# Patient Record
Sex: FEMALE | Race: WHITE | NOT HISPANIC OR LATINO | Employment: UNEMPLOYED | URBAN - METROPOLITAN AREA
[De-identification: names, ages, dates, MRNs, and addresses within clinical notes are randomized per-mention and may not be internally consistent; named-entity substitution may affect disease eponyms.]

---

## 2017-01-03 ENCOUNTER — OFFICE VISIT (OUTPATIENT)
Dept: FAMILY MEDICINE CLINIC | Facility: CLINIC | Age: 62
End: 2017-01-03

## 2017-01-03 VITALS
RESPIRATION RATE: 16 BRPM | HEIGHT: 61 IN | BODY MASS INDEX: 25.11 KG/M2 | TEMPERATURE: 98.7 F | WEIGHT: 133 LBS | SYSTOLIC BLOOD PRESSURE: 118 MMHG | HEART RATE: 85 BPM | DIASTOLIC BLOOD PRESSURE: 70 MMHG

## 2017-01-03 DIAGNOSIS — I10 ESSENTIAL HYPERTENSION: Primary | ICD-10-CM

## 2017-01-03 DIAGNOSIS — E78.49 OTHER HYPERLIPIDEMIA: ICD-10-CM

## 2017-01-03 DIAGNOSIS — F41.9 ANXIETY: ICD-10-CM

## 2017-01-03 DIAGNOSIS — Z12.11 SCREEN FOR COLON CANCER: ICD-10-CM

## 2017-01-03 DIAGNOSIS — K21.9 GASTROESOPHAGEAL REFLUX DISEASE, ESOPHAGITIS PRESENCE NOT SPECIFIED: ICD-10-CM

## 2017-01-03 PROBLEM — D70.9 NEUTROPENIA: Status: ACTIVE | Noted: 2017-01-03

## 2017-01-03 PROCEDURE — 99214 OFFICE O/P EST MOD 30 MIN: CPT | Performed by: FAMILY MEDICINE

## 2017-01-03 RX ORDER — METOPROLOL SUCCINATE 50 MG/1
50 TABLET, EXTENDED RELEASE ORAL DAILY
Qty: 90 TABLET | Refills: 1 | Status: SHIPPED | OUTPATIENT
Start: 2017-01-03 | End: 2017-06-28 | Stop reason: SDUPTHER

## 2017-01-03 RX ORDER — DULOXETIN HYDROCHLORIDE 60 MG/1
60 CAPSULE, DELAYED RELEASE ORAL NIGHTLY
Qty: 90 CAPSULE | Refills: 1 | Status: SHIPPED | OUTPATIENT
Start: 2017-01-03 | End: 2017-06-28 | Stop reason: SDUPTHER

## 2017-01-03 RX ORDER — ATORVASTATIN CALCIUM 10 MG/1
10 TABLET, FILM COATED ORAL NIGHTLY
Qty: 90 TABLET | Refills: 1 | Status: SHIPPED | OUTPATIENT
Start: 2017-01-03 | End: 2017-06-28 | Stop reason: SDUPTHER

## 2017-01-03 RX ORDER — OMEPRAZOLE 20 MG/1
20 CAPSULE, DELAYED RELEASE ORAL NIGHTLY
Qty: 90 CAPSULE | Refills: 1 | Status: SHIPPED | OUTPATIENT
Start: 2017-01-03 | End: 2017-06-28 | Stop reason: SDUPTHER

## 2017-01-03 RX ORDER — MULTIVIT,IRON,MINERALS/LUTEIN
1 TABLET ORAL DAILY
Qty: 30 TABLET | Refills: 5
Start: 2017-01-03 | End: 2021-08-26

## 2017-01-03 NOTE — PROGRESS NOTES
"Chief Complaint   Patient presents with   • Hyperlipidemia   • Hypertension       Subjective   This patient presents the office to review labs and refill medicines.  Blood pressure is controlled on current therapy.  GERD symptoms are stable with omeprazole.  Lipids are stable on current dose of atorvastatin.  Anxiety control with duloxetine.  Labs are normal except for continued neutropenia.  She will add Centrum Silver to her current regimen.  Review of Systems   Constitutional: Negative for fatigue.   Cardiovascular: Negative for chest pain.       Objective   Visit Vitals   • /70   • Pulse 85   • Temp 98.7 °F (37.1 °C) (Oral)   • Resp 16   • Ht 61\" (154.9 cm)   • Wt 133 lb (60.3 kg)   • BMI 25.13 kg/m2     Body mass index is 25.13 kg/(m^2).  Physical Exam   Constitutional: She is cooperative. No distress.   Eyes: Conjunctivae and lids are normal.   Neck: Carotid bruit is not present. No tracheal deviation present.   Cardiovascular: Normal rate, regular rhythm and normal heart sounds.    No murmur heard.  Pulmonary/Chest: Effort normal and breath sounds normal.   Neurological: She is alert. She is not disoriented.   Skin: Skin is warm and dry.   Psychiatric: She has a normal mood and affect. Her speech is normal and behavior is normal.   Vitals reviewed.      Assessment/Plan     Problem List Items Addressed This Visit        Cardiovascular and Mediastinum    BP (high blood pressure) - Primary    Relevant Medications    metoprolol succinate XL (TOPROL-XL) 50 MG 24 hr tablet    Other Relevant Orders    Comprehensive metabolic panel    CBC and Differential    TSH       Digestive    Gastroesophageal reflux disease    Relevant Medications    omeprazole (priLOSEC) 20 MG capsule    Other Relevant Orders    Comprehensive metabolic panel    CBC and Differential       Other    Hyperlipidemia    Relevant Medications    atorvastatin (LIPITOR) 10 MG tablet    Other Relevant Orders    Lipid Panel With LDL/HDL Ratio    " Anxiety    Relevant Medications    DULoxetine (CYMBALTA) 60 MG capsule    Other Relevant Orders    Comprehensive metabolic panel    CBC and Differential    TSH          Outpatient Encounter Prescriptions as of 1/3/2017   Medication Sig Dispense Refill   • atorvastatin (LIPITOR) 10 MG tablet Take 1 tablet by mouth Every Night for 180 days. 90 tablet 1   • DULoxetine (CYMBALTA) 60 MG capsule Take 1 capsule by mouth Every Night for 180 days. 90 capsule 1   • metoprolol succinate XL (TOPROL-XL) 50 MG 24 hr tablet Take 1 tablet by mouth Daily for 180 days. 90 tablet 1   • Multiple Vitamins-Minerals (CENTRUM SILVER ULTRA WOMENS) tablet Take 1 tablet by mouth Daily for 180 days. 30 tablet 5   • omeprazole (priLOSEC) 20 MG capsule Take 1 capsule by mouth Every Night for 180 days. 90 capsule 1   • [DISCONTINUED] atorvastatin (LIPITOR) 10 MG tablet Take 1 tablet by mouth every night for 180 days. 90 tablet 1   • [DISCONTINUED] DULoxetine (CYMBALTA) 60 MG capsule Take 1 capsule by mouth every night for 180 days. 90 capsule 1   • [DISCONTINUED] metoprolol succinate XL (TOPROL-XL) 50 MG 24 hr tablet Take 1 tablet by mouth daily for 180 days. 90 tablet 1   • [DISCONTINUED] omeprazole (PriLOSEC) 20 MG capsule Take 1 capsule by mouth every night for 180 days. 90 capsule 1     No facility-administered encounter medications on file as of 1/3/2017.        Orders Placed This Encounter   Procedures   • Comprehensive metabolic panel     Standing Status:   Future     Standing Expiration Date:   9/30/2017   • Lipid Panel With LDL/HDL Ratio     Standing Status:   Future     Standing Expiration Date:   9/30/2017   • TSH     Standing Status:   Future     Standing Expiration Date:   9/30/2017       Continue with current treatment plan.         F/U in 6 months

## 2017-01-03 NOTE — MR AVS SNAPSHOT
Kayla Adkins   1/3/2017 1:30 PM   Office Visit    Provider:  Emmett Rivas MD   Department:  [facility]   Dept Phone:  [phone]                Your Full Care Plan              Today's Medication Changes          These changes are accurate as of: 1/3/17  4:05 PM.  If you have any questions, ask your nurse or doctor.               New Medication(s)Ordered:     CENTRUM SILVER ULTRA WOMENS tablet   Take 1 tablet by mouth Daily for 180 days.   Started by:  Emmett Rivas MD            Where to Get Your Medications      These medications were sent to [facility] - [place], [place] - [address] - [phone] [facility] [phone] FX  [address], [facility] [address]     Phone:  [phone]     atorvastatin 10 MG tablet    DULoxetine 60 MG capsule    metoprolol succinate XL 50 MG 24 hr tablet    omeprazole 20 MG capsule         Information about where to get these medications is not yet available     ! Ask your nurse or doctor about these medications     CENTRUM SILVER ULTRA WOMENS tablet                  Your Updated Medication List          This list is accurate as of: 1/3/17  4:05 PM.  Always use your most recent med list.                atorvastatin 10 MG tablet   Commonly known as:  LIPITOR   Take 1 tablet by mouth Every Night for 180 days.       CENTRUM SILVER ULTRA WOMENS tablet   Take 1 tablet by mouth Daily for 180 days.       DULoxetine 60 MG capsule   Commonly known as:  CYMBALTA   Take 1 capsule by mouth Every Night for 180 days.       metoprolol succinate XL 50 MG 24 hr tablet   Commonly known as:  TOPROL-XL   Take 1 tablet by mouth Daily for 180 days.       omeprazole 20 MG capsule   Commonly known as:  priLOSEC   Take 1 capsule by mouth Every Night for 180 days.               We Performed the Following     Ambulatory Referral to Gastroenterology       You Were Diagnosed With        Codes Comments    Essential  "hypertension    -  Primary ICD-10-CM: I10  ICD-9-CM: 401.9     Gastroesophageal reflux disease, esophagitis presence not specified     ICD-10-CM: K21.9  ICD-9-CM: 530.81     Anxiety     ICD-10-CM: F41.9  ICD-9-CM: 300.00     Other hyperlipidemia     ICD-10-CM: E78.4  ICD-9-CM: 272.4     Screen for colon cancer     ICD-10-CM: Z12.11  ICD-9-CM: V76.51       Instructions     None    Patient Instructions History      MyChart Signup     Our records indicate that you have an active [employer] account.    You can view your After Visit Summary by going to [employer] and logging in with your [employer] username and password.  If you don't have a [employer] username and password but a parent or guardian has access to your record, the parent or guardian should login with their own [employer] username and password and access your record to view the After Visit Summary.    If you have questions, you can email [email or web address] or call [phone] to talk to our [employer] staff.  Remember, [employer] is NOT to be used for urgent needs.  For medical emergencies, dial 911.               Other Info from Your Visit           Allergies     Penicillins        Reason for Visit     Hyperlipidemia     Hypertension           Vital Signs     Blood Pressure Pulse Temperature Respirations Height Weight    118/70 85 98.7 °F (37.1 °C) (Oral) 16 61\" (154.9 cm) 133 lb (60.3 kg)    Body Mass Index Smoking Status                25.13 kg/m2 Never Smoker          Problems and Diagnoses Noted     Anxiety problem    High blood pressure    Acid reflux disease    High cholesterol or triglycerides    Disease of white blood cells    Screen for colon cancer          "

## 2017-02-23 VITALS
SYSTOLIC BLOOD PRESSURE: 97 MMHG | OXYGEN SATURATION: 98 % | HEART RATE: 82 BPM | HEIGHT: 62 IN | HEART RATE: 80 BPM | SYSTOLIC BLOOD PRESSURE: 98 MMHG | WEIGHT: 126 LBS | RESPIRATION RATE: 21 BRPM | SYSTOLIC BLOOD PRESSURE: 92 MMHG | DIASTOLIC BLOOD PRESSURE: 78 MMHG | DIASTOLIC BLOOD PRESSURE: 54 MMHG | HEART RATE: 84 BPM | RESPIRATION RATE: 29 BRPM | HEART RATE: 96 BPM | DIASTOLIC BLOOD PRESSURE: 59 MMHG | RESPIRATION RATE: 26 BRPM | DIASTOLIC BLOOD PRESSURE: 58 MMHG | DIASTOLIC BLOOD PRESSURE: 77 MMHG | HEART RATE: 76 BPM | SYSTOLIC BLOOD PRESSURE: 101 MMHG | SYSTOLIC BLOOD PRESSURE: 96 MMHG | DIASTOLIC BLOOD PRESSURE: 56 MMHG | SYSTOLIC BLOOD PRESSURE: 94 MMHG | DIASTOLIC BLOOD PRESSURE: 46 MMHG | HEART RATE: 86 BPM | RESPIRATION RATE: 16 BRPM | HEART RATE: 73 BPM | TEMPERATURE: 99.1 F | TEMPERATURE: 97.5 F | HEART RATE: 91 BPM | SYSTOLIC BLOOD PRESSURE: 128 MMHG | SYSTOLIC BLOOD PRESSURE: 125 MMHG | RESPIRATION RATE: 22 BRPM | OXYGEN SATURATION: 99 %

## 2017-02-23 PROBLEM — Z12.11 SCREENING FOR COLONIC NEOPLASIA: Status: ACTIVE | Noted: 2017-02-24

## 2017-02-24 ENCOUNTER — AMBULATORY SURGICAL CENTER (AMBULATORY)
Dept: URBAN - METROPOLITAN AREA SURGERY 17 | Facility: SURGERY | Age: 62
End: 2017-02-24
Payer: COMMERCIAL

## 2017-02-24 ENCOUNTER — OFFICE (AMBULATORY)
Dept: URBAN - METROPOLITAN AREA CLINIC 64 | Facility: CLINIC | Age: 62
End: 2017-02-24

## 2017-02-24 DIAGNOSIS — Z12.11 ENCOUNTER FOR SCREENING FOR MALIGNANT NEOPLASM OF COLON: ICD-10-CM

## 2017-02-24 DIAGNOSIS — Z83.71 FAMILY HISTORY OF COLONIC POLYPS: ICD-10-CM

## 2017-02-24 DIAGNOSIS — K52.832 LYMPHOCYTIC COLITIS: ICD-10-CM

## 2017-02-24 DIAGNOSIS — K59.1 FUNCTIONAL DIARRHEA: ICD-10-CM

## 2017-02-24 DIAGNOSIS — K52.89 OTHER SPECIFIED NONINFECTIVE GASTROENTERITIS AND COLITIS: ICD-10-CM

## 2017-02-24 LAB
GI HISTOLOGY: A. UNSPECIFIED: (no result)
GI HISTOLOGY: B. UNSPECIFIED: (no result)
GI HISTOLOGY: PDF REPORT: (no result)

## 2017-02-24 PROCEDURE — 88305 TISSUE EXAM BY PATHOLOGIST: CPT | Performed by: INTERNAL MEDICINE

## 2017-02-24 PROCEDURE — 45380 COLONOSCOPY AND BIOPSY: CPT | Performed by: INTERNAL MEDICINE

## 2017-02-24 RX ORDER — HYOSCYAMINE SULFATE 0.12 MG/1
TABLET ORAL
Qty: 60 | Refills: 10 | Status: ACTIVE
Start: 2017-02-24

## 2017-02-24 RX ADMIN — PROPOFOL 50 MG: 10 INJECTION, EMULSION INTRAVENOUS at 14:05

## 2017-02-24 RX ADMIN — PROPOFOL 50 MG: 10 INJECTION, EMULSION INTRAVENOUS at 14:06

## 2017-02-24 RX ADMIN — PROPOFOL 50 MG: 10 INJECTION, EMULSION INTRAVENOUS at 14:03

## 2017-02-24 RX ADMIN — PROPOFOL 100 MG: 10 INJECTION, EMULSION INTRAVENOUS at 14:03

## 2017-02-24 RX ADMIN — PROPOFOL 50 MG: 10 INJECTION, EMULSION INTRAVENOUS at 14:16

## 2017-04-26 ENCOUNTER — APPOINTMENT (OUTPATIENT)
Dept: WOMENS IMAGING | Facility: HOSPITAL | Age: 62
End: 2017-04-26

## 2017-04-26 PROCEDURE — 77067 SCR MAMMO BI INCL CAD: CPT | Performed by: RADIOLOGY

## 2017-06-02 ENCOUNTER — RESULTS ENCOUNTER (OUTPATIENT)
Dept: FAMILY MEDICINE CLINIC | Facility: CLINIC | Age: 62
End: 2017-06-02

## 2017-06-02 DIAGNOSIS — K21.9 GASTROESOPHAGEAL REFLUX DISEASE, ESOPHAGITIS PRESENCE NOT SPECIFIED: ICD-10-CM

## 2017-06-02 DIAGNOSIS — F41.9 ANXIETY: ICD-10-CM

## 2017-06-02 DIAGNOSIS — I10 ESSENTIAL HYPERTENSION: ICD-10-CM

## 2017-06-02 DIAGNOSIS — E78.49 OTHER HYPERLIPIDEMIA: ICD-10-CM

## 2017-06-17 DIAGNOSIS — E78.49 OTHER HYPERLIPIDEMIA: ICD-10-CM

## 2017-06-17 DIAGNOSIS — K21.9 GASTROESOPHAGEAL REFLUX DISEASE, ESOPHAGITIS PRESENCE NOT SPECIFIED: ICD-10-CM

## 2017-06-17 DIAGNOSIS — F41.9 ANXIETY: ICD-10-CM

## 2017-06-17 DIAGNOSIS — I10 ESSENTIAL HYPERTENSION: ICD-10-CM

## 2017-06-19 DIAGNOSIS — K21.9 GASTROESOPHAGEAL REFLUX DISEASE, ESOPHAGITIS PRESENCE NOT SPECIFIED: ICD-10-CM

## 2017-06-19 RX ORDER — DULOXETIN HYDROCHLORIDE 60 MG/1
CAPSULE, DELAYED RELEASE ORAL
Qty: 90 CAPSULE | Refills: 1 | OUTPATIENT
Start: 2017-06-19

## 2017-06-19 RX ORDER — ATORVASTATIN CALCIUM 10 MG/1
TABLET, FILM COATED ORAL
Qty: 90 TABLET | Refills: 1 | OUTPATIENT
Start: 2017-06-19

## 2017-06-19 RX ORDER — METOPROLOL SUCCINATE 50 MG/1
TABLET, EXTENDED RELEASE ORAL
Qty: 90 TABLET | Refills: 1 | OUTPATIENT
Start: 2017-06-19

## 2017-06-19 RX ORDER — OMEPRAZOLE 20 MG/1
CAPSULE, DELAYED RELEASE ORAL
Qty: 90 CAPSULE | Refills: 1 | OUTPATIENT
Start: 2017-06-19

## 2017-06-21 LAB
ALBUMIN SERPL-MCNC: 4.6 G/DL (ref 3.5–5.2)
ALBUMIN/GLOB SERPL: 2.1 G/DL
ALP SERPL-CCNC: 89 U/L (ref 39–117)
ALT SERPL-CCNC: 15 U/L (ref 1–33)
AST SERPL-CCNC: 13 U/L (ref 1–32)
BASOPHILS # BLD AUTO: 0.01 10*3/MM3 (ref 0–0.2)
BASOPHILS NFR BLD AUTO: 0.3 % (ref 0–1.5)
BILIRUB SERPL-MCNC: 0.2 MG/DL (ref 0.1–1.2)
BUN SERPL-MCNC: 9 MG/DL (ref 8–23)
BUN/CREAT SERPL: 10.8 (ref 7–25)
CALCIUM SERPL-MCNC: 9.6 MG/DL (ref 8.6–10.5)
CHLORIDE SERPL-SCNC: 102 MMOL/L (ref 98–107)
CHOLEST SERPL-MCNC: 186 MG/DL (ref 0–200)
CO2 SERPL-SCNC: 27.2 MMOL/L (ref 22–29)
CREAT SERPL-MCNC: 0.83 MG/DL (ref 0.57–1)
EOSINOPHIL # BLD AUTO: 0.06 10*3/MM3 (ref 0–0.7)
EOSINOPHIL NFR BLD AUTO: 1.7 % (ref 0.3–6.2)
ERYTHROCYTE [DISTWIDTH] IN BLOOD BY AUTOMATED COUNT: 12.9 % (ref 11.7–13)
GLOBULIN SER CALC-MCNC: 2.2 GM/DL
GLUCOSE SERPL-MCNC: 102 MG/DL (ref 65–99)
HCT VFR BLD AUTO: 42.9 % (ref 35.6–45.5)
HDLC SERPL-MCNC: 65 MG/DL (ref 40–60)
HGB BLD-MCNC: 13.9 G/DL (ref 11.9–15.5)
IMM GRANULOCYTES # BLD: 0 10*3/MM3 (ref 0–0.03)
IMM GRANULOCYTES NFR BLD: 0 % (ref 0–0.5)
LDLC SERPL CALC-MCNC: 89 MG/DL (ref 0–100)
LDLC/HDLC SERPL: 1.37 {RATIO}
LYMPHOCYTES # BLD AUTO: 1.35 10*3/MM3 (ref 0.9–4.8)
LYMPHOCYTES NFR BLD AUTO: 37.4 % (ref 19.6–45.3)
MCH RBC QN AUTO: 31.2 PG (ref 26.9–32)
MCHC RBC AUTO-ENTMCNC: 32.4 G/DL (ref 32.4–36.3)
MCV RBC AUTO: 96.4 FL (ref 80.5–98.2)
MONOCYTES # BLD AUTO: 0.37 10*3/MM3 (ref 0.2–1.2)
MONOCYTES NFR BLD AUTO: 10.2 % (ref 5–12)
NEUTROPHILS # BLD AUTO: 1.82 10*3/MM3 (ref 1.9–8.1)
NEUTROPHILS NFR BLD AUTO: 50.4 % (ref 42.7–76)
PLATELET # BLD AUTO: 279 10*3/MM3 (ref 140–500)
POTASSIUM SERPL-SCNC: 4.8 MMOL/L (ref 3.5–5.2)
PROT SERPL-MCNC: 6.8 G/DL (ref 6–8.5)
RBC # BLD AUTO: 4.45 10*6/MM3 (ref 3.9–5.2)
SODIUM SERPL-SCNC: 141 MMOL/L (ref 136–145)
TRIGL SERPL-MCNC: 159 MG/DL (ref 0–150)
TSH SERPL DL<=0.005 MIU/L-ACNC: 1.13 MIU/ML (ref 0.27–4.2)
VLDLC SERPL CALC-MCNC: 31.8 MG/DL (ref 5–40)
WBC # BLD AUTO: 3.61 10*3/MM3 (ref 4.5–10.7)

## 2017-06-28 ENCOUNTER — OFFICE VISIT (OUTPATIENT)
Dept: FAMILY MEDICINE CLINIC | Facility: CLINIC | Age: 62
End: 2017-06-28

## 2017-06-28 VITALS
TEMPERATURE: 99.1 F | WEIGHT: 135 LBS | DIASTOLIC BLOOD PRESSURE: 73 MMHG | BODY MASS INDEX: 25.49 KG/M2 | SYSTOLIC BLOOD PRESSURE: 115 MMHG | HEART RATE: 82 BPM | RESPIRATION RATE: 16 BRPM | HEIGHT: 61 IN

## 2017-06-28 DIAGNOSIS — I10 ESSENTIAL HYPERTENSION: Primary | ICD-10-CM

## 2017-06-28 DIAGNOSIS — Z11.59 NEED FOR HEPATITIS C SCREENING TEST: ICD-10-CM

## 2017-06-28 DIAGNOSIS — F41.9 ANXIETY: ICD-10-CM

## 2017-06-28 DIAGNOSIS — K21.9 GASTROESOPHAGEAL REFLUX DISEASE, ESOPHAGITIS PRESENCE NOT SPECIFIED: ICD-10-CM

## 2017-06-28 DIAGNOSIS — K21.9 GASTROESOPHAGEAL REFLUX DISEASE WITHOUT ESOPHAGITIS: ICD-10-CM

## 2017-06-28 DIAGNOSIS — E78.49 OTHER HYPERLIPIDEMIA: ICD-10-CM

## 2017-06-28 PROBLEM — K52.832 LYMPHOCYTIC COLITIS: Status: ACTIVE | Noted: 2017-06-28

## 2017-06-28 PROBLEM — K52.832 LYMPHOCYTIC COLITIS: Status: ACTIVE | Noted: 2017-01-03

## 2017-06-28 PROCEDURE — 99214 OFFICE O/P EST MOD 30 MIN: CPT | Performed by: FAMILY MEDICINE

## 2017-06-28 RX ORDER — METOPROLOL SUCCINATE 50 MG/1
50 TABLET, EXTENDED RELEASE ORAL DAILY
Qty: 90 TABLET | Refills: 1 | Status: SHIPPED | OUTPATIENT
Start: 2017-06-28 | End: 2018-01-03 | Stop reason: SDUPTHER

## 2017-06-28 RX ORDER — HYOSCYAMINE SULFATE 0.125 MG
0.12 TABLET ORAL EVERY 4 HOURS PRN
COMMUNITY
End: 2021-08-26

## 2017-06-28 RX ORDER — DULOXETIN HYDROCHLORIDE 60 MG/1
60 CAPSULE, DELAYED RELEASE ORAL NIGHTLY
Qty: 90 CAPSULE | Refills: 1 | Status: SHIPPED | OUTPATIENT
Start: 2017-06-28 | End: 2018-01-03 | Stop reason: SDUPTHER

## 2017-06-28 RX ORDER — OMEPRAZOLE 20 MG/1
20 CAPSULE, DELAYED RELEASE ORAL NIGHTLY
Qty: 90 CAPSULE | Refills: 1 | Status: SHIPPED | OUTPATIENT
Start: 2017-06-28 | End: 2018-01-03

## 2017-06-28 RX ORDER — ATORVASTATIN CALCIUM 10 MG/1
10 TABLET, FILM COATED ORAL NIGHTLY
Qty: 90 TABLET | Refills: 1 | Status: SHIPPED | OUTPATIENT
Start: 2017-06-28 | End: 2018-01-03 | Stop reason: SDUPTHER

## 2017-06-28 NOTE — PROGRESS NOTES
"Chief Complaint   Patient presents with   • Hyperlipidemia   • Hypertension       Subjective   This patient presents the office to review labs and refill medicines.  Blood pressure is controlled on current therapy.  GERD is controlled with omeprazole.  Lipids are controlled with current atorvastatin dose.  Her anxiety is fairly well controlled with duloxetine.  Lately, she has had some feelings that the other shoe is going to drop in the near future.  For now, we will not change therapy.  Labs have been reviewed and are seen below.  Neutropenia is improved.  She continues to have mild elevation of fasting blood sugar.  Brief discussion about dietary interventions has been undertaken.    Since the last visit she had colonoscopy and was diagnosed with lymphocytic colitis of the colon.  She uses hyoscyamine on an as-needed basis for that condition.  I have reviewed and updated her medications, medical history and problem list during today's office visit.        Social History   Substance Use Topics   • Smoking status: Never Smoker   • Smokeless tobacco: Never Used   • Alcohol use No       Review of Systems   Constitutional: Negative for fatigue.   Cardiovascular: Negative for chest pain.       Objective   /73  Pulse 82  Temp 99.1 °F (37.3 °C) (Oral)   Resp 16  Ht 61\" (154.9 cm)  Wt 135 lb (61.2 kg)  BMI 25.51 kg/m2  Body mass index is 25.51 kg/(m^2).  Physical Exam   Constitutional: She is cooperative. No distress.   Eyes: Conjunctivae and lids are normal.   Neck: Carotid bruit is not present. No tracheal deviation present.   Cardiovascular: Normal rate, regular rhythm and normal heart sounds.    No murmur heard.  Pulmonary/Chest: Effort normal and breath sounds normal.   Neurological: She is alert. She is not disoriented.   Skin: Skin is warm and dry.   Psychiatric: She has a normal mood and affect. Her speech is normal and behavior is normal.   Vitals reviewed.      Data Reviewed:    CMP:  Lab Results "   Component Value Date     (H) 06/21/2017    BUN 9 06/21/2017    CREATININE 0.83 06/21/2017    EGFRIFNONA 70 06/21/2017    EGFRIFAFRI 85 06/21/2017     06/21/2017    K 4.8 06/21/2017     06/21/2017    CALCIUM 9.6 06/21/2017    PROTENTOTREF 6.8 06/21/2017    ALBUMIN 4.60 06/21/2017    LABGLOBREF 2.2 06/21/2017    BILITOT 0.2 06/21/2017    ALKPHOS 89 06/21/2017    AST 13 06/21/2017    ALT 15 06/21/2017     CBC w/ diff:   Lab Results   Component Value Date    WBC 3.61 (L) 06/21/2017    RBC 4.45 06/21/2017    HGB 13.9 06/21/2017    HCT 42.9 06/21/2017    MCV 96.4 06/21/2017    MCH 31.2 06/21/2017    MCHC 32.4 06/21/2017    RDW 12.9 06/21/2017     06/21/2017    NEUTRORELPCT 50.4 06/21/2017    LYMPHORELPCT 37.4 06/21/2017    MONORELPCT 10.2 06/21/2017    EOSRELPCT 1.7 06/21/2017    BASORELPCT 0.3 06/21/2017     LIPID PANEL:  Lab Results   Component Value Date    CHLPL 186 06/21/2017    TRIG 159 (H) 06/21/2017    HDL 65 (H) 06/21/2017    VLDL 31.8 06/21/2017    LDL 89 06/21/2017    LDLHDL 1.37 06/21/2017     TSH:  Lab Results   Component Value Date    TSH 1.130 06/21/2017       Assessment/Plan     Problem List Items Addressed This Visit        Cardiovascular and Mediastinum    BP (high blood pressure) - Primary    Relevant Medications    metoprolol succinate XL (TOPROL-XL) 50 MG 24 hr tablet    Other Relevant Orders    Comprehensive metabolic panel    CBC and Differential    TSH    Hyperlipidemia    Relevant Medications    atorvastatin (LIPITOR) 10 MG tablet    Other Relevant Orders    Lipid Panel With LDL/HDL Ratio       Digestive    Gastroesophageal reflux disease    Relevant Medications    hyoscyamine (ANASPAZ,LEVSIN) 0.125 MG tablet    omeprazole (priLOSEC) 20 MG capsule       Other    Anxiety    Relevant Medications    DULoxetine (CYMBALTA) 60 MG capsule      Other Visit Diagnoses     Need for hepatitis C screening test        Relevant Orders    Hepatitis C Antibody          Outpatient  Encounter Prescriptions as of 6/28/2017   Medication Sig Dispense Refill   • atorvastatin (LIPITOR) 10 MG tablet Take 1 tablet by mouth Every Night for 180 days. 90 tablet 1   • DULoxetine (CYMBALTA) 60 MG capsule Take 1 capsule by mouth Every Night for 180 days. 90 capsule 1   • hyoscyamine (ANASPAZ,LEVSIN) 0.125 MG tablet Take 0.125 mg by mouth Every 4 (Four) Hours As Needed for Cramping.     • metoprolol succinate XL (TOPROL-XL) 50 MG 24 hr tablet Take 1 tablet by mouth Daily for 180 days. 90 tablet 1   • Multiple Vitamins-Minerals (CENTRUM SILVER ULTRA WOMENS) tablet Take 1 tablet by mouth Daily for 180 days. 30 tablet 5   • omeprazole (priLOSEC) 20 MG capsule Take 1 capsule by mouth Every Night for 180 days. 90 capsule 1   • [DISCONTINUED] atorvastatin (LIPITOR) 10 MG tablet Take 1 tablet by mouth Every Night for 180 days. 90 tablet 1   • [DISCONTINUED] DULoxetine (CYMBALTA) 60 MG capsule Take 1 capsule by mouth Every Night for 180 days. 90 capsule 1   • [DISCONTINUED] metoprolol succinate XL (TOPROL-XL) 50 MG 24 hr tablet Take 1 tablet by mouth Daily for 180 days. 90 tablet 1   • [DISCONTINUED] omeprazole (priLOSEC) 20 MG capsule Take 1 capsule by mouth Every Night for 180 days. 90 capsule 1     No facility-administered encounter medications on file as of 6/28/2017.        Orders Placed This Encounter   Procedures   • Comprehensive metabolic panel     Standing Status:   Future     Standing Expiration Date:   3/25/2018   • Lipid Panel With LDL/HDL Ratio     Standing Status:   Future     Standing Expiration Date:   3/25/2018   • TSH     Standing Status:   Future     Standing Expiration Date:   3/25/2018   • Hepatitis C Antibody     Standing Status:   Future     Standing Expiration Date:   3/25/2018   • CBC and Differential     Standing Status:   Future     Standing Expiration Date:   3/25/2018     Order Specific Question:   Manual Differential     Answer:   No       Continue with current treatment plan.          F/U in 6 months

## 2017-11-25 ENCOUNTER — RESULTS ENCOUNTER (OUTPATIENT)
Dept: FAMILY MEDICINE CLINIC | Facility: CLINIC | Age: 62
End: 2017-11-25

## 2017-11-25 DIAGNOSIS — I10 ESSENTIAL HYPERTENSION: ICD-10-CM

## 2017-11-25 DIAGNOSIS — Z11.59 NEED FOR HEPATITIS C SCREENING TEST: ICD-10-CM

## 2017-11-25 DIAGNOSIS — E78.49 OTHER HYPERLIPIDEMIA: ICD-10-CM

## 2017-12-05 DIAGNOSIS — K21.9 GASTROESOPHAGEAL REFLUX DISEASE, ESOPHAGITIS PRESENCE NOT SPECIFIED: ICD-10-CM

## 2017-12-05 DIAGNOSIS — E78.49 OTHER HYPERLIPIDEMIA: ICD-10-CM

## 2017-12-05 DIAGNOSIS — I10 ESSENTIAL HYPERTENSION: ICD-10-CM

## 2017-12-05 DIAGNOSIS — F41.9 ANXIETY: ICD-10-CM

## 2017-12-05 RX ORDER — OMEPRAZOLE 20 MG/1
CAPSULE, DELAYED RELEASE ORAL
Qty: 90 CAPSULE | Refills: 1 | OUTPATIENT
Start: 2017-12-05

## 2017-12-05 RX ORDER — ATORVASTATIN CALCIUM 10 MG/1
TABLET, FILM COATED ORAL
Qty: 90 TABLET | Refills: 1 | OUTPATIENT
Start: 2017-12-05

## 2017-12-05 RX ORDER — DULOXETIN HYDROCHLORIDE 60 MG/1
CAPSULE, DELAYED RELEASE ORAL
Qty: 90 CAPSULE | Refills: 1 | OUTPATIENT
Start: 2017-12-05

## 2017-12-05 RX ORDER — METOPROLOL SUCCINATE 50 MG/1
TABLET, EXTENDED RELEASE ORAL
Qty: 90 TABLET | Refills: 1 | OUTPATIENT
Start: 2017-12-05

## 2017-12-28 LAB
ALBUMIN SERPL-MCNC: 4.2 G/DL (ref 3.5–5.2)
ALBUMIN/GLOB SERPL: 1.8 G/DL
ALP SERPL-CCNC: 82 U/L (ref 39–117)
ALT SERPL-CCNC: 21 U/L (ref 1–33)
AST SERPL-CCNC: 14 U/L (ref 1–32)
BASOPHILS # BLD AUTO: 0.01 10*3/MM3 (ref 0–0.2)
BASOPHILS NFR BLD AUTO: 0.3 % (ref 0–1.5)
BILIRUB SERPL-MCNC: 0.3 MG/DL (ref 0.1–1.2)
BUN SERPL-MCNC: 13 MG/DL (ref 8–23)
BUN/CREAT SERPL: 18.1 (ref 7–25)
CALCIUM SERPL-MCNC: 9.6 MG/DL (ref 8.6–10.5)
CHLORIDE SERPL-SCNC: 104 MMOL/L (ref 98–107)
CHOLEST SERPL-MCNC: 201 MG/DL (ref 0–200)
CO2 SERPL-SCNC: 28.4 MMOL/L (ref 22–29)
CREAT SERPL-MCNC: 0.72 MG/DL (ref 0.57–1)
EOSINOPHIL # BLD AUTO: 0.06 10*3/MM3 (ref 0–0.7)
EOSINOPHIL NFR BLD AUTO: 2 % (ref 0.3–6.2)
ERYTHROCYTE [DISTWIDTH] IN BLOOD BY AUTOMATED COUNT: 13 % (ref 11.7–13)
GLOBULIN SER CALC-MCNC: 2.4 GM/DL
GLUCOSE SERPL-MCNC: 104 MG/DL (ref 65–99)
HCT VFR BLD AUTO: 41.1 % (ref 35.6–45.5)
HCV AB S/CO SERPL IA: <0.1 S/CO RATIO (ref 0–0.9)
HDLC SERPL-MCNC: 69 MG/DL (ref 40–60)
HGB BLD-MCNC: 13.5 G/DL (ref 11.9–15.5)
IMM GRANULOCYTES # BLD: 0 10*3/MM3 (ref 0–0.03)
IMM GRANULOCYTES NFR BLD: 0 % (ref 0–0.5)
LDLC SERPL CALC-MCNC: 101 MG/DL (ref 0–100)
LDLC/HDLC SERPL: 1.47 {RATIO}
LYMPHOCYTES # BLD AUTO: 1.18 10*3/MM3 (ref 0.9–4.8)
LYMPHOCYTES NFR BLD AUTO: 40 % (ref 19.6–45.3)
MCH RBC QN AUTO: 31.1 PG (ref 26.9–32)
MCHC RBC AUTO-ENTMCNC: 32.8 G/DL (ref 32.4–36.3)
MCV RBC AUTO: 94.7 FL (ref 80.5–98.2)
MONOCYTES # BLD AUTO: 0.31 10*3/MM3 (ref 0.2–1.2)
MONOCYTES NFR BLD AUTO: 10.5 % (ref 5–12)
NEUTROPHILS # BLD AUTO: 1.39 10*3/MM3 (ref 1.9–8.1)
NEUTROPHILS NFR BLD AUTO: 47.2 % (ref 42.7–76)
PLATELET # BLD AUTO: 288 10*3/MM3 (ref 140–500)
POTASSIUM SERPL-SCNC: 4.9 MMOL/L (ref 3.5–5.2)
PROT SERPL-MCNC: 6.6 G/DL (ref 6–8.5)
RBC # BLD AUTO: 4.34 10*6/MM3 (ref 3.9–5.2)
SODIUM SERPL-SCNC: 143 MMOL/L (ref 136–145)
TRIGL SERPL-MCNC: 154 MG/DL (ref 0–150)
TSH SERPL DL<=0.005 MIU/L-ACNC: 0.9 MIU/ML (ref 0.27–4.2)
VLDLC SERPL CALC-MCNC: 30.8 MG/DL (ref 5–40)
WBC # BLD AUTO: 2.95 10*3/MM3 (ref 4.5–10.7)

## 2018-01-03 ENCOUNTER — OFFICE VISIT (OUTPATIENT)
Dept: FAMILY MEDICINE CLINIC | Facility: CLINIC | Age: 63
End: 2018-01-03

## 2018-01-03 VITALS
SYSTOLIC BLOOD PRESSURE: 135 MMHG | WEIGHT: 166 LBS | TEMPERATURE: 98.4 F | BODY MASS INDEX: 31.34 KG/M2 | DIASTOLIC BLOOD PRESSURE: 86 MMHG | HEIGHT: 61 IN | RESPIRATION RATE: 16 BRPM | HEART RATE: 115 BPM

## 2018-01-03 DIAGNOSIS — K21.9 GASTROESOPHAGEAL REFLUX DISEASE, ESOPHAGITIS PRESENCE NOT SPECIFIED: ICD-10-CM

## 2018-01-03 DIAGNOSIS — F40.243 FEAR OF FLYING: ICD-10-CM

## 2018-01-03 DIAGNOSIS — F41.9 ANXIETY: ICD-10-CM

## 2018-01-03 DIAGNOSIS — E78.49 OTHER HYPERLIPIDEMIA: ICD-10-CM

## 2018-01-03 DIAGNOSIS — M77.9 TENDONITIS: ICD-10-CM

## 2018-01-03 DIAGNOSIS — I10 ESSENTIAL HYPERTENSION: Primary | ICD-10-CM

## 2018-01-03 PROCEDURE — 99214 OFFICE O/P EST MOD 30 MIN: CPT | Performed by: FAMILY MEDICINE

## 2018-01-03 RX ORDER — METOPROLOL SUCCINATE 50 MG/1
50 TABLET, EXTENDED RELEASE ORAL DAILY
Qty: 90 TABLET | Refills: 1 | Status: SHIPPED | OUTPATIENT
Start: 2018-01-03 | End: 2018-01-03 | Stop reason: SDUPTHER

## 2018-01-03 RX ORDER — ATORVASTATIN CALCIUM 10 MG/1
10 TABLET, FILM COATED ORAL NIGHTLY
Qty: 90 TABLET | Refills: 1 | Status: SHIPPED | OUTPATIENT
Start: 2018-01-03 | End: 2018-01-03 | Stop reason: SDUPTHER

## 2018-01-03 RX ORDER — OMEPRAZOLE 20 MG/1
20 CAPSULE, DELAYED RELEASE ORAL NIGHTLY
Qty: 90 CAPSULE | Refills: 2 | Status: SHIPPED | OUTPATIENT
Start: 2018-01-03 | End: 2018-01-10 | Stop reason: SDUPTHER

## 2018-01-03 RX ORDER — DULOXETIN HYDROCHLORIDE 60 MG/1
60 CAPSULE, DELAYED RELEASE ORAL NIGHTLY
Qty: 90 CAPSULE | Refills: 1 | Status: SHIPPED | OUTPATIENT
Start: 2018-01-03 | End: 2018-01-03 | Stop reason: SDUPTHER

## 2018-01-03 RX ORDER — DULOXETIN HYDROCHLORIDE 60 MG/1
60 CAPSULE, DELAYED RELEASE ORAL NIGHTLY
Qty: 90 CAPSULE | Refills: 2 | Status: SHIPPED | OUTPATIENT
Start: 2018-01-03 | End: 2018-01-10 | Stop reason: SDUPTHER

## 2018-01-03 RX ORDER — OMEPRAZOLE 20 MG/1
20 CAPSULE, DELAYED RELEASE ORAL NIGHTLY
Qty: 90 CAPSULE | Refills: 1 | Status: SHIPPED | OUTPATIENT
Start: 2018-01-03 | End: 2018-01-03 | Stop reason: SDUPTHER

## 2018-01-03 RX ORDER — ALPRAZOLAM 0.5 MG/1
0.5 TABLET ORAL 4 TIMES DAILY PRN
Qty: 4 TABLET | Refills: 0 | Status: SHIPPED | OUTPATIENT
Start: 2018-01-03 | End: 2018-01-04

## 2018-01-03 RX ORDER — ATORVASTATIN CALCIUM 10 MG/1
10 TABLET, FILM COATED ORAL NIGHTLY
Qty: 90 TABLET | Refills: 2 | Status: SHIPPED | OUTPATIENT
Start: 2018-01-03 | End: 2018-01-10 | Stop reason: SDUPTHER

## 2018-01-03 RX ORDER — METOPROLOL SUCCINATE 50 MG/1
50 TABLET, EXTENDED RELEASE ORAL DAILY
Qty: 90 TABLET | Refills: 2 | Status: SHIPPED | OUTPATIENT
Start: 2018-01-03 | End: 2018-01-10 | Stop reason: SDUPTHER

## 2018-01-10 ENCOUNTER — TELEPHONE (OUTPATIENT)
Dept: FAMILY MEDICINE CLINIC | Facility: CLINIC | Age: 63
End: 2018-01-10

## 2018-01-10 DIAGNOSIS — E78.49 OTHER HYPERLIPIDEMIA: ICD-10-CM

## 2018-01-10 DIAGNOSIS — F41.9 ANXIETY: ICD-10-CM

## 2018-01-10 DIAGNOSIS — I10 ESSENTIAL HYPERTENSION: ICD-10-CM

## 2018-01-10 DIAGNOSIS — K21.9 GASTROESOPHAGEAL REFLUX DISEASE, ESOPHAGITIS PRESENCE NOT SPECIFIED: ICD-10-CM

## 2018-01-10 RX ORDER — ATORVASTATIN CALCIUM 10 MG/1
10 TABLET, FILM COATED ORAL NIGHTLY
Qty: 14 TABLET | Refills: 0 | Status: SHIPPED | OUTPATIENT
Start: 2018-01-10 | End: 2018-09-13 | Stop reason: SDUPTHER

## 2018-01-10 RX ORDER — DULOXETIN HYDROCHLORIDE 60 MG/1
60 CAPSULE, DELAYED RELEASE ORAL NIGHTLY
Qty: 14 CAPSULE | Refills: 0 | Status: SHIPPED | OUTPATIENT
Start: 2018-01-10 | End: 2018-09-13 | Stop reason: SDUPTHER

## 2018-01-10 RX ORDER — OMEPRAZOLE 20 MG/1
20 CAPSULE, DELAYED RELEASE ORAL NIGHTLY
Qty: 14 CAPSULE | Refills: 0 | Status: SHIPPED | OUTPATIENT
Start: 2018-01-10 | End: 2018-09-13 | Stop reason: SDUPTHER

## 2018-01-10 RX ORDER — METOPROLOL SUCCINATE 50 MG/1
50 TABLET, EXTENDED RELEASE ORAL DAILY
Qty: 14 TABLET | Refills: 0 | Status: SHIPPED | OUTPATIENT
Start: 2018-01-10 | End: 2018-09-13 | Stop reason: SDUPTHER

## 2018-04-30 ENCOUNTER — APPOINTMENT (OUTPATIENT)
Dept: WOMENS IMAGING | Facility: HOSPITAL | Age: 63
End: 2018-04-30

## 2018-04-30 PROCEDURE — 77063 BREAST TOMOSYNTHESIS BI: CPT | Performed by: RADIOLOGY

## 2018-04-30 PROCEDURE — 77067 SCR MAMMO BI INCL CAD: CPT | Performed by: RADIOLOGY

## 2018-06-02 ENCOUNTER — RESULTS ENCOUNTER (OUTPATIENT)
Dept: FAMILY MEDICINE CLINIC | Facility: CLINIC | Age: 63
End: 2018-06-02

## 2018-06-02 DIAGNOSIS — E78.49 OTHER HYPERLIPIDEMIA: ICD-10-CM

## 2018-06-02 DIAGNOSIS — I10 ESSENTIAL HYPERTENSION: ICD-10-CM

## 2018-09-10 LAB
ALBUMIN SERPL-MCNC: 4.3 G/DL (ref 3.5–5.2)
ALBUMIN/GLOB SERPL: 1.6 G/DL
ALP SERPL-CCNC: 108 U/L (ref 39–117)
ALT SERPL-CCNC: 28 U/L (ref 1–33)
AST SERPL-CCNC: 16 U/L (ref 1–32)
BASOPHILS # BLD AUTO: 0.01 10*3/MM3 (ref 0–0.2)
BASOPHILS NFR BLD AUTO: 0.3 % (ref 0–1.5)
BILIRUB SERPL-MCNC: 0.2 MG/DL (ref 0.1–1.2)
BUN SERPL-MCNC: 16 MG/DL (ref 8–23)
BUN/CREAT SERPL: 23.2 (ref 7–25)
CALCIUM SERPL-MCNC: 9.8 MG/DL (ref 8.6–10.5)
CHLORIDE SERPL-SCNC: 103 MMOL/L (ref 98–107)
CHOLEST SERPL-MCNC: 205 MG/DL (ref 0–200)
CO2 SERPL-SCNC: 25.9 MMOL/L (ref 22–29)
CREAT SERPL-MCNC: 0.69 MG/DL (ref 0.57–1)
EOSINOPHIL # BLD AUTO: 0.08 10*3/MM3 (ref 0–0.7)
EOSINOPHIL NFR BLD AUTO: 2.2 % (ref 0.3–6.2)
ERYTHROCYTE [DISTWIDTH] IN BLOOD BY AUTOMATED COUNT: 13.4 % (ref 11.7–13)
GLOBULIN SER CALC-MCNC: 2.7 GM/DL
GLUCOSE SERPL-MCNC: 106 MG/DL (ref 65–99)
HCT VFR BLD AUTO: 42.7 % (ref 35.6–45.5)
HDLC SERPL-MCNC: 81 MG/DL (ref 40–60)
HGB BLD-MCNC: 13.9 G/DL (ref 11.9–15.5)
IMM GRANULOCYTES # BLD: 0 10*3/MM3 (ref 0–0.03)
IMM GRANULOCYTES NFR BLD: 0 % (ref 0–0.5)
LDLC SERPL CALC-MCNC: 94 MG/DL (ref 0–100)
LDLC/HDLC SERPL: 1.17 {RATIO}
LYMPHOCYTES # BLD AUTO: 1.21 10*3/MM3 (ref 0.9–4.8)
LYMPHOCYTES NFR BLD AUTO: 34 % (ref 19.6–45.3)
MCH RBC QN AUTO: 29.6 PG (ref 26.9–32)
MCHC RBC AUTO-ENTMCNC: 32.6 G/DL (ref 32.4–36.3)
MCV RBC AUTO: 91 FL (ref 80.5–98.2)
MONOCYTES # BLD AUTO: 0.44 10*3/MM3 (ref 0.2–1.2)
MONOCYTES NFR BLD AUTO: 12.4 % (ref 5–12)
NEUTROPHILS # BLD AUTO: 1.82 10*3/MM3 (ref 1.9–8.1)
NEUTROPHILS NFR BLD AUTO: 51.1 % (ref 42.7–76)
PLATELET # BLD AUTO: 315 10*3/MM3 (ref 140–500)
POTASSIUM SERPL-SCNC: 4.6 MMOL/L (ref 3.5–5.2)
PROT SERPL-MCNC: 7 G/DL (ref 6–8.5)
RBC # BLD AUTO: 4.69 10*6/MM3 (ref 3.9–5.2)
SODIUM SERPL-SCNC: 143 MMOL/L (ref 136–145)
TRIGL SERPL-MCNC: 148 MG/DL (ref 0–150)
TSH SERPL DL<=0.005 MIU/L-ACNC: 0.74 MIU/ML (ref 0.27–4.2)
VLDLC SERPL CALC-MCNC: 29.6 MG/DL (ref 5–40)
WBC # BLD AUTO: 3.56 10*3/MM3 (ref 4.5–10.7)

## 2018-09-13 ENCOUNTER — OFFICE VISIT (OUTPATIENT)
Dept: FAMILY MEDICINE CLINIC | Facility: CLINIC | Age: 63
End: 2018-09-13

## 2018-09-13 VITALS
WEIGHT: 162 LBS | RESPIRATION RATE: 16 BRPM | HEIGHT: 61 IN | BODY MASS INDEX: 30.58 KG/M2 | TEMPERATURE: 97.3 F | HEART RATE: 105 BPM | DIASTOLIC BLOOD PRESSURE: 87 MMHG | SYSTOLIC BLOOD PRESSURE: 142 MMHG

## 2018-09-13 DIAGNOSIS — I10 ESSENTIAL HYPERTENSION: Primary | ICD-10-CM

## 2018-09-13 DIAGNOSIS — K21.9 GASTROESOPHAGEAL REFLUX DISEASE WITHOUT ESOPHAGITIS: ICD-10-CM

## 2018-09-13 DIAGNOSIS — F41.9 ANXIETY: ICD-10-CM

## 2018-09-13 DIAGNOSIS — E78.49 OTHER HYPERLIPIDEMIA: ICD-10-CM

## 2018-09-13 PROBLEM — M77.9 TENDONITIS: Status: RESOLVED | Noted: 2018-01-03 | Resolved: 2018-09-13

## 2018-09-13 PROCEDURE — 99214 OFFICE O/P EST MOD 30 MIN: CPT | Performed by: FAMILY MEDICINE

## 2018-09-13 RX ORDER — ATORVASTATIN CALCIUM 10 MG/1
10 TABLET, FILM COATED ORAL NIGHTLY
Qty: 90 TABLET | Refills: 1 | Status: SHIPPED | OUTPATIENT
Start: 2018-09-13 | End: 2019-03-04 | Stop reason: SDUPTHER

## 2018-09-13 RX ORDER — OMEPRAZOLE 20 MG/1
20 CAPSULE, DELAYED RELEASE ORAL NIGHTLY
Qty: 90 CAPSULE | Refills: 1 | Status: SHIPPED | OUTPATIENT
Start: 2018-09-13 | End: 2019-03-04 | Stop reason: SDUPTHER

## 2018-09-13 RX ORDER — DULOXETIN HYDROCHLORIDE 60 MG/1
60 CAPSULE, DELAYED RELEASE ORAL NIGHTLY
Qty: 90 CAPSULE | Refills: 1 | Status: SHIPPED | OUTPATIENT
Start: 2018-09-13 | End: 2019-03-04 | Stop reason: SDUPTHER

## 2018-09-13 RX ORDER — METOPROLOL SUCCINATE 50 MG/1
50 TABLET, EXTENDED RELEASE ORAL DAILY
Qty: 90 TABLET | Refills: 1 | Status: SHIPPED | OUTPATIENT
Start: 2018-09-13 | End: 2019-03-04 | Stop reason: SDUPTHER

## 2018-09-13 NOTE — PROGRESS NOTES
"Chief Complaint   Patient presents with   • Hypertension   • Hyperlipidemia       Subjective     Kayla Adkins presents to the office today to refill her medications and review recent labs. No medication side effects are reported.  Her blood pressure is mildly elevated on the systolic side today.  Overall she feels well.  GERD symptoms are stable.  Anxiety is stable. Lipids are stable with current therapy.    I have reviewed and updated her medications, medical history and problem list during today's office visit.      Patient Care Team:  Emmett Rivas MD as PCP - General (Family Medicine)  Jody Erickson MD as Consulting Physician (Obstetrics and Gynecology)  Jovan Stinson MD as Consulting Physician (Gastroenterology)    Social History   Substance Use Topics   • Smoking status: Never Smoker   • Smokeless tobacco: Never Used   • Alcohol use No       Review of Systems   Constitutional: Negative for fatigue.   Cardiovascular: Negative for chest pain.       Objective     /87   Pulse 105   Temp 97.3 °F (36.3 °C) (Oral)   Resp 16   Ht 154.9 cm (61\")   Wt 73.5 kg (162 lb)   BMI 30.61 kg/m²     Body mass index is 30.61 kg/m².    Physical Exam   Constitutional: She is oriented to person, place, and time. She appears well-developed. No distress.   Eyes: Conjunctivae and lids are normal.   Neck: Carotid bruit is not present.   Cardiovascular: Normal rate, regular rhythm and normal heart sounds.    Pulmonary/Chest: Effort normal and breath sounds normal.   Neurological: She is alert and oriented to person, place, and time.   Skin: Skin is warm and dry.   Psychiatric: She has a normal mood and affect. Her behavior is normal.   Vitals reviewed.      Data Reviewed:         CMP:  Lab Results   Component Value Date     (H) 09/10/2018    BUN 16 09/10/2018    CREATININE 0.69 09/10/2018    EGFRIFNONA 86 09/10/2018    EGFRIFAFRI 104 09/10/2018     09/10/2018    K 4.6 09/10/2018     09/10/2018    " CALCIUM 9.8 09/10/2018    PROTENTOTREF 7.0 09/10/2018    ALBUMIN 4.30 09/10/2018    LABGLOBREF 2.7 09/10/2018    BILITOT 0.2 09/10/2018    ALKPHOS 108 09/10/2018    AST 16 09/10/2018    ALT 28 09/10/2018     CBC w/ diff:   Lab Results   Component Value Date    WBC 2.95 (L) 12/27/2017    RBC 4.69 09/10/2018    HGB 13.9 09/10/2018    HCT 42.7 09/10/2018    MCV 91.0 09/10/2018    MCH 29.6 09/10/2018    MCHC 32.6 09/10/2018    RDW 13.4 (H) 09/10/2018     09/10/2018    NEUTRORELPCT 51.1 09/10/2018    LYMPHORELPCT 34.0 09/10/2018    MONORELPCT 12.4 (H) 09/10/2018    EOSRELPCT 2.2 09/10/2018    BASORELPCT 0.3 09/10/2018     LIPID PANEL:  Lab Results   Component Value Date    CHLPL 205 (H) 09/10/2018    TRIG 148 09/10/2018    HDL 81 (H) 09/10/2018    VLDL 29.6 09/10/2018    LDL 94 09/10/2018    LDLHDL 1.17 09/10/2018     TSH:  Lab Results   Component Value Date    TSH 0.737 09/10/2018       Assessment/Plan     Problem List Items Addressed This Visit     Essential hypertension - Primary     Hypertension is worsening.  Continue current treatment regimen.  Dietary sodium restriction.  Weight loss.  Regular aerobic exercise.  Blood pressure will be reassessed at the next regular appointment.  BP log bid for 2 weeks with [employer] message and results         Relevant Medications    metoprolol succinate XL (TOPROL-XL) 50 MG 24 hr tablet    Other Relevant Orders    Comprehensive metabolic panel    CBC and Differential    TSH    Other hyperlipidemia     Lipid abnormalities are unchanged.  Pharmacotherapy as ordered.  Lipids will be reassessed in 6 months.         Relevant Medications    atorvastatin (LIPITOR) 10 MG tablet    Other Relevant Orders    Lipid Panel With LDL/HDL Ratio    Anxiety     The current medical regimen is effective;  continue present plan and medications.           Relevant Medications    DULoxetine (CYMBALTA) 60 MG capsule    Gastroesophageal reflux disease without esophagitis     stable         Relevant  Medications    omeprazole (priLOSEC) 20 MG capsule          Orders Placed This Encounter   Procedures   • Comprehensive metabolic panel     Standing Status:   Future     Standing Expiration Date:   6/10/2019   • Lipid Panel With LDL/HDL Ratio     Standing Status:   Future     Standing Expiration Date:   6/10/2019   • TSH     Standing Status:   Future     Standing Expiration Date:   6/10/2019   • CBC and Differential     Standing Status:   Future     Standing Expiration Date:   6/10/2019     Order Specific Question:   Manual Differential     Answer:   No         Current Outpatient Prescriptions:   •  atorvastatin (LIPITOR) 10 MG tablet, Take 1 tablet by mouth Every Night for 180 days., Disp: 90 tablet, Rfl: 1  •  DULoxetine (CYMBALTA) 60 MG capsule, Take 1 capsule by mouth Every Night for 180 days., Disp: 90 capsule, Rfl: 1  •  hyoscyamine (ANASPAZ,LEVSIN) 0.125 MG tablet, Take 0.125 mg by mouth Every 4 (Four) Hours As Needed for Cramping., Disp: , Rfl:   •  metoprolol succinate XL (TOPROL-XL) 50 MG 24 hr tablet, Take 1 tablet by mouth Daily for 180 days., Disp: 90 tablet, Rfl: 1  •  omeprazole (priLOSEC) 20 MG capsule, Take 1 capsule by mouth Every Night for 180 days., Disp: 90 capsule, Rfl: 1  •  Multiple Vitamins-Minerals (CENTRUM SILVER ULTRA WOMENS) tablet, Take 1 tablet by mouth Daily for 180 days., Disp: 30 tablet, Rfl: 5    Return in about 6 months (around 3/13/2019) for Recheck.

## 2018-09-13 NOTE — PATIENT INSTRUCTIONS
"Check on insurance coverage and cost for Shingrix (newest shingles vaccine) and get the immunization at your local pharmacy. It is more effective than the old Zostavax vaccine and is recommended even if you have had the Zostavax vaccine in the past. For more information, please look at the website below:    https://www.cdc.gov/vaccines/vpd/shingles/public/shingrix/index.html    Annual flu shot has been recommended to patient. Optimal timing of this vaccination is in mid October of each year.       DASH Eating Plan  DASH stands for \"Dietary Approaches to Stop Hypertension.\" The DASH eating plan is a healthy eating plan that has been shown to reduce high blood pressure (hypertension). It may also reduce your risk for type 2 diabetes, heart disease, and stroke. The DASH eating plan may also help with weight loss.  What are tips for following this plan?  General guidelines  · Avoid eating more than 2,300 mg (milligrams) of salt (sodium) a day. If you have hypertension, you may need to reduce your sodium intake to 1,500 mg a day.  · Limit alcohol intake to no more than 1 drink a day for nonpregnant women and 2 drinks a day for men. One drink equals 12 oz of beer, 5 oz of wine, or 1½ oz of hard liquor.  · Work with your health care provider to maintain a healthy body weight or to lose weight. Ask what an ideal weight is for you.  · Get at least 30 minutes of exercise that causes your heart to beat faster (aerobic exercise) most days of the week. Activities may include walking, swimming, or biking.  · Work with your health care provider or diet and nutrition specialist (dietitian) to adjust your eating plan to your individual calorie needs.  Reading food labels  · Check food labels for the amount of sodium per serving. Choose foods with less than 5 percent of the Daily Value of sodium. Generally, foods with less than 300 mg of sodium per serving fit into this eating plan.  · To find whole grains, look for the word \"whole\" " "as the first word in the ingredient list.  Shopping  · Buy products labeled as \"low-sodium\" or \"no salt added.\"  · Buy fresh foods. Avoid canned foods and premade or frozen meals.  Cooking  · Avoid adding salt when cooking. Use salt-free seasonings or herbs instead of table salt or sea salt. Check with your health care provider or pharmacist before using salt substitutes.  · Do not [place] foods. Cook foods using healthy methods such as baking, boiling, grilling, and broiling instead.  · Cook with heart-healthy oils, such as olive, canola, soybean, or sunflower oil.  Meal planning    · Eat a balanced diet that includes:  ? 5 or more servings of fruits and vegetables each day. At each meal, try to fill half of your plate with fruits and vegetables.  ? Up to 6-8 servings of whole grains each day.  ? Less than 6 oz of lean meat, poultry, or fish each day. A 3-oz serving of meat is about the same size as a deck of cards. One egg equals 1 oz.  ? 2 servings of low-fat dairy each day.  ? A serving of nuts, seeds, or beans 5 times each week.  ? Heart-healthy fats. Healthy fats called Omega-3 fatty acids are found in foods such as flaxseeds and coldwater fish, like sardines, salmon, and mackerel.  · Limit how much you eat of the following:  ? Canned or prepackaged foods.  ? Food that is high in trans fat, such as fried foods.  ? Food that is high in saturated fat, such as fatty meat.  ? Sweets, desserts, sugary drinks, and other foods with added sugar.  ? Full-fat dairy products.  · Do not salt foods before eating.  · Try to eat at least 2 vegetarian meals each week.  · Eat more home-cooked food and less restaurant, buffet, and fast food.  · When eating at a restaurant, ask that your food be prepared with less salt or no salt, if possible.  What foods are recommended?  The items listed may not be a complete list. Talk with your dietitian about what dietary choices are best for you.  Grains  Whole-grain or whole-wheat bread. " Whole-grain or whole-wheat pasta. Brown rice. Oatmeal. Quinoa. Bulgur. Whole-grain and low-sodium cereals. Allegra bread. Low-fat, low-sodium crackers. Whole-wheat flour tortillas.  Vegetables  Fresh or frozen vegetables (raw, steamed, roasted, or grilled). Low-sodium or reduced-sodium tomato and vegetable juice. Low-sodium or reduced-sodium tomato sauce and tomato paste. Low-sodium or reduced-sodium canned vegetables.  Fruits  All fresh, dried, or frozen fruit. Canned fruit in natural juice (without added sugar).  Meat and other protein foods  Skinless chicken or turkey. Ground chicken or turkey. Pork with fat trimmed off. Fish and seafood. Egg whites. Dried beans, peas, or lentils. Unsalted nuts, nut butters, and seeds. Unsalted canned beans. Lean cuts of beef with fat trimmed off. Low-sodium, lean deli meat.  Dairy  Low-fat (1%) or fat-free (skim) milk. Fat-free, low-fat, or reduced-fat cheeses. Nonfat, low-sodium ricotta or cottage cheese. Low-fat or nonfat yogurt. Low-fat, low-sodium cheese.  Fats and oils  Soft margarine without trans fats. Vegetable oil. Low-fat, reduced-fat, or light mayonnaise and salad dressings (reduced-sodium). Canola, safflower, olive, soybean, and sunflower oils. Avocado.  Seasoning and other foods  Herbs. Spices. Seasoning mixes without salt. Unsalted popcorn and pretzels. Fat-free sweets.  What foods are not recommended?  The items listed may not be a complete list. Talk with your dietitian about what dietary choices are best for you.  Grains  Baked goods made with fat, such as croissants, muffins, or some breads. Dry pasta or rice meal packs.  Vegetables  Creamed or fried vegetables. Vegetables in a cheese sauce. Regular canned vegetables (not low-sodium or reduced-sodium). Regular canned tomato sauce and paste (not low-sodium or reduced-sodium). Regular tomato and vegetable juice (not low-sodium or reduced-sodium). Pickles. Olives.  Fruits  Canned fruit in a light or heavy syrup.  Fried fruit. Fruit in cream or butter sauce.  Meat and other protein foods  Fatty cuts of meat. Ribs. Fried meat. Beltran. Sausage. Bologna and other processed lunch meats. Salami. Fatback. Hotdogs. Bratwurst. Salted nuts and seeds. Canned beans with added salt. Canned or smoked fish. Whole eggs or egg yolks. Chicken or turkey with skin.  Dairy  Whole or 2% milk, cream, and half-and-half. Whole or full-fat cream cheese. Whole-fat or sweetened yogurt. Full-fat cheese. Nondairy creamers. Whipped toppings. Processed cheese and cheese spreads.  Fats and oils  Butter. Stick margarine. Lard. Shortening. Ghee. Beltran fat. Tropical oils, such as coconut, palm kernel, or palm oil.  Seasoning and other foods  Salted popcorn and pretzels. Onion salt, garlic salt, seasoned salt, table salt, and sea salt. Worcestershire sauce. Tartar sauce. Barbecue sauce. Teriyaki sauce. Soy sauce, including reduced-sodium. Steak sauce. Canned and packaged gravies. Fish sauce. Oyster sauce. Cocktail sauce. Horseradish that you find on the shelf. Ketchup. Mustard. Meat flavorings and tenderizers. Bouillon cubes. Hot sauce and Tabasco sauce. Premade or packaged marinades. Premade or packaged taco seasonings. Relishes. Regular salad dressings.  Where to find more information:  · National Heart, Lung, and Blood [employer]: www.nhlbi.nih.gov  · American Heart Association: www.heart.org  Summary  · The DASH eating plan is a healthy eating plan that has been shown to reduce high blood pressure (hypertension). It may also reduce your risk for type 2 diabetes, heart disease, and stroke.  · With the DASH eating plan, you should limit salt (sodium) intake to 2,300 mg a day. If you have hypertension, you may need to reduce your sodium intake to 1,500 mg a day.  · When on the DASH eating plan, aim to eat more fresh fruits and vegetables, whole grains, lean proteins, low-fat dairy, and heart-healthy fats.  · Work with your health care provider or diet and  nutrition specialist (dietitian) to adjust your eating plan to your individual calorie needs.  This information is not intended to replace advice given to you by your health care provider. Make sure you discuss any questions you have with your health care provider.  Document Released: 12/06/2012 Document Revised: 12/11/2017 Document Reviewed: 12/11/2017  [employer] Interactive Patient Education © 2018 [employer]

## 2018-09-13 NOTE — ASSESSMENT & PLAN NOTE
Hypertension is worsening.  Continue current treatment regimen.  Dietary sodium restriction.  Weight loss.  Regular aerobic exercise.  Blood pressure will be reassessed at the next regular appointment.  BP log bid for 2 weeks with [facility] [place] message and results

## 2018-09-20 ENCOUNTER — OFFICE VISIT (OUTPATIENT)
Dept: FAMILY MEDICINE CLINIC | Facility: CLINIC | Age: 63
End: 2018-09-20

## 2018-09-20 VITALS
TEMPERATURE: 97.5 F | SYSTOLIC BLOOD PRESSURE: 143 MMHG | OXYGEN SATURATION: 99 % | HEART RATE: 106 BPM | HEIGHT: 61 IN | RESPIRATION RATE: 20 BRPM | WEIGHT: 164 LBS | BODY MASS INDEX: 30.96 KG/M2 | DIASTOLIC BLOOD PRESSURE: 96 MMHG

## 2018-09-20 DIAGNOSIS — K52.9 GASTROENTERITIS: Primary | ICD-10-CM

## 2018-09-20 PROCEDURE — 99213 OFFICE O/P EST LOW 20 MIN: CPT | Performed by: FAMILY MEDICINE

## 2018-09-20 RX ORDER — DIPHENOXYLATE HYDROCHLORIDE AND ATROPINE SULFATE 2.5; .025 MG/1; MG/1
1 TABLET ORAL 3 TIMES DAILY PRN
Qty: 21 TABLET | Refills: 0 | Status: SHIPPED | OUTPATIENT
Start: 2018-09-20 | End: 2018-10-03

## 2018-09-20 RX ORDER — ONDANSETRON 4 MG/1
4 TABLET, ORALLY DISINTEGRATING ORAL EVERY 8 HOURS PRN
Qty: 18 TABLET | Refills: 1 | Status: SHIPPED | OUTPATIENT
Start: 2018-09-20 | End: 2018-10-03

## 2018-09-20 NOTE — PROGRESS NOTES
"Subjective   Kayla McleodLucille is a 63 y.o. female.     CC: Nausea/Vomiting    History of Present Illness     Pt comes in today reporting a two day h/o N/V; also reports diarrhea.  starting with the similar today. Pt reports multiple emesis and diarrhea. Still making good UOP.      The following portions of the patient's history were reviewed and updated as appropriate: allergies, current medications, past family history, past medical history, past social history, past surgical history and problem list.    Review of Systems   Constitutional: Negative for activity change, chills, fatigue and fever.   Respiratory: Negative for cough and chest tightness.    Cardiovascular: Negative for chest pain and palpitations.   Gastrointestinal: Positive for diarrhea, nausea and vomiting. Negative for abdominal pain and blood in stool.   Endocrine: Negative for cold intolerance.   Psychiatric/Behavioral: Negative for behavioral problems and dysphoric mood.     /96   Pulse 106   Temp 97.5 °F (36.4 °C) (Oral)   Resp 20   Ht 154.9 cm (61\")   Wt 74.4 kg (164 lb)   SpO2 99%   BMI 30.99 kg/m²     Objective   Physical Exam   Constitutional: She appears well-developed and well-nourished. She has a sickly appearance.   Neck: Neck supple. No thyromegaly present.   Cardiovascular: Normal rate and regular rhythm.    No murmur heard.  Pulmonary/Chest: Effort normal and breath sounds normal.   Abdominal: Bowel sounds are normal. She exhibits no mass. There is no tenderness. There is no rebound and no guarding. No hernia.   Neurological: She is alert.   Psychiatric: She has a normal mood and affect. Her behavior is normal.   Nursing note and vitals reviewed.  Jeni present for exam.    Assessment/Plan   Kayla was seen today for vomiting, diarrhea and headache.    Diagnoses and all orders for this visit:    Gastroenteritis  -     ondansetron ODT (ZOFRAN ODT) 4 MG disintegrating tablet; Take 1 tablet by mouth Every 8 (Eight) " Hours As Needed for Nausea or Vomiting.  -     diphenoxylate-atropine (LOMOTIL) 2.5-0.025 MG per tablet; Take 1 tablet by mouth 3 (Three) Times a Day As Needed for Diarrhea.    Rehydration techniques discussed. ED if worsening.

## 2018-10-03 ENCOUNTER — OFFICE VISIT (OUTPATIENT)
Dept: FAMILY MEDICINE CLINIC | Facility: CLINIC | Age: 63
End: 2018-10-03

## 2018-10-03 VITALS
BODY MASS INDEX: 30.02 KG/M2 | TEMPERATURE: 98.2 F | HEART RATE: 84 BPM | SYSTOLIC BLOOD PRESSURE: 120 MMHG | DIASTOLIC BLOOD PRESSURE: 78 MMHG | HEIGHT: 61 IN | WEIGHT: 159 LBS | RESPIRATION RATE: 16 BRPM

## 2018-10-03 DIAGNOSIS — I10 ESSENTIAL HYPERTENSION: Primary | ICD-10-CM

## 2018-10-03 DIAGNOSIS — K52.9 GASTROENTERITIS: ICD-10-CM

## 2018-10-03 PROCEDURE — 99213 OFFICE O/P EST LOW 20 MIN: CPT | Performed by: FAMILY MEDICINE

## 2018-10-03 NOTE — PROGRESS NOTES
"Chief Complaint   Patient presents with   • Hypertension   • Hyperlipidemia       Subjective   She returns the office to review her recent blood pressures.  Home blood pressure readings have trended better since she stopped caffeine.  She is also on current exercise weight loss program.  No medication changes needed.    She recently got over a case of gastroenteritis.  She had hepatitis A vaccine back in May.  Titers will be checked.  I have reviewed and updated her medications, medical history and problem list during today's office visit.     Patient Care Team:  Emmett Rivas MD as PCP - General (Family Medicine)  Jody Erickson MD as Consulting Physician (Obstetrics and Gynecology)  Jovan Stinson MD as Consulting Physician (Gastroenterology)    Social History   Substance Use Topics   • Smoking status: Never Smoker   • Smokeless tobacco: Never Used   • Alcohol use No       Review of Systems   Gastrointestinal: Negative for diarrhea, nausea and vomiting.       Objective     /78   Pulse 84   Temp 98.2 °F (36.8 °C) (Oral)   Resp 16   Ht 154.9 cm (61\")   Wt 72.1 kg (159 lb)   BMI 30.04 kg/m²     Body mass index is 30.04 kg/m².    Physical Exam   Constitutional: She is oriented to person, place, and time. No distress.   Cardiovascular: Normal rate and normal heart sounds.    Pulmonary/Chest: Effort normal and breath sounds normal.   Neurological: She is alert and oriented to person, place, and time.   Skin: She is not diaphoretic.   Psychiatric: She has a normal mood and affect. Her speech is normal. She is attentive.   Vitals reviewed.       Data Reviewed:             Assessment/Plan     Problem List Items Addressed This Visit     Essential hypertension - Primary     Hypertension is improving with treatment.  Continue current treatment regimen.  Continue current medications.  Blood pressure will be reassessed at the next regular appointment.           Other Visit Diagnoses     Gastroenteritis        " Relevant Orders    Hepatitis A Antibody, Total    Hepatitis A Antibody, IgM          Orders Placed This Encounter   Procedures   • Hepatitis A Antibody, Total   • Hepatitis A Antibody, IgM         Current Outpatient Prescriptions:   •  atorvastatin (LIPITOR) 10 MG tablet, Take 1 tablet by mouth Every Night for 180 days., Disp: 90 tablet, Rfl: 1  •  DULoxetine (CYMBALTA) 60 MG capsule, Take 1 capsule by mouth Every Night for 180 days., Disp: 90 capsule, Rfl: 1  •  hyoscyamine (ANASPAZ,LEVSIN) 0.125 MG tablet, Take 0.125 mg by mouth Every 4 (Four) Hours As Needed for Cramping., Disp: , Rfl:   •  metoprolol succinate XL (TOPROL-XL) 50 MG 24 hr tablet, Take 1 tablet by mouth Daily for 180 days., Disp: 90 tablet, Rfl: 1  •  omeprazole (priLOSEC) 20 MG capsule, Take 1 capsule by mouth Every Night for 180 days., Disp: 90 capsule, Rfl: 1  •  Multiple Vitamins-Minerals (CENTRUM SILVER ULTRA WOMENS) tablet, Take 1 tablet by mouth Daily for 180 days., Disp: 30 tablet, Rfl: 5    Return in about 5 months (around 3/3/2019) for Recheck.

## 2018-10-04 LAB
HAV AB SER QL IA: POSITIVE
HAV IGM SERPL QL IA: NEGATIVE

## 2018-10-04 NOTE — PROGRESS NOTES
Please accept these satisfactory lab results. As expected it shows a positive antibody to hepatitis A, which I think represents you getting the vaccine in May. Glad you are feeling better.  Please keep regular follow up appointment as scheduled.                                                    Dr. Alston

## 2019-02-10 ENCOUNTER — RESULTS ENCOUNTER (OUTPATIENT)
Dept: FAMILY MEDICINE CLINIC | Facility: CLINIC | Age: 64
End: 2019-02-10

## 2019-02-10 DIAGNOSIS — I10 ESSENTIAL HYPERTENSION: ICD-10-CM

## 2019-02-10 DIAGNOSIS — E78.49 OTHER HYPERLIPIDEMIA: ICD-10-CM

## 2019-02-27 LAB
ALBUMIN SERPL-MCNC: 4.4 G/DL (ref 3.5–5.2)
ALBUMIN/GLOB SERPL: 2 G/DL
ALP SERPL-CCNC: 108 U/L (ref 39–117)
ALT SERPL-CCNC: 15 U/L (ref 1–33)
AST SERPL-CCNC: 12 U/L (ref 1–32)
BASOPHILS # BLD AUTO: 0.01 10*3/MM3 (ref 0–0.2)
BASOPHILS NFR BLD AUTO: 0.3 % (ref 0–1.5)
BILIRUB SERPL-MCNC: 0.2 MG/DL (ref 0.1–1.2)
BUN SERPL-MCNC: 10 MG/DL (ref 8–23)
BUN/CREAT SERPL: 13.7 (ref 7–25)
CALCIUM SERPL-MCNC: 10.3 MG/DL (ref 8.6–10.5)
CHLORIDE SERPL-SCNC: 102 MMOL/L (ref 98–107)
CHOLEST SERPL-MCNC: 161 MG/DL (ref 0–200)
CO2 SERPL-SCNC: 29 MMOL/L (ref 22–29)
CREAT SERPL-MCNC: 0.73 MG/DL (ref 0.57–1)
EOSINOPHIL # BLD AUTO: 0.04 10*3/MM3 (ref 0–0.4)
EOSINOPHIL NFR BLD AUTO: 1.1 % (ref 0.3–6.2)
ERYTHROCYTE [DISTWIDTH] IN BLOOD BY AUTOMATED COUNT: 12.5 % (ref 12.3–15.4)
GLOBULIN SER CALC-MCNC: 2.2 GM/DL
GLUCOSE SERPL-MCNC: 107 MG/DL (ref 65–99)
HCT VFR BLD AUTO: 43.6 % (ref 34–46.6)
HDLC SERPL-MCNC: 52 MG/DL (ref 40–60)
HGB BLD-MCNC: 13.7 G/DL (ref 12–15.9)
IMM GRANULOCYTES # BLD AUTO: 0.01 10*3/MM3 (ref 0–0.05)
IMM GRANULOCYTES NFR BLD AUTO: 0.3 % (ref 0–0.5)
LDLC SERPL CALC-MCNC: 75 MG/DL (ref 0–100)
LDLC/HDLC SERPL: 1.45 {RATIO}
LYMPHOCYTES # BLD AUTO: 1.11 10*3/MM3 (ref 0.7–3.1)
LYMPHOCYTES NFR BLD AUTO: 30.1 % (ref 19.6–45.3)
MCH RBC QN AUTO: 29.1 PG (ref 26.6–33)
MCHC RBC AUTO-ENTMCNC: 31.4 G/DL (ref 31.5–35.7)
MCV RBC AUTO: 92.6 FL (ref 79–97)
MONOCYTES # BLD AUTO: 0.37 10*3/MM3 (ref 0.1–0.9)
MONOCYTES NFR BLD AUTO: 10 % (ref 5–12)
NEUTROPHILS # BLD AUTO: 2.15 10*3/MM3 (ref 1.4–7)
NEUTROPHILS NFR BLD AUTO: 58.2 % (ref 42.7–76)
NRBC BLD AUTO-RTO: 0 /100 WBC (ref 0–0)
PLATELET # BLD AUTO: 288 10*3/MM3 (ref 140–450)
POTASSIUM SERPL-SCNC: 5.1 MMOL/L (ref 3.5–5.2)
PROT SERPL-MCNC: 6.6 G/DL (ref 6–8.5)
RBC # BLD AUTO: 4.71 10*6/MM3 (ref 3.77–5.28)
SODIUM SERPL-SCNC: 138 MMOL/L (ref 136–145)
TRIGL SERPL-MCNC: 168 MG/DL (ref 0–150)
TSH SERPL DL<=0.005 MIU/L-ACNC: 0.82 MIU/ML (ref 0.27–4.2)
VLDLC SERPL CALC-MCNC: 33.6 MG/DL (ref 5–40)
WBC # BLD AUTO: 3.69 10*3/MM3 (ref 3.4–10.8)

## 2019-03-04 ENCOUNTER — OFFICE VISIT (OUTPATIENT)
Dept: FAMILY MEDICINE CLINIC | Facility: CLINIC | Age: 64
End: 2019-03-04

## 2019-03-04 VITALS
SYSTOLIC BLOOD PRESSURE: 119 MMHG | HEIGHT: 61 IN | BODY MASS INDEX: 30.96 KG/M2 | HEART RATE: 98 BPM | DIASTOLIC BLOOD PRESSURE: 75 MMHG | TEMPERATURE: 97.7 F | RESPIRATION RATE: 16 BRPM | WEIGHT: 164 LBS

## 2019-03-04 DIAGNOSIS — E78.49 OTHER HYPERLIPIDEMIA: ICD-10-CM

## 2019-03-04 DIAGNOSIS — K21.9 GASTROESOPHAGEAL REFLUX DISEASE WITHOUT ESOPHAGITIS: ICD-10-CM

## 2019-03-04 DIAGNOSIS — F41.9 ANXIETY: ICD-10-CM

## 2019-03-04 DIAGNOSIS — R73.03 PREDIABETES: ICD-10-CM

## 2019-03-04 DIAGNOSIS — I10 ESSENTIAL HYPERTENSION: Primary | ICD-10-CM

## 2019-03-04 PROBLEM — D70.9 NEUTROPENIA: Status: RESOLVED | Noted: 2017-01-03 | Resolved: 2019-03-04

## 2019-03-04 PROCEDURE — 99214 OFFICE O/P EST MOD 30 MIN: CPT | Performed by: FAMILY MEDICINE

## 2019-03-04 RX ORDER — ATORVASTATIN CALCIUM 10 MG/1
10 TABLET, FILM COATED ORAL NIGHTLY
Qty: 90 TABLET | Refills: 3 | Status: SHIPPED | OUTPATIENT
Start: 2019-03-04 | End: 2019-11-14 | Stop reason: SDUPTHER

## 2019-03-04 RX ORDER — DULOXETIN HYDROCHLORIDE 60 MG/1
60 CAPSULE, DELAYED RELEASE ORAL NIGHTLY
Qty: 90 CAPSULE | Refills: 3 | Status: SHIPPED | OUTPATIENT
Start: 2019-03-04 | End: 2019-11-14 | Stop reason: SDUPTHER

## 2019-03-04 RX ORDER — OMEPRAZOLE 20 MG/1
20 CAPSULE, DELAYED RELEASE ORAL NIGHTLY
Qty: 90 CAPSULE | Refills: 3 | OUTPATIENT
Start: 2019-03-04 | End: 2019-06-24

## 2019-03-04 RX ORDER — METOPROLOL SUCCINATE 50 MG/1
50 TABLET, EXTENDED RELEASE ORAL DAILY
Qty: 90 TABLET | Refills: 3 | Status: SHIPPED | OUTPATIENT
Start: 2019-03-04 | End: 2019-11-14 | Stop reason: SDUPTHER

## 2019-03-04 NOTE — ASSESSMENT & PLAN NOTE
Continue omeprazole on as needed basis.  We talked about not eating after about 7 PM at night if possible and also weight control to help with condition.

## 2019-03-04 NOTE — ASSESSMENT & PLAN NOTE
Lipid abnormalities are improving with treatment.  Pharmacotherapy as ordered.  Lipids will be reassessed in 1 year.

## 2019-03-04 NOTE — PROGRESS NOTES
"Chief Complaint   Patient presents with   • Hypertension       Subjective     Kayla McleodLucille presents to the office today to refill her medications and review recent labs. No medication side effects are reported.  Blood pressure is well controlled.  Lipids have shown interval improvement.  Anxiety is stable.  GERD symptoms are stable.  She may try weaning down on frequency of dose of omeprazole.  Previously low white blood cell count is now within normal limits.  Overall, she feels well.    I have reviewed and updated her medications, medical history and problem list during today's office visit.      Patient Care Team:  Emmett Rivas MD as PCP - General (Family Medicine)  Jody Erickson MD as Consulting Physician (Obstetrics and Gynecology)  Jovan Stinson MD as Consulting Physician (Gastroenterology)    Social History     Tobacco Use   • Smoking status: Never Smoker   • Smokeless tobacco: Never Used   Substance Use Topics   • Alcohol use: No       Review of Systems   Constitutional: Negative for fatigue.   Cardiovascular: Negative for chest pain.       Objective     /75   Pulse 98   Temp 97.7 °F (36.5 °C) (Oral)   Resp 16   Ht 154.9 cm (61\")   Wt 74.4 kg (164 lb)   BMI 30.99 kg/m²     Body mass index is 30.99 kg/m².    Physical Exam   Constitutional: She is oriented to person, place, and time. Vital signs are normal. She appears well-developed. No distress.   HENT:   Head: Normocephalic and atraumatic.   Right Ear: Hearing and tympanic membrane normal.   Left Ear: Hearing and tympanic membrane normal.   Nose: Nose normal.   Mouth/Throat: Uvula is midline, oropharynx is clear and moist and mucous membranes are normal.   Eyes: Conjunctivae, EOM and lids are normal. Pupils are equal, round, and reactive to light.   Neck: Trachea normal and phonation normal. No JVD present. Carotid bruit is not present. No thyroid mass and no thyromegaly present.   Cardiovascular: Normal rate, regular rhythm and normal " heart sounds.   Pulmonary/Chest: Effort normal and breath sounds normal.   Abdominal: Soft. Normal appearance and bowel sounds are normal. There is no hepatosplenomegaly. There is no tenderness.   Musculoskeletal: Normal range of motion.        Lumbar back: She exhibits no deformity.   Lymphadenopathy:     She has no cervical adenopathy.        Right: No supraclavicular adenopathy present.        Left: No supraclavicular adenopathy present.   Neurological: She is alert and oriented to person, place, and time. She has normal strength. No cranial nerve deficit.   Reflex Scores:       Patellar reflexes are 2+ on the right side and 2+ on the left side.  Skin: Skin is warm and dry. No rash noted.   Psychiatric: She has a normal mood and affect. Her speech is normal and behavior is normal. Judgment and thought content normal. Cognition and memory are normal. She is attentive.       Data Reviewed:             Lab Results   Component Value Date     (H) 02/27/2019    BUN 10 02/27/2019    CREATININE 0.73 02/27/2019    EGFRIFNONA 81 02/27/2019    EGFRIFAFRI 98 02/27/2019     02/27/2019    K 5.1 02/27/2019     02/27/2019    CO2 29.0 02/27/2019    CALCIUM 10.3 02/27/2019    ALBUMIN 4.40 02/27/2019    LABGLOBREF 2.2 02/27/2019    BILITOT 0.2 02/27/2019    ALKPHOS 108 02/27/2019    AST 12 02/27/2019    ALT 15 02/27/2019    CHLPL 161 02/27/2019    TRIG 168 (H) 02/27/2019    HDL 52 02/27/2019    VLDL 33.6 02/27/2019    LDL 75 02/27/2019    LDLHDL 1.45 02/27/2019    WBC 3.69 02/27/2019    RBC 4.71 02/27/2019    HCT 43.6 02/27/2019    MCV 92.6 02/27/2019    MCH 29.1 02/27/2019    MCHC 31.4 (L) 02/27/2019    RDW 12.5 02/27/2019    TSH 0.818 02/27/2019          Assessment/Plan     Problem List Items Addressed This Visit     Essential hypertension - Primary     Hypertension is improving with treatment.  Continue current treatment regimen.  Blood pressure will be reassessed at the next regular appointment.          Relevant Medications    metoprolol succinate XL (TOPROL-XL) 50 MG 24 hr tablet    Other Relevant Orders    Comprehensive Metabolic Panel    CBC & Differential    Other hyperlipidemia     Lipid abnormalities are improving with treatment.  Pharmacotherapy as ordered.  Lipids will be reassessed in 1 year.         Relevant Medications    atorvastatin (LIPITOR) 10 MG tablet    Other Relevant Orders    Lipid Panel With / Chol / HDL Ratio    CK    Anxiety     The current medical regimen is effective;  continue present plan and medications.           Relevant Medications    DULoxetine (CYMBALTA) 60 MG capsule    Gastroesophageal reflux disease without esophagitis     Continue omeprazole on as needed basis.  We talked about not eating after about 7 PM at night if possible and also weight control to help with condition.         Relevant Medications    omeprazole (priLOSEC) 20 MG capsule    Prediabetes     Stable.  Will see if Romelia Luis diet helps               Orders Placed This Encounter   Procedures   • Comprehensive Metabolic Panel     Standing Status:   Future     Standing Expiration Date:   3/4/2020   • Lipid Panel With / Chol / HDL Ratio     Standing Status:   Future     Standing Expiration Date:   3/4/2020   • CK     Standing Status:   Future     Standing Expiration Date:   3/4/2020   • CBC & Differential     Standing Status:   Future     Standing Expiration Date:   3/4/2020     Order Specific Question:   Manual Differential     Answer:   No         Current Outpatient Medications:   •  atorvastatin (LIPITOR) 10 MG tablet, Take 1 tablet by mouth Every Night., Disp: 90 tablet, Rfl: 3  •  DULoxetine (CYMBALTA) 60 MG capsule, Take 1 capsule by mouth Every Night., Disp: 90 capsule, Rfl: 3  •  hyoscyamine (ANASPAZ,LEVSIN) 0.125 MG tablet, Take 0.125 mg by mouth Every 4 (Four) Hours As Needed for Cramping., Disp: , Rfl:   •  metoprolol succinate XL (TOPROL-XL) 50 MG 24 hr tablet, Take 1 tablet by mouth Daily., Disp: 90  tablet, Rfl: 3  •  omeprazole (priLOSEC) 20 MG capsule, Take 1 capsule by mouth Every Night., Disp: 90 capsule, Rfl: 3  •  Multiple Vitamins-Minerals (CENTRUM SILVER ULTRA WOMENS) tablet, Take 1 tablet by mouth Daily for 180 days., Disp: 30 tablet, Rfl: 5    Return in about 1 year (around 3/4/2020) for Recheck.

## 2019-03-05 ENCOUNTER — TRANSCRIBE ORDERS (OUTPATIENT)
Dept: ADMINISTRATIVE | Facility: HOSPITAL | Age: 64
End: 2019-03-05

## 2019-03-05 DIAGNOSIS — Z13.9 SCREENING PROCEDURE: Primary | ICD-10-CM

## 2019-03-15 ENCOUNTER — HOSPITAL ENCOUNTER (OUTPATIENT)
Dept: CARDIOLOGY | Facility: HOSPITAL | Age: 64
Discharge: HOME | End: 2019-03-15
Admitting: FAMILY MEDICINE

## 2019-03-15 VITALS
BODY MASS INDEX: 29.63 KG/M2 | HEIGHT: 62 IN | SYSTOLIC BLOOD PRESSURE: 120 MMHG | DIASTOLIC BLOOD PRESSURE: 68 MMHG | HEART RATE: 86 BPM | WEIGHT: 161 LBS

## 2019-03-15 DIAGNOSIS — Z13.9 SCREENING PROCEDURE: ICD-10-CM

## 2019-03-15 LAB
BH CV VAS BP LEFT ARM: NORMAL MMHG
BH CV VAS BP RIGHT ARM: NORMAL MMHG
BH CV XLRA MEAS - PAD LEFT ABI DP: 1.25
BH CV XLRA MEAS - PAD LEFT ABI PT: 1.25
BH CV XLRA MEAS - PAD LEFT ARM: 118 MMHG
BH CV XLRA MEAS - PAD LEFT LEG DP: 150 MMHG
BH CV XLRA MEAS - PAD LEFT LEG PT: 150 MMHG
BH CV XLRA MEAS - PAD RIGHT ABI DP: 1.23
BH CV XLRA MEAS - PAD RIGHT ABI PT: 1.21
BH CV XLRA MEAS - PAD RIGHT ARM: 120 MMHG
BH CV XLRA MEAS - PAD RIGHT LEG DP: 148 MMHG
BH CV XLRA MEAS - PAD RIGHT LEG PT: 146 MMHG
BH CV XLRA MEAS LEFT ICA/CCA RATIO: 1.66
BH CV XLRA MEAS LEFT MID CCA PSV: NORMAL CM/SEC
BH CV XLRA MEAS LEFT MID ICA PSV: NORMAL CM/SEC
BH CV XLRA MEAS LEFT PROX ECA PSV: NORMAL CM/SEC
BH CV XLRA MEAS RIGHT ICA/CCA RATIO: 0.98
BH CV XLRA MEAS RIGHT MID CCA PSV: NORMAL CM/SEC
BH CV XLRA MEAS RIGHT MID ICA PSV: NORMAL CM/SEC
BH CV XLRA MEAS RIGHT PROX ECA PSV: NORMAL CM/SEC

## 2019-03-15 PROCEDURE — 93799 UNLISTED CV SVC/PROCEDURE: CPT

## 2019-03-20 ENCOUNTER — CLINICAL SUPPORT (OUTPATIENT)
Dept: FAMILY MEDICINE CLINIC | Facility: CLINIC | Age: 64
End: 2019-03-20

## 2019-03-20 DIAGNOSIS — Z23 IMMUNIZATION DUE: Primary | ICD-10-CM

## 2019-03-20 PROCEDURE — 90471 IMMUNIZATION ADMIN: CPT | Performed by: FAMILY MEDICINE

## 2019-03-20 PROCEDURE — 90750 HZV VACC RECOMBINANT IM: CPT | Performed by: FAMILY MEDICINE

## 2019-05-03 ENCOUNTER — APPOINTMENT (OUTPATIENT)
Dept: WOMENS IMAGING | Facility: HOSPITAL | Age: 64
End: 2019-05-03

## 2019-05-03 PROCEDURE — 77063 BREAST TOMOSYNTHESIS BI: CPT | Performed by: RADIOLOGY

## 2019-05-03 PROCEDURE — 77067 SCR MAMMO BI INCL CAD: CPT | Performed by: RADIOLOGY

## 2019-06-24 ENCOUNTER — APPOINTMENT (OUTPATIENT)
Dept: GENERAL RADIOLOGY | Facility: HOSPITAL | Age: 64
End: 2019-06-24

## 2019-06-24 PROCEDURE — 73560 X-RAY EXAM OF KNEE 1 OR 2: CPT | Performed by: EMERGENCY MEDICINE

## 2019-07-01 ENCOUNTER — OFFICE VISIT (OUTPATIENT)
Dept: FAMILY MEDICINE CLINIC | Facility: CLINIC | Age: 64
End: 2019-07-01

## 2019-07-01 VITALS
DIASTOLIC BLOOD PRESSURE: 73 MMHG | BODY MASS INDEX: 29.83 KG/M2 | WEIGHT: 158 LBS | RESPIRATION RATE: 16 BRPM | HEIGHT: 61 IN | HEART RATE: 101 BPM | SYSTOLIC BLOOD PRESSURE: 127 MMHG | OXYGEN SATURATION: 99 %

## 2019-07-01 DIAGNOSIS — S89.90XA KNEE INJURY, INITIAL ENCOUNTER: Primary | ICD-10-CM

## 2019-07-01 PROCEDURE — 99213 OFFICE O/P EST LOW 20 MIN: CPT | Performed by: FAMILY MEDICINE

## 2019-07-01 NOTE — ASSESSMENT & PLAN NOTE
New problem.  Improving with therapy.  MRI needed due to ongoing crepitus and abnormal gait.  Continue meloxicam.  Discussion using crutches or avoiding stairs if possible.

## 2019-07-01 NOTE — PROGRESS NOTES
"Rooming Tab(CC,VS,Pt Hx,Fall Screen)  Chief Complaint   Patient presents with   • Knee Pain      lt knee       Subjective   Patient returns the office to check on her left knee.  Injury occurred 1 week ago while traversing stairs.  She heard a loud pop no swelling noted but she was unable to bear weight.Pain intensity is 5/10. Since then she is been using crutches and minimal weightbearing.  Use of meloxicam has seemed to help the discomfort.  She is 75% better since taking medication.  She is still having to use crutches off and on with her ambulation.  Stairs are still a problem.  They have home renovation which causes them the need to use the second floor currently.  Upcoming trip is a concern with walking.  I have reviewed and updated her medications, medical history and problem list during today's office visit.     Patient Care Team:  Emmett Rivas MD as PCP - General (Family Medicine)  Jody Erickson MD as Consulting Physician (Obstetrics and Gynecology)  Jovan Stinson MD as Consulting Physician (Gastroenterology)    Problem List Tab  Medications Tab  Synopsis Tab  Chart Review Tab  Care Everywhere Tab  Immunizations Tab  Patient History Tab    Social History     Tobacco Use   • Smoking status: Never Smoker   • Smokeless tobacco: Never Used   Substance Use Topics   • Alcohol use: No       Review of Systems   Musculoskeletal: Positive for arthralgias.        See HPI       Objective     Rooming Tab(CC,VS,Pt Hx,Fall Screen)  /73   Pulse 101   Resp 16   Ht 156.2 cm (61.5\")   Wt 71.7 kg (158 lb)   SpO2 99%   BMI 29.37 kg/m²     Body mass index is 29.37 kg/m².    Physical Exam   Constitutional: She appears well-developed. No distress.   Musculoskeletal:        Legs:  Some limping with gait noted.        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 Otherwise negative left knee x-ray.  " This report was finalized on 6/24/2019 2:17 PM by Dr. Emory Genao M.D.    Personal review of x-ray films in the office today.  Concur with the cyst of the patella.  Actual joint line looks normal.              Assessment/Plan   Order Review Tab  Health Maintenance Tab  Patient Plan/Order Tab  Diagnoses and all orders for this visit:    1. Knee injury, initial encounter-left (Primary)  Assessment & Plan:  New problem.  Improving with therapy.  MRI needed due to ongoing crepitus and abnormal gait.  Continue meloxicam.  Discussion using crutches or avoiding stairs if possible.    Orders:  -     MRI Knee Left Without Contrast; Future      Wrapup Tab  Return if symptoms worsen or fail to improve.

## 2019-07-10 ENCOUNTER — TELEPHONE (OUTPATIENT)
Dept: ORTHOPEDIC SURGERY | Facility: CLINIC | Age: 64
End: 2019-07-10

## 2019-07-10 ENCOUNTER — TELEPHONE (OUTPATIENT)
Dept: FAMILY MEDICINE CLINIC | Facility: CLINIC | Age: 64
End: 2019-07-10

## 2019-07-10 DIAGNOSIS — S83.242A ACUTE MEDIAL MENISCUS TEAR OF LEFT KNEE, INITIAL ENCOUNTER: Primary | ICD-10-CM

## 2019-07-11 ENCOUNTER — OFFICE VISIT (OUTPATIENT)
Dept: ORTHOPEDIC SURGERY | Facility: CLINIC | Age: 64
End: 2019-07-11

## 2019-07-11 VITALS — HEIGHT: 61 IN | BODY MASS INDEX: 29.91 KG/M2 | TEMPERATURE: 98.5 F | WEIGHT: 158.4 LBS

## 2019-07-11 DIAGNOSIS — M94.262 CHONDROMALACIA OF LEFT KNEE: ICD-10-CM

## 2019-07-11 DIAGNOSIS — S83.242A TEAR OF MEDIAL MENISCUS OF LEFT KNEE, CURRENT, UNSPECIFIED TEAR TYPE, INITIAL ENCOUNTER: ICD-10-CM

## 2019-07-11 DIAGNOSIS — M94.261 CHONDROMALACIA OF RIGHT KNEE: Primary | ICD-10-CM

## 2019-07-11 PROCEDURE — 99244 OFF/OP CNSLTJ NEW/EST MOD 40: CPT | Performed by: ORTHOPAEDIC SURGERY

## 2019-07-11 PROCEDURE — 20610 DRAIN/INJ JOINT/BURSA W/O US: CPT | Performed by: ORTHOPAEDIC SURGERY

## 2019-07-11 RX ADMIN — METHYLPREDNISOLONE ACETATE 80 MG: 80 INJECTION, SUSPENSION INTRA-ARTICULAR; INTRALESIONAL; INTRAMUSCULAR; SOFT TISSUE at 16:31

## 2019-07-11 NOTE — PROGRESS NOTES
New Patient Complaint      Patient: Kayla HESTER [place]  YOB: 1955 63 y.o. female  Medical Record Number: [id number]    Chief Complaints: I hurt my knee    History of Present Illness: Patient injured her left knee approximately 3 weeks ago when she was going down steps and felt a large pop in the medial aspect of the left knee.  She initially did quite a bit of pain and some swelling which has now improved and now has only mild intermittent aching pain with occasional popping in the left knee but no tej locking or catching.      Symptoms are improved with anti-inflammatories and rest.  She states that her knee is a little stiff when she first gets up in the first few steps are bothersome but then feels significantly better on the than occasional ache after prolonged standing    She was seen by her PCP and has an upcoming trip going out of town and was evaluated with an MRI and was found to have a meniscal tear.  She is seen here today at the request of Dr. Emmett Rivas who has requested my opinion regarding etiology and treatment of this condition        HPI    Allergies:   Allergies   Allergen Reactions   • Penicillins        Medications:   Current Outpatient Medications on File Prior to Visit   Medication Sig   • atorvastatin (LIPITOR) 10 MG tablet Take 1 tablet by mouth Every Night.   • DULoxetine (CYMBALTA) 60 MG capsule Take 1 capsule by mouth Every Night.   • hyoscyamine (ANASPAZ,LEVSIN) 0.125 MG tablet Take 0.125 mg by mouth Every 4 (Four) Hours As Needed for Cramping.   • meloxicam (MOBIC) 15 MG tablet Take 1 tablet by mouth Daily.   • metoprolol succinate XL (TOPROL-XL) 50 MG 24 hr tablet Take 1 tablet by mouth Daily.   • Multiple Vitamins-Minerals (CENTRUM SILVER ULTRA WOMENS) tablet Take 1 tablet by mouth Daily for 180 days.     No current facility-administered medications on file prior to visit.        Past Medical History:   Diagnosis Date   • Anemia    • Anxiety    • GERD  "(gastroesophageal reflux disease)    • Hypertension    • Neutropenia (CMS/HCC) 1/3/2017     History reviewed. No pertinent surgical history.  Social History     Occupational History   • Not on file   Tobacco Use   • Smoking status: Never Smoker   • Smokeless tobacco: Never Used   Substance and Sexual Activity   • Alcohol use: No   • Drug use: Not on file   • Sexual activity: Not on file      Social History     Social History Narrative   • Not on file     Family History   Problem Relation Age of Onset   • Hypertension Mother    • Heart disease Father    • Hypertension Sister        Review of Systems: 14 point review of systems performed, positive pertinent findings identified in HPI. All remaining systems negative except knee stiffness, anxiety, headaches    Review of Systems      Physical Exam:   Vitals:    07/11/19 1554   Temp: 98.5 °F (36.9 °C)   Weight: 71.8 kg (158 lb 6.4 oz)   Height: 154.9 cm (61\")     Physical Exam   Constitutional: pleasant, well developed She is with her   Eyes: sclera non icteric  Hearing : adequate for exam  Cardiovascular: palpable pulses in left foot, left calf/ thigh NT without sign of DVT  Respiratoy: breathing unlabored   Neurological: grossly sensate to LT throughout left LE  Psychiatric: oriented with normal mood and affect.   Lymphatic: No palpable popliteal lymphadenopathy left LE  Skin: intact throughout left leg/foot  Musculoskeletal: Left knee shows trace effusion no warmth erythema.  0 to 115 degrees range of motion.  Mild discomfort of the medial joint line but no exacerbation with Primo's.  Stable ligamentous exam.  Mild discomfort with patellofemoral compression  Physical Exam  Ortho Exam    Radiology: 2 views left knee reviewed on the [employer] system from 6/24/2019 joint spaces appear to be well-maintained there is some mild narrowing of the patellofemoral joint space.  No gross malalignment.    MRI report from [facility] dated 7/6/2019 (patient's  is going " to bring the images and for me to evaluate).  Was reviewed and reported increased signal involving the junction between the posterior horn the medial meniscus and posterior root that appeared to be a combination of mucinous degeneration as well as partial radial tear with mild peripheral bowing of the body segment.  Also some fissuring and subchondral edema involving the medial patellar facet and small amount of fissuring the central trochlea.  There is a small knee joint effusion.    Assessment/Plan: 1.  Left knee medial meniscal tear and junction of posterior horn and root with minimal peripheral extrusion  2.  Chondral fissuring consistent with grade II-III chondromalacia of patellofemoral joint    We reviewed treatment options going forward and she would like to avoid surgical treatment if at all possible.  Knee is only mildly bothersome to her at this time and she is leaving in about a week and have to go on a trip for 10 to 12 days.    As she is not having significant mechanical symptoms and is improving from pain standpoint I feel we can hold off on anything from a surgical standpoint unless symptoms worsen.    She was fitted with a knee sleeve today to give her some support we discussed other treatment options and after verbal consent and sterile preparation with discussion of risks which can include infection left knee was injected with steroid lidocaine mixture.    She will avoid kneeling squatting or twisting activities as much as possible and limit steps.    I will see her back in 1 month after she returns for further evaluation of treatment going forward.  If she has persistent complaints of pain or develops increased mechanical symptoms may require arthroscopic debridement    Large Joint Arthrocentesis: L knee  Date/Time: 7/11/2019 4:31 PM  Consent given by: patient  Site marked: site marked  Timeout: Immediately prior to procedure a time out was called to verify the correct patient, procedure,  equipment, support staff and site/side marked as required   Supporting Documentation  Indications: pain   Procedure Details  Location: knee - L knee  Preparation: Patient was prepped and draped in the usual sterile fashion  Needle size: 22 G  Approach: anteromedial  Medications administered: 4 mL lidocaine (cardiac); 80 mg methylPREDNISolone acetate 80 MG/ML  Patient tolerance: patient tolerated the procedure well with no immediate complications

## 2019-07-12 PROBLEM — S83.242A TEAR OF MEDIAL MENISCUS OF LEFT KNEE, CURRENT: Status: ACTIVE | Noted: 2019-07-12

## 2019-07-12 PROBLEM — M94.262 CHONDROMALACIA OF LEFT KNEE: Status: ACTIVE | Noted: 2019-07-12

## 2019-07-12 RX ORDER — METHYLPREDNISOLONE ACETATE 80 MG/ML
80 INJECTION, SUSPENSION INTRA-ARTICULAR; INTRALESIONAL; INTRAMUSCULAR; SOFT TISSUE
Status: COMPLETED | OUTPATIENT
Start: 2019-07-11 | End: 2019-07-11

## 2019-08-12 ENCOUNTER — OFFICE VISIT (OUTPATIENT)
Dept: ORTHOPEDIC SURGERY | Facility: CLINIC | Age: 64
End: 2019-08-12

## 2019-08-12 VITALS — TEMPERATURE: 97.3 F | BODY MASS INDEX: 29.83 KG/M2 | WEIGHT: 158 LBS | HEIGHT: 61 IN

## 2019-08-12 DIAGNOSIS — S83.242A TEAR OF MEDIAL MENISCUS OF LEFT KNEE, CURRENT, UNSPECIFIED TEAR TYPE, INITIAL ENCOUNTER: ICD-10-CM

## 2019-08-12 DIAGNOSIS — M94.262 CHONDROMALACIA OF LEFT KNEE: Primary | ICD-10-CM

## 2019-08-12 PROCEDURE — 99212 OFFICE O/P EST SF 10 MIN: CPT | Performed by: ORTHOPAEDIC SURGERY

## 2019-08-13 NOTE — PROGRESS NOTES
"Knee Exam      Patient: Kayla HESTER [place]    YOB: 1955 63 y.o. female    Chief Complaints: knee feels better    History of Present Illness: Patient was seen on 7/11/2019 after injuring her left knee 3 weeks prior to that when she was going down some steps and felt a large pop in the medial aspect of her knee.    At that time her knee was feeling a little stiff especially the first few steps in the morning.  She had an MRI prior to her visit after evaluation by her PCP which showed a meniscal tear as well as some chondromalacia patellofemoral joint.    At that time her knee was only mildly bothersome and she is not having mechanical symptoms.  Her knee was injected at that time she was fitted with a knee sleeve    She is seen back today stating that her knee feels much better with really no significant complaints of pain locking or catching      HPI    ROS: No knee pain  Past Medical History:   Diagnosis Date   • Anemia    • Anxiety    • GERD (gastroesophageal reflux disease)    • Hypertension    • Neutropenia (CMS/HCC) 1/3/2017       Physical Exam:   Vitals:    08/12/19 1119   Temp: 97.3 °F (36.3 °C)   Weight: 71.7 kg (158 lb)   Height: 154.9 cm (61\")     Well developed with normal mood.  She is seen with her  today.  Left knee shows no warmth erythema and minimal if any effusion.  No focal discomfort of the medial joint line or exacerbation with Primo's or with patellofemoral compression.      Radiology: I again reviewed her MRI of her left knee [facility] on 7/6/2019 her  brought the images in.  It does show signal at the junction between the posterior horn of the medial meniscus and posterior root that appears to be a combination of mucinous degeneration and partial radial tear with some peripheral bowing of the body segment also some fissuring and subchondral edema of the medial patellar facet and central trochlea      Assessment/Plan:  1.  Left knee medial meniscal tear and " junction of posterior horn and root with minimal peripheral extrusion  2.  Chondral fissuring consistent with grade II-III chondromalacia of patellofemoral joint    I reviewed treatment options with she and her  that she is not really having any significant complaints of pain swelling or mechanical symptoms I would not recommend surgical treatment.    She may use her knee sleeve as needed and try to avoid kneeling squatting or twisting activities as much as possible.    If she has any recurrence of symptoms she will let me know and we will consider arthroscopy otherwise I will see her back as needed

## 2019-09-19 ENCOUNTER — CLINICAL SUPPORT (OUTPATIENT)
Dept: FAMILY MEDICINE CLINIC | Facility: CLINIC | Age: 64
End: 2019-09-19

## 2019-09-19 DIAGNOSIS — Z23 IMMUNIZATION DUE: Primary | ICD-10-CM

## 2019-09-19 PROCEDURE — 90471 IMMUNIZATION ADMIN: CPT | Performed by: FAMILY MEDICINE

## 2019-09-19 PROCEDURE — 90750 HZV VACC RECOMBINANT IM: CPT | Performed by: FAMILY MEDICINE

## 2019-10-15 ENCOUNTER — TELEPHONE (OUTPATIENT)
Dept: ORTHOPEDIC SURGERY | Facility: CLINIC | Age: 64
End: 2019-10-15

## 2019-10-15 NOTE — TELEPHONE ENCOUNTER
Thank you. Patient will see [facility] tomorrow and [facility] on 10/28.  very appreciative.

## 2019-10-15 NOTE — TELEPHONE ENCOUNTER
called stating patient's left knee has flared up again and she has been back on crutches x 10 days. Scheduled 1st available f/u appt on 11/6. Can MWM see any sooner?

## 2019-10-15 NOTE — TELEPHONE ENCOUNTER
My schedule is completely full this week and next week.  Please see if she can get in to see Radha this week and I can see her back on 10/28 or 10/31

## 2019-10-16 ENCOUNTER — OFFICE VISIT (OUTPATIENT)
Dept: ORTHOPEDIC SURGERY | Facility: CLINIC | Age: 64
End: 2019-10-16

## 2019-10-16 VITALS — HEIGHT: 62 IN | TEMPERATURE: 98.6 F | WEIGHT: 167.2 LBS | BODY MASS INDEX: 30.77 KG/M2

## 2019-10-16 DIAGNOSIS — M25.562 LEFT KNEE PAIN, UNSPECIFIED CHRONICITY: Primary | ICD-10-CM

## 2019-10-16 DIAGNOSIS — M17.12 PRIMARY OSTEOARTHRITIS OF LEFT KNEE: ICD-10-CM

## 2019-10-16 PROCEDURE — 99213 OFFICE O/P EST LOW 20 MIN: CPT | Performed by: NURSE PRACTITIONER

## 2019-10-16 PROCEDURE — 20610 DRAIN/INJ JOINT/BURSA W/O US: CPT | Performed by: NURSE PRACTITIONER

## 2019-10-16 PROCEDURE — 73562 X-RAY EXAM OF KNEE 3: CPT | Performed by: NURSE PRACTITIONER

## 2019-10-16 RX ORDER — OMEPRAZOLE 20 MG/1
CAPSULE, DELAYED RELEASE ORAL
COMMUNITY
Start: 2019-08-12 | End: 2019-11-14 | Stop reason: SDUPTHER

## 2019-10-16 RX ORDER — MELOXICAM 15 MG/1
TABLET ORAL
Qty: 90 TABLET | Refills: 0 | Status: SHIPPED | OUTPATIENT
Start: 2019-10-16 | End: 2019-10-16

## 2019-10-16 RX ORDER — METHYLPREDNISOLONE ACETATE 80 MG/ML
80 INJECTION, SUSPENSION INTRA-ARTICULAR; INTRALESIONAL; INTRAMUSCULAR; SOFT TISSUE
Status: COMPLETED | OUTPATIENT
Start: 2019-10-16 | End: 2019-10-16

## 2019-10-16 RX ADMIN — METHYLPREDNISOLONE ACETATE 80 MG: 80 INJECTION, SUSPENSION INTRA-ARTICULAR; INTRALESIONAL; INTRAMUSCULAR; SOFT TISSUE at 14:20

## 2019-10-16 NOTE — PATIENT INSTRUCTIONS
"Osteoarthritis    Osteoarthritis is a type of arthritis that affects tissue that covers the ends of bones in joints (cartilage). Cartilage acts as a cushion between the bones and helps them move smoothly. Osteoarthritis results when cartilage in the joints gets worn down. Osteoarthritis is sometimes called \"wear and tear\" arthritis.  Osteoarthritis is the most common form of arthritis. It often occurs in older people. It is a condition that gets worse over time (a progressive condition). Joints that are most often affected by this condition are in:  · Fingers.  · Toes.  · Hips.  · Knees.  · Spine, including neck and lower back.  What are the causes?  This condition is caused by age-related wearing down of cartilage that covers the ends of bones.  What increases the risk?  The following factors may make you more likely to develop this condition:  · Older age.  · Being overweight or obese.  · Overuse of joints, such as in athletes.  · Past injury of a joint.  · Past surgery on a joint.  · Family history of osteoarthritis.  What are the signs or symptoms?  The main symptoms of this condition are pain, swelling, and stiffness in the joint. The joint may lose its shape over time. Small pieces of bone or cartilage may break off and float inside of the joint, which may cause more pain and damage to the joint. Small deposits of bone (osteophytes) may grow on the edges of the joint. Other symptoms may include:  · A grating or scraping feeling inside the joint when you move it.  · Popping or creaking sounds when you move.  Symptoms may affect one or more joints. Osteoarthritis in a major joint, such as your knee or hip, can make it painful to walk or exercise. If you have osteoarthritis in your hands, you might not be able to  items, twist your hand, or control small movements of your hands and fingers (fine motor skills).  How is this diagnosed?  This condition may be diagnosed based on:  · Your medical history.  · A " physical exam.  · Your symptoms.  · X-rays of the affected joint(s).  · Blood tests to rule out other types of arthritis.  How is this treated?  There is no cure for this condition, but treatment can help to control pain and improve joint function. Treatment plans may include:  · A prescribed exercise program that allows for rest and joint relief. You may work with a physical therapist.  · A weight control plan.  · Pain relief techniques, such as:  ? Applying heat and cold to the joint.  ? Electric pulses delivered to nerve endings under the skin (transcutaneous electrical nerve stimulation, or TENS).  ? Massage.  ? Certain nutritional supplements.  · NSAIDs or prescription medicines to help relieve pain.  · Medicine to help relieve pain and inflammation (corticosteroids). This can be given by mouth (orally) or as an injection.  · Assistive devices, such as a brace, wrap, splint, specialized glove, or cane.  · Surgery, such as:  ? An osteotomy. This is done to reposition the bones and relieve pain or to remove loose pieces of bone and cartilage.  ? Joint replacement surgery. You may need this surgery if you have very bad (advanced) osteoarthritis.  Follow these instructions at home:  Activity  · Rest your affected joints as directed by your health care provider.  · Do not drive or use heavy machinery while taking prescription pain medicine.  · Exercise as directed. Your health care provider or physical therapist may recommend specific types of exercise, such as:  ? Strengthening exercises. These are done to strengthen the muscles that support joints that are affected by arthritis. They can be performed with weights or with exercise bands to add resistance.  ? Aerobic activities. These are exercises, such as brisk walking or water aerobics, that get your heart pumping.  ? Range-of-motion activities. These keep your joints easy to move.  ? Balance and agility exercises.  Managing pain, stiffness, and swelling    · If  directed, apply heat to the affected area as often as told by your health care provider. Use the heat source that your health care provider recommends, such as a moist heat pack or a heating pad.  ? If you have a removable assistive device, remove it as told by your health care provider.  ? Place a towel between your skin and the heat source. If your health care provider tells you to keep the assistive device on while you apply heat, place a towel between the assistive device and the heat source.  ? Leave the heat on for 20-30 minutes.  ? Remove the heat if your skin turns bright red. This is especially important if you are unable to feel pain, heat, or cold. You may have a greater risk of getting burned.  · If directed, put ice on the affected joint:  ? If you have a removable assistive device, remove it as told by your health care provider.  ? Put ice in a plastic bag.  ? Place a towel between your skin and the bag. If your health care provider tells you to keep the assistive device on during icing, place a towel between the assistive device and the bag.  ? Leave the ice on for 20 minutes, 2-3 times a day.  General instructions  · Take over-the-counter and prescription medicines only as told by your health care provider.  · Maintain a healthy weight. Follow instructions from your health care provider for weight control. These may include dietary restrictions.  · Do not use any products that contain nicotine or tobacco, such as cigarettes and e-cigarettes. These can delay bone healing. If you need help quitting, ask your health care provider.  · Use assistive devices as directed by your health care provider.  · Keep all follow-up visits as told by your health care provider. This is important.  Where to find more information  · National [place] of Arthritis and Musculoskeletal and Skin Diseases: www.niams.nih.gov  · National [place] on Aging: www.kacy.nih.gov  · American College of Rheumatology:  www.rheumatology.org  Contact a health care provider if:  · Your skin turns red.  · You develop a rash.  · You have pain that gets worse.  · You have a fever along with joint or muscle aches.  Get help right away if:  · You lose a lot of weight.  · You suddenly lose your appetite.  · You have night sweats.  Summary  · Osteoarthritis is a type of arthritis that affects tissue covering the ends of bones in joints (cartilage).  · This condition is caused by age-related wearing down of cartilage that covers the ends of bones.  · The main symptom of this condition is pain, swelling, and stiffness in the joint.  · There is no cure for this condition, but treatment can help to control pain and improve joint function.  This information is not intended to replace advice given to you by your health care provider. Make sure you discuss any questions you have with your health care provider.  Document Released: 12/18/2006 Document Revised: 09/24/2018 Document Reviewed: 08/21/2017  [employer] Patient Education © 2019 [employer]

## 2019-10-16 NOTE — PROGRESS NOTES
Patient Name: Kayla Adkins   YOB: 1955  Referring Primary Care Physician: Emmett Rivas MD  BMI: Body mass index is 30.58 kg/m².    Chief Complaint:    Chief Complaint   Patient presents with   • Left Knee - Establish Care, Pain        HPI: Knee Pain: Patient presents with knee pain involving the  left knee. Onset of the symptoms was several months ago. Inciting event: this is a longstanding problem which has been getting worse. Current symptoms include pain located medial aspect. Pain is aggravated by any weight bearing, going up and down stairs, lateral movements, squatting and standing.  Patient has had prior knee problems. Evaluation to date: plain films: medial joint line oa and MRI: meniscus. Treatment to date: avoidance of offending activity, corticosteroid injection which was effective and rest.  Patient is known to Dr. Curran and is sent here today for evaluation of left knee pain.  She is increased her walking up to a mile and a half and has had more pain.  She experienced this in June had a plain film done at [facility] and her PCP did an MRI both that were reviewed today.  Kayla Adkins is a 64 y.o. female who presents today for evaluation of   Chief Complaint   Patient presents with   • Left Knee - Establish Care, Pain       This problem is  new to this examiner.     Subjective   Medications:   Home Medications:  Current Outpatient Medications on File Prior to Visit   Medication Sig   • atorvastatin (LIPITOR) 10 MG tablet Take 1 tablet by mouth Every Night.   • DULoxetine (CYMBALTA) 60 MG capsule Take 1 capsule by mouth Every Night.   • hyoscyamine (ANASPAZ,LEVSIN) 0.125 MG tablet Take 0.125 mg by mouth Every 4 (Four) Hours As Needed for Cramping.   • meloxicam (MOBIC) 15 MG tablet Take 1 tablet by mouth Daily.   • metoprolol succinate XL (TOPROL-XL) 50 MG 24 hr tablet Take 1 tablet by mouth Daily.   • omeprazole (priLOSEC) 20 MG capsule    • Multiple  Vitamins-Minerals (CENTRUM SILVER ULTRA WOMENS) tablet Take 1 tablet by mouth Daily for 180 days.     No current facility-administered medications on file prior to visit.      Current Medications:  Scheduled Meds:  Continuous Infusions:  No current facility-administered medications for this visit.   PRN Meds:.    I have reviewed the patient's medical history in detail and updated the computerized patient record.  Review and summarization of old records includes:    Past Medical History:   Diagnosis Date   • Anemia    • Anxiety    • GERD (gastroesophageal reflux disease)    • Hypertension    • Neutropenia (CMS/HCC) 1/3/2017      History reviewed. No pertinent surgical history.     Social History     Occupational History   • Not on file   Tobacco Use   • Smoking status: Never Smoker   • Smokeless tobacco: Never Used   Substance and Sexual Activity   • Alcohol use: No   • Drug use: Not on file   • Sexual activity: Not on file      Social History     Social History Narrative   • Not on file        Family History   Problem Relation Age of Onset   • Hypertension Mother    • Heart disease Father    • Hypertension Sister        ROS: 14 point review of systems was performed and all other systems were reviewed and are negative except for documented findings in HPI and today's encounter.     Allergies:   Allergies   Allergen Reactions   • Penicillins      Constitutional:  Denies fever, shaking or chills   Eyes:  Denies change in visual acuity   HENT:  Denies nasal congestion or sore throat   Respiratory:  Denies cough or shortness of breath   Cardiovascular:  Denies chest pain or severe LE edema   GI:  Denies abdominal pain, nausea, vomiting, bloody stools or diarrhea   Musculoskeletal:  Numbness, tingling, pain, or loss of motor function only as noted above in history of present illness.  : Denies painful urination or hematuria  Integument:  Denies rash, lesion or ulceration   Neurologic:  Denies headache or focal  "weakness  Endocrine:  Denies lymphadenopathy  Psych:  Denies confusion or change in mental status   Hem:  Denies active bleeding    OBJECTIVE:  Physical Exam:   Temp 98.6 °F (37 °C) (Temporal)   Ht 157.5 cm (62\")   Wt 75.8 kg (167 lb 3.2 oz)   BMI 30.58 kg/m²     General Appearance:    Alert, cooperative, in no acute distress                  Eyes: conjunctiva clear  ENT: external ears and nose atraumatic  CV: no peripheral edema  Resp: normal respiratory effort  Skin: no rashes or wounds; normal turgor  Psych: mood and affect appropriate  Lymph: no nodes appreciated  Neuro: gross sensation intact  Vascular:  Palpable peripheral pulse in noted extremity  Musculoskeletal Extremities: left knee with medial joint line pain is warm and dry and intact she is neurovascular intact has patellofemoral crepitation skin is warm dry good pulses movement and sensation calf is soft hip is nontender ankles nontender    Radiology: 3 views of left knee were done today that reveal some osteoarthritic changes in the medial compartment and patellofemoral no acute fracture noted no changes from previous films MRI was done it is under media and discusses meniscus tear that is degenerative  LEFT KNEE X-RAY     HISTORY: Medial knee pain for one week after feeling a pop.     TECHNIQUE: Two x-ray images of the left knee are provided.     FINDINGS: There is no joint effusion. The bones and the joint spaces  appear normal with the exception of some small subcortical cysts of the  patella, indicative of chronic chondromalacia at the patellar articular  surface. No soft tissue deformity is present.     IMPRESSION:  Evidence of chronic chondral edge of the patella. Otherwise  negative left knee x-ray.     This report was finalized on 6/24/2019 2:17 PM by Dr. Emory Genao M.D.  Assessment:     ICD-10-CM ICD-9-CM   1. Left knee pain, unspecified chronicity M25.562 719.46   2. Primary osteoarthritis of left knee M17.12 715.16        Large " Joint Arthrocentesis: L knee  Date/Time: 10/16/2019 2:20 PM  Consent given by: patient  Site marked: site marked  Timeout: Immediately prior to procedure a time out was called to verify the correct patient, procedure, equipment, support staff and site/side marked as required   Supporting Documentation  Indications: pain and joint swelling   Procedure Details  Location: knee - L knee  Preparation: Patient was prepped and draped in the usual sterile fashion  Needle size: 22 G (21 G)  Approach: anterolateral  Medications administered: 80 mg methylPREDNISolone acetate 80 MG/ML; 4 mL lidocaine (cardiac)  Patient tolerance: patient tolerated the procedure well with no immediate complications        Cortisone injection was done today per protocol patient can get another one in 3 months prior to her trip to [facility] and discussed mechanism of arthritis we will try the Mobic once a day gave precautions for GI and hypertension and she can call us to follow-up if not better.    Plan: Biomechanics of pertinent body area discussed.  Risks, benefits, alternatives, comparisons, and complications of accepted medicines, injections, recommendations, surgical procedures, and therapies explained and education provided in laymen's terms. Natural history and expected course of this patient's diagnosis discussed along with evaluation of therapies. Questions answered. When appropriate I also discussed proper use of cane, walker, trekking poles.   BMI:  The concept of BMI body mass index and its importance and implications discussed.  BMI suggested to be < 40 or as low as possible. Lifestyle measures for weight loss and how this affects orthopedic condition.  RICE: Rest, ice, compression, and elevation therapy, Cryotherapy/brachy therapy, and or OTC linaments as indicated with instructions.   Discussion of knee braces/elastic support sleeve.      10/16/2019    Much of this encounter note is an electronic transcription/translation of  spoken language to printed text. The electronic translation of spoken language may permit erroneous, or at times, nonsensical words or phrases to be inadvertently transcribed; Although I have reviewed the note for such errors, some may still exist

## 2019-10-28 ENCOUNTER — OFFICE VISIT (OUTPATIENT)
Dept: ORTHOPEDIC SURGERY | Facility: CLINIC | Age: 64
End: 2019-10-28

## 2019-10-28 VITALS — HEIGHT: 62 IN | BODY MASS INDEX: 29.44 KG/M2 | WEIGHT: 160 LBS

## 2019-10-28 DIAGNOSIS — M94.262 CHONDROMALACIA OF LEFT KNEE: ICD-10-CM

## 2019-10-28 DIAGNOSIS — S83.242D TEAR OF MEDIAL MENISCUS OF LEFT KNEE, CURRENT, UNSPECIFIED TEAR TYPE, SUBSEQUENT ENCOUNTER: Primary | ICD-10-CM

## 2019-10-28 PROCEDURE — 99213 OFFICE O/P EST LOW 20 MIN: CPT | Performed by: ORTHOPAEDIC SURGERY

## 2019-10-29 NOTE — PROGRESS NOTES
"Knee Exam      Patient: Kayla HESTER [place]    YOB: 1955 64 y.o. female    Chief Complaints: knee was hurting again    History of Present Illness: Patient was last seen on 8/12/2019 after injuring her left knee 3 weeks prior to our visit on 7/11/2019 when she was going down some steps and felt a large pop in the medial aspect of her knee.    At our last visit we reviewed her MRI which had shown a medial meniscal tear as well as some chondromalacia.  Given her lack of mechanical symptoms at that time we proceeded with nonoperative treatment and had been injected at our visit in July.    She called on 10/15/2019 stating that her knee had flared up again and she saw Radha got another injection.  Her symptoms have improved again with mild intermittent aching pain in the right knee but no tej locking or catching at this point.  HPI    ROS: knee pain  Past Medical History:   Diagnosis Date   • Anemia    • Anxiety    • GERD (gastroesophageal reflux disease)    • Hypertension    • Neutropenia (CMS/HCC) 1/3/2017       Physical Exam:   Vitals:    10/28/19 1429   Weight: 72.6 kg (160 lb)   Height: 157.5 cm (62\")     Well developed with normal mood.  She is with her .  Left knee shows 0 to 115 degrees range of motion minimal discomfort of medial joint line but no exacerbation of pain with Primo's and mild discomfort with patellofemoral compression.      Radiology:I again reviewed her MRI of her left knee [facility] on 7/6/2019 her  brought the images in.  It does show signal at the junction between the posterior horn of the medial meniscus and posterior root that appears to be a combination of mucinous degeneration and partial radial tear with some peripheral bowing of the body segment also some fissuring and subchondral edema of the medial patellar facet and central trochlea      Assessment/Plan:   1.  Left knee medial meniscal tear and junction of posterior horn and root with minimal " peripheral extrusion  2.  Chondral fissuring consistent with grade II-III chondromalacia of patellofemoral joint    We again reviewed treatment options and will hold off on any surgical treatment as her symptoms have improved again however I reviewed in detail with him that I would not recommend any further injections and she has persistent or recurrent complaints of pain that we would be best to proceed with treatment in the form of arthroscopic debridement especially if she develops mechanical symptoms.    She will limit kneeling squatting activities and I will check her back in 3 weeks to assess her progress and determine treatment going forward

## 2019-11-14 ENCOUNTER — TELEPHONE (OUTPATIENT)
Dept: FAMILY MEDICINE CLINIC | Facility: CLINIC | Age: 64
End: 2019-11-14

## 2019-11-14 DIAGNOSIS — I10 ESSENTIAL HYPERTENSION: ICD-10-CM

## 2019-11-14 DIAGNOSIS — F41.9 ANXIETY: ICD-10-CM

## 2019-11-14 DIAGNOSIS — E78.49 OTHER HYPERLIPIDEMIA: ICD-10-CM

## 2019-11-14 DIAGNOSIS — K21.9 GASTROESOPHAGEAL REFLUX DISEASE WITHOUT ESOPHAGITIS: Primary | ICD-10-CM

## 2019-11-14 RX ORDER — OMEPRAZOLE 20 MG/1
20 CAPSULE, DELAYED RELEASE ORAL DAILY
Qty: 90 CAPSULE | Refills: 0 | Status: SHIPPED | OUTPATIENT
Start: 2019-11-14 | End: 2020-03-05 | Stop reason: SDUPTHER

## 2019-11-14 RX ORDER — METOPROLOL SUCCINATE 50 MG/1
50 TABLET, EXTENDED RELEASE ORAL DAILY
Qty: 90 TABLET | Refills: 0 | Status: SHIPPED | OUTPATIENT
Start: 2019-11-14 | End: 2020-03-05 | Stop reason: SDUPTHER

## 2019-11-14 RX ORDER — ATORVASTATIN CALCIUM 10 MG/1
10 TABLET, FILM COATED ORAL NIGHTLY
Qty: 90 TABLET | Refills: 0 | Status: SHIPPED | OUTPATIENT
Start: 2019-11-14 | End: 2020-03-05 | Stop reason: SDUPTHER

## 2019-11-14 RX ORDER — DULOXETIN HYDROCHLORIDE 60 MG/1
60 CAPSULE, DELAYED RELEASE ORAL NIGHTLY
Qty: 90 CAPSULE | Refills: 0 | Status: SHIPPED | OUTPATIENT
Start: 2019-11-14 | End: 2020-03-05 | Stop reason: SDUPTHER

## 2019-11-14 NOTE — TELEPHONE ENCOUNTER
Pt call and says her insurance changed and she will no longer be using [facility]. She states her insurance makes her use [employer].

## 2019-11-16 DIAGNOSIS — R30.0 DYSURIA: Primary | ICD-10-CM

## 2019-11-18 ENCOUNTER — OFFICE VISIT (OUTPATIENT)
Dept: ORTHOPEDIC SURGERY | Facility: CLINIC | Age: 64
End: 2019-11-18

## 2019-11-18 VITALS — HEIGHT: 62 IN | TEMPERATURE: 98.3 F | BODY MASS INDEX: 29.44 KG/M2 | WEIGHT: 160 LBS

## 2019-11-18 DIAGNOSIS — M94.262 CHONDROMALACIA OF LEFT KNEE: ICD-10-CM

## 2019-11-18 DIAGNOSIS — S83.242D TEAR OF MEDIAL MENISCUS OF LEFT KNEE, CURRENT, UNSPECIFIED TEAR TYPE, SUBSEQUENT ENCOUNTER: Primary | ICD-10-CM

## 2019-11-18 PROCEDURE — 99212 OFFICE O/P EST SF 10 MIN: CPT | Performed by: ORTHOPAEDIC SURGERY

## 2019-11-18 NOTE — PROGRESS NOTES
"Knee Exam      Patient: Kayla HESTER [place]    YOB: 1955 64 y.o. female    Chief Complaints: knee feels okay    History of Present Illness:Patient was seen on 8/12/2019 after injuring her left knee 3 weeks prior to our visit on 7/11/2019 when she was going down some steps and felt a large pop in the medial aspect of her knee.     On 8/12/2019  we reviewed her MRI which had shown a medial meniscal tear as well as some chondromalacia.  Given her lack of mechanical symptoms at that time we proceeded with nonoperative treatment and had been injected at our visit in July.     She called on 10/15/2019 stating that she was seen on 10/28/2019 at which time her symptoms had been reported only improved again with mild intermittent aching pain in the right knee but no tej locking or catching.      We discussed treatment options at that time with mutual decision made to proceed to surgical treatment but reviewed with them that if she had recurrent complaints that it may be best to proceed with operative hold on any treatment rather than any recurrent injections.    She is seen back today stating her knee feels fine she has not had any recurrent pain or mechanical symptoms to the knee but has been very careful with it and limiting squatting kneeling or twisting activities.  HPI    ROS: No  knee pain  Past Medical History:   Diagnosis Date   • Anemia    • Anxiety    • GERD (gastroesophageal reflux disease)    • Hypertension    • Neutropenia (CMS/HCC) 1/3/2017       Physical Exam:   Vitals:    11/18/19 1309   Temp: 98.3 °F (36.8 °C)   TempSrc: Temporal   Weight: 72.6 kg (160 lb)   Height: 157.5 cm (62\")     Well developed with normal mood.on exam she is ambling without assistive device.  Left knee shows no effusion warmth erythema and no focal discomfort of the medial joint line but this is the area that bothers her when it does.  She had no pain with Primo's or with patellofemoral compression.  0 to 170 degrees " range of motion today      Radiology: None performed      Assessment/Plan: 1.  Left knee medial meniscal tear and junction of posterior horn and root with minimal peripheral extrusion  2.  Chondral fissuring consistent with grade II-III chondromalacia of patellofemoral joint      Despite her MRI findings clinically she is doing quite well not having any pain or mechanical symptoms I would not recommend surgical treatment nor any recurrent injections  .    She use her knee sleeve and avoid kneeling or squatting twisting activities.  She has a trip coming up in January to [facility] and to check her back in about 2 months to assess progress.  If she has recurrent symptoms then we may need to proceed with operative treatment as I would not recommend any further injection based on her current exams

## 2020-01-02 NOTE — PROGRESS NOTES
"Chief Complaint   Patient presents with   • Hand Pain   • Hypertension   • Heartburn       Subjective   This patient presents the office to refill medicines.  Labs have been reviewed and are seen below.  Her GERD symptoms are stable with current proton pump inhibitor.  Lipids are stable.  There has been mild progression of total cholesterol.  HDL cholesterol is improved.  Blood pressure is stable on current therapy.    She has had 6 week issue with left thumb tendinitis.  It is slowly improving with rest and not lifting as much with her left arm.    She is planning a trip in May and would like to have anti-anxiety medication on hand in case of need due to her fear flying.  I have reviewed and updated her medications, medical history and problem list during today's office visit.        Social History   Substance Use Topics   • Smoking status: Never Smoker   • Smokeless tobacco: Never Used   • Alcohol use No       Review of Systems   Constitutional: Negative for fatigue.   Cardiovascular: Negative for chest pain.       Objective   /86  Pulse 115  Temp 98.4 °F (36.9 °C) (Oral)   Resp 16  Ht 154.9 cm (61\")  Wt 75.3 kg (166 lb)  BMI 31.37 kg/m2  Body mass index is 31.37 kg/(m^2).  Physical Exam   Constitutional: She is cooperative. No distress.   Eyes: Conjunctivae and lids are normal.   Neck: Carotid bruit is not present. No tracheal deviation present.   Cardiovascular: Normal rate, regular rhythm and normal heart sounds.    No murmur heard.  Pulmonary/Chest: Effort normal and breath sounds normal.   Neurological: She is alert. She is not disoriented.   Skin: Skin is warm and dry.   Psychiatric: She has a normal mood and affect. Her speech is normal and behavior is normal.   Vitals reviewed.      Data Reviewed:    CMP:  Lab Results   Component Value Date     (H) 12/27/2017    BUN 13 12/27/2017    CREATININE 0.72 12/27/2017    EGFRIFNONA 82 12/27/2017    EGFRIFAFRI 100 12/27/2017     12/27/2017 " Follow up left shoulder cuff tear arthropathy .  Patient desires injection today of left shoulder.  Risks, benefits, potential complications and alternatives were discussed.   With the patient's consent, sterile prep was performed of left shoulder.  Left shoulder was injected with Depomedrol 80 mg and lidocaine at shoulder subacromial space from the posterolateral approach .  Return to clinic as needed.          K 4.9 12/27/2017     12/27/2017    CALCIUM 9.6 12/27/2017    PROTENTOTREF 6.6 12/27/2017    ALBUMIN 4.20 12/27/2017    LABGLOBREF 2.4 12/27/2017    BILITOT 0.3 12/27/2017    ALKPHOS 82 12/27/2017    AST 14 12/27/2017    ALT 21 12/27/2017     CBC w/ diff:   Lab Results   Component Value Date    WBC 2.95 (L) 12/27/2017    RBC 4.34 12/27/2017    HGB 13.5 12/27/2017    HCT 41.1 12/27/2017    MCV 94.7 12/27/2017    MCH 31.1 12/27/2017    MCHC 32.8 12/27/2017    RDW 13.0 12/27/2017     12/27/2017    NEUTRORELPCT 47.2 12/27/2017    LYMPHORELPCT 40.0 12/27/2017    MONORELPCT 10.5 12/27/2017    EOSRELPCT 2.0 12/27/2017    BASORELPCT 0.3 12/27/2017     LIPID PANEL:  Lab Results   Component Value Date    CHLPL 201 (H) 12/27/2017    TRIG 154 (H) 12/27/2017    HDL 69 (H) 12/27/2017    VLDL 30.8 12/27/2017     (H) 12/27/2017    LDLHDL 1.47 12/27/2017     TSH:  Lab Results   Component Value Date    TSH 0.904 12/27/2017       Assessment/Plan     Problem List Items Addressed This Visit        Cardiovascular and Mediastinum    BP (high blood pressure) - Primary    Relevant Medications    metoprolol succinate XL (TOPROL-XL) 50 MG 24 hr tablet    Other Relevant Orders    Comprehensive metabolic panel    CBC and Differential    TSH    Hyperlipidemia    Relevant Medications    atorvastatin (LIPITOR) 10 MG tablet    Other Relevant Orders    Lipid Panel With LDL/HDL Ratio       Digestive    Gastroesophageal reflux disease    Relevant Medications    omeprazole (priLOSEC) 20 MG capsule       Musculoskeletal and Integument    Tendonitis of left wrist       Other    Anxiety    Relevant Medications    DULoxetine (CYMBALTA) 60 MG capsule    Fear of flying    Relevant Medications    ALPRAZolam (XANAX) 0.5 MG tablet          Outpatient Encounter Prescriptions as of 1/3/2018   Medication Sig Dispense Refill   • hyoscyamine (ANASPAZ,LEVSIN) 0.125 MG tablet Take 0.125 mg by mouth Every 4 (Four) Hours As Needed for Cramping.      • ALPRAZolam (XANAX) 0.5 MG tablet Take 1 tablet by mouth 4 (Four) Times a Day As Needed for Anxiety for up to 1 day. 4 tablet 0   • atorvastatin (LIPITOR) 10 MG tablet Take 1 tablet by mouth Every Night for 180 days. 90 tablet 1   • DULoxetine (CYMBALTA) 60 MG capsule Take 1 capsule by mouth Every Night for 180 days. 90 capsule 1   • metoprolol succinate XL (TOPROL-XL) 50 MG 24 hr tablet Take 1 tablet by mouth Daily for 180 days. 90 tablet 1   • Multiple Vitamins-Minerals (CENTRUM SILVER ULTRA WOMENS) tablet Take 1 tablet by mouth Daily for 180 days. 30 tablet 5   • omeprazole (priLOSEC) 20 MG capsule Take 1 capsule by mouth Every Night for 180 days. 90 capsule 1   • [DISCONTINUED] atorvastatin (LIPITOR) 10 MG tablet Take 1 tablet by mouth Every Night for 180 days. 90 tablet 1   • [DISCONTINUED] DULoxetine (CYMBALTA) 60 MG capsule Take 1 capsule by mouth Every Night for 180 days. 90 capsule 1   • [DISCONTINUED] metoprolol succinate XL (TOPROL-XL) 50 MG 24 hr tablet Take 1 tablet by mouth Daily for 180 days. 90 tablet 1   • [DISCONTINUED] omeprazole (priLOSEC) 20 MG capsule Take 1 capsule by mouth Every Night for 180 days. 90 capsule 1     No facility-administered encounter medications on file as of 1/3/2018.        Orders Placed This Encounter   Procedures   • Comprehensive metabolic panel     Standing Status:   Future     Standing Expiration Date:   9/30/2018   • Lipid Panel With LDL/HDL Ratio     Standing Status:   Future     Standing Expiration Date:   9/30/2018   • TSH     Standing Status:   Future     Standing Expiration Date:   9/30/2018   • CBC and Differential     Standing Status:   Future     Standing Expiration Date:   9/30/2018     Order Specific Question:   Manual Differential     Answer:   No              F/U in 9 months

## 2020-01-20 ENCOUNTER — OFFICE VISIT (OUTPATIENT)
Dept: ORTHOPEDIC SURGERY | Facility: CLINIC | Age: 65
End: 2020-01-20

## 2020-01-20 VITALS — BODY MASS INDEX: 31.73 KG/M2 | HEIGHT: 62 IN | WEIGHT: 172.4 LBS | TEMPERATURE: 97.2 F

## 2020-01-20 DIAGNOSIS — M94.262 CHONDROMALACIA OF LEFT KNEE: Primary | ICD-10-CM

## 2020-01-20 DIAGNOSIS — S83.242D TEAR OF MEDIAL MENISCUS OF LEFT KNEE, CURRENT, UNSPECIFIED TEAR TYPE, SUBSEQUENT ENCOUNTER: ICD-10-CM

## 2020-01-20 PROCEDURE — 99213 OFFICE O/P EST LOW 20 MIN: CPT | Performed by: ORTHOPAEDIC SURGERY

## 2020-01-20 NOTE — PROGRESS NOTES
"Knee Exam      Patient: Kayla HESTER [place]    YOB: 1955 64 y.o. female    Chief Complaints: knee feels okay    History of Present Illness: With some slight achiness but no locking or catching.  Patient has been followed for her left knee (please see note from 11/18/2019 for details.    She was last seen on 11/19/2018 at which time she did not have any significant complaints of pain in the left knee or mechanical symptoms but had been very careful with it and limiting activities.    At that time we decided to hold off on any surgical treatment given her lack of mechanical symptoms and improvement in pain.    She has since gone to [place] and quite a bit of snowmobiling that overall her knee is doing relatively well gets a slight occasional twinge of pain over the anteromedial aspect of the knee    She had previous MRI but in last August which had shown a medial meniscal tear and some chondromalacia.    ROS: knee pain  Past Medical History:   Diagnosis Date   • Anemia    • Anxiety    • GERD (gastroesophageal reflux disease)    • Hypertension    • Neutropenia (CMS/HCC) 1/3/2017       Physical Exam:   Vitals:    01/20/20 1252   Temp: 97.2 °F (36.2 °C)   Weight: 78.2 kg (172 lb 6.4 oz)   Height: 156.2 cm (61.5\")     Well developed with normal mood.  She is with her .  Left knee shows no warmth erythema.  0 to 118 degrees range of motion with mild discomfort over the medial joint line to palpation but no exacerbation of pain with Primo's minimal if any discomfort with patellofemoral compression no gross instability.      Radiology: None performed      Assessment/Plan:   1.  Left knee medial meniscal tear and junction of posterior horn and root with minimal peripheral extrusion  2.  Chondral fissuring consistent with grade II-III chondromalacia of patellofemoral joint    We discussed treatment options including operative and nonoperative and she would like to avoid operative treatment at this " time as well possible.  Reviewed with her that I do not think it is necessary at this time given her lack of mechanical symptoms but is certainly little bit concerned about her slight twinging pain but nothing that is excruciating.    After thorough discussion with she and her  we will hold off on operative treatment she will get back to her more normal activities and see how things do.  If she has recurrence of increased pain or mechanical symptoms let me know we will get a new MRI without toward surgical treatment otherwise I will see her back as needed.  We had a pleasant visit she to me, she has appreciated my care.

## 2020-01-28 ENCOUNTER — RESULTS ENCOUNTER (OUTPATIENT)
Dept: FAMILY MEDICINE CLINIC | Facility: CLINIC | Age: 65
End: 2020-01-28

## 2020-01-28 DIAGNOSIS — I10 ESSENTIAL HYPERTENSION: ICD-10-CM

## 2020-01-28 DIAGNOSIS — E78.49 OTHER HYPERLIPIDEMIA: ICD-10-CM

## 2020-03-03 LAB
ALBUMIN SERPL-MCNC: 4.4 G/DL (ref 3.5–5.2)
ALBUMIN/GLOB SERPL: 2.1 G/DL
ALP SERPL-CCNC: 94 U/L (ref 39–117)
ALT SERPL-CCNC: 10 U/L (ref 1–33)
AST SERPL-CCNC: 10 U/L (ref 1–32)
BASOPHILS # BLD AUTO: 0.01 10*3/MM3 (ref 0–0.2)
BASOPHILS NFR BLD AUTO: 0.3 % (ref 0–1.5)
BILIRUB SERPL-MCNC: 0.3 MG/DL (ref 0.2–1.2)
BUN SERPL-MCNC: 14 MG/DL (ref 8–23)
BUN/CREAT SERPL: 17.1 (ref 7–25)
CALCIUM SERPL-MCNC: 9.2 MG/DL (ref 8.6–10.5)
CHLORIDE SERPL-SCNC: 103 MMOL/L (ref 98–107)
CHOLEST SERPL-MCNC: 149 MG/DL (ref 0–200)
CHOLEST/HDLC SERPL: 2.98 {RATIO}
CK SERPL-CCNC: 57 U/L (ref 20–180)
CO2 SERPL-SCNC: 26.6 MMOL/L (ref 22–29)
CREAT SERPL-MCNC: 0.82 MG/DL (ref 0.57–1)
EOSINOPHIL # BLD AUTO: 0.05 10*3/MM3 (ref 0–0.4)
EOSINOPHIL NFR BLD AUTO: 1.4 % (ref 0.3–6.2)
ERYTHROCYTE [DISTWIDTH] IN BLOOD BY AUTOMATED COUNT: 13.7 % (ref 12.3–15.4)
GLOBULIN SER CALC-MCNC: 2.1 GM/DL
GLUCOSE SERPL-MCNC: 104 MG/DL (ref 65–99)
HCT VFR BLD AUTO: 38.6 % (ref 34–46.6)
HDLC SERPL-MCNC: 50 MG/DL (ref 40–60)
HGB BLD-MCNC: 12.6 G/DL (ref 12–15.9)
IMM GRANULOCYTES # BLD AUTO: 0.01 10*3/MM3 (ref 0–0.05)
IMM GRANULOCYTES NFR BLD AUTO: 0.3 % (ref 0–0.5)
LDLC SERPL CALC-MCNC: 72 MG/DL (ref 0–100)
LYMPHOCYTES # BLD AUTO: 0.93 10*3/MM3 (ref 0.7–3.1)
LYMPHOCYTES NFR BLD AUTO: 26.6 % (ref 19.6–45.3)
MCH RBC QN AUTO: 28.1 PG (ref 26.6–33)
MCHC RBC AUTO-ENTMCNC: 32.6 G/DL (ref 31.5–35.7)
MCV RBC AUTO: 86.2 FL (ref 79–97)
MONOCYTES # BLD AUTO: 0.45 10*3/MM3 (ref 0.1–0.9)
MONOCYTES NFR BLD AUTO: 12.9 % (ref 5–12)
NEUTROPHILS # BLD AUTO: 2.05 10*3/MM3 (ref 1.7–7)
NEUTROPHILS NFR BLD AUTO: 58.5 % (ref 42.7–76)
NRBC BLD AUTO-RTO: 0 /100 WBC (ref 0–0.2)
PLATELET # BLD AUTO: 322 10*3/MM3 (ref 140–450)
POTASSIUM SERPL-SCNC: 4.2 MMOL/L (ref 3.5–5.2)
PROT SERPL-MCNC: 6.5 G/DL (ref 6–8.5)
RBC # BLD AUTO: 4.48 10*6/MM3 (ref 3.77–5.28)
SODIUM SERPL-SCNC: 141 MMOL/L (ref 136–145)
TRIGL SERPL-MCNC: 135 MG/DL (ref 0–150)
VLDLC SERPL CALC-MCNC: 27 MG/DL
WBC # BLD AUTO: 3.5 10*3/MM3 (ref 3.4–10.8)

## 2020-03-05 ENCOUNTER — OFFICE VISIT (OUTPATIENT)
Dept: FAMILY MEDICINE CLINIC | Facility: CLINIC | Age: 65
End: 2020-03-05

## 2020-03-05 VITALS
HEART RATE: 116 BPM | WEIGHT: 162 LBS | BODY MASS INDEX: 29.81 KG/M2 | OXYGEN SATURATION: 96 % | SYSTOLIC BLOOD PRESSURE: 130 MMHG | HEIGHT: 62 IN | RESPIRATION RATE: 16 BRPM | DIASTOLIC BLOOD PRESSURE: 79 MMHG | TEMPERATURE: 98.4 F

## 2020-03-05 DIAGNOSIS — R30.0 DYSURIA: ICD-10-CM

## 2020-03-05 DIAGNOSIS — R73.03 PREDIABETES: ICD-10-CM

## 2020-03-05 DIAGNOSIS — F41.9 ANXIETY: ICD-10-CM

## 2020-03-05 DIAGNOSIS — K21.9 GASTROESOPHAGEAL REFLUX DISEASE WITHOUT ESOPHAGITIS: ICD-10-CM

## 2020-03-05 DIAGNOSIS — E78.49 OTHER HYPERLIPIDEMIA: ICD-10-CM

## 2020-03-05 DIAGNOSIS — R39.9 SYMPTOM INVOLVING BLADDER: ICD-10-CM

## 2020-03-05 DIAGNOSIS — L98.9 SKIN LESION: ICD-10-CM

## 2020-03-05 DIAGNOSIS — I10 ESSENTIAL HYPERTENSION: Primary | ICD-10-CM

## 2020-03-05 PROCEDURE — 99214 OFFICE O/P EST MOD 30 MIN: CPT | Performed by: FAMILY MEDICINE

## 2020-03-05 RX ORDER — DULOXETIN HYDROCHLORIDE 60 MG/1
60 CAPSULE, DELAYED RELEASE ORAL NIGHTLY
Qty: 90 CAPSULE | Refills: 3 | Status: SHIPPED | OUTPATIENT
Start: 2020-03-05 | End: 2021-03-08 | Stop reason: SDUPTHER

## 2020-03-05 RX ORDER — OMEPRAZOLE 20 MG/1
20 CAPSULE, DELAYED RELEASE ORAL DAILY
Qty: 90 CAPSULE | Refills: 3 | Status: SHIPPED | OUTPATIENT
Start: 2020-03-05 | End: 2021-03-08 | Stop reason: SDUPTHER

## 2020-03-05 RX ORDER — METOPROLOL SUCCINATE 50 MG/1
50 TABLET, EXTENDED RELEASE ORAL DAILY
Qty: 90 TABLET | Refills: 3 | Status: SHIPPED | OUTPATIENT
Start: 2020-03-05 | End: 2021-03-08 | Stop reason: SDUPTHER

## 2020-03-05 RX ORDER — ATORVASTATIN CALCIUM 10 MG/1
10 TABLET, FILM COATED ORAL NIGHTLY
Qty: 90 TABLET | Refills: 3 | Status: SHIPPED | OUTPATIENT
Start: 2020-03-05 | End: 2021-03-08 | Stop reason: SDUPTHER

## 2020-03-05 NOTE — PROGRESS NOTES
"   Subjective       Chief Complaint   Patient presents with   • Hypertension     1 yr follow up          HPI:       Kayla Adkins is a 64 y.o. female who presents to the office today for lab review and medicine refill.  Blood pressure is controlled.  Lipids are improved.  Anxiety stable.  GERD symptoms stable.  Prediabetes condition is unchanged.  Patient continues to have some issues with drop bladder sensation.  Her urinary tract infections have resolved.  She never did get the referral process completed to see OB urology.  Call placed today during office to help facilitate that referral process.    I have reviewed and updated her medications, medical history and problem list during today's office visit.     Social History     Tobacco Use   • Smoking status: Never Smoker   • Smokeless tobacco: Never Used   Substance Use Topics   • Alcohol use: No       Review of Systems   Constitutional: Negative for fatigue.   Cardiovascular: Negative for chest pain.   Skin:        Skin lesions anterior chest with some discoloration   Psychiatric/Behavioral: The patient is not nervous/anxious.          PE:   Objective   /79   Pulse 116   Temp 98.4 °F (36.9 °C) (Oral)   Resp 16   Ht 156.2 cm (61.5\")   Wt 73.5 kg (162 lb)   SpO2 96%   BMI 30.11 kg/m²     Body mass index is 30.11 kg/m².    Physical Exam   Constitutional: She is oriented to person, place, and time. She appears well-developed. No distress.   Eyes: Conjunctivae and lids are normal.   Neck: Carotid bruit is not present.   Cardiovascular: Normal rate, regular rhythm and normal heart sounds.   Pulmonary/Chest: Effort normal and breath sounds normal.   Neurological: She is alert and oriented to person, place, and time.   Skin: Skin is warm and dry.   Psychiatric: She has a normal mood and affect. Her behavior is normal.   Vitals reviewed.       Data Reviewed:              Lab Results   Component Value Date     (H) 03/02/2020    BUN 14 03/02/2020    " CREATININE 0.82 03/02/2020    EGFRIFNONA 70 03/02/2020    EGFRIFAFRI 85 03/02/2020     03/02/2020    K 4.2 03/02/2020     03/02/2020    CALCIUM 9.2 03/02/2020    ALBUMIN 4.40 03/02/2020    BILITOT 0.3 03/02/2020    ALKPHOS 94 03/02/2020    AST 10 03/02/2020    ALT 10 03/02/2020    CHLPL 149 03/02/2020    TRIG 135 03/02/2020    HDL 50 03/02/2020    VLDL 27 03/02/2020    LDL 72 03/02/2020    CKTOTAL 57 03/02/2020    WBC 3.50 03/02/2020    RBC 4.48 03/02/2020    HCT 38.6 03/02/2020    MCV 86.2 03/02/2020    MCH 28.1 03/02/2020          A/P:     Assessment/Plan   Diagnoses and all orders for this visit:    1. Essential hypertension (Primary)  Assessment & Plan:  Hypertension is unchanged.  Continue current treatment regimen.  Blood pressure will be reassessed at the next regular appointment.    Orders:  -     metoprolol succinate XL (TOPROL-XL) 50 MG 24 hr tablet; Take 1 tablet by mouth Daily.  Dispense: 90 tablet; Refill: 3  -     Comprehensive Metabolic Panel; Future  -     CBC & Differential; Future  -     TSH; Future    2. Other hyperlipidemia  Assessment & Plan:  Lipid abnormalities are improving with treatment.  Nutritional counseling was provided.  Lipids will be reassessed in 1 year.    Orders:  -     atorvastatin (LIPITOR) 10 MG tablet; Take 1 tablet by mouth Every Night.  Dispense: 90 tablet; Refill: 3  -     Comprehensive Metabolic Panel; Future  -     CBC & Differential; Future  -     Lipid Panel With / Chol / HDL Ratio; Future  -     CK; Future    3. Anxiety  Assessment & Plan:  The current medical regimen is effective;  continue present plan and medications.      Orders:  -     DULoxetine (CYMBALTA) 60 MG capsule; Take 1 capsule by mouth Every Night.  Dispense: 90 capsule; Refill: 3  -     Comprehensive Metabolic Panel; Future  -     CBC & Differential; Future  -     TSH; Future    4. Gastroesophageal reflux disease without esophagitis  Assessment & Plan:  The current medical regimen is  effective;  continue present plan and medications.      Orders:  -     omeprazole (priLOSEC) 20 MG capsule; Take 1 capsule by mouth Daily.  Dispense: 90 capsule; Refill: 3  -     Comprehensive Metabolic Panel; Future  -     CBC & Differential; Future    5. Prediabetes  Assessment & Plan:  stable    Orders:  -     Comprehensive Metabolic Panel; Future    6. Skin lesion  Comments:  Anterior chest most likely seborrheic keratoses.  Some discoloration.  Orders:  -     Ambulatory Referral to Dermatology        Follow up:    Return in about 1 year (around 3/5/2021).

## 2020-07-17 ENCOUNTER — APPOINTMENT (OUTPATIENT)
Dept: WOMENS IMAGING | Facility: HOSPITAL | Age: 65
End: 2020-07-17

## 2020-07-17 PROCEDURE — 77063 BREAST TOMOSYNTHESIS BI: CPT | Performed by: RADIOLOGY

## 2020-07-17 PROCEDURE — 77067 SCR MAMMO BI INCL CAD: CPT | Performed by: RADIOLOGY

## 2020-11-30 DIAGNOSIS — I10 ESSENTIAL HYPERTENSION: ICD-10-CM

## 2020-12-02 RX ORDER — METOPROLOL SUCCINATE 50 MG/1
TABLET, EXTENDED RELEASE ORAL
Qty: 90 TABLET | Refills: 2 | OUTPATIENT
Start: 2020-12-02

## 2021-03-08 ENCOUNTER — OFFICE VISIT (OUTPATIENT)
Dept: FAMILY MEDICINE CLINIC | Facility: CLINIC | Age: 66
End: 2021-03-08

## 2021-03-08 VITALS
BODY MASS INDEX: 30.06 KG/M2 | WEIGHT: 159.2 LBS | HEIGHT: 61 IN | TEMPERATURE: 96.9 F | HEART RATE: 104 BPM | OXYGEN SATURATION: 94 % | DIASTOLIC BLOOD PRESSURE: 68 MMHG | RESPIRATION RATE: 16 BRPM | SYSTOLIC BLOOD PRESSURE: 124 MMHG

## 2021-03-08 DIAGNOSIS — I10 ESSENTIAL HYPERTENSION: Primary | ICD-10-CM

## 2021-03-08 DIAGNOSIS — K21.9 GASTROESOPHAGEAL REFLUX DISEASE WITHOUT ESOPHAGITIS: ICD-10-CM

## 2021-03-08 DIAGNOSIS — D64.9 LOW HEMOGLOBIN: ICD-10-CM

## 2021-03-08 DIAGNOSIS — F41.9 ANXIETY: ICD-10-CM

## 2021-03-08 DIAGNOSIS — D70.8 OTHER NEUTROPENIA (HCC): ICD-10-CM

## 2021-03-08 DIAGNOSIS — E78.49 OTHER HYPERLIPIDEMIA: ICD-10-CM

## 2021-03-08 PROBLEM — R73.03 PREDIABETES: Status: RESOLVED | Noted: 2019-03-04 | Resolved: 2021-03-08

## 2021-03-08 PROBLEM — S89.90XA KNEE INJURY, INITIAL ENCOUNTER: Status: RESOLVED | Noted: 2019-07-01 | Resolved: 2021-03-08

## 2021-03-08 PROCEDURE — 99214 OFFICE O/P EST MOD 30 MIN: CPT | Performed by: FAMILY MEDICINE

## 2021-03-08 RX ORDER — OMEPRAZOLE 20 MG/1
20 CAPSULE, DELAYED RELEASE ORAL DAILY
Qty: 90 CAPSULE | Refills: 3 | Status: SHIPPED | OUTPATIENT
Start: 2021-03-08 | End: 2022-02-28 | Stop reason: SDUPTHER

## 2021-03-08 RX ORDER — DULOXETIN HYDROCHLORIDE 60 MG/1
60 CAPSULE, DELAYED RELEASE ORAL NIGHTLY
Qty: 90 CAPSULE | Refills: 3 | Status: SHIPPED | OUTPATIENT
Start: 2021-03-08 | End: 2022-02-28 | Stop reason: SDUPTHER

## 2021-03-08 RX ORDER — ATORVASTATIN CALCIUM 10 MG/1
10 TABLET, FILM COATED ORAL NIGHTLY
Qty: 90 TABLET | Refills: 3 | Status: SHIPPED | OUTPATIENT
Start: 2021-03-08 | End: 2022-02-28 | Stop reason: SDUPTHER

## 2021-03-08 RX ORDER — METOPROLOL SUCCINATE 50 MG/1
50 TABLET, EXTENDED RELEASE ORAL DAILY
Qty: 90 TABLET | Refills: 3 | Status: SHIPPED | OUTPATIENT
Start: 2021-03-08 | End: 2022-02-28 | Stop reason: SDUPTHER

## 2021-03-08 NOTE — PROGRESS NOTES
"Chief Complaint  Hypertension (one year follow up/ med check)    Subjective          Kayla Adkins presents to [facility] to refill medications and review recent lab results. No medication side effects are reported. Overall the patient is feeling well.  Ongoing issues with the knee.  She will contact orthopedist for next step.  Labs show mild neutropenia and decreased hemoglobin.  Review of Systems   Genitourinary:        Fallen bladder, some urinary difficulty     Objective   Vital Signs:   /68   Pulse 104   Temp 96.9 °F (36.1 °C) (Tympanic)   Resp 16   Ht 154.9 cm (61\")   Wt 72.2 kg (159 lb 3.2 oz)   SpO2 94%   BMI 30.08 kg/m²     Physical Exam  Vitals reviewed.   Constitutional:       General: She is not in acute distress.  Eyes:      General: Lids are normal.      Conjunctiva/sclera: Conjunctivae normal.   Neck:      Vascular: No carotid bruit.      Trachea: No tracheal deviation.   Cardiovascular:      Rate and Rhythm: Normal rate and regular rhythm.      Heart sounds: Normal heart sounds. No murmur.   Pulmonary:      Effort: Pulmonary effort is normal.      Breath sounds: Normal breath sounds.   Skin:     General: Skin is warm and dry.   Neurological:      Mental Status: She is alert. She is not disoriented.   Psychiatric:         Speech: Speech normal.         Behavior: Behavior normal. Behavior is cooperative.          Result Review :       The data below has been reviewed by Emmett Rivas MD on 03/08/2021.  Lab Results - Last 18 Months   Lab Units 03/02/21  0838 03/02/20  0946   GLUCOSE mg/dL 99 104*   BUN mg/dL 16 14   CREATININE mg/dL 0.75 0.82   EGFR IF NONAFRICN AM mL/min/1.73 78 70   EGFR IF AFRICN AM mL/min/1.73 94 85   SODIUM mmol/L 141 141   POTASSIUM mmol/L 4.9 4.2   CHLORIDE mmol/L 104 103   CALCIUM mg/dL 9.4 9.2   ALBUMIN g/dL 4.20 4.40   BILIRUBIN mg/dL <0.2 0.3   ALK PHOS U/L 97 94   AST (SGOT) U/L 9 10   ALT (SGPT) U/L 12 10   CHOLESTEROL mg/dL " 172 149   TRIGLYCERIDES mg/dL 138 135   HDL CHOL mg/dL 53 50   VLDL CHOL mg/dL  --  27   VLDL CHOLESTEROL ANNA mg/dL 24  --    LDL CHOL mg/dL 95 72   CK TOTAL U/L 59 57   WBC 10*3/mm3 2.56* 3.50   RBC 10*6/mm3 4.55 4.48   HEMATOCRIT % 36.9 38.6   MCV fL 81.1 86.2   MCH pg 25.1* 28.1   TSH uIU/mL 0.685  --                        Assessment and Plan    Diagnoses and all orders for this visit:    1. Essential hypertension (Primary)  Assessment & Plan:  Hypertension is unchanged.  Continue current treatment regimen.  Blood pressure will be reassessed at the next regular appointment.    Orders:  -     metoprolol succinate XL (TOPROL-XL) 50 MG 24 hr tablet; Take 1 tablet by mouth Daily.  Dispense: 90 tablet; Refill: 3  -     Comprehensive Metabolic Panel; Future  -     CBC & Differential; Future  -     TSH; Future    2. Gastroesophageal reflux disease without esophagitis  Assessment & Plan:  The current medical regimen is effective;  continue present plan and medications.      Orders:  -     omeprazole (priLOSEC) 20 MG capsule; Take 1 capsule by mouth Daily.  Dispense: 90 capsule; Refill: 3    3. Other hyperlipidemia  Assessment & Plan:  Lipid abnormalities are unchanged.  Pharmacotherapy as ordered.  Lipids will be reassessed in 1 year.    Orders:  -     atorvastatin (LIPITOR) 10 MG tablet; Take 1 tablet by mouth Every Night.  Dispense: 90 tablet; Refill: 3  -     Lipid Panel With / Chol / HDL Ratio; Future  -     CK; Future    4. Anxiety  Assessment & Plan:  The current medical regimen is effective;  continue present plan and medications.      Orders:  -     DULoxetine (CYMBALTA) 60 MG capsule; Take 1 capsule by mouth Every Night.  Dispense: 90 capsule; Refill: 3    5. Other neutropenia (CMS/HCC)  Assessment & Plan:  Asymptomatic.  Repeat CBC    Orders:  -     CBC & Differential; Future  -     CBC & Differential; Future    6. Low hemoglobin  Assessment & Plan:  See discussion regarding neutropenia    Orders:  -     CBC &  Differential; Future      Follow Up   Return in about 1 year (around 3/8/2022) for Welcome to Medicare Wellness and regular visit, 30 min.  Patient was given instructions and counseling regarding her condition or for health maintenance advice. Please see specific information pulled into the AVS if appropriate.

## 2021-03-10 ENCOUNTER — IMMUNIZATION (OUTPATIENT)
Dept: VACCINE CLINIC | Facility: HOSPITAL | Age: 66
End: 2021-03-10

## 2021-03-10 PROCEDURE — 91300 HC SARSCOV02 VAC 30MCG/0.3ML IM: CPT | Performed by: INTERNAL MEDICINE

## 2021-03-10 PROCEDURE — 0001A: CPT | Performed by: INTERNAL MEDICINE

## 2021-03-11 DIAGNOSIS — F41.9 ANXIETY: ICD-10-CM

## 2021-03-11 DIAGNOSIS — E78.49 OTHER HYPERLIPIDEMIA: ICD-10-CM

## 2021-03-11 DIAGNOSIS — K21.9 GASTROESOPHAGEAL REFLUX DISEASE WITHOUT ESOPHAGITIS: ICD-10-CM

## 2021-03-11 DIAGNOSIS — I10 ESSENTIAL HYPERTENSION: ICD-10-CM

## 2021-03-11 RX ORDER — METOPROLOL SUCCINATE 50 MG/1
TABLET, EXTENDED RELEASE ORAL
Qty: 90 TABLET | Refills: 2 | OUTPATIENT
Start: 2021-03-11

## 2021-03-11 RX ORDER — DULOXETIN HYDROCHLORIDE 60 MG/1
CAPSULE, DELAYED RELEASE ORAL
Qty: 30 CAPSULE | Refills: 2 | OUTPATIENT
Start: 2021-03-11

## 2021-03-11 RX ORDER — ATORVASTATIN CALCIUM 10 MG/1
TABLET, FILM COATED ORAL
Qty: 30 TABLET | Refills: 2 | OUTPATIENT
Start: 2021-03-11

## 2021-03-11 RX ORDER — OMEPRAZOLE 20 MG/1
CAPSULE, DELAYED RELEASE ORAL
Qty: 30 CAPSULE | Refills: 2 | OUTPATIENT
Start: 2021-03-11

## 2021-03-31 ENCOUNTER — IMMUNIZATION (OUTPATIENT)
Dept: VACCINE CLINIC | Facility: HOSPITAL | Age: 66
End: 2021-03-31

## 2021-03-31 PROCEDURE — 91300 HC SARSCOV02 VAC 30MCG/0.3ML IM: CPT | Performed by: INTERNAL MEDICINE

## 2021-03-31 PROCEDURE — 0002A: CPT | Performed by: INTERNAL MEDICINE

## 2021-04-19 ENCOUNTER — TELEPHONE (OUTPATIENT)
Dept: ONCOLOGY | Facility: CLINIC | Age: 66
End: 2021-04-19

## 2021-04-19 NOTE — TELEPHONE ENCOUNTER
Returning call to pt. Pt wondering if she needed to fast before her labs on Thursday. Told pt that she did not need to fast. Pt v/u.

## 2021-04-19 NOTE — TELEPHONE ENCOUNTER
Caller: CHARAN BYERS    Relationship: SELF    Best call back number: [phone]    Do you require a callback: YES - PATIENT HAS AN APPT WITH DR ROCHA ON 4/22 AND WANTED TO KNOW IF SHE SHOULD FAST THE DAY BEFORE OR ANY OTHER INSTRUCTIONS.     PLEASE CALL TO DISCUSS

## 2021-04-21 ENCOUNTER — TELEPHONE (OUTPATIENT)
Dept: ONCOLOGY | Facility: CLINIC | Age: 66
End: 2021-04-21

## 2021-04-21 NOTE — TELEPHONE ENCOUNTER
Caller: CHARAN BYERS    Relationship to patient: SELF    Best call back number: [phone]    Additional notes: PATIENT NOTICED THAT SHE IS SCHEDULED FOR LABS ON 4/22 AT [facility] BUT IS TO FOLLOW UP WITH DR ROCHA AT [place]. SHE THOUGHT HER LABS WOULD BE AT [place] AS WELL. PLEASE CALL TO CONFIRM.

## 2021-04-22 ENCOUNTER — CONSULT (OUTPATIENT)
Dept: ONCOLOGY | Facility: CLINIC | Age: 66
End: 2021-04-22

## 2021-04-22 ENCOUNTER — LAB (OUTPATIENT)
Dept: OTHER | Facility: HOSPITAL | Age: 66
End: 2021-04-22

## 2021-04-22 VITALS
TEMPERATURE: 97.3 F | OXYGEN SATURATION: 95 % | RESPIRATION RATE: 16 BRPM | HEIGHT: 63 IN | DIASTOLIC BLOOD PRESSURE: 85 MMHG | BODY MASS INDEX: 28.56 KG/M2 | HEART RATE: 102 BPM | WEIGHT: 161.2 LBS | SYSTOLIC BLOOD PRESSURE: 132 MMHG

## 2021-04-22 DIAGNOSIS — D64.9 LOW HEMOGLOBIN: Primary | ICD-10-CM

## 2021-04-22 DIAGNOSIS — D64.9 LOW HEMOGLOBIN: ICD-10-CM

## 2021-04-22 DIAGNOSIS — D70.8 OTHER NEUTROPENIA (HCC): ICD-10-CM

## 2021-04-22 LAB
BASOPHILS # BLD AUTO: 0.01 10*3/MM3 (ref 0–0.2)
BASOPHILS NFR BLD AUTO: 0.3 % (ref 0–1.5)
CHROMATIN AB SERPL-ACNC: <10 IU/ML (ref 0–14)
DEPRECATED RDW RBC AUTO: 39.5 FL (ref 37–54)
EOSINOPHIL # BLD AUTO: 0.06 10*3/MM3 (ref 0–0.4)
EOSINOPHIL NFR BLD AUTO: 1.7 % (ref 0.3–6.2)
ERYTHROCYTE [DISTWIDTH] IN BLOOD BY AUTOMATED COUNT: 14.5 % (ref 12.3–15.4)
ERYTHROCYTE [SEDIMENTATION RATE] IN BLOOD: 29 MM/HR (ref 0–30)
FERRITIN SERPL-MCNC: 5.7 NG/ML (ref 13–150)
FOLATE SERPL-MCNC: 11.8 NG/ML (ref 4.78–24.2)
HCT VFR BLD AUTO: 33.9 % (ref 34–46.6)
HGB BLD-MCNC: 10.4 G/DL (ref 12–15.9)
HGB RETIC QN AUTO: 22.7 PG (ref 29.8–36.1)
IMM GRANULOCYTES # BLD AUTO: 0.01 10*3/MM3 (ref 0–0.05)
IMM GRANULOCYTES NFR BLD AUTO: 0.3 % (ref 0–0.5)
IMM RETICS NFR: 18.8 % (ref 3–15.8)
IRON 24H UR-MRATE: 16 MCG/DL (ref 37–145)
IRON SATN MFR SERPL: 3 % (ref 20–50)
LDH SERPL-CCNC: 174 U/L (ref 135–214)
LYMPHOCYTES # BLD AUTO: 1.53 10*3/MM3 (ref 0.7–3.1)
LYMPHOCYTES NFR BLD AUTO: 43.1 % (ref 19.6–45.3)
MCH RBC QN AUTO: 23.2 PG (ref 26.6–33)
MCHC RBC AUTO-ENTMCNC: 30.7 G/DL (ref 31.5–35.7)
MCV RBC AUTO: 75.7 FL (ref 79–97)
MONOCYTES # BLD AUTO: 0.5 10*3/MM3 (ref 0.1–0.9)
MONOCYTES NFR BLD AUTO: 14.1 % (ref 5–12)
NEUTROPHILS NFR BLD AUTO: 1.44 10*3/MM3 (ref 1.7–7)
NEUTROPHILS NFR BLD AUTO: 40.5 % (ref 42.7–76)
NRBC BLD AUTO-RTO: 0 /100 WBC (ref 0–0.2)
PLATELET # BLD AUTO: 387 10*3/MM3 (ref 140–450)
PMV BLD AUTO: 9.6 FL (ref 6–12)
RBC # BLD AUTO: 4.48 10*6/MM3 (ref 3.77–5.28)
RETICS # AUTO: 0.05 10*6/MM3 (ref 0.02–0.13)
RETICS/RBC NFR AUTO: 1.07 % (ref 0.7–1.9)
TIBC SERPL-MCNC: 508 MCG/DL (ref 298–536)
TRANSFERRIN SERPL-MCNC: 341 MG/DL (ref 200–360)
VIT B12 BLD-MCNC: 415 PG/ML (ref 211–946)
WBC # BLD AUTO: 3.55 10*3/MM3 (ref 3.4–10.8)

## 2021-04-22 PROCEDURE — 82746 ASSAY OF FOLIC ACID SERUM: CPT | Performed by: INTERNAL MEDICINE

## 2021-04-22 PROCEDURE — 86431 RHEUMATOID FACTOR QUANT: CPT | Performed by: INTERNAL MEDICINE

## 2021-04-22 PROCEDURE — 85046 RETICYTE/HGB CONCENTRATE: CPT | Performed by: INTERNAL MEDICINE

## 2021-04-22 PROCEDURE — 82607 VITAMIN B-12: CPT | Performed by: INTERNAL MEDICINE

## 2021-04-22 PROCEDURE — 84466 ASSAY OF TRANSFERRIN: CPT | Performed by: INTERNAL MEDICINE

## 2021-04-22 PROCEDURE — 82728 ASSAY OF FERRITIN: CPT | Performed by: INTERNAL MEDICINE

## 2021-04-22 PROCEDURE — 85025 COMPLETE CBC W/AUTO DIFF WBC: CPT | Performed by: INTERNAL MEDICINE

## 2021-04-22 PROCEDURE — 99204 OFFICE O/P NEW MOD 45 MIN: CPT | Performed by: INTERNAL MEDICINE

## 2021-04-22 PROCEDURE — 36415 COLL VENOUS BLD VENIPUNCTURE: CPT

## 2021-04-22 PROCEDURE — 86038 ANTINUCLEAR ANTIBODIES: CPT | Performed by: INTERNAL MEDICINE

## 2021-04-22 PROCEDURE — 83540 ASSAY OF IRON: CPT | Performed by: INTERNAL MEDICINE

## 2021-04-22 PROCEDURE — 82525 ASSAY OF COPPER: CPT | Performed by: INTERNAL MEDICINE

## 2021-04-22 PROCEDURE — 83615 LACTATE (LD) (LDH) ENZYME: CPT | Performed by: INTERNAL MEDICINE

## 2021-04-22 PROCEDURE — 85652 RBC SED RATE AUTOMATED: CPT | Performed by: INTERNAL MEDICINE

## 2021-04-22 RX ORDER — FERROUS SULFATE 137(45) MG
1 TABLET, EXTENDED RELEASE ORAL EVERY OTHER DAY
Qty: 30 TABLET | Refills: 3 | Status: SHIPPED | OUTPATIENT
Start: 2021-04-22 | End: 2021-08-26

## 2021-04-22 NOTE — TELEPHONE ENCOUNTER
Reviewed iron levels with pt and informed her of Dr Alvarez's recommendation to being iron every other day.  This was routed to Dr Alvarez for singature.

## 2021-04-22 NOTE — PROGRESS NOTES
Subjective   Kayla Adkins is a 65 y.o. female.  Referred by Dr. Rivas for evaluation of leukopenia    History of Present Illness   Ms. Adkins is a 65-year-old postmenopausal  who has been referred by Dr. Rivas for evaluation of leukopenia, neutropenia.  Patient reports that she has had longstanding leukopenia and her white blood cell count normally ranges in the threes.  On her most recent visit with Dr. Rivas white blood cell count was noted to be 2560 on 3/2/2021 and 2660 on 4/5/2021.  This prompted a referral to hematology.  Patient reports that she had a extensive work-up including a bone marrow biopsy for the same condition about 17 years ago.  She thinks that this was performed at [facility].  According to patient no clear etiology was determined after the work-up.  She was recommended to continue with periodic labs.  No treatment was required.  She denies any frequent infections, recent changes in weight, risk factors for HIV or hepatitis.  Denies any lymphadenopathy.  Denies any new changes in medications.  She has not been on any medications known to affect WBC count.  Denies any other complaints at this time.  She feels well otherwise.  Weight has been fairly stable.  CBC 4/22/2021 shows anemia with a hemoglobin of 10.4, MCV noted to be 75.7, RDW 39.5.  CBC 4/5/2021 also shows anemia with a hemoglobin of 11.1, MCV 76.7.  Hemoglobin was completely normal up until March 2020.  Previously checked hepatitis panel negative.  Patient denies any bright red blood per rectum or dark-colored stools.  She had a colonoscopy in 2017 without any abnormalities.  She is on omeprazole for GERD.  She has been on this for several years.      The following portions of the patient's history were reviewed and updated as appropriate: allergies, current medications, past family history, past medical history, past social history and problem list.    Past Medical History:   Diagnosis Date   • Anemia    •  "Anxiety    • GERD (gastroesophageal reflux disease)    • Hypertension    • Knee injury, initial encounter-left 7/1/2019   • Neutropenia (CMS/HCC) 1/3/2017   • Prediabetes 3/4/2019        History reviewed. No pertinent surgical history.     Family History   Problem Relation Age of Onset   • Hypertension Mother    • Heart disease Father    • Hypertension Sister         Social History     Socioeconomic History   • Marital status:      Spouse name: Not on file   • Number of children: Not on file   • Years of education: Not on file   • Highest education level: Not on file   Tobacco Use   • Smoking status: Never Smoker   • Smokeless tobacco: Never Used   Substance and Sexual Activity   • Alcohol use: No   • Drug use: Defer   • Sexual activity: Defer        OB History    No obstetric history on file.          Allergies   Allergen Reactions   • Penicillins Rash            Review of Systems   Constitutional: Negative.    HENT: Negative.    Eyes: Negative.    Respiratory: Negative.    Cardiovascular: Negative.    Gastrointestinal: Negative.    Endocrine: Negative.    Genitourinary: Negative.    Musculoskeletal: Negative.    Allergic/Immunologic: Negative.    Neurological: Negative.    Hematological: Negative.    Psychiatric/Behavioral: Negative.          Objective   Blood pressure 132/85, pulse 102, temperature 97.3 °F (36.3 °C), temperature source Temporal, resp. rate 16, height 160 cm (62.99\"), weight 73.1 kg (161 lb 3.2 oz), SpO2 95 %.   Physical Exam  Vitals reviewed.   Constitutional:       Appearance: Normal appearance. She is normal weight.   HENT:      Head: Normocephalic and atraumatic.      Right Ear: External ear normal.      Left Ear: External ear normal.      Nose: Nose normal.      Mouth/Throat:      Mouth: Mucous membranes are moist.   Eyes:      Extraocular Movements: Extraocular movements intact.      Pupils: Pupils are equal, round, and reactive to light.   Cardiovascular:      Rate and Rhythm: Normal " rate.   Pulmonary:      Effort: Pulmonary effort is normal.   Abdominal:      General: Abdomen is flat.      Palpations: Abdomen is soft. There is no mass.   Musculoskeletal:         General: Normal range of motion.      Cervical back: Normal range of motion.   Neurological:      General: No focal deficit present.      Mental Status: She is alert and oriented to person, place, and time. Mental status is at baseline.           Lab on 04/22/2021   Component Date Value Ref Range Status   • WBC 04/22/2021 3.55  3.40 - 10.80 10*3/mm3 Final   • RBC 04/22/2021 4.48  3.77 - 5.28 10*6/mm3 Final   • Hemoglobin 04/22/2021 10.4* 12.0 - 15.9 g/dL Final   • Hematocrit 04/22/2021 33.9* 34.0 - 46.6 % Final   • MCV 04/22/2021 75.7* 79.0 - 97.0 fL Final   • MCH 04/22/2021 23.2* 26.6 - 33.0 pg Final   • MCHC 04/22/2021 30.7* 31.5 - 35.7 g/dL Final   • RDW 04/22/2021 14.5  12.3 - 15.4 % Final   • RDW-SD 04/22/2021 39.5  37.0 - 54.0 fl Final   • MPV 04/22/2021 9.6  6.0 - 12.0 fL Final   • Platelets 04/22/2021 387  140 - 450 10*3/mm3 Final   • Neutrophil % 04/22/2021 40.5* 42.7 - 76.0 % Final   • Lymphocyte % 04/22/2021 43.1  19.6 - 45.3 % Final   • Monocyte % 04/22/2021 14.1* 5.0 - 12.0 % Final   • Eosinophil % 04/22/2021 1.7  0.3 - 6.2 % Final   • Basophil % 04/22/2021 0.3  0.0 - 1.5 % Final   • Immature Grans % 04/22/2021 0.3  0.0 - 0.5 % Final   • Neutrophils, Absolute 04/22/2021 1.44* 1.70 - 7.00 10*3/mm3 Final   • Lymphocytes, Absolute 04/22/2021 1.53  0.70 - 3.10 10*3/mm3 Final   • Monocytes, Absolute 04/22/2021 0.50  0.10 - 0.90 10*3/mm3 Final   • Eosinophils, Absolute 04/22/2021 0.06  0.00 - 0.40 10*3/mm3 Final   • Basophils, Absolute 04/22/2021 0.01  0.00 - 0.20 10*3/mm3 Final   • Immature Grans, Absolute 04/22/2021 0.01  0.00 - 0.05 10*3/mm3 Final   • nRBC 04/22/2021 0.0  0.0 - 0.2 /100 WBC Final        No radiology results for the last 30 days.   CBC since 7/6/2016 reviewed and pertinent details summarized in HPI.  CMP  3/2/2021 reviewed and noted to be within normal limits  12/27/2017 hepatitis C antibody negative    Peripheral smear independently reviewed by me.  Noted to have decrease in neutrophils.  Occasional schistocytes noted.  Normal-appearing platelets.  RBC normal.    Assessment/Plan        65-year-old postmenopausal  lady with longstanding leukopenia/neutropenia and anemia    *Leukopenia  · Predominantly neutropenia  · Mild increase in monocyte count  · This is chronic and unchanged  · No increased infections  · Had a previous work-up including a bone marrow biopsy with no clear etiology  · Differential includes nutritional, chronic idiopathic, autoimmune.  · She is currently not on any medications which could account for the leukopenia.  · I will check B12, folic acid, copper levels to rule out any nutritional deficiencies  · Flow cytometry will be obtained to rule out any LGL  · No splenomegaly on exam  · If work-up is negative then she will be seen periodically with CBCs    *Anemia  · Microcytic  · Likely secondary to iron deficiency  · Iron studies have been ordered today    *[id number]-sw-[id number]-date on screening mammograms and colonoscopies    *Follow-up-4 months with CBC, patient will be informed if there is any abnormal values resulted.

## 2021-04-23 LAB — ANA SER QL: NEGATIVE

## 2021-04-25 LAB — COPPER SERPL-MCNC: 134 UG/DL (ref 80–158)

## 2021-05-11 ENCOUNTER — TELEPHONE (OUTPATIENT)
Dept: ONCOLOGY | Facility: CLINIC | Age: 66
End: 2021-05-11

## 2021-05-11 NOTE — TELEPHONE ENCOUNTER
----- Message from Alexa Payne sent at 5/11/2021 12:29 PM EDT -----  Regarding: RE: pre-auth  NO PA NEEDED FOR  FLOW - [id number] AND [id number] - THE [id number] HAS BEEN APPROVED.  THE PATIENT WILL NEED AN APPT TO HAVE THE  FLOW DRAWN.    THANKS,  ----- Message -----  From: Milla Guzman  Sent: 4/22/2021   2:52 PM EDT  To: Anastasiia Silva, Luiza Clemens, #  Subject: RE: pre-auth                                     OK [facility]  ----- Message -----  From: Alexa Payne  Sent: 4/22/2021   2:46 PM EDT  To: Anastasiia Silva, Luiza Clemens, #  Subject: RE: pre-auth                                     NO PA NEEDED FOR [id number] AND [id number] BUT THE [id number] NEEDS A PRECERT AND HAVE TO FAX CLINCALS    ----- Message -----  From: Luiza Clemens  Sent: 4/22/2021  12:12 PM EDT  To: Anastasiia Silva, Luiza Clemens, #  Subject: pre-auth                                         Flow [id number], [id number], [id number]; Dx: D64.9, D70.8; tests done @ [facility]; Dr Alvarez;  [facility] [employer]; collected today @

## 2021-05-12 ENCOUNTER — LAB (OUTPATIENT)
Dept: OTHER | Facility: HOSPITAL | Age: 66
End: 2021-05-12

## 2021-05-12 DIAGNOSIS — D70.8 OTHER NEUTROPENIA (HCC): ICD-10-CM

## 2021-05-12 DIAGNOSIS — D64.9 LOW HEMOGLOBIN: ICD-10-CM

## 2021-05-12 PROCEDURE — 36415 COLL VENOUS BLD VENIPUNCTURE: CPT

## 2021-05-12 PROCEDURE — 88184 FLOWCYTOMETRY/ TC 1 MARKER: CPT

## 2021-05-12 PROCEDURE — 88185 FLOWCYTOMETRY/TC ADD-ON: CPT

## 2021-05-13 LAB — REF LAB TEST METHOD: NORMAL

## 2021-07-26 ENCOUNTER — TELEPHONE (OUTPATIENT)
Dept: FAMILY MEDICINE CLINIC | Facility: CLINIC | Age: 66
End: 2021-07-26

## 2021-07-26 NOTE — TELEPHONE ENCOUNTER
PATIENT WOULD LIKE TO CHECK THE STATUS OF THEIR WHOOPING COUGH VACCINE. PATIENT STATES THEY ARE HAVING A GRANDCHILD AND WILL NEED THIS.    PLEASE ADVISE: [phone]

## 2021-07-27 ENCOUNTER — APPOINTMENT (OUTPATIENT)
Dept: WOMENS IMAGING | Facility: HOSPITAL | Age: 66
End: 2021-07-27

## 2021-07-27 PROCEDURE — 77067 SCR MAMMO BI INCL CAD: CPT | Performed by: RADIOLOGY

## 2021-07-27 PROCEDURE — 77063 BREAST TOMOSYNTHESIS BI: CPT | Performed by: RADIOLOGY

## 2021-08-25 NOTE — PROGRESS NOTES
Subjective   Kayla Adkins is a 65 y.o. female.  Referred by Dr. Rivas for evaluation of leukopenia    History of Present Illness   Ms. Adkins is a 65-year-old postmenopausal  who has been referred by Dr. Rivas for evaluation of leukopenia, neutropenia.  Patient reports that she has had longstanding leukopenia and her white blood cell count normally ranges in the threes.  On her most recent visit with Dr. Rivas white blood cell count was noted to be 2560 on 3/2/2021 and 2660 on 4/5/2021.  This prompted a referral to hematology.  Patient reports that she had a extensive work-up including a bone marrow biopsy for the same condition about 17 years ago.  She thinks that this was performed at [facility].  According to patient no clear etiology was determined after the work-up.  She was recommended to continue with periodic labs.  No treatment was required.    Interval History:  The patient returns today for lab review and follow-up.  She continues on oral iron every other day and is tolerating this well.  She reports feeling well today.  She is eating and drinking adequately.  Her bowels are moving regularly.  She denies fever, chills, or any other signs of infection.  She denies nausea or vomiting.  She has no new complaints today.    The following portions of the patient's history were reviewed and updated as appropriate: allergies, current medications, past family history, past medical history, past social history and problem list.    Past Medical History:   Diagnosis Date   • Anemia    • Anxiety    • GERD (gastroesophageal reflux disease)    • Hypertension    • Knee injury, initial encounter-left 7/1/2019   • Neutropenia (CMS/HCC) 1/3/2017   • Prediabetes 3/4/2019      No past surgical history on file.     Family History   Problem Relation Age of Onset   • Hypertension Mother    • Heart disease Father    • Hypertension Sister       Social History     Socioeconomic History   • Marital status:  "     Spouse name: Not on file   • Number of children: Not on file   • Years of education: Not on file   • Highest education level: Not on file   Tobacco Use   • Smoking status: Never Smoker   • Smokeless tobacco: Never Used   Substance and Sexual Activity   • Alcohol use: No   • Drug use: Defer   • Sexual activity: Defer      OB History    No obstetric history on file.          Allergies   Allergen Reactions   • Penicillins Rash      Review of systems as mentioned in the HPI.    Objective   Blood pressure 130/78, pulse 96, temperature 98.2 °F (36.8 °C), temperature source Temporal, resp. rate 18, height 160 cm (62.99\"), weight 74.1 kg (163 lb 4.8 oz), SpO2 97 %.     Physical Exam  Vitals reviewed.   Constitutional:       Appearance: Normal appearance. She is normal weight.   HENT:      Head: Normocephalic and atraumatic.      Nose: Nose normal.      Mouth/Throat:      Mouth: Mucous membranes are moist.   Eyes:      Extraocular Movements: Extraocular movements intact.      Pupils: Pupils are equal, round, and reactive to light.   Cardiovascular:      Rate and Rhythm: Normal rate.      Heart sounds: Normal heart sounds. No murmur heard.   No gallop.    Pulmonary:      Effort: Pulmonary effort is normal. No respiratory distress.      Breath sounds: Normal breath sounds. No wheezing or rales.   Abdominal:      General: Abdomen is flat. Bowel sounds are normal.      Palpations: Abdomen is soft. There is no mass.   Musculoskeletal:         General: Normal range of motion.      Cervical back: Normal range of motion.   Skin:     Findings: No bruising or rash.   Neurological:      General: No focal deficit present.      Mental Status: She is alert and oriented to person, place, and time. Mental status is at baseline.   Psychiatric:         Behavior: Behavior normal.       Lab on 08/26/2021   Component Date Value Ref Range Status   • WBC 08/26/2021 3.09* 3.40 - 10.80 10*3/mm3 Final   • RBC 08/26/2021 4.52  3.77 - 5.28 " 10*6/mm3 Final   • Hemoglobin 08/26/2021 10.1* 12.0 - 15.9 g/dL Final   • Hematocrit 08/26/2021 33.6* 34.0 - 46.6 % Final   • MCV 08/26/2021 74.3* 79.0 - 97.0 fL Final   • MCH 08/26/2021 22.3* 26.6 - 33.0 pg Final   • MCHC 08/26/2021 30.1* 31.5 - 35.7 g/dL Final   • RDW 08/26/2021 17.9* 12.3 - 15.4 % Final   • RDW-SD 08/26/2021 47.0  37.0 - 54.0 fl Final   • MPV 08/26/2021 9.4  6.0 - 12.0 fL Final   • Platelets 08/26/2021 385  140 - 450 10*3/mm3 Final   • Neutrophil % 08/26/2021 41.8* 42.7 - 76.0 % Final   • Lymphocyte % 08/26/2021 39.5  19.6 - 45.3 % Final   • Monocyte % 08/26/2021 14.9* 5.0 - 12.0 % Final   • Eosinophil % 08/26/2021 3.2  0.3 - 6.2 % Final   • Basophil % 08/26/2021 0.3  0.0 - 1.5 % Final   • Immature Grans % 08/26/2021 0.3  0.0 - 0.5 % Final   • Neutrophils, Absolute 08/26/2021 1.29* 1.70 - 7.00 10*3/mm3 Final   • Lymphocytes, Absolute 08/26/2021 1.22  0.70 - 3.10 10*3/mm3 Final   • Monocytes, Absolute 08/26/2021 0.46  0.10 - 0.90 10*3/mm3 Final   • Eosinophils, Absolute 08/26/2021 0.10  0.00 - 0.40 10*3/mm3 Final   • Basophils, Absolute 08/26/2021 0.01  0.00 - 0.20 10*3/mm3 Final   • Immature Grans, Absolute 08/26/2021 0.01  0.00 - 0.05 10*3/mm3 Final   • nRBC 08/26/2021 0.0  0.0 - 0.2 /100 WBC Final        No radiology results for the last 30 days.   CBC since 7/6/2016 reviewed and pertinent details summarized in HPI.  CMP 3/2/2021 reviewed and noted to be within normal limits  12/27/2017 hepatitis C antibody negative    Peripheral smear independently reviewed by me.  Noted to have decrease in neutrophils.  Occasional schistocytes noted.  Normal-appearing platelets.  RBC normal.    Assessment/Plan      65-year-old postmenopausal  lady with longstanding leukopenia/neutropenia and anemia    *Leukopenia  · Predominantly neutropenia  · Mild increase in monocyte count  · This is chronic and unchanged  · No increased infections  · Had a previous work-up including a bone marrow biopsy with no  clear etiology  · Differential includes nutritional, chronic idiopathic, autoimmune.  · She is currently not on any medications which could account for the leukopenia.  · I will check B12, folic acid, copper levels to rule out any nutritional deficiencies  · Flow cytometry will be obtained to rule out any LGL  · No splenomegaly on exam  · WBC today 3.09, ANC 1.29..  Continue to monitor with every 4-month CBC.    *Anemia  · Microcytic  · Likely secondary to iron deficiency  · 4/22/2021 iron saturation 3%, TIBC today 508, ferritin 5.70.  The patient was initiated on oral iron every other day.  · 8/26/2021, hemoglobin today 10.1.  The patient has been tolerating oral iron every other day.  We will increase oral iron to daily and recheck iron labs in 4 months.    *[id number]-ec-gp-date on screening mammograms and colonoscopies    PLAN:  · Increase oral iron to daily.  · Return in 4 months for CBC, iron profile, ferritin with MD follow-up.    DALE Orozco  08/26/2021

## 2021-08-26 ENCOUNTER — LAB (OUTPATIENT)
Dept: OTHER | Facility: HOSPITAL | Age: 66
End: 2021-08-26

## 2021-08-26 ENCOUNTER — OFFICE VISIT (OUTPATIENT)
Dept: ONCOLOGY | Facility: CLINIC | Age: 66
End: 2021-08-26

## 2021-08-26 VITALS
HEIGHT: 63 IN | WEIGHT: 163.3 LBS | DIASTOLIC BLOOD PRESSURE: 78 MMHG | OXYGEN SATURATION: 97 % | RESPIRATION RATE: 18 BRPM | TEMPERATURE: 98.2 F | SYSTOLIC BLOOD PRESSURE: 130 MMHG | BODY MASS INDEX: 28.93 KG/M2 | HEART RATE: 96 BPM

## 2021-08-26 DIAGNOSIS — D50.9 IRON DEFICIENCY ANEMIA, UNSPECIFIED IRON DEFICIENCY ANEMIA TYPE: ICD-10-CM

## 2021-08-26 DIAGNOSIS — D64.9 LOW HEMOGLOBIN: ICD-10-CM

## 2021-08-26 DIAGNOSIS — D70.8 OTHER NEUTROPENIA (HCC): Primary | ICD-10-CM

## 2021-08-26 LAB
ANISOCYTOSIS BLD QL: NORMAL
BASOPHILS # BLD AUTO: 0.01 10*3/MM3 (ref 0–0.2)
BASOPHILS NFR BLD AUTO: 0.3 % (ref 0–1.5)
DEPRECATED RDW RBC AUTO: 47 FL (ref 37–54)
EOSINOPHIL # BLD AUTO: 0.1 10*3/MM3 (ref 0–0.4)
EOSINOPHIL NFR BLD AUTO: 3.2 % (ref 0.3–6.2)
ERYTHROCYTE [DISTWIDTH] IN BLOOD BY AUTOMATED COUNT: 17.9 % (ref 12.3–15.4)
HCT VFR BLD AUTO: 33.6 % (ref 34–46.6)
HGB BLD-MCNC: 10.1 G/DL (ref 12–15.9)
IMM GRANULOCYTES # BLD AUTO: 0.01 10*3/MM3 (ref 0–0.05)
IMM GRANULOCYTES NFR BLD AUTO: 0.3 % (ref 0–0.5)
LYMPHOCYTES # BLD AUTO: 1.22 10*3/MM3 (ref 0.7–3.1)
LYMPHOCYTES NFR BLD AUTO: 39.5 % (ref 19.6–45.3)
MCH RBC QN AUTO: 22.3 PG (ref 26.6–33)
MCHC RBC AUTO-ENTMCNC: 30.1 G/DL (ref 31.5–35.7)
MCV RBC AUTO: 74.3 FL (ref 79–97)
MICROCYTES BLD QL: NORMAL
MONOCYTES # BLD AUTO: 0.46 10*3/MM3 (ref 0.1–0.9)
MONOCYTES NFR BLD AUTO: 14.9 % (ref 5–12)
NEUTROPHILS NFR BLD AUTO: 1.29 10*3/MM3 (ref 1.7–7)
NEUTROPHILS NFR BLD AUTO: 41.8 % (ref 42.7–76)
NRBC BLD AUTO-RTO: 0 /100 WBC (ref 0–0.2)
PLAT MORPH BLD: NORMAL
PLATELET # BLD AUTO: 385 10*3/MM3 (ref 140–450)
PMV BLD AUTO: 9.4 FL (ref 6–12)
RBC # BLD AUTO: 4.52 10*6/MM3 (ref 3.77–5.28)
WBC # BLD AUTO: 3.09 10*3/MM3 (ref 3.4–10.8)
WBC MORPH BLD: NORMAL

## 2021-08-26 PROCEDURE — 36415 COLL VENOUS BLD VENIPUNCTURE: CPT

## 2021-08-26 PROCEDURE — 85007 BL SMEAR W/DIFF WBC COUNT: CPT | Performed by: INTERNAL MEDICINE

## 2021-08-26 PROCEDURE — 85025 COMPLETE CBC W/AUTO DIFF WBC: CPT | Performed by: INTERNAL MEDICINE

## 2021-08-26 PROCEDURE — 99213 OFFICE O/P EST LOW 20 MIN: CPT | Performed by: NURSE PRACTITIONER

## 2021-08-26 RX ORDER — FERROUS SULFATE 137(45) MG
1 TABLET, EXTENDED RELEASE ORAL DAILY
Qty: 30 TABLET | Refills: 3
Start: 2021-08-26 | End: 2021-10-04 | Stop reason: SDUPTHER

## 2021-10-04 ENCOUNTER — TELEPHONE (OUTPATIENT)
Dept: ONCOLOGY | Facility: CLINIC | Age: 66
End: 2021-10-04

## 2021-10-04 RX ORDER — FERROUS SULFATE 137(45) MG
1 TABLET, EXTENDED RELEASE ORAL DAILY
Qty: 30 TABLET | Refills: 3
Start: 2021-10-04

## 2021-10-04 NOTE — TELEPHONE ENCOUNTER
Caller: Kayla Adkins    Relationship: Self    Medication requested (name and dosage): Ferrous Sulfate ER (Slow Fe) 142 (45 Fe) MG tablet controlled-release    Pharmacy where request should be sent: [facility] [address] AT [facility] & [place] - [phone]  - [phone] FX    Additional details provided by patient: PATIENT HAS 4 PILLS REMAINING.    Best call back number:[phone]    Does the patient have less than a 3 day supply:  [] Yes  [x] No    Herve ANGULO Rep   10/04/21 11:03 EDT

## 2021-10-07 NOTE — TELEPHONE ENCOUNTER
Caller: Kayla Adkins    Relationship: Self      Medication requested (name and dosage):   Ferrous Sulfate ER (Slow Fe) 142 (45 Fe) MG tablet controlled-release [[id number]] (Order [id number])        Pharmacy where request should be sent:   Pharmacy    [facility] [address] [place] [address] - [id number] [facility] AT [facility] [address] & [place] - [phone]  - [phone]    [id number] [place] [id number]   Phone:  [phone]  Fax:  [phone]         Additional details provided by patient: PT STATES THEY WERE TO PHARMACY DID NOT HAVE RX. HUB ATTEMPTED TO CONFIRM WITH PHARMACY. ORIGINAL ORDER ON 10/4 NOT RECEIVED.  NO ANSWER TO CALL MADE TO PHARMACY    Best call back number: [phone]    Does the patient have less than a 3 day supply:  [x] Yes  [] No    Herve Villaseñor Rep   10/07/21 09:30 EDT

## 2021-11-04 ENCOUNTER — OFFICE VISIT (OUTPATIENT)
Dept: FAMILY MEDICINE CLINIC | Facility: CLINIC | Age: 66
End: 2021-11-04

## 2021-11-04 VITALS
HEART RATE: 103 BPM | DIASTOLIC BLOOD PRESSURE: 76 MMHG | WEIGHT: 165 LBS | SYSTOLIC BLOOD PRESSURE: 126 MMHG | TEMPERATURE: 97.5 F | BODY MASS INDEX: 29.23 KG/M2 | OXYGEN SATURATION: 96 % | RESPIRATION RATE: 16 BRPM | HEIGHT: 63 IN

## 2021-11-04 DIAGNOSIS — Z23 IMMUNIZATION DUE: ICD-10-CM

## 2021-11-04 DIAGNOSIS — N30.90 CYSTITIS WITHOUT HEMATURIA: Primary | ICD-10-CM

## 2021-11-04 LAB
BILIRUB BLD-MCNC: NEGATIVE MG/DL
CLARITY, POC: CLEAR
COLOR UR: YELLOW
EXPIRATION DATE: ABNORMAL
GLUCOSE UR STRIP-MCNC: NEGATIVE MG/DL
KETONES UR QL: NEGATIVE
LEUKOCYTE EST, POC: ABNORMAL
Lab: ABNORMAL
NITRITE UR-MCNC: POSITIVE MG/ML
PH UR: 5.5 [PH] (ref 5–8)
PROT UR STRIP-MCNC: NEGATIVE MG/DL
RBC # UR STRIP: NEGATIVE /UL
SP GR UR: 1.02 (ref 1–1.03)
UROBILINOGEN UR QL: NORMAL

## 2021-11-04 PROCEDURE — 90686 IIV4 VACC NO PRSV 0.5 ML IM: CPT | Performed by: NURSE PRACTITIONER

## 2021-11-04 PROCEDURE — 99213 OFFICE O/P EST LOW 20 MIN: CPT | Performed by: NURSE PRACTITIONER

## 2021-11-04 PROCEDURE — G0008 ADMIN INFLUENZA VIRUS VAC: HCPCS | Performed by: NURSE PRACTITIONER

## 2021-11-04 PROCEDURE — 81003 URINALYSIS AUTO W/O SCOPE: CPT | Performed by: NURSE PRACTITIONER

## 2021-11-04 RX ORDER — NITROFURANTOIN 25; 75 MG/1; MG/1
100 CAPSULE ORAL 2 TIMES DAILY
Qty: 10 CAPSULE | Refills: 0 | Status: SHIPPED | OUTPATIENT
Start: 2021-11-04 | End: 2021-11-09

## 2021-11-04 NOTE — PROGRESS NOTES
Immunization  Immunization performed in Left Deltoid by Bibi Jimenez. Patient tolerated the procedure well without complications.  11/04/21   Bibi Jimenez

## 2021-11-04 NOTE — PROGRESS NOTES
Chief Complaint  UTI Symptoms    Subjective          Kayla presents to [facility]     66-year-old female presents to the office today complaining of pain with urination, increased urgency and frequency.  Onset 4 to 5 days ago she does have a prolapsed bladder and was associating the symptoms to that but she took Azo over-the-counter and it did give her some symptom relief.  She stopped taking Azo yesterday and is now having the dysuria again.  She denies any back pain or fever.      She has an active problem list of the following    Patient Active Problem List   Diagnosis   • Essential hypertension   • Other hyperlipidemia   • Anxiety   • Gastroesophageal reflux disease without esophagitis   • Lymphocytic colitis of colon   • Fear of flying   • Chondromalacia of left knee   • Tear of medial meniscus of left knee, current   • Other neutropenia (HCC)   • Low hemoglobin       She has been compliant on the following medications    Current Outpatient Medications on File Prior to Visit   Medication Sig Dispense Refill   • atorvastatin (LIPITOR) 10 MG tablet Take 1 tablet by mouth Every Night. 90 tablet 3   • DULoxetine (CYMBALTA) 60 MG capsule Take 1 capsule by mouth Every Night. 90 capsule 3   • Ferrous Sulfate ER (Slow Fe) 142 (45 Fe) MG tablet controlled-release Take 142 mg by mouth Daily. 30 tablet 3   • metoprolol succinate XL (TOPROL-XL) 50 MG 24 hr tablet Take 1 tablet by mouth Daily. 90 tablet 3   • omeprazole (priLOSEC) 20 MG capsule Take 1 capsule by mouth Daily. 90 capsule 3     No current facility-administered medications on file prior to visit.       She denies any medication side effects    The following portions of the patient's history were reviewed and updated as appropriate: allergies, current medications, past family history, past medical history, past social history, past surgical history, and problem list      Review of Systems   Constitutional: Negative.    HENT:  "Negative.    Respiratory: Negative.    Cardiovascular: Negative.    Gastrointestinal: Negative.    Genitourinary: Positive for dysuria, frequency and urgency. Negative for decreased urine volume, difficulty urinating, dyspareunia, enuresis, flank pain, genital sores, hematuria, menstrual problem, pelvic pain, vaginal bleeding, vaginal discharge and vaginal pain.   Neurological: Negative.    Psychiatric/Behavioral: Negative.         Objective   Vital Signs:   Vitals:    11/04/21 1319   BP: 126/76   Pulse: 103   Resp: 16   Temp: 97.5 °F (36.4 °C)   SpO2: 96%   Weight: 74.8 kg (165 lb)   Height: 160 cm (62.99\")        Physical Exam  Constitutional:       General: She is not in acute distress.     Appearance: Normal appearance. She is normal weight. She is not ill-appearing.   HENT:      Head: Normocephalic.      Nose: Nose normal. No congestion.   Eyes:      Pupils: Pupils are equal, round, and reactive to light.   Cardiovascular:      Rate and Rhythm: Normal rate and regular rhythm.      Pulses: Normal pulses.      Heart sounds: Normal heart sounds. No murmur heard.      Pulmonary:      Effort: Pulmonary effort is normal.      Breath sounds: Normal breath sounds.   Abdominal:      General: Abdomen is flat. There is no distension.      Palpations: Abdomen is soft. There is no mass.      Tenderness: There is no abdominal tenderness. There is no right CVA tenderness, left CVA tenderness, guarding or rebound.      Hernia: No hernia is present.   Musculoskeletal:      Cervical back: Normal range of motion and neck supple.   Skin:     General: Skin is warm and dry.      Capillary Refill: Capillary refill takes less than 2 seconds.      Findings: No rash.   Neurological:      Mental Status: She is alert and oriented to person, place, and time.   Psychiatric:         Mood and Affect: Mood normal.         Behavior: Behavior normal.         Thought Content: Thought content normal.         Judgment: Judgment normal.       Result " Review :     {The following data was reviewed by Radha HUNTER    POC Urinalysis Dipstick, Automated (11/04/2021 13:45)  Urine Culture - Urine, Urine, Clean Catch (11/04/2021 13:45)           Assessment and Plan    Diagnoses and all orders for this visit:    1. Cystitis without hematuria (Primary)  -     nitrofurantoin, macrocrystal-monohydrate, (Macrobid) 100 MG capsule; Take 1 capsule by mouth 2 (Two) Times a Day for 5 days.  Dispense: 10 capsule; Refill: 0  -     Urine Culture - Urine, Urine, Clean Catch  -     POC Urinalysis Dipstick, Automated      Plan  Finish all antibiotics monitor for any side effects  Send urine for culture today  Reviewed proper front to back cleaning technique  Void after intercourse  Return 1 week with any symptoms  Increase fluid intake  May continue Azo as needed          Follow Up   Return if symptoms worsen or fail to improve.  Patient was given instructions and counseling regarding her condition or for health maintenance advice. Please see specific information pulled into the AVS if appropriate.

## 2021-11-04 NOTE — PATIENT INSTRUCTIONS

## 2021-11-10 LAB
BACTERIA UR CULT: ABNORMAL
BACTERIA UR CULT: ABNORMAL
Lab: NORMAL
OTHER ANTIBIOTIC SUSC ISLT: ABNORMAL

## 2021-11-11 ENCOUNTER — TELEPHONE (OUTPATIENT)
Dept: FAMILY MEDICINE CLINIC | Facility: CLINIC | Age: 66
End: 2021-11-11

## 2021-11-11 NOTE — TELEPHONE ENCOUNTER
Caller: Kayla Adkins    Relationship: Self    Best call back number: [phone]    What medication are you requesting: N/A    What are your current symptoms: PATIENT HAS NO SYMPTOMS, PATIENT STATES SHE SAW ON HER MYCHART THAT HER URINE CULTURE CAME BACK POSITIVE AND IS WANTING ASM MEDICATION TO TREAT BACTERIA     Have you had these symptoms before:    [] Yes  [x] No    Have you been treated for these symptoms before:   [] Yes  [x] No    If a prescription is needed, what is your preferred pharmacy and phone number: [facility] [id number] [place] [address] & [place] - [phone] [facility] [phone] FX

## 2021-11-17 ENCOUNTER — TELEPHONE (OUTPATIENT)
Dept: FAMILY MEDICINE CLINIC | Facility: CLINIC | Age: 66
End: 2021-11-17

## 2021-11-17 RX ORDER — SULFAMETHOXAZOLE AND TRIMETHOPRIM 800; 160 MG/1; MG/1
1 TABLET ORAL 2 TIMES DAILY
Qty: 10 TABLET | Refills: 0 | Status: SHIPPED | OUTPATIENT
Start: 2021-11-17 | End: 2021-11-22

## 2021-11-17 NOTE — TELEPHONE ENCOUNTER
Prescription sent for Bactrim.  If symptoms continue or return after this antibiotic need to return for repeat urine culture

## 2021-11-17 NOTE — TELEPHONE ENCOUNTER
PATIENT CALLED AND STATES SHE WAS SEEN ON 11/4/21 FOR UTI. SHE FINISHED THE MEDICATION AND THIS MORNING SHE IS FEELING THE BURNING WHILE URINATING AND PRESSURE AGAIN.    [facility] [id number] [place] [address] AT [facility] & [place] - [phone]  - [phone] FX  [phone]    CALL BACK NUMBER [phone]

## 2021-11-22 ENCOUNTER — OFFICE VISIT (OUTPATIENT)
Dept: FAMILY MEDICINE CLINIC | Facility: CLINIC | Age: 66
End: 2021-11-22

## 2021-11-22 VITALS
HEIGHT: 63 IN | DIASTOLIC BLOOD PRESSURE: 79 MMHG | WEIGHT: 165 LBS | OXYGEN SATURATION: 99 % | BODY MASS INDEX: 29.23 KG/M2 | TEMPERATURE: 97 F | HEART RATE: 119 BPM | SYSTOLIC BLOOD PRESSURE: 150 MMHG | RESPIRATION RATE: 16 BRPM

## 2021-11-22 DIAGNOSIS — R19.7 DIARRHEA, UNSPECIFIED TYPE: ICD-10-CM

## 2021-11-22 DIAGNOSIS — R19.7 DIARRHEA, UNSPECIFIED TYPE: Primary | ICD-10-CM

## 2021-11-22 DIAGNOSIS — K52.832 LYMPHOCYTIC COLITIS: ICD-10-CM

## 2021-11-22 PROCEDURE — 99213 OFFICE O/P EST LOW 20 MIN: CPT | Performed by: NURSE PRACTITIONER

## 2021-11-22 NOTE — PATIENT INSTRUCTIONS
Diarrhea, Adult  Diarrhea is when you pass loose and watery poop (stool) often. Diarrhea can make you feel weak and cause you to lose water in your body (get dehydrated). Losing water in your body can cause you to:  · Feel tired and thirsty.  · Have a dry mouth.  · Go pee (urinate) less often.  Diarrhea often lasts 2-3 days. However, it can last longer if it is a sign of something more serious. It is important to treat your diarrhea as told by your doctor.  Follow these instructions at home:  Eating and drinking         Follow these instructions as told by your doctor:  · Take an ORS (oral rehydration solution). This is a drink that helps you replace fluids and minerals your body lost. It is sold at pharmacies and stores.  · Drink plenty of fluids, such as:  ? Water.  ? Ice chips.  ? Diluted fruit juice.  ? Low-calorie sports drinks.  ? Milk, if you want.  · Avoid drinking fluids that have a lot of sugar or caffeine in them.  · Eat bland, easy-to-digest foods in small amounts as you are able. These foods include:  ? Bananas.  ? Applesauce.  ? Rice.  ? Low-fat (lean) meats.  ? Toast.  ? Crackers.  · Avoid alcohol.  · Avoid spicy or fatty foods.    Medicines  · Take over-the-counter and prescription medicines only as told by your doctor.  · If you were prescribed an antibiotic medicine, take it as told by your doctor. Do not stop using the antibiotic even if you start to feel better.  General instructions    · Wash your hands often using soap and water. If soap and water are not available, use a hand . Others in your home should wash their hands as well. Hands should be washed:  ? After using the toilet or changing a diaper.  ? Before preparing, cooking, or serving food.  ? While caring for a sick person.  ? While visiting someone in a hospital.  · Drink enough fluid to keep your pee (urine) pale yellow.  · Rest at home while you get better.  · Watch your condition for any changes.  · Take a warm bath to help  with any burning or pain from having diarrhea.  · Keep all follow-up visits as told by your doctor. This is important.    Contact a doctor if:  · You have a fever.  · Your diarrhea gets worse.  · You have new symptoms.  · You cannot keep fluids down.  · You feel light-headed or dizzy.  · You have a headache.  · You have muscle cramps.  Get help right away if:  · You have chest pain.  · You feel very weak or you pass out (faint).  · You have bloody or black poop or poop that looks like tar.  · You have very bad pain, cramping, or bloating in your belly (abdomen).  · You have trouble breathing or you are breathing very quickly.  · Your heart is beating very quickly.  · Your skin feels cold and clammy.  · You feel confused.  · You have signs of losing too much water in your body, such as:  ? Dark pee, very little pee, or no pee.  ? Cracked lips.  ? Dry mouth.  ? Sunken eyes.  ? Sleepiness.  ? Weakness.  Summary  · Diarrhea is when you pass loose and watery poop (stool) often.  · Diarrhea can make you feel weak and cause you to lose water in your body (get dehydrated).  · Take an ORS (oral rehydration solution). This is a drink that is sold at pharmacies and stores.  · Eat bland, easy-to-digest foods in small amounts as you are able.  · Contact a doctor if your condition gets worse. Get help right away if you have signs that you have lost too much water in your body.  This information is not intended to replace advice given to you by your health care provider. Make sure you discuss any questions you have with your health care provider.  Document Revised: 05/24/2019 Document Reviewed: 05/24/2019  [employer] Patient Education © 2021 [employer]

## 2021-11-22 NOTE — PROGRESS NOTES
"Chief Complaint  Diarrhea (was on antibiotics for UTI and then once finished said she wasn't better and we gave her another antibiotic. Now she has diarrhea. HX of Colitis and concerned about CDiff)    Aiden Garza presents to [facility]    Review of Systems   Constitutional: Negative for chills, fatigue and fever.   Respiratory: Negative for cough and shortness of breath.    Cardiovascular: Negative.  Negative for chest pain, palpitations and leg swelling.   Gastrointestinal: Positive for diarrhea. Negative for abdominal distention, abdominal pain, anal bleeding, blood in stool, constipation, nausea, rectal pain and vomiting.   Genitourinary: Positive for urgency. Negative for dysuria and frequency.   Skin: Negative.    Neurological: Negative for dizziness and light-headedness.   Psychiatric/Behavioral: Negative.      66-year-old female presents to the office complaining of ongoing diarrhea.  Patient states that she was on antibiotics for UTI a couple of different courses.  She does have a history of colitis and is concerned about C. Difficile.  She has had ongoing issues with diarrhea in the past this is not new.  States it has gotten worse since she has had the antibiotics.  She has not had any diarrhea today but did have 2-3 times the last several days of watery.  No abdominal pain no fever.  Her UTI symptoms have subsided, except for continued urgency related to a \" falling bladder\" she has been referred to GYN urology for that and sees Dr. Gaxiola.    She would like a referral to gastroenterology related to chronic diarrhea  She is due for a repeat colonoscopy on 1/3/2022 where they had found lymphocytic colitis.  She would like to have that scheduled today    She has an active problem list of the following  Patient Active Problem List   Diagnosis   • Essential hypertension   • Other hyperlipidemia   • Anxiety   • Gastroesophageal reflux disease without esophagitis   • " "Lymphocytic colitis of colon   • Fear of flying   • Chondromalacia of left knee   • Tear of medial meniscus of left knee, current   • Other neutropenia (HCC)   • Low hemoglobin       She is been compliant on the following medications    Current Outpatient Medications on File Prior to Visit   Medication Sig Dispense Refill   • atorvastatin (LIPITOR) 10 MG tablet Take 1 tablet by mouth Every Night. 90 tablet 3   • DULoxetine (CYMBALTA) 60 MG capsule Take 1 capsule by mouth Every Night. 90 capsule 3   • Ferrous Sulfate ER (Slow Fe) 142 (45 Fe) MG tablet controlled-release Take 142 mg by mouth Daily. 30 tablet 3   • metoprolol succinate XL (TOPROL-XL) 50 MG 24 hr tablet Take 1 tablet by mouth Daily. 90 tablet 3   • omeprazole (priLOSEC) 20 MG capsule Take 1 capsule by mouth Daily. 90 capsule 3   • sulfamethoxazole-trimethoprim (Bactrim DS) 800-160 MG per tablet Take 1 tablet by mouth 2 (Two) Times a Day for 5 days. 10 tablet 0     No current facility-administered medications on file prior to visit.       She denies any medication side effects    The following portions of the patient's history were reviewed and updated as appropriate: allergies, current medications, past family history, past medical history, past social history, past surgical history, and problem list       Objective   Vital Signs:   Vitals:    11/22/21 1502   BP: 150/79   BP Location: Left arm   Patient Position: Sitting   Cuff Size: Adult   Pulse: 119   Resp: 16   Temp: 97 °F (36.1 °C)   TempSrc: Skin   SpO2: 99%   Weight: 74.8 kg (165 lb)   Height: 160 cm (62.99\")   PainSc: 0-No pain        Physical Exam  Constitutional:       General: She is not in acute distress.     Appearance: Normal appearance. She is normal weight. She is not ill-appearing.   HENT:      Head: Normocephalic.      Nose: Nose normal. No congestion.   Eyes:      Pupils: Pupils are equal, round, and reactive to light.   Cardiovascular:      Rate and Rhythm: Normal rate and regular " rhythm.      Pulses: Normal pulses.      Heart sounds: Normal heart sounds. No murmur heard.      Pulmonary:      Effort: Pulmonary effort is normal.      Breath sounds: Normal breath sounds.   Abdominal:      General: Abdomen is flat. Bowel sounds are normal. There is no distension.      Palpations: Abdomen is soft. There is no mass.      Tenderness: There is no abdominal tenderness. There is no right CVA tenderness, left CVA tenderness, guarding or rebound.      Hernia: No hernia is present.   Musculoskeletal:      Cervical back: Normal range of motion and neck supple.   Skin:     General: Skin is warm and dry.      Capillary Refill: Capillary refill takes less than 2 seconds.      Findings: No rash.   Neurological:      Mental Status: She is alert and oriented to person, place, and time.   Psychiatric:         Mood and Affect: Mood normal.         Behavior: Behavior normal.         Thought Content: Thought content normal.         Judgment: Judgment normal.          Result Review :     {The following data was reviewed by Radha HUNTER      Clostridium Difficile EIA - Stool, Per Rectum (11/22/2021 15:29)  Urine Culture - , (11/05/2021 00:00)  POC Urinalysis Dipstick, Automated (11/04/2021 14:41)  Urine Culture - Urine, Urine, Clean Catch (11/04/2021 13:45)      CBC & Differential (08/26/2021 12:53)    Assessment and Plan    Diagnoses and all orders for this visit:    1. Diarrhea, unspecified type (Primary)  -     Clostridium Difficile EIA - Stool, Per Rectum; Future  -     Cancel: Ambulatory Referral to Gastroenterology  -     Ambulatory Referral to Gastroenterology    2. Lymphocytic colitis of colon  -     Ambulatory Referral For Screening Colonoscopy      Plan  C. difficile ordered today  Ambulatory referral made to gastroenterology  Order placed for colonoscopy repeat screening due 1/3/2022  Continue bland diet-avoid caffeine/spicy foods/dairy  To ER with any acute abdominal pain/fever not relieved by  Tylenol  Declined lab work today does have appointment with hematology next week would like to get lab work completed with them    Follow Up   Return if symptoms worsen or fail to improve.  Patient was given instructions and counseling regarding her condition or for health maintenance advice. Please see specific information pulled into the AVS if appropriate.

## 2021-11-24 LAB
C DIFF TOX A+B STL QL IA: NEGATIVE
Lab: NORMAL

## 2021-12-03 ENCOUNTER — TELEPHONE (OUTPATIENT)
Dept: FAMILY MEDICINE CLINIC | Facility: CLINIC | Age: 66
End: 2021-12-03

## 2021-12-03 NOTE — TELEPHONE ENCOUNTER
Caller: Lucille Kayla KACIE    Relationship: Self    Best call back number: [phone]    What medication are you requesting: SOMETHING FOR DIARRHEA UNTIL SHE CAN GET TO HER GI APPOINTMENT  01/18/22    What are your current symptoms: PATIENT STATES THAT ONCE SHE EATS IT ALMOST IMMEDIATELY THAT SHE NEEDS TO USE THE BATHROOM.      How long have you been experiencing symptoms: HAS GOTTEN WORSE IN THE LAST MONTH.     If a prescription is needed, what is your preferred pharmacy and phone number:    [facility] [id number] [place] [address] & [facility] - [phone]  - [phone] FX  [phone]    Additional notes:  PATIENT HAS BEEN SEEING MASSIEL BARKER ALSO.

## 2021-12-14 NOTE — PROGRESS NOTES
Subjective   Kayla Adkins is a 66 y.o. female.  Referred by Dr. Rivas for evaluation of leukopenia    History of Present Illness   Ms. Adkins is a 65-year-old postmenopausal  who has been referred by Dr. Rivas for evaluation of leukopenia, neutropenia.  Patient reports that she has had longstanding leukopenia and her white blood cell count normally ranges in the threes.  On her most recent visit with Dr. Riavs white blood cell count was noted to be 2560 on 3/2/2021 and 2660 on 4/5/2021.  This prompted a referral to hematology.  Patient reports that she had a extensive work-up including a bone marrow biopsy for the same condition about 17 years ago.  She thinks that this was performed at [facility].  According to patient no clear etiology was determined after the work-up.  She was recommended to continue with periodic labs.  No treatment was required.    Interval History:  Patient returns today for lab review and follow-up.  She continues on oral iron every day and is tolerating this well.  She continues on oral iron daily and is tolerating this well.  She does note that her colitis has worsened over the last 2 months.  She is scheduled to follow-up with her gastroenterologist mid January, with colonoscopy after.  Otherwise, she is feeling well.  She denies fatigue.  She denies signs or symptoms of bleeding.    The following portions of the patient's history were reviewed and updated as appropriate: allergies, current medications, past family history, past medical history, past social history and problem list.    Past Medical History:   Diagnosis Date   • Anemia    • Anxiety    • GERD (gastroesophageal reflux disease)    • Hypertension    • Knee injury, initial encounter-left 7/1/2019   • Neutropenia (HCC) 1/3/2017   • Prediabetes 3/4/2019      No past surgical history on file.     Family History   Problem Relation Age of Onset   • Hypertension Mother    • Heart disease Father    •  "Hypertension Sister       Social History     Socioeconomic History   • Marital status:    Tobacco Use   • Smoking status: Never Smoker   • Smokeless tobacco: Never Used   Vaping Use   • Vaping Use: Never used   Substance and Sexual Activity   • Alcohol use: No   • Drug use: Defer   • Sexual activity: Defer      OB History    No obstetric history on file.          Allergies   Allergen Reactions   • Penicillins Rash      Review of systems as mentioned in the HPI.    Objective   Blood pressure 132/81, pulse 97, temperature 98.2 °F (36.8 °C), temperature source Temporal, resp. rate 16, height 160 cm (62.99\"), weight 74.8 kg (164 lb 12.8 oz), SpO2 99 %.     Physical Exam  Vitals reviewed.   Constitutional:       Appearance: Normal appearance. She is normal weight.   HENT:      Head: Normocephalic and atraumatic.      Nose: Nose normal.      Mouth/Throat:      Mouth: Mucous membranes are moist.   Eyes:      Extraocular Movements: Extraocular movements intact.      Pupils: Pupils are equal, round, and reactive to light.   Cardiovascular:      Rate and Rhythm: Normal rate.      Heart sounds: Normal heart sounds. No murmur heard.  No gallop.    Pulmonary:      Effort: Pulmonary effort is normal. No respiratory distress.      Breath sounds: Normal breath sounds. No wheezing.   Abdominal:      General: Abdomen is flat. Bowel sounds are normal. There is no distension.      Palpations: Abdomen is soft.      Tenderness: There is no abdominal tenderness.   Musculoskeletal:         General: Normal range of motion.      Cervical back: Normal range of motion.      Right lower leg: No edema.      Left lower leg: No edema.   Skin:     Findings: No bruising or rash.   Neurological:      General: No focal deficit present.      Mental Status: She is alert and oriented to person, place, and time. Mental status is at baseline.   Psychiatric:         Behavior: Behavior normal.       Orders Only on 11/22/2021   Component Date Value Ref " Range Status   • C difficile Toxins A+B, EIA 11/23/2021 Negative  Negative Final   • Please note 11/23/2021 Comment   Final    Comment: The date and/or time of collection was not indicated on the  requisition as required by state and federal law.  The date of  receipt of the specimen was used as the collection date if not  supplied.          No radiology results for the last 30 days.   CBC since 7/6/2016 reviewed and pertinent details summarized in HPI.  CMP 3/2/2021 reviewed and noted to be within normal limits  12/27/2017 hepatitis C antibody negative    Peripheral smear independently reviewed by Dr. Alvarez.  Noted to have decrease in neutrophils.  Occasional schistocytes noted.  Normal-appearing platelets.  RBC normal.    Assessment/Plan      65-year-old postmenopausal  lady with longstanding leukopenia/neutropenia and anemia    *Leukopenia  · Predominantly neutropenia  · Mild increase in monocyte count  · This is chronic and unchanged  · No increased infections  · Had a previous work-up including a bone marrow biopsy with no clear etiology  · Differential includes nutritional, chronic idiopathic, autoimmune.  · She is currently not on any medications which could account for the leukopenia.  · We will check B12, folic acid, copper levels to rule out any nutritional deficiencies  · Flow cytometry will be obtained to rule out any LGL  · No splenomegaly on exam  · 12/16/2021, WBC 3.16 today.  Patient denies frequent infections.    *Anemia  · Microcytic  · Likely secondary to iron deficiency  · 4/22/2021 iron saturation 3%, TIBC today 508, ferritin 5.70.  The patient was initiated on oral iron every other day.  · 8/26/2021, hemoglobin today 10.1.  The patient has been tolerating oral iron every other day.  We will increase oral iron to daily and recheck iron labs in 4 months.  · 12/16/2021, patient is taking oral iron every day and tolerating this well.  Hemoglobin today 10.0.  Iron saturation 45, TIBC 413,  ferritin 10.20.    *Qrmevejlx-wj-ns-date on screening mammograms and colonoscopies    PLAN:   · Continue oral iron once daily.  · Return in 3 months for CBC, iron profile, ferritin with MD follow-up.    I spent 35 minutes caring for Kayla on this date of service. This time includes time spent by me in the following activities: preparing for the visit, reviewing tests, obtaining and/or reviewing a separately obtained history, performing a medically appropriate examination and/or evaluation, counseling and educating the patient/family/caregiver, ordering medications, tests, or procedures, documenting information in the medical record and care coordination.     Natalya Thornton, APRN  12/16/2021

## 2021-12-16 ENCOUNTER — LAB (OUTPATIENT)
Dept: OTHER | Facility: HOSPITAL | Age: 66
End: 2021-12-16

## 2021-12-16 ENCOUNTER — OFFICE VISIT (OUTPATIENT)
Dept: ONCOLOGY | Facility: CLINIC | Age: 66
End: 2021-12-16

## 2021-12-16 VITALS
HEIGHT: 63 IN | TEMPERATURE: 98.2 F | DIASTOLIC BLOOD PRESSURE: 81 MMHG | BODY MASS INDEX: 29.2 KG/M2 | RESPIRATION RATE: 16 BRPM | SYSTOLIC BLOOD PRESSURE: 132 MMHG | OXYGEN SATURATION: 99 % | HEART RATE: 97 BPM | WEIGHT: 164.8 LBS

## 2021-12-16 DIAGNOSIS — D64.9 LOW HEMOGLOBIN: ICD-10-CM

## 2021-12-16 DIAGNOSIS — D70.8 OTHER NEUTROPENIA (HCC): Primary | ICD-10-CM

## 2021-12-16 DIAGNOSIS — D50.9 IRON DEFICIENCY ANEMIA, UNSPECIFIED IRON DEFICIENCY ANEMIA TYPE: Primary | ICD-10-CM

## 2021-12-16 LAB
BASOPHILS # BLD AUTO: 0.01 10*3/MM3 (ref 0–0.2)
BASOPHILS NFR BLD AUTO: 0.3 % (ref 0–1.5)
DEPRECATED RDW RBC AUTO: 51.4 FL (ref 37–54)
EOSINOPHIL # BLD AUTO: 0.08 10*3/MM3 (ref 0–0.4)
EOSINOPHIL NFR BLD AUTO: 2.5 % (ref 0.3–6.2)
ERYTHROCYTE [DISTWIDTH] IN BLOOD BY AUTOMATED COUNT: 18.5 % (ref 12.3–15.4)
FERRITIN SERPL-MCNC: 10.2 NG/ML (ref 13–150)
HCT VFR BLD AUTO: 34.4 % (ref 34–46.6)
HGB BLD-MCNC: 10 G/DL (ref 12–15.9)
IMM GRANULOCYTES # BLD AUTO: 0.01 10*3/MM3 (ref 0–0.05)
IMM GRANULOCYTES NFR BLD AUTO: 0.3 % (ref 0–0.5)
IRON 24H UR-MRATE: 186 MCG/DL (ref 37–145)
IRON SATN MFR SERPL: 45 % (ref 20–50)
LYMPHOCYTES # BLD AUTO: 1.1 10*3/MM3 (ref 0.7–3.1)
LYMPHOCYTES NFR BLD AUTO: 34.8 % (ref 19.6–45.3)
MCH RBC QN AUTO: 22.5 PG (ref 26.6–33)
MCHC RBC AUTO-ENTMCNC: 29.1 G/DL (ref 31.5–35.7)
MCV RBC AUTO: 77.5 FL (ref 79–97)
MONOCYTES # BLD AUTO: 0.34 10*3/MM3 (ref 0.1–0.9)
MONOCYTES NFR BLD AUTO: 10.8 % (ref 5–12)
NEUTROPHILS NFR BLD AUTO: 1.62 10*3/MM3 (ref 1.7–7)
NEUTROPHILS NFR BLD AUTO: 51.3 % (ref 42.7–76)
NRBC BLD AUTO-RTO: 0 /100 WBC (ref 0–0.2)
PLATELET # BLD AUTO: 384 10*3/MM3 (ref 140–450)
PMV BLD AUTO: 9.2 FL (ref 6–12)
RBC # BLD AUTO: 4.44 10*6/MM3 (ref 3.77–5.28)
TIBC SERPL-MCNC: 413 MCG/DL (ref 298–536)
TRANSFERRIN SERPL-MCNC: 277 MG/DL (ref 200–360)
WBC NRBC COR # BLD: 3.16 10*3/MM3 (ref 3.4–10.8)

## 2021-12-16 PROCEDURE — 85025 COMPLETE CBC W/AUTO DIFF WBC: CPT | Performed by: NURSE PRACTITIONER

## 2021-12-16 PROCEDURE — 83540 ASSAY OF IRON: CPT | Performed by: NURSE PRACTITIONER

## 2021-12-16 PROCEDURE — 99214 OFFICE O/P EST MOD 30 MIN: CPT | Performed by: NURSE PRACTITIONER

## 2021-12-16 PROCEDURE — 82728 ASSAY OF FERRITIN: CPT | Performed by: NURSE PRACTITIONER

## 2021-12-16 PROCEDURE — 36415 COLL VENOUS BLD VENIPUNCTURE: CPT

## 2021-12-16 PROCEDURE — 84466 ASSAY OF TRANSFERRIN: CPT | Performed by: NURSE PRACTITIONER

## 2022-01-18 ENCOUNTER — TELEPHONE (OUTPATIENT)
Dept: GASTROENTEROLOGY | Facility: CLINIC | Age: 67
End: 2022-01-18

## 2022-01-18 ENCOUNTER — OFFICE VISIT (OUTPATIENT)
Dept: GASTROENTEROLOGY | Facility: CLINIC | Age: 67
End: 2022-01-18

## 2022-01-18 VITALS
WEIGHT: 165.1 LBS | BODY MASS INDEX: 30.38 KG/M2 | HEIGHT: 62 IN | OXYGEN SATURATION: 99 % | DIASTOLIC BLOOD PRESSURE: 76 MMHG | TEMPERATURE: 97.1 F | HEART RATE: 111 BPM | SYSTOLIC BLOOD PRESSURE: 114 MMHG

## 2022-01-18 DIAGNOSIS — R19.7 DIARRHEA, UNSPECIFIED TYPE: Primary | ICD-10-CM

## 2022-01-18 DIAGNOSIS — D50.9 IRON DEFICIENCY ANEMIA, UNSPECIFIED IRON DEFICIENCY ANEMIA TYPE: ICD-10-CM

## 2022-01-18 PROCEDURE — 99204 OFFICE O/P NEW MOD 45 MIN: CPT | Performed by: INTERNAL MEDICINE

## 2022-01-18 RX ORDER — SODIUM CHLORIDE, SODIUM LACTATE, POTASSIUM CHLORIDE, CALCIUM CHLORIDE 600; 310; 30; 20 MG/100ML; MG/100ML; MG/100ML; MG/100ML
30 INJECTION, SOLUTION INTRAVENOUS CONTINUOUS
Status: CANCELLED | OUTPATIENT
Start: 2022-02-22

## 2022-01-18 NOTE — TELEPHONE ENCOUNTER
GILDA patient in office for EGD/CS. Scheduled 02/22/2022 with arrival time 7:30am. Prep packet given to patient in office. Patient advised arrival time may vary. Spoke with Magdalena--Heidi

## 2022-01-18 NOTE — PROGRESS NOTES
Chief Complaint   Patient presents with   • Diarrhea     Subjective   HPI  Kayla Adkins is a 66 y.o. female who presents today for new patient evaluation.  She complains of diarrhea.  Symptoms longstanding dating back years..  She had prior work-up with Dr. Vallejo at [facility] in 2017 she underwent colonoscopy with random biopsies which demonstrated lymphocytic colitis.  Since that time she has been treated with Imodium which is somewhat helpful although symptoms seem to be getting worse over the last few months.  She denies any blood or mucus in her stool.  She has no significant abdominal pain.  She reports that she had a remote EGD and had testing done for celiac disease which was negative.  Also noted to have issues with anemia followed by hematology reports anemia is generally longstanding as well.  She still has intact gallbladdler    Objective   Vitals:    01/18/22 1250   BP: 114/76   Pulse: 111   Temp: 97.1 °F (36.2 °C)   SpO2: 99%     Physical Exam  Vitals reviewed.   Constitutional:       Appearance: She is well-developed.   HENT:      Head: Normocephalic and atraumatic.   Neurological:      Mental Status: She is alert and oriented to person, place, and time.   Psychiatric:         Behavior: Behavior normal.         Thought Content: Thought content normal.         Judgment: Judgment normal.              Assessment/Plan   Assessment:     1. Diarrhea, unspecified type    2. Iron deficiency anemia, unspecified iron deficiency anemia type    3.      Lymphocytic colitis    Plan:   Suspect her symptoms probably related to her underlying lymphocytic colitis but given that her last colonoscopy was nearly 5 years ago I think we should repeat that and perform random biopsies to assess for activity.  Would also recommend an EGD to biopsy for celiac disease given some associated with this and microscopic colitis as well as to rule out any other etiologies for her anemia.  For now we will continue with Imodium if we do  verify that she has active lymphocytic colitis and no other issues at play I would give her a course of Entocort to see if this may provide some improvement in her symptoms or potentially even induce an element of remission.            Irvin Clayton M.D.  [facility]  [address] [place] [address]  Office: [phone]

## 2022-02-03 ENCOUNTER — TELEPHONE (OUTPATIENT)
Dept: FAMILY MEDICINE CLINIC | Facility: CLINIC | Age: 67
End: 2022-02-03

## 2022-02-03 NOTE — TELEPHONE ENCOUNTER
Caller: Kayla Adkins    Relationship: Self    Best call back number: [phone]    Who are you requesting to speak with (clinical staff, provider,  specific staff member): NURSE    What was the call regarding: PATIENT IS NEEDING TO KNOW IF SHE NEEDS TO DO LABS PRIOR TO HER MED CHECK APPT. ON 3/7 PLEASE CALL AND ADVISE.     Do you require a callback: YES

## 2022-02-19 ENCOUNTER — LAB (OUTPATIENT)
Dept: LAB | Facility: HOSPITAL | Age: 67
End: 2022-02-19

## 2022-02-19 DIAGNOSIS — Z00.00 ROUTINE GENERAL MEDICAL EXAMINATION AT A HEALTH CARE FACILITY: Primary | ICD-10-CM

## 2022-02-19 LAB — SARS-COV-2 ORF1AB RESP QL NAA+PROBE: NOT DETECTED

## 2022-02-19 PROCEDURE — C9803 HOPD COVID-19 SPEC COLLECT: HCPCS

## 2022-02-19 PROCEDURE — U0004 COV-19 TEST NON-CDC HGH THRU: HCPCS

## 2022-02-21 ENCOUNTER — TELEPHONE (OUTPATIENT)
Dept: GASTROENTEROLOGY | Facility: CLINIC | Age: 67
End: 2022-02-21

## 2022-02-21 NOTE — TELEPHONE ENCOUNTER
Pt has not stopped taking her iron.  Procedure is in the morning.  What should she do ?  She wants to talk to you. [phone]  
Spoke with pt per dr pope pt not to take iron today or tomorrow still ok to have egd colonoscopy  
wheezes

## 2022-02-22 ENCOUNTER — DOCUMENTATION (OUTPATIENT)
Dept: FAMILY MEDICINE CLINIC | Facility: CLINIC | Age: 67
End: 2022-02-22

## 2022-02-22 ENCOUNTER — ANESTHESIA EVENT (OUTPATIENT)
Dept: GASTROENTEROLOGY | Facility: HOSPITAL | Age: 67
End: 2022-02-22

## 2022-02-22 ENCOUNTER — HOSPITAL ENCOUNTER (OUTPATIENT)
Facility: HOSPITAL | Age: 67
Setting detail: HOSPITAL OUTPATIENT SURGERY
Discharge: HOME | End: 2022-02-22
Attending: INTERNAL MEDICINE | Admitting: INTERNAL MEDICINE

## 2022-02-22 ENCOUNTER — ANESTHESIA (OUTPATIENT)
Dept: GASTROENTEROLOGY | Facility: HOSPITAL | Age: 67
End: 2022-02-22

## 2022-02-22 VITALS
DIASTOLIC BLOOD PRESSURE: 77 MMHG | BODY MASS INDEX: 30.36 KG/M2 | OXYGEN SATURATION: 99 % | HEART RATE: 88 BPM | WEIGHT: 165 LBS | HEIGHT: 62 IN | TEMPERATURE: 98 F | SYSTOLIC BLOOD PRESSURE: 135 MMHG | RESPIRATION RATE: 16 BRPM

## 2022-02-22 DIAGNOSIS — R19.7 DIARRHEA, UNSPECIFIED TYPE: ICD-10-CM

## 2022-02-22 DIAGNOSIS — K63.89 COLONIC MASS: Primary | ICD-10-CM

## 2022-02-22 DIAGNOSIS — D50.9 IRON DEFICIENCY ANEMIA, UNSPECIFIED IRON DEFICIENCY ANEMIA TYPE: ICD-10-CM

## 2022-02-22 PROCEDURE — 45380 COLONOSCOPY AND BIOPSY: CPT | Performed by: INTERNAL MEDICINE

## 2022-02-22 PROCEDURE — 25010000002 PROPOFOL 10 MG/ML EMULSION: Performed by: STUDENT IN AN ORGANIZED HEALTH CARE EDUCATION/TRAINING PROGRAM

## 2022-02-22 PROCEDURE — 43239 EGD BIOPSY SINGLE/MULTIPLE: CPT | Performed by: INTERNAL MEDICINE

## 2022-02-22 PROCEDURE — 25010000002 ONDANSETRON PER 1 MG: Performed by: STUDENT IN AN ORGANIZED HEALTH CARE EDUCATION/TRAINING PROGRAM

## 2022-02-22 PROCEDURE — 88305 TISSUE EXAM BY PATHOLOGIST: CPT | Performed by: INTERNAL MEDICINE

## 2022-02-22 RX ORDER — LIDOCAINE HYDROCHLORIDE 20 MG/ML
INJECTION, SOLUTION INFILTRATION; PERINEURAL AS NEEDED
Status: DISCONTINUED | OUTPATIENT
Start: 2022-02-22 | End: 2022-02-22 | Stop reason: SURG

## 2022-02-22 RX ORDER — ONDANSETRON 2 MG/ML
INJECTION INTRAMUSCULAR; INTRAVENOUS AS NEEDED
Status: DISCONTINUED | OUTPATIENT
Start: 2022-02-22 | End: 2022-02-22 | Stop reason: SURG

## 2022-02-22 RX ORDER — PROPOFOL 10 MG/ML
VIAL (ML) INTRAVENOUS AS NEEDED
Status: DISCONTINUED | OUTPATIENT
Start: 2022-02-22 | End: 2022-02-22 | Stop reason: SURG

## 2022-02-22 RX ORDER — GLYCOPYRROLATE 0.2 MG/ML
INJECTION INTRAMUSCULAR; INTRAVENOUS AS NEEDED
Status: DISCONTINUED | OUTPATIENT
Start: 2022-02-22 | End: 2022-02-22 | Stop reason: SURG

## 2022-02-22 RX ORDER — PROPOFOL 10 MG/ML
VIAL (ML) INTRAVENOUS CONTINUOUS PRN
Status: DISCONTINUED | OUTPATIENT
Start: 2022-02-22 | End: 2022-02-22 | Stop reason: SURG

## 2022-02-22 RX ORDER — PROMETHAZINE HYDROCHLORIDE 25 MG/1
25 TABLET ORAL ONCE AS NEEDED
Status: DISCONTINUED | OUTPATIENT
Start: 2022-02-22 | End: 2022-02-22 | Stop reason: HOSPADM

## 2022-02-22 RX ORDER — SODIUM CHLORIDE 0.9 % (FLUSH) 0.9 %
3 SYRINGE (ML) INJECTION EVERY 12 HOURS SCHEDULED
Status: DISCONTINUED | OUTPATIENT
Start: 2022-02-22 | End: 2022-02-22 | Stop reason: HOSPADM

## 2022-02-22 RX ORDER — SODIUM CHLORIDE, SODIUM LACTATE, POTASSIUM CHLORIDE, CALCIUM CHLORIDE 600; 310; 30; 20 MG/100ML; MG/100ML; MG/100ML; MG/100ML
30 INJECTION, SOLUTION INTRAVENOUS CONTINUOUS
Status: DISCONTINUED | OUTPATIENT
Start: 2022-02-22 | End: 2022-02-22 | Stop reason: HOSPADM

## 2022-02-22 RX ORDER — SODIUM CHLORIDE 0.9 % (FLUSH) 0.9 %
10 SYRINGE (ML) INJECTION AS NEEDED
Status: DISCONTINUED | OUTPATIENT
Start: 2022-02-22 | End: 2022-02-22 | Stop reason: HOSPADM

## 2022-02-22 RX ORDER — PROMETHAZINE HYDROCHLORIDE 25 MG/1
25 SUPPOSITORY RECTAL ONCE AS NEEDED
Status: DISCONTINUED | OUTPATIENT
Start: 2022-02-22 | End: 2022-02-22 | Stop reason: HOSPADM

## 2022-02-22 RX ADMIN — PROPOFOL 120 MG: 10 INJECTION, EMULSION INTRAVENOUS at 08:34

## 2022-02-22 RX ADMIN — Medication 200 MCG/KG/MIN: at 08:34

## 2022-02-22 RX ADMIN — LIDOCAINE HYDROCHLORIDE 60 MG: 20 INJECTION, SOLUTION INFILTRATION; PERINEURAL at 08:34

## 2022-02-22 RX ADMIN — SODIUM CHLORIDE, POTASSIUM CHLORIDE, SODIUM LACTATE AND CALCIUM CHLORIDE 30 ML/HR: 600; 310; 30; 20 INJECTION, SOLUTION INTRAVENOUS at 07:56

## 2022-02-22 RX ADMIN — GLYCOPYRROLATE 0.2 MG: 0.2 INJECTION INTRAMUSCULAR; INTRAVENOUS at 08:34

## 2022-02-22 RX ADMIN — ONDANSETRON 4 MG: 2 INJECTION INTRAMUSCULAR; INTRAVENOUS at 08:34

## 2022-02-22 NOTE — ANESTHESIA POSTPROCEDURE EVALUATION
"Patient: Kayla McleodLucille    Procedure Summary     Date: 02/22/22 Room / Location: [facility] ENDOSCOPY 10 /  [facility]    Anesthesia Start: 0829 Anesthesia Stop: 0909    Procedures:       ESOPHAGOGASTRODUODENOSCOPY WITH BIOPSY (N/A Esophagus)      COLONOSCOPY INTO CECUM WITH COLD BIOPSIES (N/A ) Diagnosis:       Diarrhea, unspecified type      Iron deficiency anemia, unspecified iron deficiency anemia type      (Diarrhea, unspecified type [R19.7])      (Iron deficiency anemia, unspecified iron deficiency anemia type [D50.9])    Surgeons: Irvin Clayton MD Provider: Sylvester Haywood MD    Anesthesia Type: MAC ASA Status: 3          Anesthesia Type: MAC    Vitals  Vitals Value Taken Time   /77 02/22/22 0931   Temp 36.7 °C (98 °F) 02/22/22 0920   Pulse 88 02/22/22 0931   Resp 16 02/22/22 0931   SpO2 99 % 02/22/22 0931           Post Anesthesia Care and Evaluation    Patient location during evaluation: bedside  Patient participation: waiting for patient participation  Level of consciousness: sleepy but conscious and responsive to verbal stimuli  Pain management: adequate  Airway patency: patent  Anesthetic complications: No anesthetic complications  PONV Status: NA  Cardiovascular status: acceptable and hemodynamically stable  Respiratory status: acceptable, spontaneous ventilation and nonlabored ventilation  Hydration status: acceptable    Comments: /77 (BP Location: Left arm, Patient Position: Lying)   Pulse 88   Temp 36.7 °C (98 °F)   Resp 16   Ht 157.5 cm (62\")   Wt 74.8 kg (165 lb)   LMP  (LMP Unknown)   SpO2 99%   BMI 30.18 kg/m²       "

## 2022-02-22 NOTE — H&P
[facility]  Pre Procedure History & Physical    Chief Complaint:   Diarrhea    Subjective     HPI:   Kayla Adkins is a 66 y.o. female who presents today for new patient evaluation.  She complains of diarrhea.  Symptoms longstanding dating back years..  She had prior work-up with Dr. Vallejo at [facility] in 2017 she underwent colonoscopy with random biopsies which demonstrated lymphocytic colitis.  Since that time she has been treated with Imodium which is somewhat helpful although symptoms seem to be getting worse over the last few months.  She denies any blood or mucus in her stool.  She has no significant abdominal pain.  She reports that she had a remote EGD and had testing done for celiac disease which was negative.  Also noted to have issues with anemia followed by hematology reports anemia is generally longstanding as well.  She still has intact gallbladdler     Past Medical History:   Past Medical History:   Diagnosis Date   • Anemia    • Anxiety    • GERD (gastroesophageal reflux disease)    • Hypertension    • Knee injury, initial encounter-left 7/1/2019   • Neutropenia (HCC) 1/3/2017   • Prediabetes 3/4/2019       Family History:  Family History   Problem Relation Age of Onset   • Hypertension Mother    • Heart disease Father    • Hypertension Sister        Social History:   reports that she has never smoked. She has never used smokeless tobacco. She reports current alcohol use. She reports that she does not use drugs.    Medications:   Medications Prior to Admission   Medication Sig Dispense Refill Last Dose   • atorvastatin (LIPITOR) 10 MG tablet Take 1 tablet by mouth Every Night. 90 tablet 3 Past Week at Unknown time   • DULoxetine (CYMBALTA) 60 MG capsule Take 1 capsule by mouth Every Night. 90 capsule 3 Past Week at Unknown time   • Ferrous Sulfate ER (Slow Fe) 142 (45 Fe) MG tablet controlled-release Take 142 mg by mouth Daily. 30 tablet 3 Past Week at Unknown time   •  "Loperamide HCl (IMODIUM A-D PO) Take  by mouth.   Past Week at Unknown time   • metoprolol succinate XL (TOPROL-XL) 50 MG 24 hr tablet Take 1 tablet by mouth Daily. 90 tablet 3 2/22/2022 at 0001   • omeprazole (priLOSEC) 20 MG capsule Take 1 capsule by mouth Daily. 90 capsule 3 2/21/2022 at Unknown time       Allergies:  Penicillins    ROS:    Pertinent items are noted in HPI     Objective     Blood pressure 149/89, pulse 103, resp. rate 17, height 157.5 cm (62\"), weight 74.8 kg (165 lb), SpO2 97 %.    Physical Exam   Constitutional: Pt is oriented to person, place, and time and well-developed, well-nourished, and in no distress.   Abdominal: Soft.   Psychiatric: Mood, memory, affect and judgment normal.     Assessment/Plan     Diagnosis:  Diarrhea    Anticipated Surgical Procedure:  EGD  Colonoscopy    The risks, benefits, and alternatives of this procedure have been discussed with the patient or the responsible party- the patient understands and agrees to proceed.                                                                "

## 2022-02-22 NOTE — ANESTHESIA PREPROCEDURE EVALUATION
Anesthesia Evaluation     Patient summary reviewed and Nursing notes reviewed   no history of anesthetic complications:  NPO Solid Status: > 8 hours  NPO Liquid Status: > 2 hours           Airway   Mallampati: II  TM distance: <3 FB  Neck ROM: full  Dental - normal exam     Pulmonary - negative pulmonary ROS   Cardiovascular   Exercise tolerance: excellent (>7 METS)    (+) hypertension, hyperlipidemia,       Neuro/Psych  (+) headaches,    GI/Hepatic/Renal/Endo    (+)  GERD,      Musculoskeletal (-) negative ROS    Abdominal    Substance History      OB/GYN          Other                        Anesthesia Plan    ASA 3     MAC   total IV anesthesia(I have reviewed the patient's history with the patient and the chart, including all pertinent laboratory results and imaging. I have explained the risks of anesthesia including but not limited to dental damage, corneal abrasion, nerve injury, MI, stroke, and death. Questions asked and answered. Anesthetic plan discussed with patient and team as indicated. Patient expressed understanding of the above.  )    Anesthetic plan, all risks, benefits, and alternatives have been provided, discussed and informed consent has been obtained with: patient.        CODE STATUS:

## 2022-02-22 NOTE — DISCHARGE INSTRUCTIONS
For the next 24 hours patient needs to be with a responsible adult.    For 24 hours DO NOT drive, operate machinery, appliances, drink alcohol, make important decisions or sign legal documents.    Start with a light or bland diet if you are feeling sick to your stomach otherwise advance to regular diet as tolerated.    Follow recommendations on procedure report if provided by your doctor.    Call Dr HOYOS for problems [phone]    Problems may include but not limited to: large amounts of bleeding, trouble breathing, repeated vomiting, severe unrelieved pain, fever or chills.

## 2022-02-24 ENCOUNTER — OFFICE VISIT (OUTPATIENT)
Dept: SURGERY | Facility: CLINIC | Age: 67
End: 2022-02-24

## 2022-02-24 ENCOUNTER — PREP FOR SURGERY (OUTPATIENT)
Dept: OTHER | Facility: HOSPITAL | Age: 67
End: 2022-02-24

## 2022-02-24 VITALS — BODY MASS INDEX: 30.84 KG/M2 | HEIGHT: 62 IN | WEIGHT: 167.6 LBS

## 2022-02-24 DIAGNOSIS — K63.89 COLONIC MASS: Primary | ICD-10-CM

## 2022-02-24 DIAGNOSIS — C18.0 MALIGNANT NEOPLASM OF CECUM: ICD-10-CM

## 2022-02-24 PROCEDURE — 99204 OFFICE O/P NEW MOD 45 MIN: CPT | Performed by: SURGERY

## 2022-02-24 RX ORDER — CHLORHEXIDINE GLUCONATE 4 G/100ML
SOLUTION TOPICAL 2 TIMES DAILY
Qty: 236 ML | Refills: 0 | Status: SHIPPED | OUTPATIENT
Start: 2022-02-24 | End: 2022-03-01

## 2022-02-24 RX ORDER — ERYTHROMYCIN 500 MG/1
1000 TABLET, COATED ORAL 3 TIMES DAILY
Qty: 6 TABLET | Refills: 0 | Status: SHIPPED | OUTPATIENT
Start: 2022-02-24 | End: 2022-02-25

## 2022-02-24 RX ORDER — ACETAMINOPHEN 500 MG
1000 TABLET ORAL EVERY 6 HOURS
Status: CANCELLED | OUTPATIENT
Start: 2022-02-24

## 2022-02-24 RX ORDER — ACETAMINOPHEN, ASPIRIN AND CAFFEINE 250; 250; 65 MG/1; MG/1; MG/1
1 TABLET, FILM COATED ORAL EVERY 6 HOURS PRN
COMMUNITY

## 2022-02-24 RX ORDER — PREGABALIN 75 MG/1
75 CAPSULE ORAL ONCE
Status: CANCELLED | OUTPATIENT
Start: 2022-02-24 | End: 2022-02-24

## 2022-02-24 RX ORDER — CELECOXIB 200 MG/1
200 CAPSULE ORAL EVERY 12 HOURS SCHEDULED
Status: CANCELLED | OUTPATIENT
Start: 2022-02-24

## 2022-02-24 RX ORDER — ALVIMOPAN 12 MG/1
12 CAPSULE ORAL ONCE
Status: CANCELLED | OUTPATIENT
Start: 2022-03-02 | End: 2022-02-24

## 2022-02-24 RX ORDER — NEOMYCIN SULFATE 500 MG/1
1000 TABLET ORAL 3 TIMES DAILY
Qty: 6 TABLET | Refills: 0 | Status: SHIPPED | OUTPATIENT
Start: 2022-02-24 | End: 2022-02-25

## 2022-02-24 NOTE — PROGRESS NOTES
"CC: Ascending colon mass     HPI: 66-year-old female that was referred by Dr. Whitlock for evaluation of ascending colon mass. Patient has history of lymphocytic colitis and chronic diarrhea for which she takes Imodium as needed usually 2-3 times a week. Patient has history of colon polyps last one was done in 2017. She has history of chronic anemia as well as neutropenia. She underwent colonoscopy yesterday by Dr. Whitlock that show evidence of colonic mass at the proximal ascending colon.  She denies any recent weight loss, change in bowel habits or melanotic stools. Denies any rectal bleeding. No family history of colon cancer or inflammatory bowel disease     PMH: Lymphocytic colitis, anemia, anxiety, GERD, hypertension, neutropenia     PSH:   -Colonoscopy 2017  -Upper endoscopy and colonoscopy 2022  -Tonsillectomy    MEDS: Iron sulfate, atorvastatin, Cymbalta, metoprolol, omeprazole, Excedrin, loperamide     ALL: Penicillins    FH and SH: Family history of hypertension heart disease, no family history of colon cancer. Denies smoking drinking or taking any drugs     ROS:   Constitutional: denies any weight changes, fatigue or weakness.  HEENT: Denies hearing loss and rhinorrhea  Cardiovascular: denies chest pain, palpitations, edemas.  Respiratory: denies cough, sputum, SOB.  Gastrointestinal: denies N&V, abd pain, diarrhea, constipation.  Genitourinary: denies dysuria, frequency.  Endocrine: denies cold intolerance, lethargy and flushing.  Hem: denies excessive bruising and postop bleeding.  Musculoskeletal: denies weakness, joint swelling, pain or stiffness.  Neuro: denies seizures, CVA, paresthesia, or peripheral neuropathy.   Skin: denies change in nevi, rashes, masses.     PE:   Vitals:    02/24/22 1009   Weight: 76 kg (167 lb 9.6 oz)   Height: 157.5 cm (62\")     Body mass index is 30.65 kg/m².   Alert and oriented ×3, no acute distress.  Head is normocephalic and atraumatic.  Neck is supple there is no " thyromegaly or lymphadenopathy  Chest is clear bilaterally there is no added sounds  Regular rate and rhythm, no murmurs  Abdomen is soft and nontender, is nondistended, bowel sounds are positive.  There is no rebound or guarding is and there is no peritoneal signs.  No clubbing cyanosis or edema in lower or upper extremities    Diagnostic studies:   Labs 12/16/2021:   White blood cell count 3.1, hemoglobin 10, platelets 384, low MCV, MCH and MCHC  IRON level high, otherwise normal iron profile    Pathology from colonoscopy still pending    Pathology from colonoscopy 2/2017 consistent with lymphocytic colitis     Assessment and plan    The patient is a very pleasant 66-year-old female with proximal ascending colon mass likely colon cancer. Pathology report is pending. Has history of lymphocytic colitis. She has CT scan abdomen pelvis ordered for next Monday. We have changed the CAT scan for tomorrow morning so we will hopefully have a definite plan for tomorrow. Discussed with the patient about treatment options. If patient colonic mass is found to be colon cancer and has no evidence of metastatic disease then however recommend that she undergoes robotic assisted laparoscopic right hemicolectomy. If there is evidence of metastatic disease then question should be raised what ever her anemia is related to chronic blood loss due to colonic mass or from other hematologic conditions. If there is any suspicious she is chronically bleeding then we will discuss about colon resection after chemotherapy started.   If no evidence of cancer on pathology then she will need to have colon resection for better pathologic characterization of the mass.  I offer her robotic assisted laparoscopic right hemicolectomy. Risk and benefits of procedure including bleeding, infection, intra-abdominal injuries, anastomotic leak were discussed in detail with the patient.  We discussed about ERAS protocol as well as pre and postoperative  expectations after surgery with early mobilization, no narcotic pain medication and diet advancement.    She also understand that after right colon resection  there is a risk that her diarrhea will get worse. I discussed with her that we have several medication that can help her with chronic diarrhea. If lymphocytic colitis is found on pathology report then she will need to have close follow-up with GI for treatment of lymphocytic colitis after surgical resection is accomplished.    -Awaiting pathology results  -Awaiting imaging labs, include CEA  -Surgical scheduling started.    Malcolm Wellington MD  General, Minimally Invasive and Endoscopic Surgery  [facility]     [address], [address]  [place], [place], [address]  P: [phone]  F: [phone]

## 2022-02-25 ENCOUNTER — HOSPITAL ENCOUNTER (OUTPATIENT)
Dept: CT IMAGING | Facility: HOSPITAL | Age: 67
Discharge: HOME | End: 2022-02-25
Admitting: SURGERY

## 2022-02-25 DIAGNOSIS — K63.89 COLONIC MASS: ICD-10-CM

## 2022-02-25 LAB
LAB AP CASE REPORT: NORMAL
LAB AP DIAGNOSIS COMMENT: NORMAL
PATH REPORT.FINAL DX SPEC: NORMAL
PATH REPORT.GROSS SPEC: NORMAL

## 2022-02-25 PROCEDURE — 25010000002 IOPAMIDOL 61 % SOLUTION: Performed by: SURGERY

## 2022-02-25 PROCEDURE — 0 DIATRIZOATE MEGLUMINE & SODIUM PER 1 ML: Performed by: SURGERY

## 2022-02-25 PROCEDURE — 82565 ASSAY OF CREATININE: CPT

## 2022-02-25 PROCEDURE — 71260 CT THORAX DX C+: CPT

## 2022-02-25 PROCEDURE — 74177 CT ABD & PELVIS W/CONTRAST: CPT

## 2022-02-25 RX ADMIN — DIATRIZOATE MEGLUMINE AND DIATRIZOATE SODIUM 30 ML: 660; 100 LIQUID ORAL; RECTAL at 11:51

## 2022-02-25 RX ADMIN — IOPAMIDOL 90 ML: 612 INJECTION, SOLUTION INTRAVENOUS at 12:57

## 2022-02-28 ENCOUNTER — TELEPHONE (OUTPATIENT)
Dept: FAMILY MEDICINE CLINIC | Facility: CLINIC | Age: 67
End: 2022-02-28

## 2022-02-28 DIAGNOSIS — E78.49 OTHER HYPERLIPIDEMIA: ICD-10-CM

## 2022-02-28 DIAGNOSIS — F41.9 ANXIETY: ICD-10-CM

## 2022-02-28 DIAGNOSIS — I10 ESSENTIAL HYPERTENSION: ICD-10-CM

## 2022-02-28 DIAGNOSIS — K21.9 GASTROESOPHAGEAL REFLUX DISEASE WITHOUT ESOPHAGITIS: ICD-10-CM

## 2022-02-28 LAB — CREAT BLDA-MCNC: 0.7 MG/DL (ref 0.6–1.3)

## 2022-02-28 RX ORDER — METOPROLOL SUCCINATE 50 MG/1
50 TABLET, EXTENDED RELEASE ORAL DAILY
Qty: 90 TABLET | Refills: 0 | Status: SHIPPED | OUTPATIENT
Start: 2022-02-28 | End: 2022-05-24 | Stop reason: SDUPTHER

## 2022-02-28 RX ORDER — ATORVASTATIN CALCIUM 10 MG/1
10 TABLET, FILM COATED ORAL NIGHTLY
Qty: 90 TABLET | Refills: 0 | Status: SHIPPED | OUTPATIENT
Start: 2022-02-28 | End: 2022-05-24 | Stop reason: SDUPTHER

## 2022-02-28 RX ORDER — DULOXETIN HYDROCHLORIDE 60 MG/1
60 CAPSULE, DELAYED RELEASE ORAL NIGHTLY
Qty: 90 CAPSULE | Refills: 0 | Status: SHIPPED | OUTPATIENT
Start: 2022-02-28 | End: 2022-05-24 | Stop reason: SDUPTHER

## 2022-02-28 RX ORDER — OMEPRAZOLE 20 MG/1
20 CAPSULE, DELAYED RELEASE ORAL DAILY
Qty: 90 CAPSULE | Refills: 0 | Status: SHIPPED | OUTPATIENT
Start: 2022-02-28 | End: 2022-05-24 | Stop reason: SDUPTHER

## 2022-02-28 NOTE — TELEPHONE ENCOUNTER
Caller: Kayla Adkins    Relationship: Self    Best call back number: [phone]    Requested Prescriptions:   Requested Prescriptions     Pending Prescriptions Disp Refills   • atorvastatin (LIPITOR) 10 MG tablet 90 tablet 3     Sig: Take 1 tablet by mouth Every Night.   • DULoxetine (CYMBALTA) 60 MG capsule 90 capsule 3     Sig: Take 1 capsule by mouth Every Night.   • metoprolol succinate XL (TOPROL-XL) 50 MG 24 hr tablet 90 tablet 3     Sig: Take 1 tablet by mouth Daily.   • omeprazole (priLOSEC) 20 MG capsule 90 capsule 3     Sig: Take 1 capsule by mouth Daily.        Pharmacy where request should be sent: [address] AT [facility] [place] - [phone] [facility] [phone] FX     Additional details provided by patient: HAS 1 WEEK OF MEDS LEFT. IS HAVING SURGERY 3-3-22. RESCHEDULED APPT WITH DR PHILLIPS FOR 4-25-22  Does the patient have less than a 3 day supply:  [] Yes  [x] No    Herve Singer Rep   02/28/22 08:18 EST

## 2022-03-01 ENCOUNTER — PRE-ADMISSION TESTING (OUTPATIENT)
Dept: PREADMISSION TESTING | Facility: HOSPITAL | Age: 67
End: 2022-03-01

## 2022-03-01 ENCOUNTER — APPOINTMENT (OUTPATIENT)
Dept: CT IMAGING | Facility: HOSPITAL | Age: 67
End: 2022-03-01

## 2022-03-01 VITALS
BODY MASS INDEX: 30.91 KG/M2 | HEART RATE: 98 BPM | WEIGHT: 168 LBS | SYSTOLIC BLOOD PRESSURE: 162 MMHG | RESPIRATION RATE: 16 BRPM | HEIGHT: 62 IN | OXYGEN SATURATION: 96 % | DIASTOLIC BLOOD PRESSURE: 81 MMHG | TEMPERATURE: 98.3 F

## 2022-03-01 DIAGNOSIS — K63.89 COLONIC MASS: ICD-10-CM

## 2022-03-01 DIAGNOSIS — C18.0 MALIGNANT NEOPLASM OF CECUM: ICD-10-CM

## 2022-03-01 LAB
ANION GAP SERPL CALCULATED.3IONS-SCNC: 10 MMOL/L (ref 5–15)
BUN SERPL-MCNC: 13 MG/DL (ref 8–23)
BUN/CREAT SERPL: 21 (ref 7–25)
CALCIUM SPEC-SCNC: 8.9 MG/DL (ref 8.6–10.5)
CEA SERPL-MCNC: 0.77 NG/ML
CHLORIDE SERPL-SCNC: 105 MMOL/L (ref 98–107)
CO2 SERPL-SCNC: 24 MMOL/L (ref 22–29)
CREAT SERPL-MCNC: 0.62 MG/DL (ref 0.57–1)
DEPRECATED RDW RBC AUTO: 44.3 FL (ref 37–54)
EGFRCR SERPLBLD CKD-EPI 2021: 99 ML/MIN/1.73
ERYTHROCYTE [DISTWIDTH] IN BLOOD BY AUTOMATED COUNT: 15.6 % (ref 12.3–15.4)
GLUCOSE SERPL-MCNC: 114 MG/DL (ref 65–99)
HCT VFR BLD AUTO: 37.2 % (ref 34–46.6)
HGB BLD-MCNC: 11.4 G/DL (ref 12–15.9)
MCH RBC QN AUTO: 24.2 PG (ref 26.6–33)
MCHC RBC AUTO-ENTMCNC: 30.6 G/DL (ref 31.5–35.7)
MCV RBC AUTO: 79 FL (ref 79–97)
PLATELET # BLD AUTO: 376 10*3/MM3 (ref 140–450)
PMV BLD AUTO: 8.8 FL (ref 6–12)
POTASSIUM SERPL-SCNC: 3.9 MMOL/L (ref 3.5–5.2)
QT INTERVAL: 377 MS
RBC # BLD AUTO: 4.71 10*6/MM3 (ref 3.77–5.28)
SARS-COV-2 ORF1AB RESP QL NAA+PROBE: NOT DETECTED
SODIUM SERPL-SCNC: 139 MMOL/L (ref 136–145)
WBC NRBC COR # BLD: 2.79 10*3/MM3 (ref 3.4–10.8)

## 2022-03-01 PROCEDURE — 82378 CARCINOEMBRYONIC ANTIGEN: CPT

## 2022-03-01 PROCEDURE — C9803 HOPD COVID-19 SPEC COLLECT: HCPCS

## 2022-03-01 PROCEDURE — U0004 COV-19 TEST NON-CDC HGH THRU: HCPCS

## 2022-03-01 PROCEDURE — 88342 IMHCHEM/IMCYTCHM 1ST ANTB: CPT

## 2022-03-01 PROCEDURE — 36415 COLL VENOUS BLD VENIPUNCTURE: CPT

## 2022-03-01 PROCEDURE — 85027 COMPLETE CBC AUTOMATED: CPT

## 2022-03-01 PROCEDURE — 93005 ELECTROCARDIOGRAM TRACING: CPT

## 2022-03-01 PROCEDURE — 80048 BASIC METABOLIC PNL TOTAL CA: CPT

## 2022-03-01 PROCEDURE — 93010 ELECTROCARDIOGRAM REPORT: CPT | Performed by: INTERNAL MEDICINE

## 2022-03-01 RX ORDER — CHLORHEXIDINE GLUCONATE 500 MG/1
CLOTH TOPICAL
COMMUNITY
End: 2022-03-04 | Stop reason: HOSPADM

## 2022-03-01 NOTE — DISCHARGE INSTRUCTIONS
Take the following medications the morning of surgery:    NONE    FOLLOW DR VELASQUEZ ORDERS FOR BOWEL PREP.      If you are on prescription narcotic pain medication to control your pain you may also take that medication the morning of surgery.    General Instructions:  • Do not eat solid food after midnight the night before surgery.  • You may drink clear liquids day of surgery but must stop at least one hour before your hospital arrival time.  • It is beneficial for you to have a clear drink that contains carbohydrates the day of surgery.  We suggest a 12 to 20 ounce bottle of Gatorade or Powerade for non-diabetic patients or a 12 to 20 ounce bottle of G2 or Powerade Zero for diabetic patients. (Pediatric patients, are not advised to drink a 12 to 20 ounce carbohydrate drink)    Clear liquids are liquids you can see through.  Nothing red in color.     Plain water                               Sports drinks  Sodas                                   Gelatin (Jell-O)  Fruit juices without pulp such as white grape juice and apple juice  Popsicles that contain no fruit or yogurt  Tea or coffee (no cream or milk added)  Gatorade / Powerade  G2 / Powerade Zero    • Infants may have breast milk up to four hours before surgery.  • Infants drinking formula may drink formula up to six hours before surgery.   • Patients who avoid smoking, chewing tobacco and alcohol for 4 weeks prior to surgery have a reduced risk of post-operative complications.  Quit smoking as many days before surgery as you can.  • Do not smoke, use chewing tobacco or drink alcohol the day of surgery.   • If applicable bring your C-PAP/ BI-PAP machine.  • Bring any papers given to you in the doctor’s office.  • Wear clean comfortable clothes.  • Do not wear contact lenses, false eyelashes or make-up.  Bring a case for your glasses.   • Bring crutches or walker if applicable.  • Remove all piercings.  Leave jewelry and any other valuables at home.  • Hair  extensions with metal clips must be removed prior to surgery.  • The Pre-Admission Testing nurse will instruct you to bring medications if unable to obtain an accurate list in Pre-Admission Testing.        If you were given a blood bank ID arm band remember to bring it with you the day of surgery.    Preventing a Surgical Site Infection:  • For 2 to 3 days before surgery, avoid shaving with a razor because the razor can irritate skin and make it easier to develop an infection.    • Any areas of open skin can increase the risk of a post-operative wound infection by allowing bacteria to enter and travel throughout the body.  Notify your surgeon if you have any skin wounds / rashes even if it is not near the expected surgical site.  The area will need assessed to determine if surgery should be delayed until it is healed.  • The night prior to surgery shower using a fresh bar of anti-bacterial soap (such as Dial) and clean washcloth.  Sleep in a clean bed with clean clothing.  Do not allow pets to sleep with you.  • Shower on the morning of surgery using a fresh bar of anti-bacterial soap (such as Dial) and clean washcloth.  Dry with a clean towel and dress in clean clothing.  • Ask your surgeon if you will be receiving antibiotics prior to surgery.  • Make sure you, your family, and all healthcare providers clean their hands with soap and water or an alcohol based hand  before caring for you or your wound.    Day of surgery:  Your arrival time is approximately two hours before your scheduled surgery time.  Upon arrival, a Pre-op nurse and Anesthesiologist will review your health history, obtain vital signs, and answer questions you may have.  The only belongings needed at this time will be a list of your home medications and if applicable your C-PAP/BI-PAP machine.  A Pre-op nurse will start an IV and you may receive medication in preparation for surgery, including something to help you relax.     Please be  aware that surgery does come with discomfort.  We want to make every effort to control your discomfort so please discuss any uncontrolled symptoms with your nurse.   Your doctor will most likely have prescribed pain medications.      If you are going home after surgery you will receive individualized written care instructions before being discharged.  A responsible adult must drive you to and from the hospital on the day of your surgery and stay with you for 24 hours.  Discharge prescriptions can be filled by the hospital pharmacy during regular pharmacy hours.  If you are having surgery late in the day/evening your prescription may be e-prescribed to your pharmacy.  Please verify your pharmacy hours or chose a 24 hour pharmacy to avoid not having access to your prescription because your pharmacy has closed for the day.    If you are staying overnight following surgery, you will be transported to your hospital room following the recovery period.  [facility] has all private rooms.    If you have any questions please call Pre-Admission Testing at [phone].  Deductibles and co-payments are collected on the day of service. Please be prepared to pay the required co-pay, deductible or deposit on the day of service as defined by your plan.    Patient Education for Self-Quarantine Process    • Following your COVID testing, we strongly recommend that you wear a mask when you are with other people and practice social distancing.   • Limit your activities to only required outings.  • Wash your hands with soap and water frequently for at least 20 seconds.   • Avoid touching your eyes, nose and mouth with unwashed hands.  • Do not share anything - utensils, drinking glasses, food from the same bowl.   • Sanitize household surfaces daily. Include all high touch areas (door handles, light switches, phones, countertops, etc.)    Call your surgeon immediately if you experience any of the following  symptoms:  • Sore Throat  • Shortness of Breath or difficulty breathing  • Cough  • Chills  • Body soreness or muscle pain  • Headache  • Fever  • New loss of taste or smell  • Do not arrive for your surgery ill.  Your procedure will need to be rescheduled to another time.  You will need to call your physician before the day of surgery to avoid any unnecessary exposure to hospital staff as well as other patients.      CHLORHEXIDINE CLOTH INSTRUCTIONS  The morning of surgery follow these instructions using the Chlorhexidine cloths you've been given.  These steps reduce bacteria on the body.  Do not use the cloths near your eyes, ears mouth, genitalia or on open wounds.  Throw the cloths away after use but do not try to flush them down a toilet.      • Open and remove one cloth at a time from the package.    • Leave the cloth unfolded and begin the bathing.  • Massage the skin with the cloths using gentle pressure to remove bacteria.  Do not scrub harshly.   • Follow the steps below with one 2% CHG cloth per area (6 total cloths).  • One cloth for neck, shoulders and chest.  • One cloth for both arms, hands, fingers and underarms (do underarms last).  • One cloth for the abdomen followed by groin.  • One cloth for right leg and foot including between the toes.  • One cloth for left leg and foot including between the toes.  • The last cloth is to be used for the back of the neck, back and buttocks.    Allow the CHG to air dry 3 minutes on the skin which will give it time to work and decrease the chance of irritation.  The skin may feel sticky until it is dry.  Do not rinse with water or any other liquid or you will lose the beneficial effects of the CHG.  If mild skin irritation occurs, do rinse the skin to remove the CHG.  Report this to the nurse at time of admission.  Do not apply lotions, creams, ointments, deodorants or perfumes after using the clothes. Dress in clean clothes before coming to the hospital.

## 2022-03-02 ENCOUNTER — HOSPITAL ENCOUNTER (INPATIENT)
Facility: HOSPITAL | Age: 67
LOS: 1 days | Discharge: HOME | End: 2022-03-04
Attending: SURGERY | Admitting: SURGERY

## 2022-03-02 ENCOUNTER — ANESTHESIA (OUTPATIENT)
Dept: PERIOP | Facility: HOSPITAL | Age: 67
End: 2022-03-02

## 2022-03-02 ENCOUNTER — ANESTHESIA EVENT (OUTPATIENT)
Dept: PERIOP | Facility: HOSPITAL | Age: 67
End: 2022-03-02

## 2022-03-02 DIAGNOSIS — K63.89 COLONIC MASS: ICD-10-CM

## 2022-03-02 LAB
ABO GROUP BLD: NORMAL
BLD GP AB SCN SERPL QL: NEGATIVE
RH BLD: POSITIVE
T&S EXPIRATION DATE: NORMAL

## 2022-03-02 PROCEDURE — 86850 RBC ANTIBODY SCREEN: CPT | Performed by: SURGERY

## 2022-03-02 PROCEDURE — 25010000002 FENTANYL CITRATE (PF) 50 MCG/ML SOLUTION: Performed by: NURSE ANESTHETIST, CERTIFIED REGISTERED

## 2022-03-02 PROCEDURE — 25010000002 DEXAMETHASONE PER 1 MG: Performed by: NURSE ANESTHETIST, CERTIFIED REGISTERED

## 2022-03-02 PROCEDURE — 25010000002 PROPOFOL 10 MG/ML EMULSION: Performed by: NURSE ANESTHETIST, CERTIFIED REGISTERED

## 2022-03-02 PROCEDURE — 25010000002 SUCCINYLCHOLINE PER 20 MG: Performed by: NURSE ANESTHETIST, CERTIFIED REGISTERED

## 2022-03-02 PROCEDURE — 0DTF4ZZ RESECTION OF RIGHT LARGE INTESTINE, PERCUTANEOUS ENDOSCOPIC APPROACH: ICD-10-PCS | Performed by: SURGERY

## 2022-03-02 PROCEDURE — 86901 BLOOD TYPING SEROLOGIC RH(D): CPT | Performed by: SURGERY

## 2022-03-02 PROCEDURE — 88341 IMHCHEM/IMCYTCHM EA ADD ANTB: CPT

## 2022-03-02 PROCEDURE — 25010000002 CEFOXITIN PER 1 G: Performed by: NURSE ANESTHETIST, CERTIFIED REGISTERED

## 2022-03-02 PROCEDURE — C1889 IMPLANT/INSERT DEVICE, NOC: HCPCS | Performed by: SURGERY

## 2022-03-02 PROCEDURE — 81301 MICROSATELLITE INSTABILITY: CPT

## 2022-03-02 PROCEDURE — 8E0W4CZ ROBOTIC ASSISTED PROCEDURE OF TRUNK REGION, PERCUTANEOUS ENDOSCOPIC APPROACH: ICD-10-PCS | Performed by: SURGERY

## 2022-03-02 PROCEDURE — 25010000002 CEFOXITIN PER 1 G: Performed by: SURGERY

## 2022-03-02 PROCEDURE — G0378 HOSPITAL OBSERVATION PER HR: HCPCS

## 2022-03-02 PROCEDURE — 25010000002 LIDOCAINE PER 10 MG: Performed by: NURSE ANESTHETIST, CERTIFIED REGISTERED

## 2022-03-02 PROCEDURE — 4A1BXSH MONITORING OF GASTROINTESTINAL VASCULAR PERFUSION USING INDOCYANINE GREEN DYE, EXTERNAL APPROACH: ICD-10-PCS | Performed by: SURGERY

## 2022-03-02 PROCEDURE — C9290 INJ, BUPIVACAINE LIPOSOME: HCPCS | Performed by: NURSE ANESTHETIST, CERTIFIED REGISTERED

## 2022-03-02 PROCEDURE — 88309 TISSUE EXAM BY PATHOLOGIST: CPT | Performed by: SURGERY

## 2022-03-02 PROCEDURE — 44204 LAPARO PARTIAL COLECTOMY: CPT | Performed by: SURGERY

## 2022-03-02 PROCEDURE — 25010000002 HYDROMORPHONE PER 4 MG: Performed by: NURSE ANESTHETIST, CERTIFIED REGISTERED

## 2022-03-02 PROCEDURE — 25010000002 KETOROLAC TROMETHAMINE PER 15 MG: Performed by: SURGERY

## 2022-03-02 PROCEDURE — S2900 ROBOTIC SURGICAL SYSTEM: HCPCS | Performed by: SURGERY

## 2022-03-02 PROCEDURE — 0 BUPIVACAINE LIPOSOME 1.3 % SUSPENSION: Performed by: NURSE ANESTHETIST, CERTIFIED REGISTERED

## 2022-03-02 PROCEDURE — 86900 BLOOD TYPING SEROLOGIC ABO: CPT | Performed by: SURGERY

## 2022-03-02 DEVICE — STAPLER 60 RELOAD WHITE
Type: IMPLANTABLE DEVICE | Site: ABDOMEN | Status: FUNCTIONAL
Brand: SUREFORM

## 2022-03-02 DEVICE — KNOTLESS TISSUE CONTROL DEVICE, VIOLET UNIDIRECTIONAL (ANTIBACTERIAL) SYNTHETIC ABSORBABLE DEVICE
Type: IMPLANTABLE DEVICE | Site: ABDOMEN | Status: FUNCTIONAL
Brand: STRATAFIX

## 2022-03-02 DEVICE — LIGAMAX 5 MM ENDOSCOPIC MULTIPLE CLIP APPLIER
Type: IMPLANTABLE DEVICE | Site: ABDOMEN | Status: FUNCTIONAL
Brand: LIGAMAX

## 2022-03-02 DEVICE — STAPLER 60 RELOAD BLUE
Type: IMPLANTABLE DEVICE | Site: ABDOMEN | Status: FUNCTIONAL
Brand: SUREFORM

## 2022-03-02 RX ORDER — PROMETHAZINE HYDROCHLORIDE 25 MG/1
25 TABLET ORAL ONCE AS NEEDED
Status: DISCONTINUED | OUTPATIENT
Start: 2022-03-02 | End: 2022-03-02 | Stop reason: HOSPADM

## 2022-03-02 RX ORDER — CELECOXIB 200 MG/1
200 CAPSULE ORAL DAILY
Status: DISCONTINUED | OUTPATIENT
Start: 2022-03-02 | End: 2022-03-04 | Stop reason: HOSPADM

## 2022-03-02 RX ORDER — SUCCINYLCHOLINE CHLORIDE 20 MG/ML
INJECTION INTRAMUSCULAR; INTRAVENOUS AS NEEDED
Status: DISCONTINUED | OUTPATIENT
Start: 2022-03-02 | End: 2022-03-02 | Stop reason: SURG

## 2022-03-02 RX ORDER — BUPIVACAINE HYDROCHLORIDE AND EPINEPHRINE 5; 5 MG/ML; UG/ML
INJECTION, SOLUTION EPIDURAL; INTRACAUDAL; PERINEURAL AS NEEDED
Status: DISCONTINUED | OUTPATIENT
Start: 2022-03-02 | End: 2022-03-02 | Stop reason: HOSPADM

## 2022-03-02 RX ORDER — IBUPROFEN 600 MG/1
600 TABLET ORAL ONCE AS NEEDED
Status: DISCONTINUED | OUTPATIENT
Start: 2022-03-02 | End: 2022-03-02 | Stop reason: HOSPADM

## 2022-03-02 RX ORDER — HYDRALAZINE HYDROCHLORIDE 20 MG/ML
5 INJECTION INTRAMUSCULAR; INTRAVENOUS
Status: DISCONTINUED | OUTPATIENT
Start: 2022-03-02 | End: 2022-03-02 | Stop reason: HOSPADM

## 2022-03-02 RX ORDER — MAGNESIUM HYDROXIDE 1200 MG/15ML
LIQUID ORAL AS NEEDED
Status: DISCONTINUED | OUTPATIENT
Start: 2022-03-02 | End: 2022-03-02 | Stop reason: HOSPADM

## 2022-03-02 RX ORDER — FLUMAZENIL 0.1 MG/ML
0.2 INJECTION INTRAVENOUS AS NEEDED
Status: DISCONTINUED | OUTPATIENT
Start: 2022-03-02 | End: 2022-03-02 | Stop reason: HOSPADM

## 2022-03-02 RX ORDER — DIPHENHYDRAMINE HCL 25 MG
25 CAPSULE ORAL
Status: DISCONTINUED | OUTPATIENT
Start: 2022-03-02 | End: 2022-03-02 | Stop reason: HOSPADM

## 2022-03-02 RX ORDER — ONDANSETRON 2 MG/ML
4 INJECTION INTRAMUSCULAR; INTRAVENOUS ONCE AS NEEDED
Status: DISCONTINUED | OUTPATIENT
Start: 2022-03-02 | End: 2022-03-02 | Stop reason: HOSPADM

## 2022-03-02 RX ORDER — NALOXONE HCL 0.4 MG/ML
0.4 VIAL (ML) INJECTION
Status: DISCONTINUED | OUTPATIENT
Start: 2022-03-02 | End: 2022-03-04 | Stop reason: HOSPADM

## 2022-03-02 RX ORDER — PROPOFOL 10 MG/ML
VIAL (ML) INTRAVENOUS AS NEEDED
Status: DISCONTINUED | OUTPATIENT
Start: 2022-03-02 | End: 2022-03-02 | Stop reason: SURG

## 2022-03-02 RX ORDER — HYDROMORPHONE HYDROCHLORIDE 1 MG/ML
0.5 INJECTION, SOLUTION INTRAMUSCULAR; INTRAVENOUS; SUBCUTANEOUS
Status: DISCONTINUED | OUTPATIENT
Start: 2022-03-02 | End: 2022-03-04 | Stop reason: HOSPADM

## 2022-03-02 RX ORDER — HYDROCODONE BITARTRATE AND ACETAMINOPHEN 7.5; 325 MG/1; MG/1
1 TABLET ORAL ONCE AS NEEDED
Status: DISCONTINUED | OUTPATIENT
Start: 2022-03-02 | End: 2022-03-02 | Stop reason: HOSPADM

## 2022-03-02 RX ORDER — METOPROLOL SUCCINATE 50 MG/1
50 TABLET, EXTENDED RELEASE ORAL DAILY
Status: DISCONTINUED | OUTPATIENT
Start: 2022-03-02 | End: 2022-03-04 | Stop reason: HOSPADM

## 2022-03-02 RX ORDER — LIDOCAINE HYDROCHLORIDE 20 MG/ML
INJECTION, SOLUTION INFILTRATION; PERINEURAL AS NEEDED
Status: DISCONTINUED | OUTPATIENT
Start: 2022-03-02 | End: 2022-03-02 | Stop reason: SURG

## 2022-03-02 RX ORDER — SODIUM CHLORIDE, SODIUM LACTATE, POTASSIUM CHLORIDE, CALCIUM CHLORIDE 600; 310; 30; 20 MG/100ML; MG/100ML; MG/100ML; MG/100ML
9 INJECTION, SOLUTION INTRAVENOUS CONTINUOUS PRN
Status: DISCONTINUED | OUTPATIENT
Start: 2022-03-02 | End: 2022-03-02 | Stop reason: HOSPADM

## 2022-03-02 RX ORDER — DULOXETIN HYDROCHLORIDE 60 MG/1
60 CAPSULE, DELAYED RELEASE ORAL NIGHTLY
Status: DISCONTINUED | OUTPATIENT
Start: 2022-03-02 | End: 2022-03-04 | Stop reason: HOSPADM

## 2022-03-02 RX ORDER — PANTOPRAZOLE SODIUM 40 MG/1
40 TABLET, DELAYED RELEASE ORAL EVERY MORNING
Refills: 0 | Status: DISCONTINUED | OUTPATIENT
Start: 2022-03-03 | End: 2022-03-04 | Stop reason: HOSPADM

## 2022-03-02 RX ORDER — ALVIMOPAN 12 MG/1
12 CAPSULE ORAL ONCE
Status: COMPLETED | OUTPATIENT
Start: 2022-03-02 | End: 2022-03-02

## 2022-03-02 RX ORDER — NALOXONE HCL 0.4 MG/ML
0.2 VIAL (ML) INJECTION AS NEEDED
Status: DISCONTINUED | OUTPATIENT
Start: 2022-03-02 | End: 2022-03-02 | Stop reason: HOSPADM

## 2022-03-02 RX ORDER — ROCURONIUM BROMIDE 10 MG/ML
INJECTION, SOLUTION INTRAVENOUS AS NEEDED
Status: DISCONTINUED | OUTPATIENT
Start: 2022-03-02 | End: 2022-03-02 | Stop reason: SURG

## 2022-03-02 RX ORDER — FENTANYL CITRATE 50 UG/ML
INJECTION, SOLUTION INTRAMUSCULAR; INTRAVENOUS AS NEEDED
Status: DISCONTINUED | OUTPATIENT
Start: 2022-03-02 | End: 2022-03-02 | Stop reason: SURG

## 2022-03-02 RX ORDER — ONDANSETRON 4 MG/1
4 TABLET, FILM COATED ORAL EVERY 6 HOURS PRN
Status: DISCONTINUED | OUTPATIENT
Start: 2022-03-02 | End: 2022-03-04 | Stop reason: HOSPADM

## 2022-03-02 RX ORDER — SODIUM CHLORIDE 9 MG/ML
INJECTION, SOLUTION INTRAVENOUS CONTINUOUS PRN
Status: DISCONTINUED | OUTPATIENT
Start: 2022-03-02 | End: 2022-03-02 | Stop reason: SURG

## 2022-03-02 RX ORDER — KETAMINE HYDROCHLORIDE 10 MG/ML
INJECTION INTRAMUSCULAR; INTRAVENOUS AS NEEDED
Status: DISCONTINUED | OUTPATIENT
Start: 2022-03-02 | End: 2022-03-02 | Stop reason: SURG

## 2022-03-02 RX ORDER — BUPIVACAINE HYDROCHLORIDE 2.5 MG/ML
INJECTION, SOLUTION EPIDURAL; INFILTRATION; INTRACAUDAL
Status: COMPLETED | OUTPATIENT
Start: 2022-03-02 | End: 2022-03-02

## 2022-03-02 RX ORDER — SODIUM CHLORIDE 9 MG/ML
75 INJECTION, SOLUTION INTRAVENOUS CONTINUOUS
Status: DISCONTINUED | OUTPATIENT
Start: 2022-03-02 | End: 2022-03-04 | Stop reason: HOSPADM

## 2022-03-02 RX ORDER — FENTANYL CITRATE 50 UG/ML
50 INJECTION, SOLUTION INTRAMUSCULAR; INTRAVENOUS
Status: DISCONTINUED | OUTPATIENT
Start: 2022-03-02 | End: 2022-03-02 | Stop reason: HOSPADM

## 2022-03-02 RX ORDER — ATORVASTATIN CALCIUM 10 MG/1
10 TABLET, FILM COATED ORAL NIGHTLY
Status: DISCONTINUED | OUTPATIENT
Start: 2022-03-02 | End: 2022-03-04 | Stop reason: HOSPADM

## 2022-03-02 RX ORDER — DIPHENHYDRAMINE HYDROCHLORIDE 50 MG/ML
12.5 INJECTION INTRAMUSCULAR; INTRAVENOUS
Status: DISCONTINUED | OUTPATIENT
Start: 2022-03-02 | End: 2022-03-02 | Stop reason: HOSPADM

## 2022-03-02 RX ORDER — HYDROMORPHONE HYDROCHLORIDE 1 MG/ML
0.5 INJECTION, SOLUTION INTRAMUSCULAR; INTRAVENOUS; SUBCUTANEOUS
Status: DISCONTINUED | OUTPATIENT
Start: 2022-03-02 | End: 2022-03-02 | Stop reason: HOSPADM

## 2022-03-02 RX ORDER — SODIUM CHLORIDE 0.9 % (FLUSH) 0.9 %
10 SYRINGE (ML) INJECTION AS NEEDED
Status: DISCONTINUED | OUTPATIENT
Start: 2022-03-02 | End: 2022-03-02 | Stop reason: HOSPADM

## 2022-03-02 RX ORDER — SODIUM CHLORIDE 0.9 % (FLUSH) 0.9 %
10 SYRINGE (ML) INJECTION EVERY 12 HOURS SCHEDULED
Status: DISCONTINUED | OUTPATIENT
Start: 2022-03-02 | End: 2022-03-02 | Stop reason: HOSPADM

## 2022-03-02 RX ORDER — LIDOCAINE HYDROCHLORIDE ANHYDROUS AND DEXTROSE MONOHYDRATE 5; 400 G/100ML; MG/100ML
INJECTION, SOLUTION INTRAVENOUS CONTINUOUS PRN
Status: DISCONTINUED | OUTPATIENT
Start: 2022-03-02 | End: 2022-03-02 | Stop reason: SURG

## 2022-03-02 RX ORDER — PREGABALIN 75 MG/1
75 CAPSULE ORAL DAILY
Status: DISCONTINUED | OUTPATIENT
Start: 2022-03-02 | End: 2022-03-04 | Stop reason: HOSPADM

## 2022-03-02 RX ORDER — ONDANSETRON 2 MG/ML
4 INJECTION INTRAMUSCULAR; INTRAVENOUS EVERY 6 HOURS PRN
Status: DISCONTINUED | OUTPATIENT
Start: 2022-03-02 | End: 2022-03-04 | Stop reason: HOSPADM

## 2022-03-02 RX ORDER — ALBUTEROL SULFATE 2.5 MG/3ML
2.5 SOLUTION RESPIRATORY (INHALATION) ONCE AS NEEDED
Status: DISCONTINUED | OUTPATIENT
Start: 2022-03-02 | End: 2022-03-02 | Stop reason: HOSPADM

## 2022-03-02 RX ORDER — ACETAMINOPHEN 500 MG
1000 TABLET ORAL EVERY 6 HOURS
Status: COMPLETED | OUTPATIENT
Start: 2022-03-02 | End: 2022-03-04

## 2022-03-02 RX ORDER — INDOCYANINE GREEN AND WATER 25 MG
KIT INJECTION AS NEEDED
Status: DISCONTINUED | OUTPATIENT
Start: 2022-03-02 | End: 2022-03-02 | Stop reason: SURG

## 2022-03-02 RX ORDER — OXYCODONE AND ACETAMINOPHEN 7.5; 325 MG/1; MG/1
1 TABLET ORAL EVERY 4 HOURS PRN
Status: DISCONTINUED | OUTPATIENT
Start: 2022-03-02 | End: 2022-03-02 | Stop reason: HOSPADM

## 2022-03-02 RX ORDER — CELECOXIB 200 MG/1
200 CAPSULE ORAL EVERY 12 HOURS SCHEDULED
Status: DISCONTINUED | OUTPATIENT
Start: 2022-03-02 | End: 2022-03-02 | Stop reason: HOSPADM

## 2022-03-02 RX ORDER — CEFOXITIN 2 G/1
INJECTION, POWDER, FOR SOLUTION INTRAVENOUS AS NEEDED
Status: DISCONTINUED | OUTPATIENT
Start: 2022-03-02 | End: 2022-03-02 | Stop reason: SURG

## 2022-03-02 RX ORDER — GLYCOPYRROLATE 0.2 MG/ML
INJECTION INTRAMUSCULAR; INTRAVENOUS AS NEEDED
Status: DISCONTINUED | OUTPATIENT
Start: 2022-03-02 | End: 2022-03-02 | Stop reason: SURG

## 2022-03-02 RX ORDER — MIDAZOLAM HYDROCHLORIDE 1 MG/ML
1 INJECTION INTRAMUSCULAR; INTRAVENOUS
Status: DISCONTINUED | OUTPATIENT
Start: 2022-03-02 | End: 2022-03-02 | Stop reason: HOSPADM

## 2022-03-02 RX ORDER — DEXAMETHASONE SODIUM PHOSPHATE 10 MG/ML
INJECTION INTRAMUSCULAR; INTRAVENOUS AS NEEDED
Status: DISCONTINUED | OUTPATIENT
Start: 2022-03-02 | End: 2022-03-02 | Stop reason: SURG

## 2022-03-02 RX ORDER — EPHEDRINE SULFATE 50 MG/ML
5 INJECTION, SOLUTION INTRAVENOUS ONCE AS NEEDED
Status: DISCONTINUED | OUTPATIENT
Start: 2022-03-02 | End: 2022-03-02 | Stop reason: HOSPADM

## 2022-03-02 RX ORDER — PREGABALIN 75 MG/1
75 CAPSULE ORAL ONCE
Status: COMPLETED | OUTPATIENT
Start: 2022-03-02 | End: 2022-03-02

## 2022-03-02 RX ORDER — ACETAMINOPHEN 500 MG
1000 TABLET ORAL EVERY 6 HOURS
Status: DISCONTINUED | OUTPATIENT
Start: 2022-03-02 | End: 2022-03-02 | Stop reason: HOSPADM

## 2022-03-02 RX ORDER — KETOROLAC TROMETHAMINE 15 MG/ML
15 INJECTION, SOLUTION INTRAMUSCULAR; INTRAVENOUS EVERY 6 HOURS
Status: COMPLETED | OUTPATIENT
Start: 2022-03-02 | End: 2022-03-04

## 2022-03-02 RX ORDER — LABETALOL HYDROCHLORIDE 5 MG/ML
5 INJECTION, SOLUTION INTRAVENOUS
Status: DISCONTINUED | OUTPATIENT
Start: 2022-03-02 | End: 2022-03-02 | Stop reason: HOSPADM

## 2022-03-02 RX ORDER — PROMETHAZINE HYDROCHLORIDE 25 MG/1
25 SUPPOSITORY RECTAL ONCE AS NEEDED
Status: DISCONTINUED | OUTPATIENT
Start: 2022-03-02 | End: 2022-03-02 | Stop reason: HOSPADM

## 2022-03-02 RX ORDER — HYDROMORPHONE HCL 110MG/55ML
PATIENT CONTROLLED ANALGESIA SYRINGE INTRAVENOUS AS NEEDED
Status: DISCONTINUED | OUTPATIENT
Start: 2022-03-02 | End: 2022-03-02 | Stop reason: SURG

## 2022-03-02 RX ORDER — ALVIMOPAN 12 MG/1
12 CAPSULE ORAL 2 TIMES DAILY
Status: DISCONTINUED | OUTPATIENT
Start: 2022-03-03 | End: 2022-03-03

## 2022-03-02 RX ORDER — FAMOTIDINE 10 MG/ML
20 INJECTION, SOLUTION INTRAVENOUS
Status: COMPLETED | OUTPATIENT
Start: 2022-03-02 | End: 2022-03-02

## 2022-03-02 RX ORDER — MIDAZOLAM HYDROCHLORIDE 1 MG/ML
0.5 INJECTION INTRAMUSCULAR; INTRAVENOUS
Status: DISCONTINUED | OUTPATIENT
Start: 2022-03-02 | End: 2022-03-02 | Stop reason: HOSPADM

## 2022-03-02 RX ADMIN — ROCURONIUM BROMIDE 20 MG: 50 INJECTION INTRAVENOUS at 14:11

## 2022-03-02 RX ADMIN — KETAMINE HYDROCHLORIDE 30 MG: 10 INJECTION INTRAMUSCULAR; INTRAVENOUS at 15:00

## 2022-03-02 RX ADMIN — INDOCYANINE GREEN AND WATER 7.75 MG: KIT at 14:39

## 2022-03-02 RX ADMIN — BUPIVACAINE 10 ML: 13.3 INJECTION, SUSPENSION, LIPOSOMAL INFILTRATION at 12:32

## 2022-03-02 RX ADMIN — PREGABALIN 75 MG: 75 CAPSULE ORAL at 09:00

## 2022-03-02 RX ADMIN — LIDOCAINE HYDROCHLORIDE 80 MG: 20 INJECTION, SOLUTION INFILTRATION; PERINEURAL at 12:23

## 2022-03-02 RX ADMIN — SUCCINYLCHOLINE CHLORIDE 120 MG: 20 INJECTION, SOLUTION INTRAMUSCULAR; INTRAVENOUS; PARENTERAL at 12:23

## 2022-03-02 RX ADMIN — SUGAMMADEX 200 MG: 100 INJECTION, SOLUTION INTRAVENOUS at 15:57

## 2022-03-02 RX ADMIN — CELECOXIB 200 MG: 200 CAPSULE ORAL at 09:00

## 2022-03-02 RX ADMIN — FENTANYL CITRATE 50 MCG: 0.05 INJECTION, SOLUTION INTRAMUSCULAR; INTRAVENOUS at 13:15

## 2022-03-02 RX ADMIN — SODIUM CHLORIDE, POTASSIUM CHLORIDE, SODIUM LACTATE AND CALCIUM CHLORIDE: 600; 310; 30; 20 INJECTION, SOLUTION INTRAVENOUS at 13:51

## 2022-03-02 RX ADMIN — KETAMINE HYDROCHLORIDE 20 MG: 10 INJECTION INTRAMUSCULAR; INTRAVENOUS at 15:30

## 2022-03-02 RX ADMIN — GLYCOPYRROLATE 0.2 MG: 0.2 INJECTION INTRAMUSCULAR; INTRAVENOUS at 12:20

## 2022-03-02 RX ADMIN — PROPOFOL 160 MG: 10 INJECTION, EMULSION INTRAVENOUS at 12:23

## 2022-03-02 RX ADMIN — KETOROLAC TROMETHAMINE 15 MG: 15 INJECTION, SOLUTION INTRAMUSCULAR; INTRAVENOUS at 18:56

## 2022-03-02 RX ADMIN — ROCURONIUM BROMIDE 45 MG: 50 INJECTION INTRAVENOUS at 12:37

## 2022-03-02 RX ADMIN — SODIUM CHLORIDE 100 ML/HR: 9 INJECTION, SOLUTION INTRAVENOUS at 18:56

## 2022-03-02 RX ADMIN — SODIUM CHLORIDE, POTASSIUM CHLORIDE, SODIUM LACTATE AND CALCIUM CHLORIDE: 600; 310; 30; 20 INJECTION, SOLUTION INTRAVENOUS at 12:16

## 2022-03-02 RX ADMIN — DULOXETINE HYDROCHLORIDE 60 MG: 60 CAPSULE, DELAYED RELEASE ORAL at 20:46

## 2022-03-02 RX ADMIN — HYDROMORPHONE HYDROCHLORIDE 0.5 MG: 2 INJECTION, SOLUTION INTRAMUSCULAR; INTRAVENOUS; SUBCUTANEOUS at 15:50

## 2022-03-02 RX ADMIN — FAMOTIDINE 20 MG: 10 INJECTION INTRAVENOUS at 09:54

## 2022-03-02 RX ADMIN — ROCURONIUM BROMIDE 15 MG: 50 INJECTION INTRAVENOUS at 14:52

## 2022-03-02 RX ADMIN — DEXAMETHASONE SODIUM PHOSPHATE 8 MG: 10 INJECTION INTRAMUSCULAR; INTRAVENOUS at 12:43

## 2022-03-02 RX ADMIN — BUPIVACAINE HYDROCHLORIDE 30 ML: 2.5 INJECTION, SOLUTION EPIDURAL; INFILTRATION; INTRACAUDAL; PERINEURAL at 12:32

## 2022-03-02 RX ADMIN — HYDROMORPHONE HYDROCHLORIDE 0.5 MG: 2 INJECTION, SOLUTION INTRAMUSCULAR; INTRAVENOUS; SUBCUTANEOUS at 16:00

## 2022-03-02 RX ADMIN — SODIUM CHLORIDE: 9 INJECTION, SOLUTION INTRAVENOUS at 12:30

## 2022-03-02 RX ADMIN — ACETAMINOPHEN 1000 MG: 500 TABLET ORAL at 20:50

## 2022-03-02 RX ADMIN — CEFOXITIN SODIUM 2 G: 2 POWDER, FOR SOLUTION INTRAVENOUS at 14:14

## 2022-03-02 RX ADMIN — ACETAMINOPHEN 1000 MG: 500 TABLET ORAL at 09:00

## 2022-03-02 RX ADMIN — METOPROLOL SUCCINATE 50 MG: 50 TABLET, EXTENDED RELEASE ORAL at 20:46

## 2022-03-02 RX ADMIN — ATORVASTATIN CALCIUM 10 MG: 10 TABLET, FILM COATED ORAL at 20:46

## 2022-03-02 RX ADMIN — FENTANYL CITRATE 50 MCG: 0.05 INJECTION, SOLUTION INTRAMUSCULAR; INTRAVENOUS at 12:20

## 2022-03-02 RX ADMIN — LIDOCAINE HYDROCHLORIDE 2 MG/KG/HR: 4 INJECTION, SOLUTION INTRAVENOUS at 13:00

## 2022-03-02 RX ADMIN — CEFOXITIN 2 G: 2 INJECTION, POWDER, FOR SOLUTION INTRAVENOUS at 12:10

## 2022-03-02 RX ADMIN — ALVIMOPAN 12 MG: 12 CAPSULE ORAL at 09:00

## 2022-03-02 RX ADMIN — ROCURONIUM BROMIDE 5 MG: 50 INJECTION INTRAVENOUS at 12:23

## 2022-03-02 NOTE — BRIEF OP NOTE
COLON RESECTION LAPAROSCOPIC RIGHT WITH DAVINCI ROBOT  Progress Note    Kayla Adkins  3/2/2022    Pre-op Diagnosis:   Colonic mass [K63.89]       Post-Op Diagnosis Codes:     * Colonic mass [K63.89]    Procedure/CPT® Codes:        Procedure(s):  LAPAROSCOPIC RIGHT HEMICOLECTOMY WITH DAVINCI ROBOT    Surgeon(s):  Malcolm Wellington MD    Anesthesia: General with Block    Staff:   Circulator: Abdirizak Mendes RN  Scrub Person: Keiry Redding Mariah  Assistant: Sam Triplett CSA  Assistant: Sam Triplett CSA      Estimated Blood Loss: minimal    Urine Voided: 300 mL    Specimens:                Specimens     ID Source Type Tests Collected By Collected At Frozen?    A Large Intestine, Right / Ascending Colon Tissue · TISSUE PATHOLOGY EXAM   Malcolm Wellington MD 3/2/22 1521 No    Description: right hemicolectomy                Drains:   Urethral Catheter Silicone 16 Fr. (Active)       Findings: Proximal cecal mass    Complications: None    Assistant: Sam Triplett CSA  was responsible for performing the following activities: Retraction, Suction, Irrigation, Suturing, Closing, Placing Dressing and Held/Positioned Camera and their skilled assistance was necessary for the success of this case.    Malcolm Wellington MD     Date: 3/2/2022  Time: 15:38 EST

## 2022-03-02 NOTE — ANESTHESIA PROCEDURE NOTES
Airway  Urgency: elective    Date/Time: 3/2/2022 12:26 PM  Airway not difficult    General Information and Staff    Patient location during procedure: OR  Anesthesiologist: Callum Rodriguez MD  CRNA: Patrice Beck CRNA    Indications and Patient Condition  Indications for airway management: airway protection    Preoxygenated: yes  MILS maintained throughout  Mask difficulty assessment: 1 - vent by mask    Final Airway Details  Final airway type: endotracheal airway      Successful airway: ETT  Cuffed: yes   Successful intubation technique: direct laryngoscopy  Endotracheal tube insertion site: oral  Blade: Augustine  Blade size: 3  ETT size (mm): 7.0  Cormack-Lehane Classification: grade I - full view of glottis  Placement verified by: chest auscultation and capnometry   Inital cuff pressure (cm H2O): 22  Cuff volume (mL): 5  Measured from: lips  ETT/EBT  to lips (cm): 22  Number of attempts at approach: 1

## 2022-03-02 NOTE — OP NOTE
Date of procedure: 3/2/2022    Primary Surgeon: Dr. Wellington    Assistant: Sam Triplett CSA  was responsible for performing the following activities: Retraction, Suction, Irrigation, Suturing, Closing, Placing Dressing and Held/Positioned Camera and their skilled assistance was necessary for the success of this case.    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  She underwent CT scan of the abdomen that show only enlarged lymph node surrounding the mass.  Discussed with the patient about treatment options.  I recommend that she undergoes robotic assisted laparoscopic right hemicolectomy.  Risk and benefits of procedure including bleeding, infection, intra-abdominal injuries, anastomotic leak were discussed in detail with the patient that verbalized understanding and agreed with plan.    Description: Patient was taken to the operative room and was placed in the OR table in the supine position.  Preoperative antibiotics were given and SCDs were placed.  Patient underwent general endotracheal anesthesia.  Tap block was performed by anesthesia service.  Patient abdomen was then prepped and draped in usual sterile fashion after Lin catheter was placed by nursing staff.  Timeout was performed the patient was correctly identified.  I then injected half percent Marcaine with epinephrine in the left upper abdomen.  A 5 millimeter incision was performed with scalpel.  With Optiview technique and insertion of a 5 mm trocar the abdominal cavity was accessed.  Pneumoperitoneum was insufflated.  Upon expiration of  the abdominal cavity there was no evidence of injury from trocar placement.  There was no evidence of peritoneal implants neither liver metastasis.  I placed another 2 8 mm trochars in the suprapubic area and left periumbilical area.  Another 8 mm trocar was placed in the left subcostal area.  Initial 5 mm trocar was switched by a 12 mm robotic trocar.  A 5 mm trocar for the assistant was placed in the left flank.  Patient was positioned in slight reverse Trendelenburg position and robotic arms were brought to the operative field.  Targeting of the anatomy was performed and instruments were inserted under direct camera vision.  Started procedure by transecting the peritoneal attachments from the cecum at the level of the Toltz fascia all the way up to the hepatic flexure.  Then omental attachments to the transverse colon where transected.  I then continue the mobilization of the colon medially to lateral by creating retromesenteric window behind the ileocolic vessel.  Mesentery of the colon was dissected from the retroperitoneum.  Ureter was recognized and preserved.  Dissection continue all the way up to hepatic flexure as well as the transverse colon.  The duodenum was recognized preserved and dissected from the mesentery of the colon.  Then the ileocolic pedicle was transected with with robotic white load stapler.  I then continued the dissection of the mesentery all the way up to the terminal ileum.  The distal ileum was transected with robotic blue load stapler.  Mesentery of the transverse colon was then dissected all the way up to the proximal transverse colon.  The robotic blue load stapler the proximal transverse colon was transected.  Required 2 loads of the stapler.  Now the specimen was completely free and was placed over the liver.  ICG green was then injected and there was evidence of excellent vascularization of the 2 ends of the small bowel and the transverse colon.  There was bleeding at the staple  line of the colon that was oversewn with 2 interrupted 2-0 Vicryl sutures.  I then decided to perform side-to-side isoperistaltic ileocolonic anastomosis by performing 2 enterotomies in the small bowel and in the transverse colon with scissors.  With robotic blue load stapler anastomosis was created.  Common enterotomy channel was closed with running 3-0 PDS unidirectional barbed suture in 2 layers performing lambert stitches of the outer layer.  The crotch of the anastomosis was reinforced with single 2-0 Vicryl.  Surgical bed was explored and there was no evidence of bleeding.  We then proceeded to remove all the robotic treatments and disconnected the arms from the trochars.  We continued the procedure laparoscopically.  I removed the 2 mm trocar from the left upper abdomen and incision was slightly enlarged.  Anterior rectus sheath was transected and rectus muscle was split.  Posterior rectus sheath was entered.  Specimen was removed from the incision without any problem.  This was sent for pathological analysis.  There was evidence of a mass in the proximal cecum.  The abdominal wall fascia was closed in 2 layers with running 2-0 Vicryl and running 0 PDS sutures.  Exploration of the abdominal cavity was performed after pneumoperitoneum was insufflated.  There was no evidence of injury from the procedure.  The trochars were removed under direct laparoscopic vision.  The larger incision was closed with 3-0 Vicryl and 4-0 Monocryl.  The incisions were closed with 4-0 Monocryl.  The patient tolerated procedure well and was sent to recovery area in stable condition.  Instrument sponge count were correct    Malcolm Wellington MD, FACS  General, Minimally Invasive and Endoscopic Surgery  [facility]    [address] [place] [address], [address]  [place], [place], [address]  P: [phone]  F: [phone]

## 2022-03-02 NOTE — ANESTHESIA PROCEDURE NOTES
Peripheral Block      Patient location during procedure: OR  Start time: 3/2/2022 12:25 PM  Stop time: 3/2/2022 12:32 PM  Reason for block: at surgeon's request and post-op pain management  Performed by  Anesthesiologist: Callum Rodriguez MD  Preanesthetic Checklist  Completed: patient identified, IV checked, site marked, risks and benefits discussed, surgical consent, monitors and equipment checked, pre-op evaluation and timeout performed  Prep:  Pt Position: supine  Prep: ChloraPrep  Patient monitoring: blood pressure monitoring, continuous pulse oximetry and EKG  Procedure    Sedation: yes  Performed under: local infiltration  Guidance:ultrasound guided    ULTRASOUND INTERPRETATION. Using ultrasound guidance a 21 G gauge needle was placed in close proximity to the nerve, at which point, under ultrasound guidance anesthetic was injected in the area of the nerve and spread of the anesthesia was seen on ultrasound in close proximity thereto.  There were no abnormalities seen on ultrasound; a digital image was taken; and the patient tolerated the procedure with no complications. Images:still images not obtained    Laterality:Bilateral  Block Type:TAP  Injection Technique:single-shot    Medications Used: bupivacaine liposome (EXPAREL) 1.3 % injection, 10 mL  bupivacaine PF (MARCAINE) 0.25 % injection, 30 mL  Med administered at 3/2/2022 12:32 PM      Post Assessment  Injection Assessment: negative aspiration for heme  Complications:no

## 2022-03-02 NOTE — INTERVAL H&P NOTE
H&P updated. The patient was examined and the following changes are noted:    CT scan chest abdomen pelvis that was performed did not show any convincing evidence of metastatic disease but enlarged regional lymph nodes.  CEA was ordered and normal.  Otherwise we discussed with the patient about the need to perform colon resection for further staging of her colon mass.  She understood

## 2022-03-02 NOTE — ANESTHESIA POSTPROCEDURE EVALUATION
Patient: Kayla THOMAS Lucille    Procedure Summary     Date: 03/02/22 Room / Location: [facility] OR  /  [facility]    Anesthesia Start: 1215 Anesthesia Stop: 1606    Procedure: LAPAROSCOPIC RIGHT HEMICOLECTOMY WITH DAVINCI ROBOT (Right Abdomen) Diagnosis:       Colonic mass      (Colonic mass [K63.89])    Surgeons: Malcolm Wellington MD Provider: Callum Rodriguez MD    Anesthesia Type: general with block ASA Status: 3          Anesthesia Type: general with block    Vitals  Vitals Value Taken Time   /83 03/02/22 1621   Temp 37.3 °C (99.2 °F) 03/02/22 1605   Pulse 91 03/02/22 1629   Resp 16 03/02/22 1620   SpO2 99 % 03/02/22 1629   Vitals shown include unvalidated device data.        Post Anesthesia Care and Evaluation    Patient location during evaluation: bedside  Patient participation: complete - patient participated  Level of consciousness: awake and alert  Pain management: adequate  Airway patency: patent  Anesthetic complications: No anesthetic complications    Cardiovascular status: acceptable  Respiratory status: acceptable  Hydration status: acceptable    Comments: /83   Pulse 97   Temp 37.3 °C (99.2 °F) (Oral)   Resp 16   Wt 75.3 kg (166 lb 0.1 oz)   LMP  (LMP Unknown)   SpO2 99%   BMI 30.36 kg/m²     Patient is stable postoperatively and has adequately recovered from anesthesia as described above unless otherwise noted

## 2022-03-02 NOTE — ANESTHESIA PREPROCEDURE EVALUATION
Anesthesia Evaluation     Patient summary reviewed   NPO Solid Status: > 8 hours             Airway   Mallampati: II  Neck ROM: full  Dental      Pulmonary    Cardiovascular     Rhythm: regular    (+) hypertension,       Neuro/Psych  (+) headaches, psychiatric history,    GI/Hepatic/Renal/Endo      Musculoskeletal     Abdominal    Substance History      OB/GYN          Other      history of cancer active                    Anesthesia Plan    ASA 3     general with block       Use of blood products discussed with patient .       CODE STATUS:

## 2022-03-03 LAB
ANION GAP SERPL CALCULATED.3IONS-SCNC: 13 MMOL/L (ref 5–15)
BASOPHILS # BLD AUTO: 0.01 10*3/MM3 (ref 0–0.2)
BASOPHILS NFR BLD AUTO: 0.1 % (ref 0–1.5)
BUN SERPL-MCNC: 6 MG/DL (ref 8–23)
BUN/CREAT SERPL: 9.2 (ref 7–25)
CALCIUM SPEC-SCNC: 8.6 MG/DL (ref 8.6–10.5)
CHLORIDE SERPL-SCNC: 104 MMOL/L (ref 98–107)
CO2 SERPL-SCNC: 24 MMOL/L (ref 22–29)
CREAT SERPL-MCNC: 0.65 MG/DL (ref 0.57–1)
DEPRECATED RDW RBC AUTO: 42.9 FL (ref 37–54)
EGFRCR SERPLBLD CKD-EPI 2021: 97.8 ML/MIN/1.73
EOSINOPHIL # BLD AUTO: 0 10*3/MM3 (ref 0–0.4)
EOSINOPHIL NFR BLD AUTO: 0 % (ref 0.3–6.2)
ERYTHROCYTE [DISTWIDTH] IN BLOOD BY AUTOMATED COUNT: 15.2 % (ref 12.3–15.4)
GLUCOSE SERPL-MCNC: 146 MG/DL (ref 65–99)
HCT VFR BLD AUTO: 34.2 % (ref 34–46.6)
HGB BLD-MCNC: 10.8 G/DL (ref 12–15.9)
IMM GRANULOCYTES # BLD AUTO: 0.02 10*3/MM3 (ref 0–0.05)
IMM GRANULOCYTES NFR BLD AUTO: 0.3 % (ref 0–0.5)
LYMPHOCYTES # BLD AUTO: 0.57 10*3/MM3 (ref 0.7–3.1)
LYMPHOCYTES NFR BLD AUTO: 8.3 % (ref 19.6–45.3)
MCH RBC QN AUTO: 24.6 PG (ref 26.6–33)
MCHC RBC AUTO-ENTMCNC: 31.6 G/DL (ref 31.5–35.7)
MCV RBC AUTO: 77.9 FL (ref 79–97)
MONOCYTES # BLD AUTO: 0.56 10*3/MM3 (ref 0.1–0.9)
MONOCYTES NFR BLD AUTO: 8.2 % (ref 5–12)
NEUTROPHILS NFR BLD AUTO: 5.67 10*3/MM3 (ref 1.7–7)
NEUTROPHILS NFR BLD AUTO: 83.1 % (ref 42.7–76)
NRBC BLD AUTO-RTO: 0 /100 WBC (ref 0–0.2)
PLATELET # BLD AUTO: 383 10*3/MM3 (ref 140–450)
PMV BLD AUTO: 8.9 FL (ref 6–12)
POTASSIUM SERPL-SCNC: 4.1 MMOL/L (ref 3.5–5.2)
RBC # BLD AUTO: 4.39 10*6/MM3 (ref 3.77–5.28)
SODIUM SERPL-SCNC: 141 MMOL/L (ref 136–145)
WBC NRBC COR # BLD: 6.83 10*3/MM3 (ref 3.4–10.8)

## 2022-03-03 PROCEDURE — 25010000002 ENOXAPARIN PER 10 MG: Performed by: SURGERY

## 2022-03-03 PROCEDURE — 85025 COMPLETE CBC W/AUTO DIFF WBC: CPT | Performed by: SURGERY

## 2022-03-03 PROCEDURE — 80048 BASIC METABOLIC PNL TOTAL CA: CPT | Performed by: SURGERY

## 2022-03-03 PROCEDURE — 97110 THERAPEUTIC EXERCISES: CPT

## 2022-03-03 PROCEDURE — 99024 POSTOP FOLLOW-UP VISIT: CPT | Performed by: SURGERY

## 2022-03-03 PROCEDURE — 25010000002 KETOROLAC TROMETHAMINE PER 15 MG: Performed by: SURGERY

## 2022-03-03 PROCEDURE — 97162 PT EVAL MOD COMPLEX 30 MIN: CPT

## 2022-03-03 RX ADMIN — PREGABALIN 75 MG: 75 CAPSULE ORAL at 08:35

## 2022-03-03 RX ADMIN — ACETAMINOPHEN 1000 MG: 500 TABLET ORAL at 01:04

## 2022-03-03 RX ADMIN — KETOROLAC TROMETHAMINE 15 MG: 15 INJECTION, SOLUTION INTRAMUSCULAR; INTRAVENOUS at 18:05

## 2022-03-03 RX ADMIN — KETOROLAC TROMETHAMINE 15 MG: 15 INJECTION, SOLUTION INTRAMUSCULAR; INTRAVENOUS at 06:21

## 2022-03-03 RX ADMIN — CELECOXIB 200 MG: 200 CAPSULE ORAL at 08:35

## 2022-03-03 RX ADMIN — KETOROLAC TROMETHAMINE 15 MG: 15 INJECTION, SOLUTION INTRAMUSCULAR; INTRAVENOUS at 01:04

## 2022-03-03 RX ADMIN — SODIUM CHLORIDE 75 ML/HR: 9 INJECTION, SOLUTION INTRAVENOUS at 16:21

## 2022-03-03 RX ADMIN — SODIUM CHLORIDE 100 ML/HR: 9 INJECTION, SOLUTION INTRAVENOUS at 05:02

## 2022-03-03 RX ADMIN — PANTOPRAZOLE SODIUM 40 MG: 40 TABLET, DELAYED RELEASE ORAL at 06:20

## 2022-03-03 RX ADMIN — DULOXETINE HYDROCHLORIDE 60 MG: 60 CAPSULE, DELAYED RELEASE ORAL at 21:22

## 2022-03-03 RX ADMIN — KETOROLAC TROMETHAMINE 15 MG: 15 INJECTION, SOLUTION INTRAMUSCULAR; INTRAVENOUS at 12:29

## 2022-03-03 RX ADMIN — ATORVASTATIN CALCIUM 10 MG: 10 TABLET, FILM COATED ORAL at 21:22

## 2022-03-03 RX ADMIN — ACETAMINOPHEN 1000 MG: 500 TABLET ORAL at 19:29

## 2022-03-03 RX ADMIN — ACETAMINOPHEN 1000 MG: 500 TABLET ORAL at 06:20

## 2022-03-03 RX ADMIN — ENOXAPARIN SODIUM 40 MG: 100 INJECTION SUBCUTANEOUS at 08:35

## 2022-03-03 RX ADMIN — ACETAMINOPHEN 1000 MG: 500 TABLET ORAL at 12:29

## 2022-03-03 RX ADMIN — METOPROLOL SUCCINATE 50 MG: 50 TABLET, EXTENDED RELEASE ORAL at 08:35

## 2022-03-03 NOTE — PAYOR COMM NOTE
"Kayla Adkins (66 y.o. Female)     PLEASE SEE ATTACHED FOR REVIEW.     REF#[id number]    PLEASE CALL   OR  [phone]    THANK YOU    CHINEDU SHIELDS LPN CCP            Date of Birth Social Security Number Address Home Phone MRN    1955  [id number] [facility] [phone] [id number]    Holiness Marital Status             [employer]        Admission Date Admission Type Admitting Provider Attending Provider Department, Room/Bed    3/2/22 Elective Malcolm Wellington MD Gallo, Alberto Sebastian, MD [address], P684/1    Discharge Date Discharge Disposition Discharge Destination                         Attending Provider: Malcolm Wellington MD    Allergies: Penicillins    Isolation: None   Infection: None   Code Status: CPR   Advance Care Planning Activity    Ht: 157.5 cm (62\")   Wt: 75.3 kg (166 lb 0.1 oz)    Admission Cmt: None   Principal Problem: Colonic mass [K63.89]                 Active Insurance as of 3/2/2022     Primary Coverage     Payor Plan Insurance Group Employer/Plan Group    [facility] MEDICARE REPLACEMENT [employer] MEDICARE REPLACEMENT      Payor Plan Address Payor Plan Phone Number Payor Plan Fax Number Effective Dates    [address] [address] [phone]  11/1/2021 - None Entered    [facility] [id number]       Subscriber Name Subscriber Birth Date Member ID       KAYLA ADKINS 1955 [id number]                 Emergency Contacts      (Rel.) Home Phone Work Phone Mobile Phone    Alonso Adkins (Spouse) -- -- [phone]               History & Physical      Malcolm Wellington MD at 03/02/22 1142          CEA SLIGHTLY ELEVATED         Electronically signed by Malcolm Wellington MD at 03/02/22 1143   Source Note          CC: Ascending colon mass     HPI: 66-year-old female that was referred by Dr. Whitlock for evaluation of ascending colon mass. Patient " "has history of lymphocytic colitis and chronic diarrhea for which she takes Imodium as needed usually 2-3 times a week. Patient has history of colon polyps last one was done in 2017. She has history of chronic anemia as well as neutropenia. She underwent colonoscopy yesterday by Dr. Whitlock that show evidence of colonic mass at the proximal ascending colon.  She denies any recent weight loss, change in bowel habits or melanotic stools. Denies any rectal bleeding. No family history of colon cancer or inflammatory bowel disease     PMH: Lymphocytic colitis, anemia, anxiety, GERD, hypertension, neutropenia     PSH:   -Colonoscopy 2017  -Upper endoscopy and colonoscopy 2022  -Tonsillectomy    MEDS: Iron sulfate, atorvastatin, Cymbalta, metoprolol, omeprazole, Excedrin, loperamide     ALL: Penicillins    FH and SH: Family history of hypertension heart disease, no family history of colon cancer. Denies smoking drinking or taking any drugs     ROS:   Constitutional: denies any weight changes, fatigue or weakness.  HEENT: Denies hearing loss and rhinorrhea  Cardiovascular: denies chest pain, palpitations, edemas.  Respiratory: denies cough, sputum, SOB.  Gastrointestinal: denies N&V, abd pain, diarrhea, constipation.  Genitourinary: denies dysuria, frequency.  Endocrine: denies cold intolerance, lethargy and flushing.  Hem: denies excessive bruising and postop bleeding.  Musculoskeletal: denies weakness, joint swelling, pain or stiffness.  Neuro: denies seizures, CVA, paresthesia, or peripheral neuropathy.   Skin: denies change in nevi, rashes, masses.     PE:   Vitals:    02/24/22 1009   Weight: 76 kg (167 lb 9.6 oz)   Height: 157.5 cm (62\")     Body mass index is 30.65 kg/m².   Alert and oriented ×3, no acute distress.  Head is normocephalic and atraumatic.  Neck is supple there is no thyromegaly or lymphadenopathy  Chest is clear bilaterally there is no added sounds  Regular rate and rhythm, no murmurs  Abdomen is soft " and nontender, is nondistended, bowel sounds are positive.  There is no rebound or guarding is and there is no peritoneal signs.  No clubbing cyanosis or edema in lower or upper extremities    Diagnostic studies:   Labs 12/16/2021:   White blood cell count 3.1, hemoglobin 10, platelets 384, low MCV, MCH and MCHC  IRON level high, otherwise normal iron profile    Pathology from colonoscopy still pending    Pathology from colonoscopy 2/2017 consistent with lymphocytic colitis     Assessment and plan    The patient is a very pleasant 66-year-old female with proximal ascending colon mass likely colon cancer. Pathology report is pending. Has history of lymphocytic colitis. She has CT scan abdomen pelvis ordered for next Monday. We have changed the CAT scan for tomorrow morning so we will hopefully have a definite plan for tomorrow. Discussed with the patient about treatment options. If patient colonic mass is found to be colon cancer and has no evidence of metastatic disease then however recommend that she undergoes robotic assisted laparoscopic right hemicolectomy. If there is evidence of metastatic disease then question should be raised what ever her anemia is related to chronic blood loss due to colonic mass or from other hematologic conditions. If there is any suspicious she is chronically bleeding then we will discuss about colon resection after chemotherapy started.   If no evidence of cancer on pathology then she will need to have colon resection for better pathologic characterization of the mass.  I offer her robotic assisted laparoscopic right hemicolectomy. Risk and benefits of procedure including bleeding, infection, intra-abdominal injuries, anastomotic leak were discussed in detail with the patient.  We discussed about ERAS protocol as well as pre and postoperative expectations after surgery with early mobilization, no narcotic pain medication and diet advancement.    She also understand that after right  colon resection  there is a risk that her diarrhea will get worse. I discussed with her that we have several medication that can help her with chronic diarrhea. If lymphocytic colitis is found on pathology report then she will need to have close follow-up with GI for treatment of lymphocytic colitis after surgical resection is accomplished.    -Awaiting pathology results  -Awaiting imaging labs, include CEA  -Surgical scheduling started.    Malcolm Wellington MD  General, Minimally Invasive and Endoscopic Surgery  [facility]     [address], [address]  [place], [place], [address]  P: [phone]  F: [phone]       Electronically signed by Malcolm Wellington MD at 02/24/22 1150             Malcolm Wellington MD at 03/02/22 1141          H&P updated. The patient was examined and the following changes are noted:    CT scan chest abdomen pelvis that was performed did not show any convincing evidence of metastatic disease but enlarged regional lymph nodes.  CEA was ordered and normal.  Otherwise we discussed with the patient about the need to perform colon resection for further staging of her colon mass.  She understood        Electronically signed by Malcolm Wellington MD at 03/02/22 1141   Source Note          CC: Ascending colon mass     HPI: 66-year-old female that was referred by Dr. Whitlock for evaluation of ascending colon mass. Patient has history of lymphocytic colitis and chronic diarrhea for which she takes Imodium as needed usually 2-3 times a week. Patient has history of colon polyps last one was done in 2017. She has history of chronic anemia as well as neutropenia. She underwent colonoscopy yesterday by Dr. Whitlock that show evidence of colonic mass at the proximal ascending colon.  She denies any recent weight loss, change in bowel habits or melanotic stools. Denies any rectal bleeding. No family history of colon cancer or inflammatory bowel disease     PMH:  "Lymphocytic colitis, anemia, anxiety, GERD, hypertension, neutropenia     PSH:   -Colonoscopy 2017  -Upper endoscopy and colonoscopy 2022  -Tonsillectomy    MEDS: Iron sulfate, atorvastatin, Cymbalta, metoprolol, omeprazole, Excedrin, loperamide     ALL: Penicillins    FH and SH: Family history of hypertension heart disease, no family history of colon cancer. Denies smoking drinking or taking any drugs     ROS:   Constitutional: denies any weight changes, fatigue or weakness.  HEENT: Denies hearing loss and rhinorrhea  Cardiovascular: denies chest pain, palpitations, edemas.  Respiratory: denies cough, sputum, SOB.  Gastrointestinal: denies N&V, abd pain, diarrhea, constipation.  Genitourinary: denies dysuria, frequency.  Endocrine: denies cold intolerance, lethargy and flushing.  Hem: denies excessive bruising and postop bleeding.  Musculoskeletal: denies weakness, joint swelling, pain or stiffness.  Neuro: denies seizures, CVA, paresthesia, or peripheral neuropathy.   Skin: denies change in nevi, rashes, masses.     PE:   Vitals:    02/24/22 1009   Weight: 76 kg (167 lb 9.6 oz)   Height: 157.5 cm (62\")     Body mass index is 30.65 kg/m².   Alert and oriented ×3, no acute distress.  Head is normocephalic and atraumatic.  Neck is supple there is no thyromegaly or lymphadenopathy  Chest is clear bilaterally there is no added sounds  Regular rate and rhythm, no murmurs  Abdomen is soft and nontender, is nondistended, bowel sounds are positive.  There is no rebound or guarding is and there is no peritoneal signs.  No clubbing cyanosis or edema in lower or upper extremities    Diagnostic studies:   Labs 12/16/2021:   White blood cell count 3.1, hemoglobin 10, platelets 384, low MCV, MCH and MCHC  IRON level high, otherwise normal iron profile    Pathology from colonoscopy still pending    Pathology from colonoscopy 2/2017 consistent with lymphocytic colitis     Assessment and plan    The patient is a very pleasant " 66-year-old female with proximal ascending colon mass likely colon cancer. Pathology report is pending. Has history of lymphocytic colitis. She has CT scan abdomen pelvis ordered for next Monday. We have changed the CAT scan for tomorrow morning so we will hopefully have a definite plan for tomorrow. Discussed with the patient about treatment options. If patient colonic mass is found to be colon cancer and has no evidence of metastatic disease then however recommend that she undergoes robotic assisted laparoscopic right hemicolectomy. If there is evidence of metastatic disease then question should be raised what ever her anemia is related to chronic blood loss due to colonic mass or from other hematologic conditions. If there is any suspicious she is chronically bleeding then we will discuss about colon resection after chemotherapy started.   If no evidence of cancer on pathology then she will need to have colon resection for better pathologic characterization of the mass.  I offer her robotic assisted laparoscopic right hemicolectomy. Risk and benefits of procedure including bleeding, infection, intra-abdominal injuries, anastomotic leak were discussed in detail with the patient.  We discussed about ERAS protocol as well as pre and postoperative expectations after surgery with early mobilization, no narcotic pain medication and diet advancement.    She also understand that after right colon resection  there is a risk that her diarrhea will get worse. I discussed with her that we have several medication that can help her with chronic diarrhea. If lymphocytic colitis is found on pathology report then she will need to have close follow-up with GI for treatment of lymphocytic colitis after surgical resection is accomplished.    -Awaiting pathology results  -Awaiting imaging labs, include CEA  -Surgical scheduling started.    Malcolm Wellington MD  General, Minimally Invasive and Endoscopic Surgery  [facility]     [address], [address]  [place], [place], [address]  P: [phone]  F: [phone]       Electronically signed by Malcolm Wellington MD at 02/24/22 1150             Malcolm Wellington MD at 02/24/22 1000          CC: Ascending colon mass     HPI: 66-year-old female that was referred by Dr. Whitlock for evaluation of ascending colon mass. Patient has history of lymphocytic colitis and chronic diarrhea for which she takes Imodium as needed usually 2-3 times a week. Patient has history of colon polyps last one was done in 2017. She has history of chronic anemia as well as neutropenia. She underwent colonoscopy yesterday by Dr. Whitlock that show evidence of colonic mass at the proximal ascending colon.  She denies any recent weight loss, change in bowel habits or melanotic stools. Denies any rectal bleeding. No family history of colon cancer or inflammatory bowel disease     PMH: Lymphocytic colitis, anemia, anxiety, GERD, hypertension, neutropenia     PSH:   -Colonoscopy 2017  -Upper endoscopy and colonoscopy 2022  -Tonsillectomy    MEDS: Iron sulfate, atorvastatin, Cymbalta, metoprolol, omeprazole, Excedrin, loperamide     ALL: Penicillins    FH and SH: Family history of hypertension heart disease, no family history of colon cancer. Denies smoking drinking or taking any drugs     ROS:   Constitutional: denies any weight changes, fatigue or weakness.  HEENT: Denies hearing loss and rhinorrhea  Cardiovascular: denies chest pain, palpitations, edemas.  Respiratory: denies cough, sputum, SOB.  Gastrointestinal: denies N&V, abd pain, diarrhea, constipation.  Genitourinary: denies dysuria, frequency.  Endocrine: denies cold intolerance, lethargy and flushing.  Hem: denies excessive bruising and postop bleeding.  Musculoskeletal: denies weakness, joint swelling, pain or stiffness.  Neuro: denies seizures, CVA, paresthesia, or peripheral neuropathy.   Skin: denies change in nevi, rashes, masses.    "  PE:   Vitals:    02/24/22 1009   Weight: 76 kg (167 lb 9.6 oz)   Height: 157.5 cm (62\")     Body mass index is 30.65 kg/m².   Alert and oriented ×3, no acute distress.  Head is normocephalic and atraumatic.  Neck is supple there is no thyromegaly or lymphadenopathy  Chest is clear bilaterally there is no added sounds  Regular rate and rhythm, no murmurs  Abdomen is soft and nontender, is nondistended, bowel sounds are positive.  There is no rebound or guarding is and there is no peritoneal signs.  No clubbing cyanosis or edema in lower or upper extremities    Diagnostic studies:   Labs 12/16/2021:   White blood cell count 3.1, hemoglobin 10, platelets 384, low MCV, MCH and MCHC  IRON level high, otherwise normal iron profile    Pathology from colonoscopy still pending    Pathology from colonoscopy 2/2017 consistent with lymphocytic colitis     Assessment and plan    The patient is a very pleasant 66-year-old female with proximal ascending colon mass likely colon cancer. Pathology report is pending. Has history of lymphocytic colitis. She has CT scan abdomen pelvis ordered for next Monday. We have changed the CAT scan for tomorrow morning so we will hopefully have a definite plan for tomorrow. Discussed with the patient about treatment options. If patient colonic mass is found to be colon cancer and has no evidence of metastatic disease then however recommend that she undergoes robotic assisted laparoscopic right hemicolectomy. If there is evidence of metastatic disease then question should be raised what ever her anemia is related to chronic blood loss due to colonic mass or from other hematologic conditions. If there is any suspicious she is chronically bleeding then we will discuss about colon resection after chemotherapy started.   If no evidence of cancer on pathology then she will need to have colon resection for better pathologic characterization of the mass.  I offer her robotic assisted laparoscopic right " hemicolectomy. Risk and benefits of procedure including bleeding, infection, intra-abdominal injuries, anastomotic leak were discussed in detail with the patient.  We discussed about ERAS protocol as well as pre and postoperative expectations after surgery with early mobilization, no narcotic pain medication and diet advancement.    She also understand that after right colon resection  there is a risk that her diarrhea will get worse. I discussed with her that we have several medication that can help her with chronic diarrhea. If lymphocytic colitis is found on pathology report then she will need to have close follow-up with GI for treatment of lymphocytic colitis after surgical resection is accomplished.    -Awaiting pathology results  -Awaiting imaging labs, include CEA  -Surgical scheduling started.    Malcolm Wellington MD  General, Minimally Invasive and Endoscopic Surgery  [facility]     [address], [address]  [place], [address]  P: [phone]  F: [phone]       Electronically signed by Malcolm Wellington MD at 02/24/22 1150          Operative/Procedure Notes (all)      Malcolm Wellington MD at 03/02/22 1257  Version 1 of 1         COLON RESECTION LAPAROSCOPIC RIGHT WITH DAVINCI ROBOT  Progress Note    Kayla McleodLucille  3/2/2022    Pre-op Diagnosis:   Colonic mass [K63.89]       Post-Op Diagnosis Codes:     * Colonic mass [K63.89]    Procedure/CPT® Codes:        Procedure(s):  LAPAROSCOPIC RIGHT HEMICOLECTOMY WITH DAVINCI ROBOT    Surgeon(s):  Malcolm Wellington MD    Anesthesia: General with Block    Staff:   Circulator: Abdirizak Mendes RN  Scrub Person: Keiry Redding Mariah  Assistant: Sam Triplett CSA  Assistant: Sam Triplett CSA      Estimated Blood Loss: minimal    Urine Voided: 300 mL    Specimens:                Specimens     ID Source Type Tests Collected By Collected At Frozen?    A Large Intestine, Right /  Ascending Colon Tissue · TISSUE PATHOLOGY EXAM   Malcolm Wellington MD 3/2/22 1521 No    Description: right hemicolectomy                Drains:   Urethral Catheter Silicone 16 Fr. (Active)       Findings: Proximal cecal mass    Complications: None    Assistant: Sam Triplett CSA  was responsible for performing the following activities: Retraction, Suction, Irrigation, Suturing, Closing, Placing Dressing and Held/Positioned Camera and their skilled assistance was necessary for the success of this case.    Malcolm Wellington MD     Date: 3/2/2022  Time: 15:38 EST        Electronically signed by Malcolm Wellington MD at 03/02/22 1539     Malcolm Wellington MD at 03/02/22 1257  Version 1 of 1       Date of procedure: 3/2/2022    Primary Surgeon: Dr. Wellington    Assistant: Sam Triplett CSA  was responsible for performing the following activities: Retraction, Suction, Irrigation, Suturing, Closing, Placing Dressing and Held/Positioned Camera and their skilled assistance was necessary for the success of this case.    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  She underwent CT scan of the abdomen that show only enlarged lymph node surrounding the mass.  Discussed with the patient about treatment options.  I recommend that she undergoes robotic assisted laparoscopic right hemicolectomy.  Risk and benefits of procedure including bleeding, infection, intra-abdominal injuries, anastomotic leak were discussed in detail with the patient that verbalized understanding and agreed  with plan.    Description: Patient was taken to the operative room and was placed in the OR table in the supine position.  Preoperative antibiotics were given and SCDs were placed.  Patient underwent general endotracheal anesthesia.  Tap block was performed by anesthesia service.  Patient abdomen was then prepped and draped in usual sterile fashion after Lin catheter was placed by nursing staff.  Timeout was performed the patient was correctly identified.  I then injected half percent Marcaine with epinephrine in the left upper abdomen.  A 5 millimeter incision was performed with scalpel.  With Optiview technique and insertion of a 5 mm trocar the abdominal cavity was accessed.  Pneumoperitoneum was insufflated.  Upon expiration of the abdominal cavity there was no evidence of injury from trocar placement.  There was no evidence of peritoneal implants neither liver metastasis.  I placed another 2 8 mm trochars in the suprapubic area and left periumbilical area.  Another 8 mm trocar was placed in the left subcostal area.  Initial 5 mm trocar was switched by a 12 mm robotic trocar.  A 5 mm trocar for the assistant was placed in the left flank.  Patient was positioned in slight reverse Trendelenburg position and robotic arms were brought to the operative field.  Targeting of the anatomy was performed and instruments were inserted under direct camera vision.  Started procedure by transecting the peritoneal attachments from the cecum at the level of the Toltz fascia all the way up to the hepatic flexure.  Then omental attachments to the transverse colon where transected.  I then continue the mobilization of the colon medially to lateral by creating retromesenteric window behind the ileocolic vessel.  Mesentery of the colon was dissected from the retroperitoneum.  Ureter was recognized and preserved.  Dissection continue all the way up to hepatic flexure as well as the transverse colon.  The duodenum was recognized  preserved and dissected from the mesentery of the colon.  Then the ileocolic pedicle was transected with with robotic white load stapler.  I then continued the dissection of the mesentery all the way up to the terminal ileum.  The distal ileum was transected with robotic blue load stapler.  Mesentery of the transverse colon was then dissected all the way up to the proximal transverse colon.  The robotic blue load stapler the proximal transverse colon was transected.  Required 2 loads of the stapler.  Now the specimen was completely free and was placed over the liver.  ICG green was then injected and there was evidence of excellent vascularization of the 2 ends of the small bowel and the transverse colon.  There was bleeding at the staple line of the colon that was oversewn with 2 interrupted 2-0 Vicryl sutures.  I then decided to perform side-to-side isoperistaltic ileocolonic anastomosis by performing 2 enterotomies in the small bowel and in the transverse colon with scissors.  With robotic blue load stapler anastomosis was created.  Common enterotomy channel was closed with running 3-0 PDS unidirectional barbed suture in 2 layers performing lambert stitches of the outer layer.  The crotch of the anastomosis was reinforced with single 2-0 Vicryl.  Surgical bed was explored and there was no evidence of bleeding.  We then proceeded to remove all the robotic treatments and disconnected the arms from the trochars.  We continued the procedure laparoscopically.  I removed the 2 mm trocar from the left upper abdomen and incision was slightly enlarged.  Anterior rectus sheath was transected and rectus muscle was split.  Posterior rectus sheath was entered.  Specimen was removed from the incision without any problem.  This was sent for pathological analysis.  There was evidence of a mass in the proximal cecum.  The abdominal wall fascia was closed in 2 layers with running 2-0 Vicryl and running 0 PDS sutures.  Exploration  of the abdominal cavity was performed after pneumoperitoneum was insufflated.  There was no evidence of injury from the procedure.  The trochars were removed under direct laparoscopic vision.  The larger incision was closed with 3-0 Vicryl and 4-0 Monocryl.  The incisions were closed with 4-0 Monocryl.  The patient tolerated procedure well and was sent to recovery area in stable condition.  Instrument sponge count were correct    Malcolm Wellington MD, FACS  General, Minimally Invasive and Endoscopic Surgery  [facility]    [address] [place] [address], [address]  [place], [place], [address]  P: [phone]  F: [phone]     Electronically signed by Malcolm Wellington MD at 03/02/22 2469

## 2022-03-03 NOTE — PLAN OF CARE
Goal Outcome Evaluation:  Plan of Care Reviewed With: patient        Progress: improving  Outcome Summary: Bp slightly elevated. Metoprolol given .  lap sites x 5 with dermabond CDI.  denies pain.  Sched tylenol and toradol given.  ambulated in the lanza.  barr to be removed this am.  Advanced diet to full liquid this am

## 2022-03-03 NOTE — PLAN OF CARE
Goal Outcome Evaluation:  Plan of Care Reviewed With: patient           Outcome Summary: Pt admitted 3/2 with colonic mass, s/p laproscopic R hemicolectomy.  Pt reports normally independent.  Today pt demonstrates genearlized post op weakness from baseline but able to ambulate 450' w/ stand by assist, no UE support.  Assisted pt with toileting twice w/ education on ambulation/mobilizing independently; pt and RN aware and agreeable.  DC PT, no need for inpatient PT.

## 2022-03-03 NOTE — THERAPY EVALUATION
Patient Name: Kayla McleodLucille  : 1955    MRN: [id number]                              Today's Date: 3/3/2022       Admit Date: 3/2/2022    Visit Dx:     ICD-10-CM ICD-9-CM   1. Colonic mass  K63.89 569.89     Patient Active Problem List   Diagnosis   • Essential hypertension   • Other hyperlipidemia   • Anxiety   • Gastroesophageal reflux disease without esophagitis   • Lymphocytic colitis of colon   • Fear of flying   • Chondromalacia of left knee   • Tear of medial meniscus of left knee, current   • Other neutropenia (HCC)   • Low hemoglobin   • Diarrhea   • Iron deficiency anemia   • Colonic mass     Past Medical History:   Diagnosis Date   • Anemia    • Anxiety    • Bladder infection    • Colitis    • Colon cancer (HCC)    • GERD (gastroesophageal reflux disease)    • Headache    • High cholesterol    • Hypertension    • Knee injury, initial encounter-left 2019   • Neutropenia (HCC) 1/3/2017   • Prediabetes 3/4/2019     Past Surgical History:   Procedure Laterality Date   • COLON RESECTION Right 3/2/2022    Procedure: LAPAROSCOPIC RIGHT HEMICOLECTOMY WITH DAVINCI ROBOT;  Surgeon: Malcolm Wellington MD;  Location: [facility] MAIN OR;  Service: Robotics - DaVinci;  Laterality: Right;   • COLONOSCOPY     • COLONOSCOPY W/ POLYPECTOMY N/A 2022    Procedure: COLONOSCOPY INTO CECUM WITH COLD BIOPSIES;  Surgeon: Irvin Clayton MD;  Location: [facility] ENDOSCOPY;  Service: Gastroenterology;  Laterality: N/A;  PRE: DIARRHEA, ANEMIA  POST: ASCENDING COLON MASS   • ENDOSCOPY N/A 2022    Procedure: ESOPHAGOGASTRODUODENOSCOPY WITH BIOPSY;  Surgeon: Irvin Clayton MD;  Location: [facility] ENDOSCOPY;  Service: Gastroenterology;  Laterality: N/A;  PRE: ANEMIA  POST: NORMAL EGD   • TONSILLECTOMY        General Information     Row Name 22 0939          Physical Therapy Time and Intention    Document Type evaluation  -AR     Mode of Treatment physical therapy  -AR     Row Name 22 0939           General Information    Patient Profile Reviewed yes  -AR     Prior Level of Function independent:  -AR     Existing Precautions/Restrictions no known precautions/restrictions  -AR     Barriers to Rehab none identified  -AR     Row Name 03/03/22 0939          Living Environment    Lives With spouse  -AR     Row Name 03/03/22 0939          Home Main Entrance    Number of Stairs, Main Entrance five  -AR     Stair Railings, Main Entrance railings safe and in good condition  -AR     Row Name 03/03/22 0939          Stairs Within Home, Primary    Stairs, Within Home, Primary bedroom onm ain floor  -AR     Row Name 03/03/22 0939          Cognition    Orientation Status (Cognition) oriented x 4  -AR     Row Name 03/03/22 0939          Safety Issues, Functional Mobility    Impairments Affecting Function (Mobility) endurance/activity tolerance; strength; pain  -AR           User Key  (r) = Recorded By, (t) = Taken By, (c) = Cosigned By    Initials Name Provider Type    AR Bernie Pagan, PT Physical Therapist               Mobility     Row Name 03/03/22 0940          Bed Mobility    Bed Mobility supine-sit  -AR     Supine-Sit [place] (Bed Mobility) modified independence  -AR     Assistive Device (Bed Mobility) head of bed elevated  -AR     Comment (Bed Mobility) HOB elevates at home  -AR     Row Name 03/03/22 0940          Transfers    Comment (Transfers) from bed, recliner and toilet twice  -AR     Row Name 03/03/22 0940          Sit-Stand Transfer    Sit-Stand [place] (Transfers) standby assist; independent  -AR     Row Name 03/03/22 0940          Gait/Stairs (Locomotion)    [place] Level (Gait) standby assist  -AR     Distance in Feet (Gait) 450, somewhat quick pace, no loss of balance or safety concerns.  no  rest break requires  -AR           User Key  (r) = Recorded By, (t) = Taken By, (c) = Cosigned By    Initials Name Provider Type    AR Bernie Pagan, PT Physical Therapist                Obj/Interventions     Row Name 03/03/22 0943          Range of Motion Comprehensive    Comment, General Range of Motion B LE WFL  -AR     Row Name 03/03/22 0943          Strength Comprehensive (MMT)    Comment, General Manual Muscle Testing (MMT) Assessment B LE 4+/5  -AR     Row Name 03/03/22 0943          Balance    Balance Assessment sitting static balance; standing static balance; standing dynamic balance  -AR     Static Sitting Balance WFL  -AR     Static Standing Balance WFL  -AR     Dynamic Standing Balance WFL  -AR           User Key  (r) = Recorded By, (t) = Taken By, (c) = Cosigned By    Initials Name Provider Type    AR Bernie Pagan, PT Physical Therapist               Goals/Plan    No documentation.                Clinical Impression     Row Name 03/03/22 0943          Pain    Additional Documentation Pain Scale: Numbers Pre/Post-Treatment (Group)  -AR     Row Name 03/03/22 0943          Pain Scale: Numbers Pre/Post-Treatment    Pretreatment Pain Rating 2/10  -AR     Posttreatment Pain Rating 2/10  -AR     Pain Location - Orientation incisional  -AR     Pain Location abdomen  -AR     Pain Intervention(s) Medication (See MAR); Repositioned  -AR     Row Name 03/03/22 0943          Plan of Care Review    Plan of Care Reviewed With patient  -AR     Outcome Summary Pt admitted 3/2 with colonic mass, s/p laproscopic R hemicolectomy.  Pt reports normally independent.  Today pt demonstrates genearlized post op weakness from baseline but able to ambulate 450' w/ stand by assist, no UE support.  Assisted pt with toileting twice w/ education on ambulation/mobilizing independently; pt and RN aware and agreeable.  DC PT, no need for inpatient PT.  -AR     Row Name 03/03/22 0943          Therapy Assessment/Plan (PT)    Criteria for Skilled Interventions Met (PT) no  -AR     Row Name 03/03/22 0943          Vital Signs    O2 Delivery Pre Treatment room air  -AR     Row Name 03/03/22 0943          Positioning and  Restraints    Pre-Treatment Position in bed  -AR     Post Treatment Position chair  -AR     In Chair notified nsg; reclined; sitting; call light within reach  -AR           User Key  (r) = Recorded By, (t) = Taken By, (c) = Cosigned By    Initials Name Provider Type    Bernie Ahn, PT Physical Therapist               Outcome Measures     Row Name 03/03/22 0945 03/03/22 0835       How much help from another person do you currently need...    Turning from your back to your side while in flat bed without using bedrails? 4  -AR 3  -JS    Moving from lying on back to sitting on the side of a flat bed without bedrails? 4  -AR 3  -JS    Moving to and from a bed to a chair (including a wheelchair)? 4  -AR 3  -JS    Standing up from a chair using your arms (e.g., wheelchair, bedside chair)? 4  -AR 3  -JS    Climbing 3-5 steps with a railing? 3  -AR 3  -JS    To walk in hospital room? 4  -AR 3  -JS    AM-PAC 6 Clicks Score (PT) 23  -AR 18  -JS    Row Name 03/03/22 0945          Functional Assessment    Outcome Measure Options AM-PAC 6 Clicks Basic Mobility (PT)  -AR           User Key  (r) = Recorded By, (t) = Taken By, (c) = Cosigned By    Initials Name Provider Type    Bernie Ahn, PT Physical Therapist    Gabriela Majano RN Registered Nurse                             Physical Therapy Education                 Title: PT OT SLP Therapies (Done)     Topic: Physical Therapy (Done)     Point: Mobility training (Done)     Learning Progress Summary           Patient Acceptance, E, VU,DU by AR at 3/3/2022 0946    Acceptance, E, NR by AR at 3/3/2022 0945                   Point: Home exercise program (Done)     Learning Progress Summary           Patient Acceptance, E, VU,DU by AR at 3/3/2022 0946    Acceptance, E, NR by AR at 3/3/2022 0945                   Point: Body mechanics (Done)     Learning Progress Summary           Patient Acceptance, E, VU,DU by AR at 3/3/2022 0946    Acceptance, E, NR by AR at  3/3/2022 0945                   Point: Precautions (Done)     Learning Progress Summary           Patient Acceptance, E, VU,DU by AR at 3/3/2022 0946    Acceptance, E, NR by AR at 3/3/2022 0945                               User Key     Initials Effective Dates Name Provider Type Discipline    AR 06/16/21 -  Bernie Pagan, PT Physical Therapist PT              PT Recommendation and Plan     Plan of Care Reviewed With: patient  Outcome Summary: Pt admitted 3/2 with colonic mass, s/p laproscopic R hemicolectomy.  Pt reports normally independent.  Today pt demonstrates genearlized post op weakness from baseline but able to ambulate 450' w/ stand by assist, no UE support.  Assisted pt with toileting twice w/ education on ambulation/mobilizing independently; pt and RN aware and agreeable.  DC PT, no need for inpatient PT.     Time Calculation:    PT Charges     Row Name 03/03/22 0938             Time Calculation    Start Time 0905  -AR      Stop Time 0938  -AR      Time Calculation (min) 33 min  -AR      PT Received On 03/03/22  -AR            User Key  (r) = Recorded By, (t) = Taken By, (c) = Cosigned By    Initials Name Provider Type    AR Bernie Pagan, PT Physical Therapist              Therapy Charges for Today     Code Description Service Date Service Provider Modifiers Qty    28170622946 HC PT EVAL MOD COMPLEXITY 4 3/3/2022 Bernie Pagan, PT GP 1    99695142528 HC PT THER PROC EA 15 MIN 3/3/2022 Bernie Pagan, PT GP 1          PT G-Codes  Outcome Measure Options: AM-PAC 6 Clicks Basic Mobility (PT)  AM-PAC 6 Clicks Score (PT): 23    Bernie Pagan PT  3/3/2022

## 2022-03-03 NOTE — PROGRESS NOTES
Postoperative day 1 status post robotic right hemicolectomy    S: No events overnight, feeling great.  Minimal abdominal discomfort.  No nausea no vomiting.  Having liquid bowel movements    O:   Vitals:    03/02/22 2145 03/03/22 0103 03/03/22 0459 03/03/22 0853   BP: 157/84 152/90 153/74 142/69   BP Location: Right arm Right arm Right arm Right arm   Patient Position: Lying Lying Lying Lying   Pulse: 108 92 82 71   Resp: 16 17 16 16   Temp: 98.4 °F (36.9 °C) 98.2 °F (36.8 °C) 97.7 °F (36.5 °C) 97.8 °F (36.6 °C)   TempSrc: Oral Oral Oral Oral   SpO2: 99% 99% 100% 96%   Weight:         Alert, no acute distress  Breathing comfortable  Abdomen soft, nontender nondistended, incisions clean dry and intact    While the cell count 6.8, hemoglobin 10.8, stable  Other labs stable with mild elevation of the blood glucose    Assessment and plan    Postoperative day 1 status post robotic right hemicolectomy, recovering as expected  Advance diet as tolerated  DC Entereg  Continue pain control with nonnarcotic medications  Continue normal saline 75 cc/h until tolerating regular diet    Hopefully home tomorrow

## 2022-03-03 NOTE — PROGRESS NOTES
Discharge Planning Assessment  [facility]     Patient Name: Kayla Adkins  MRN: [id number]  Today's Date: 3/3/2022    Admit Date: 3/2/2022     Discharge Needs Assessment     Row Name 03/03/22 9352       Living Environment    Lives With spouse    Name(s) of Who Lives With Patient Spouse: Alonso Adkins    Current Living Arrangements home/apartment/condo    Primary Care Provided by self    Provides Primary Care For no one    Family Caregiver if Needed spouse    Quality of Family Relationships involved; helpful; supportive    Able to Return to Prior Arrangements yes       Resource/Environmental Concerns    Resource/Environmental Concerns none       Transition Planning    Patient/Family Anticipates Transition to home with family    Patient/Family Anticipated Services at Transition none    Transportation Anticipated family or friend will provide       Discharge Needs Assessment    Equipment Currently Used at Home none    Concerns to be Addressed no discharge needs identified    Equipment Needed After Discharge none    Provided Post Acute Provider List? N/A    N/A Provider List Comment Denies needs. Plan is home               Discharge Plan     Row Name 03/03/22 3968       Plan    Plan Home no needs    Patient/Family in Agreement with Plan yes    Plan Comments IMM noted. Introduced self and role of CCP. Spouse, Alonso Valdivia [phone](c) at bedside. Patient agreeable to discuss with all present. Facesheet verified. Patient lives with spouse in a multi-level home. There are 5 steps to enter through front and 3 steps to enter through garage. Master bedroom, master bathroom and laundry are all located on the main level. Prior to admission, patient was independent with ADL's and drove. Uses no DMEs. Has never used a HH agency nor been to a rehab facility. DC plan is home. Stated spouse will assist as needed and will provide transportation at [place]. Denies any needs/equipment.              Continued Care and Services  - Admitted Since 3/2/2022    Coordination has not been started for this encounter.       Expected Discharge Date and Time     Expected Discharge Date Expected Discharge Time    Mar 4, 2022          Demographic Summary     Row Name 03/03/22 1329       General Information    Admission Type inpatient    Arrived From home    Required Notices Provided Important Message from Medicare    Reason for Consult discharge planning    Preferred Language English     Used During This Interaction no               Functional Status     Row Name 03/03/22 1329       Functional Status    Usual Activity Tolerance good    Current Activity Tolerance good       Functional Status, IADL    Medications independent    Meal Preparation independent    Housekeeping independent    Laundry independent    Shopping independent       Mental Status    General Appearance WDL WDL               Psychosocial     Row Name 03/03/22 1330       Values/Beliefs    Spiritual, Cultural Beliefs, Christianity Practices, Values that Affect Care no       Behavior WDL    Behavior WDL WDL       Emotion Mood WDL    Emotion/Mood/Affect WDL WDL       Speech WDL    Speech WDL WDL       Perceptual State WDL    Perceptual State WDL WDL       Intellectual Performance WDL    Level of Consciousness Alert       Coping/Stress    Sources of Support spouse    Reaction to Health Status adjusting    Understanding of Condition and Treatment adequate understanding of treatment       Developmental Stage (Eriksson's)    Developmental Stage Stage 7 (35-65 years/Middle Adulthood) Generativity vs. Stagnation               Abuse/Neglect     Row Name 03/03/22 1330       Personal Safety    Feels Unsafe at Home or Work/School no    Feels Threatened by Someone no    Does Anyone Try to Keep You From Having Contact with Others or Doing Things Outside Your Home? no    Physical Signs of Abuse Present no              Sera Bassett, RN

## 2022-03-03 NOTE — PLAN OF CARE
Goal Outcome Evaluation:           Progress: improving  Outcome Summary: VSS, up ad joy, IVFs as ordered, pain controlled w/ scheduled meds, no nausea, SCDs on, Lovenox given, encouraged IS, SCDs worn, ambulated in halls, multiple small loose green BMs, voiding freely, lap sites x5 CDI with dermabond; bruising around sites.  at bedside, will continue to monitor.

## 2022-03-04 ENCOUNTER — READMISSION MANAGEMENT (OUTPATIENT)
Dept: CALL CENTER | Facility: HOSPITAL | Age: 67
End: 2022-03-04

## 2022-03-04 VITALS
HEART RATE: 65 BPM | RESPIRATION RATE: 16 BRPM | SYSTOLIC BLOOD PRESSURE: 161 MMHG | DIASTOLIC BLOOD PRESSURE: 73 MMHG | OXYGEN SATURATION: 97 % | BODY MASS INDEX: 30.36 KG/M2 | TEMPERATURE: 97.4 F | WEIGHT: 166.01 LBS

## 2022-03-04 PROBLEM — C18.2 MALIGNANT NEOPLASM OF ASCENDING COLON (HCC): Status: ACTIVE | Noted: 2022-03-04

## 2022-03-04 LAB
FERRITIN SERPL-MCNC: 27.5 NG/ML (ref 13–150)
IRON 24H UR-MRATE: 19 MCG/DL (ref 37–145)
IRON SATN MFR SERPL: 5 % (ref 20–50)
TIBC SERPL-MCNC: 375 MCG/DL (ref 298–536)
TRANSFERRIN SERPL-MCNC: 252 MG/DL (ref 200–360)

## 2022-03-04 PROCEDURE — 84466 ASSAY OF TRANSFERRIN: CPT | Performed by: INTERNAL MEDICINE

## 2022-03-04 PROCEDURE — 99024 POSTOP FOLLOW-UP VISIT: CPT | Performed by: SURGERY

## 2022-03-04 PROCEDURE — 83540 ASSAY OF IRON: CPT | Performed by: INTERNAL MEDICINE

## 2022-03-04 PROCEDURE — 99222 1ST HOSP IP/OBS MODERATE 55: CPT | Performed by: INTERNAL MEDICINE

## 2022-03-04 PROCEDURE — 25010000002 KETOROLAC TROMETHAMINE PER 15 MG: Performed by: SURGERY

## 2022-03-04 PROCEDURE — 82728 ASSAY OF FERRITIN: CPT | Performed by: INTERNAL MEDICINE

## 2022-03-04 PROCEDURE — 25010000002 ENOXAPARIN PER 10 MG: Performed by: SURGERY

## 2022-03-04 RX ORDER — PREGABALIN 75 MG/1
75 CAPSULE ORAL DAILY
Qty: 20 CAPSULE | Refills: 0 | Status: SHIPPED | OUTPATIENT
Start: 2022-03-05 | End: 2022-04-05 | Stop reason: SDUPTHER

## 2022-03-04 RX ORDER — CELECOXIB 200 MG/1
200 CAPSULE ORAL DAILY
Qty: 10 CAPSULE | Refills: 0 | Status: SHIPPED | OUTPATIENT
Start: 2022-03-05 | End: 2022-10-14

## 2022-03-04 RX ORDER — ONDANSETRON 4 MG/1
4 TABLET, FILM COATED ORAL EVERY 6 HOURS PRN
Qty: 30 TABLET | Refills: 0 | Status: SHIPPED | OUTPATIENT
Start: 2022-03-04 | End: 2022-03-18

## 2022-03-04 RX ADMIN — ENOXAPARIN SODIUM 40 MG: 100 INJECTION SUBCUTANEOUS at 08:07

## 2022-03-04 RX ADMIN — METOPROLOL SUCCINATE 50 MG: 50 TABLET, EXTENDED RELEASE ORAL at 08:07

## 2022-03-04 RX ADMIN — ACETAMINOPHEN 1000 MG: 500 TABLET ORAL at 06:04

## 2022-03-04 RX ADMIN — KETOROLAC TROMETHAMINE 15 MG: 15 INJECTION, SOLUTION INTRAMUSCULAR; INTRAVENOUS at 00:17

## 2022-03-04 RX ADMIN — ACETAMINOPHEN 1000 MG: 500 TABLET ORAL at 00:17

## 2022-03-04 RX ADMIN — KETOROLAC TROMETHAMINE 15 MG: 15 INJECTION, SOLUTION INTRAMUSCULAR; INTRAVENOUS at 12:48

## 2022-03-04 RX ADMIN — ACETAMINOPHEN 1000 MG: 500 TABLET ORAL at 12:48

## 2022-03-04 RX ADMIN — CELECOXIB 200 MG: 200 CAPSULE ORAL at 08:07

## 2022-03-04 RX ADMIN — PANTOPRAZOLE SODIUM 40 MG: 40 TABLET, DELAYED RELEASE ORAL at 06:10

## 2022-03-04 RX ADMIN — KETOROLAC TROMETHAMINE 15 MG: 15 INJECTION, SOLUTION INTRAMUSCULAR; INTRAVENOUS at 06:05

## 2022-03-04 RX ADMIN — PREGABALIN 75 MG: 75 CAPSULE ORAL at 08:07

## 2022-03-04 RX ADMIN — SODIUM CHLORIDE 75 ML/HR: 9 INJECTION, SOLUTION INTRAVENOUS at 05:39

## 2022-03-04 NOTE — OUTREACH NOTE
Prep Survey      Responses   [facility] patient discharged from? [place]   Is LACE score < 7 ? Yes   Emergency Room discharge w/ pulse ox? No   Eligibility [facility]   Date of Admission 03/02/22   Date of Discharge 03/04/22   Discharge Disposition Home or Self Care   Discharge diagnosis Robotic assisted laparoscopic right hemicolectomy   Does the patient have one of the following disease processes/diagnoses(primary or secondary)? General Surgery   Does the patient have Home health ordered? No   Is there a DME ordered? No   Prep survey completed? Yes          Anna Miner RN

## 2022-03-04 NOTE — DISCHARGE SUMMARY
Admission date: 3/2/2022   Discharge date: 3/4/2022    Admission diagnosis: Colonic mass [K63.89]     Discharge diagnosis: Invasive mucinous adenocarcinoma     Procedure: Robotic assisted laparoscopic right hemicolectomy    Hospital course: Patient was admitted to the hospital after undergoing robotic right hemicolectomy.  Her postoperative course was uneventful.  She was tolerating clear liquid diet on postoperative day 1 and the diet was advanced to regular diet she tolerated without any problem.  Pain was well controlled.  She had her Lin catheter removed and voided without any problem.  Postoperative day 2 she was doing great, tolerating diet and having bowel function.  Pain was well controlled.  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  Medical oncology was consulted.  Patient was discharged home in stable condition without any problem     Meds:      Your medication list      START taking these medications      Instructions Last Dose Given Next Dose Due   celecoxib 200 MG capsule  Commonly known as: CeleBREX  Start taking on: March 5, 2022      Take 1 capsule by mouth Daily.       ondansetron 4 MG tablet  Commonly known as: ZOFRAN      Take 1 tablet by mouth Every 6 (Six) Hours As Needed for Nausea or Vomiting.       pregabalin 75 MG capsule  Commonly known as: LYRICA  Start taking on: March 5, 2022      Take 1 capsule by mouth Daily.          CHANGE how you take these medications      Instructions Last Dose Given Next Dose Due   metoprolol succinate XL 50 MG 24 hr tablet  Commonly known as: TOPROL-XL  What changed: when to take this      Take 1 tablet by mouth Daily.       omeprazole 20 MG capsule  Commonly known as: priLOSEC  What changed: when to take this      Take 1 capsule by mouth Daily.          CONTINUE taking these medications      Instructions Last Dose Given Next Dose Due   aspirin-acetaminophen-caffeine 250-250-65  MG per tablet  Commonly known as: EXCEDRIN MIGRAINE      Take 1 tablet by mouth Every 6 (Six) Hours As Needed for Headache.       atorvastatin 10 MG tablet  Commonly known as: LIPITOR      Take 1 tablet by mouth Every Night.       DULoxetine 60 MG capsule  Commonly known as: CYMBALTA      Take 1 capsule by mouth Every Night.       IMODIUM A-D PO      Take  by mouth As Needed.       Slow Fe 142 (45 Fe) MG tablet controlled-release  Generic drug: Ferrous Sulfate ER      Take 142 mg by mouth Daily.          STOP taking these medications    Chlorhexidine Gluconate Cloth 2 % pads              Where to Get Your Medications      These medications were sent to [facility] - [place] [address]    Hours: 7:00 AM-6:00 PM Mon-Fri, 8:00 AM-4:30 PM Sat-Sun (Closed 12-12:30PM) Phone: [phone]   · celecoxib 200 MG capsule  · ondansetron 4 MG tablet  · pregabalin 75 MG capsule         Instructions:    - No heavy lifting of more than 15 lb for 6 weeks. Can start doing aerobic type exercise in 4 weeks.   - No driving while taking pain medications  - Take medications as prescribed.   - Can shower, no bath tub    Follow up: Dr. Wellington in 2 weeks, call for appointment   Follow-up with Dr. Burton as instructed     Malcolm Wellington MD  General, Minimally Invasive and Endoscopic Surgery  [facility]

## 2022-03-04 NOTE — PROGRESS NOTES
Postoperative day 2 s/p robotic right hemicolectomy    S: No events overnight.  Having bowel movements are brown liquid.  Denies any nausea or vomiting.  Has been able to tolerate regular diet.  Abdominal pain is well controlled    O:   Vitals:    03/03/22 2100 03/04/22 0211 03/04/22 0558 03/04/22 0928   BP: 130/70 137/77 136/76 161/73   BP Location: Right arm Right arm Right arm Right arm   Patient Position: Lying Lying Lying Lying   Pulse: 63 95 68 65   Resp: 16 16 16 16   Temp: 97.2 °F (36.2 °C) 97.4 °F (36.3 °C) 97.4 °F (36.3 °C) 97.4 °F (36.3 °C)   TempSrc: Oral Oral Oral Oral   SpO2: 96% 97% 97% 97%   Weight:         Alert, no acute distress  Abdomen soft, appropriate tender, nondistended, incisions clean dry and intact  Breathing comfortable, regular rate rhythm    No labs today    Pathology report showing  Final Diagnosis   1. Terminal Ileum, Cecum, Appendix and Ascending Colon, Right Hemicolectomy: INVASIVE MODERATELY       MUCINOUS ADENOCARCINOMA.               A. Tumor site: Cecum.               B. Tumor size: 3.8 cm.               C. Tumor extent: tumor invades through the muscularis propria into pericolorectal adipose tissue.               D. No lymphovascular nor perineural invasion identified.               E. All margins are free of carcinoma.               F. Tumor comes to within 3 cm of the radial margin.               G. Additional findings: Benign appendix with attached benign mesothelial cyst.               H. Lymph nodes: Three of twenty-six lymph nodes, positive for metastatic carcinoma (3/26).                            1. The largest metastatic focus measures 0.7 cm without extranodal extension.                I. No tumor deposits identified.               J. Pathologic stage: pT3,N1b.         Assessment and plan    Postoperative day 2 status post robotic right hemicolectomy, patient with diagnosis of likely stage III mucinous adenocarcinoma, has imaging on CT scan of the chest with nodules  in the lung that are likely benign but will most likely need to be ruled out with PET scan for final staging.  She is doing great recovering, she is having bowel function that is loose as her norm due to her microscopic colitis.  I think she is ready to go home.  I discussed with her about the need to start taking Imodium if having more than 5 liquid bowel movement per day.  She can take some fiber supplementation and see if it helps before that.    Medical oncology to see the patient today and discharge after  Follow-up in my office in 2 weeks    Malcolm Wellington MD, FACS  General, Minimally Invasive and Endoscopic Surgery  [facility]    [address], [address]  [place], [place], [address]  P: [phone]  F: [phone]

## 2022-03-04 NOTE — PROGRESS NOTES
Continued Stay Note  [facility]     Patient Name: Kayla Adkins  MRN: [id number]  Today's Date: 3/4/2022    Admit Date: 3/2/2022     Discharge Plan     Row Name 03/04/22 1516       Plan    Plan Home no needs    Plan Comments Discharge order noted. Met with patient who confirmed DC plan is home. Stated spouse will assist as needed and will provide transportation at DC. Denies any needs/equipment.               Discharge Codes    No documentation.               Expected Discharge Date and Time     Expected Discharge Date Expected Discharge Time    Mar 4, 2022             Sera Bassett RN

## 2022-03-04 NOTE — CONSULTS
.     REASON FOR CONSULTATION:     Provide an opinion on any further workup or treatment of newly diagnosed colon cancer.                              REQUESTING PHYSICIAN: Malcolm Wellington M.D.  RECORDS OBTAINED:  Records of the patient's history including those obtained from the referring provider were reviewed and summarized in detail.    HISTORY OF PRESENT ILLNESS:  The patient is a 66 y.o. year old female with chronic colitis followed by GI service, hypertension, chronic neutropenia, hyperlipidemia, who we were consulted for newly diagnosed colon cancer. Information was provided by the patient and her  as well as by reviewing medical records.     The patient reports she has colitis with chronic diarrhea and followed by GI service. She had routine surveillance colonoscopic examination recently done by Dr. Clayton on 02/22/2022. The study reported an infiltrative, nonobstructing medium-sized mass in the proximal ascending colon just 1-2 cm distal to the cecum. The mass was partially circumferential involving 1 foot of the lumen circumference. Biopsy was taken. There was also mildly congested mucosa in the entire colon. Random biopsy was taken. The patient also had EGD examination at the same time and that reported normal esophagus, stomach and duodenum. Random biopsy was taken. Pathology evaluation from the biopsy reported superficial fragments of at least intramucosal carcinoma. Random colon biopsy reported increased lamina propria, chronic inflammation with surface epithelial alteration and increased intraepithelial lymphocytes, consistent with lymphocytic colitis. Biopsy from the stomach reported chronic inactive gastritis. Negative for interstitial metaplasia and negative for H. pylori. Biopsy from the duodenum was benign.     Patient was subsequently seen by Dr. Wellington who performed laparoscopic right hemicolectomy on 03/02/2022.  Patient tolerated the procedure well. Pathology evaluation  reported a 3.8 cm mass at the cecum, invading through the muscularis propria into the pericolorectal adipose tissue. No lymphovascular nor perineural invasion. All margins were clear. The radial margin was 3 cm. Three out of 26 lymph nodes were positive for metastatic carcinoma with largest metastatic focus 0.7 cm without extranodal extension. Pathology stage pT3N1b. The tumor was moderately differentiated, G2 adenocarcinoma.     Patient had a normal CEA level at 0.77 ng/mL on 03/01/2022, day prior to the surgery.     This patient reports she has been seen by my colleague, Dr. Alvarez, for chronic leukocytopenia/neutropenia. Patient reports no recurrent febrile illness. She previously had bone marrow biopsy    Past Medical History:   Diagnosis Date   • Anemia    • Anxiety    • Bladder infection    • Colitis    • Colon cancer (HCC)    • GERD (gastroesophageal reflux disease)    • Headache    • High cholesterol    • Hypertension    • Knee injury, initial encounter-left 7/1/2019   • Neutropenia (HCC) 1/3/2017   • Prediabetes 3/4/2019     Past Surgical History:   Procedure Laterality Date   • COLON RESECTION Right 3/2/2022    Procedure: LAPAROSCOPIC RIGHT HEMICOLECTOMY WITH DAVINCI ROBOT;  Surgeon: Malcolm Wellington MD;  Location: [facility] OR;  Service: Robotics - DaVinci;  Laterality: Right;   • COLONOSCOPY     • COLONOSCOPY W/ POLYPECTOMY N/A 2/22/2022    Procedure: COLONOSCOPY INTO CECUM WITH COLD BIOPSIES;  Surgeon: Irvin Clayton MD;  Location: [facility] ENDOSCOPY;  Service: Gastroenterology;  Laterality: N/A;  PRE: DIARRHEA, ANEMIA  POST: ASCENDING COLON MASS   • ENDOSCOPY N/A 2/22/2022    Procedure: ESOPHAGOGASTRODUODENOSCOPY WITH BIOPSY;  Surgeon: Irvin Clayton MD;  Location: [facility] ENDOSCOPY;  Service: Gastroenterology;  Laterality: N/A;  PRE: ANEMIA  POST: NORMAL EGD   • TONSILLECTOMY         MEDICATIONS    Current Facility-Administered Medications:   •  atorvastatin (LIPITOR) tablet  10 mg, 10 mg, Oral, Nightly, Malcolm Wellington MD, 10 mg at 03/03/22 2122  •  celecoxib (CeleBREX) capsule 200 mg, 200 mg, Oral, Daily, Malcolm Wellington MD, 200 mg at 03/04/22 0807  •  DULoxetine (CYMBALTA) DR capsule 60 mg, 60 mg, Oral, Nightly, Malcolm Wellington MD, 60 mg at 03/03/22 2122  •  enoxaparin (LOVENOX) syringe 40 mg, 40 mg, Subcutaneous, Daily, Malcolm Wellington MD, 40 mg at 03/04/22 0807  •  HYDROmorphone (DILAUDID) injection 0.5 mg, 0.5 mg, Intravenous, Q2H PRN **AND** naloxone (NARCAN) injection 0.4 mg, 0.4 mg, Intravenous, Q5 Min PRN, Malcolm Wellington MD  •  metoprolol succinate XL (TOPROL-XL) 24 hr tablet 50 mg, 50 mg, Oral, Daily, Malcolm Wellington MD, 50 mg at 03/04/22 0807  •  ondansetron (ZOFRAN) tablet 4 mg, 4 mg, Oral, Q6H PRN **OR** ondansetron (ZOFRAN) injection 4 mg, 4 mg, Intravenous, Q6H PRN, Malcolm Wellington MD  •  pantoprazole (PROTONIX) EC tablet 40 mg, 40 mg, Oral, QAM, Malcolm Wellington MD, 40 mg at 03/04/22 0610  •  pregabalin (LYRICA) capsule 75 mg, 75 mg, Oral, Daily, Malcolm Wellington MD, 75 mg at 03/04/22 0807  •  sodium chloride 0.9 % infusion, 75 mL/hr, Intravenous, Continuous, Malcolm Wellington MD, Last Rate: 75 mL/hr at 03/04/22 0539, 75 mL/hr at 03/04/22 0539    ALLERGIES:     Allergies   Allergen Reactions   • Penicillins Rash       SOCIAL HISTORY:       Social History     Socioeconomic History   • Marital status:    Tobacco Use   • Smoking status: Never Smoker   • Smokeless tobacco: Never Used   Vaping Use   • Vaping Use: Never used   Substance and Sexual Activity   • Alcohol use: Yes     Comment: seldom   • Drug use: Never   • Sexual activity: Defer         FAMILY HISTORY:  Family History   Problem Relation Age of Onset   • Hypertension Mother    • Heart disease Father    • Hypertension Sister    • Malig Hyperthermia Neg Hx        REVIEW OF SYSTEMS:  Review of Systems    Constitutional: Negative for chills, fever and unexpected weight change.   HENT: Negative for mouth sores and sore throat.    Respiratory: Negative for cough and shortness of breath.    Cardiovascular: Negative for chest pain and leg swelling.   Gastrointestinal: Positive for diarrhea. Negative for abdominal pain, anal bleeding, blood in stool and nausea.   Genitourinary: Negative for dysuria and hematuria.   Musculoskeletal: Negative for arthralgias and joint swelling.   Skin: Negative for color change and pallor.   Allergic/Immunologic: Negative for immunocompromised state.   Neurological: Negative for light-headedness and headaches.   Hematological: Negative for adenopathy. Does not bruise/bleed easily.   Psychiatric/Behavioral: Negative for confusion.              Vitals:    03/03/22 2100 03/04/22 0211 03/04/22 0558 03/04/22 0928   BP: 130/70 137/77 136/76 161/73   BP Location: Right arm Right arm Right arm Right arm   Patient Position: Lying Lying Lying Lying   Pulse: 63 95 68 65   Resp: 16 16 16 16   Temp: 97.2 °F (36.2 °C) 97.4 °F (36.3 °C) 97.4 °F (36.3 °C) 97.4 °F (36.3 °C)   TempSrc: Oral Oral Oral Oral   SpO2: 96% 97% 97% 97%   Weight:         Current Status 12/16/2021   ECOG score 0      PHYSICAL EXAM:      CONSTITUTIONAL:  Vital signs reviewed.  Well-developed well-nourished female.  No distress, looks comfortable.  EYES:  Conjunctivae and lids unremarkable.    EARS,NOSE,MOUTH,THROAT:  Ears and nose appear unremarkable.  Lips appear unremarkable.  RESPIRATORY:  Normal respiratory effort.  Lungs clear to auscultation bilaterally.  CARDIOVASCULAR: Regular rhythm and rate.  Normal S1, S2.  No murmurs.  No significant lower extremity edema.  GASTROINTESTINAL: Abdomen post a laparoscopic hemicolectomy.  Wound present.  No signs of infection.    LYMPHATIC:  No cervical, supraclavicular lymphadenopathy.  MUSCULOSKELETAL:   Unremarkable digits/nails.  No cyanosis or clubbing.  SKIN:  Warm.  No rashes.  Wound  from laparoscopic hemicolectomy present, no signs of infection.  PSYCHIATRIC: Patient is very emotional because the diagnosis of colon cancer, and is scared because of the uncertainties.  Normal judgment and insight.        RECENT LABS:    Results from last 7 days   Lab Units 03/03/22  0456 03/01/22  0656   WBC 10*3/mm3 6.83 2.79*   HEMOGLOBIN g/dL 10.8* 11.4*   HEMATOCRIT % 34.2 37.2   PLATELETS 10*3/mm3 383 376     Lab Results   Component Value Date    NEUTROABS 5.67 03/03/2022     Results from last 7 days   Lab Units 03/03/22  0456 03/01/22  0656   SODIUM mmol/L 141 139   POTASSIUM mmol/L 4.1 3.9   CHLORIDE mmol/L 104 105   CO2 mmol/L 24.0 24.0   BUN mg/dL 6* 13   CREATININE mg/dL 0.65 0.62   CALCIUM mg/dL 8.6 8.9   GLUCOSE mg/dL 146* 114*     Lab Results   Component Value Date    IRON 186 (H) 12/16/2021    TIBC 413 12/16/2021    FERRITIN 10.20 (L) 12/16/2021     Lab Results   Component Value Date    SZNGFOLE44 415 04/22/2021     Lab Results   Component Value Date    FOLATE 11.80 04/22/2021     Lab Results   Component Value Date    CEA 0.77 03/01/2022       Pathology:  1.   Case Report   Surgical Pathology Report                         Case: [id number]                                   Authorizing Provider:  Malcolm Wellington,  Collected:           03/02/2022 03:21 PM                                  MD                                                                            Ordering Location:     [facility]  Received:            03/02/2022 10:02 PM                                  MAIN OR                                                                       Pathologist:           Sera Sanchez MD                                                     Specimen:    Large Intestine, Right / Ascending Colon, right hemicolectomy                              Final Diagnosis   1. Terminal Ileum, Cecum, Appendix and Ascending Colon, Right Hemicolectomy: INVASIVE MODERATELY       MUCINOUS  ADENOCARCINOMA.               A. Tumor site: Cecum.               B. Tumor size: 3.8 cm.               C. Tumor extent: tumor invades through the muscularis propria into pericolorectal adipose tissue.               D. No lymphovascular nor perineural invasion identified.               E. All margins are free of carcinoma.               F. Tumor comes to within 3 cm of the radial margin.               G. Additional findings: Benign appendix with attached benign mesothelial cyst.               H. Lymph nodes: Three of twenty-six lymph nodes, positive for metastatic carcinoma (3/26).                            1. The largest metastatic focus measures 0.7 cm without extranodal extension.                I. No tumor deposits identified.               J. Pathologic stage: pT3,N1b.     swm/pkm    Electronically signed by Sera Sanchez MD on 3/4/2022 at 1304   Synoptic Checklist     COLON AND RECTUM: Resection, Including Transanal Disk Excision of Rectal Neoplasms  8th Edition - Protocol posted: 12/17/2021  COLON AND RECTUM: RESECTION - All Specimens  SPECIMEN   Procedure  Right hemicolectomy    TUMOR   Tumor Site  Cecum    Histologic Type  Adenocarcinoma    Histologic Grade  G2, moderately differentiated    Tumor Size  Greatest dimension (Centimeters): 3.8 cm   Tumor Extent  Invades through muscularis propria into pericolorectal tissue    Macroscopic Tumor Perforation  Not identified    Lymphovascular Invasion  Not identified    Perineural Invasion  Not identified    Treatment Effect  No known presurgical therapy    MARGINS   Margin Status for Invasive Carcinoma  All margins negative for invasive carcinoma    Closest Margin(s) to Invasive Carcinoma  Radial (circumferential) or mesenteric: 3.0    Margin Status for Non-Invasive Tumor  All margins negative for high-grade dysplasia / intramucosal carcinoma and low-grade dysplasia    REGIONAL LYMPH NODES   Regional Lymph Node Status  Tumor present in regional lymph  node(s)    Number of Lymph Nodes with Tumor  3    Number of Lymph Nodes Examined  26    Tumor Deposits  Not identified    PATHOLOGIC STAGE CLASSIFICATION (pTNM, AJCC 8th Edition)   Reporting of pT, pN, and (when applicable) pM categories is based on information available to the pathologist at the time the report is issued. As per the AJCC (Chapter 1, 8th Ed.) it is the managing physician’s responsibility to establish the final pathologic stage based upon all pertinent information, including but potentially not limited to this pathology report.   pT Category  pT3    pN Category  pN1b    .            IMAGING:  CT ABDOMEN PELVIS W CONTRAST-, CT CHEST W CONTRAST DIAGNOSTIC-     HISTORY:  Ascending colonic mass on colonoscopy.     TECHNIQUE: CT of the chest, abdomen and pelvis was performed following  administration of intravenous contrast. Reformatted images were  reviewed. Ingested oral contrast partially opacifies the bowel.  Radiation dose reduction techniques were utilized, including automated  exposure control and exposure modulation based on body size.     COMPARISON:  None.     FINDINGS CHEST: There is a 0.9 cm hypodense left thyroid nodule. No  pathologically enlarged thoracic lymph nodes are identified. Heart size  is normal. There is no pericardial effusion. There is mild calcific  aortic atherosclerosis. Pleural spaces are clear.  There is mild degenerative disc disease.  The trachea and central bronchi are clear. There is no focal  consolidation. There are a few scattered sub 0.3 cm pulmonary nodules.     FINDINGS ABDOMEN/PELVIS: The liver is normal in size. No suspicious  focal intrahepatic lesion is identified. The gallbladder is present. The  pancreas, spleen, adrenal glands, and kidneys are within normal limits.  There is at least mild calcific aortoiliac atherosclerosis. There are  multiple prominent and mildly enlarged right glenna-abdominal/lower  quadrant mesenteric lymph nodes, including a dominant  1.2 x 0.9 cm right  lower quadrant lymph node.  There is no bowel obstruction. The appendix is present and normal in  size. The sigmoid colon and rectum are collapsed and poorly assessed.  There are a few scattered colonic diverticula without CT evidence of  acute diverticulitis. There is mild slightly irregular wall thickening  at the cecum, which likely corresponds to the mass described on recent  colonoscopy. The bladder is mildly distended and within normal limits.  The uterus is present. There is no adnexal mass.  The visualized osseous structures are age-appropriate in appearance.        IMPRESSION:  COMBINED IMPRESSION:      1.  Mild slightly irregular wall thickening of the cecum likely  corresponds to the reported mass on recent colonoscopy. Adjacent  prominent and mildly enlarged right hemiabdominal/lower quadrant lymph  nodes, measuring up to 1.2 cm, are nonspecific. Given the close  proximity to the adjacent colonic mass, metastatic disease is not  excluded. Recommend further evaluation with PET/CT and/or short-term  follow-up imaging.  2.  A few scattered tiny pulmonary nodules are nonspecific. Recommend  comparison with prior imaging, if available, and short-term follow-up  imaging.        Assessment/Plan      ASSESSMENT:   1. Newly diagnosed colon cancer, status post right hemicolectomy, T3N1b, with 3 out of 26 lymph nodes positive. This patient will be a candidate for adjuvant chemotherapy. I discussed with them the details about FOLFOX regimen, especially with 5-FU infusion. She will need Port-A-Cath for that. I also explained to her side effects with peripheral neuropathy, bone marrow suppression with leukocytopenia, neutropenia, anemia and thrombocytopenia. Chemotherapy will be carried out every 2 weeks for a total of 12 cycles which will be about half year commitment. After that we typically obtain CT scan every 6 months for first 2 years and then once a year for the next 3 years, up to 5 years.  After that no regular routine CT scan examination.     The patient will need a portacatheter for adjuvant chemotherapy.  This can be arranged as outpatient.    Patient is emotional since this is not expected news for her and she is scared with the consequences. I explained to them this is understandable.     2. Mild anemia. Hemoglobin is mildly low at 10.8 today. I requested laboratory study for ferritin and iron profile using the blood sample in the laboratory. It is not surprising to see anemia in patient with colon cancer.     The patient reports she was taking oral iron once a day for several months. However, not sure exactly how long. I told the patient not to take oral iron at this point since she just had surgery. We will reassess in a couple of weeks.     3. History of chronic leukocytopenia/neutropenia.     Patient had normal WBC at 6830 including neutrophils 5670 on 3/4/2022. This is likely postop reactive phenomenon. She previous had evaluation by Dr. Alvarez for chronic leukocytopenia. I think this patient would benefit from G-CSF/white cell growth factors during the time of adjuvant chemotherapy because otherwise she would not tolerate chemotherapy.     PLAN:   1. Patient could be discharged home per oncology point of view.   2. Obtain iron studies using the blood sample in the lab from this morning.   3. We will arrange the patient to come back to see Dr. Alvarez in about 2 weeks for reevaluation.     I discussed with the patient and her  at bedtime. I spent more than 50 minutes in consultation to the patient.      RAMON GALVAN M.D., Ph.D.    3/4/2022

## 2022-03-04 NOTE — PLAN OF CARE
Goal Outcome Evaluation:  Plan of Care Reviewed With: patient        Progress: improving  Outcome Summary: VSS. lap sites x 5 with dermabond CDI.  denies pain. Scheduled tylenol and toradol given.  ambulated iin the lanza.  voiding freely. for discharge today

## 2022-03-04 NOTE — NURSING NOTE
VSS, up ad joy, lap sites x5 CDI w/ dermabond, bruising around sites. Voiding freely, ambulated in halls, bowel movements are loose and brown in color. Oncology saw pt prior to d/c and she is to follow up with their office. Pt. Will follow up w/ Dr. Wellington. Educated on post op care, when to seek help, new medications. Meds via meds to beds.  taking patient home - patient is ready to go home.

## 2022-03-07 ENCOUNTER — TRANSITIONAL CARE MANAGEMENT TELEPHONE ENCOUNTER (OUTPATIENT)
Dept: CALL CENTER | Facility: HOSPITAL | Age: 67
End: 2022-03-07

## 2022-03-07 ENCOUNTER — TELEPHONE (OUTPATIENT)
Dept: ONCOLOGY | Facility: CLINIC | Age: 67
End: 2022-03-07

## 2022-03-07 NOTE — OUTREACH NOTE
Call Center TCM Note    Flowsheet Row Responses   [facility] patient discharged from? [place]   Does the patient have one of the following disease processes/diagnoses(primary or secondary)? General Surgery   TCM attempt successful? Yes   Call start time 1120   Call end time 1137   Discharge diagnosis Robotic assisted laparoscopic right hemicolectomy   Is patient permission given to speak with other caregiver? Yes   List who call center can speak with spouse, Alonso Lucille   Person spoke with today (if not patient) and relationship patient and spouse   Meds reviewed with patient/caregiver? Yes   Does the patient have all medications related to this admission filled (includes all antibiotics, pain medications, etc.) Yes   Is the patient taking all medications as directed (includes completed medication regime)? Yes   Does the patient have a follow up appointment scheduled with their surgeon? Yes  [3/18/22]   Has the patient kept scheduled appointments due by today? N/A   Comments PCP Dr Rivas. Patient has previously scheduled appt in PCP office on 4/25/22. Patient declines earlier appt at this time. Patient to call PCP office with any needs or message thru My Chart.    Has home health visited the patient within 72 hours of discharge? N/A   Psychosocial issues? No   Did the patient receive a copy of their discharge instructions? Yes   Nursing interventions Reviewed instructions with patient   What is the patient's perception of their health status since discharge? Improving   Nursing interventions Nurse provided patient education   Is the patient /caregiver able to teach back basic post-op care? Practice 'cough and deep breath', Continue use of incentive spirometry at least 1 week post discharge, No tub bath, swimming, or hot tub until instructed by MD, Keep incision areas clean,dry and protected, Lifting as instructed by MD in discharge instructions   Is the patient/caregiver able to teach back signs and  symptoms of incisional infection? Increased redness, swelling or pain at the incisonal site, Increased drainage or bleeding, Fever   Is the patient/caregiver able to teach back steps to recovery at home? Eat a well-balance diet, Rest and rebuild strength, gradually increase activity   If the patient is a current smoker, are they able to teach back resources for cessation? Not a smoker   Is the patient/caregiver able to teach back the hierarchy of who to call/visit for symptoms/problems? PCP, Specialist, Home health nurse, Urgent Care, ED, 911 Yes   TCM call completed? Yes          Luiza Owen RN    3/7/2022, 11:37 EST

## 2022-03-07 NOTE — OUTREACH NOTE
Call Center TCM Note    Flowsheet Row Responses   [facility] [place] patient discharged from? [place]   Does the patient have one of the following disease processes/diagnoses(primary or secondary)? General Surgery   TCM attempt successful? No  [attempted patient and spouse #]   Unsuccessful attempts Attempt 1          Luiza Owen RN    3/7/2022, 10:13 EST

## 2022-03-07 NOTE — TELEPHONE ENCOUNTER
----- Message from Jessica Burton MD PhD sent at 3/4/2022  4:47 PM EST -----  Regarding: Appointment  Please arrange patient to see Dr. Alvarez in 2 weeks, possibly [facility], she just been diagnosed of colon cancer, status post right hemicolectomy on 3/2/2022.  Had positive lymph nodes.    Patient was previous seen by Dr. Alvarez and DALE Hoang last year.    Thank you very much!    Micheal

## 2022-03-07 NOTE — PROGRESS NOTES
Case Management Discharge Note      Final Note: Discharged home. Sera Bassett RN    Provided Post Acute Provider List?: N/A  N/A Provider List Comment: Denies needs. Plan is home    Transportation Services  Private: Car    Final Discharge Disposition Code: 01 - home or self-care

## 2022-03-09 LAB
LAB AP CASE REPORT: NORMAL
LAB AP DIAGNOSIS COMMENT: NORMAL
LAB AP SYNOPTIC CHECKLIST: NORMAL
Lab: NORMAL
PATH REPORT.ADDENDUM SPEC: NORMAL
PATH REPORT.FINAL DX SPEC: NORMAL
PATH REPORT.GROSS SPEC: NORMAL

## 2022-03-10 NOTE — PAYOR COMM NOTE
"Kayla Byers (66 y.o. Female)     DC SUMMARY FOR [id number]                Date of Birth   1955    Social Security Number       Address   [address] [id number]    Home Phone   [phone]    MRN   [id number]       [employer]   [employer]    Marital Status                               Admission Date   3/2/22    Admission Type   Elective    Admitting Provider   Malcolm Wellington MD    Attending Provider       Department, Room/Bed   [address], P684/1       Discharge Date   3/4/2022    Discharge Disposition   Home or Self Care    Discharge Destination                               Attending Provider: (none)   Allergies: Penicillins    Isolation: None   Infection: None   Code Status: Prior   Advance Care Planning Activity    Ht: 157.5 cm (62\")   Wt: 75.3 kg (166 lb 0.1 oz)    Admission Cmt: None   Principal Problem: Colonic mass [K63.89]                 Active Insurance as of 3/2/2022     Primary Coverage     Payor Plan Insurance Group Employer/Plan Group    [facility]IC[facility]      Payor Plan Address Payor Plan Phone Number Payor Plan Fax Number Effective Dates    [address] [address] [phone]  11/1/2021 - None Entered    [facility] [address]       Subscriber Name Subscriber Birth Date Member ID       KAYLA BYERS [id number]                 Emergency Contacts      (Rel.) Home Phone Work Phone Mobile Phone    Alonso Byers (Spouse) -- -- [phone]               Discharge Summary      Malcolm Wellington MD at 03/04/22 1340          Admission date: 3/2/2022   Discharge date: 3/4/2022    Admission diagnosis: Colonic mass [K63.89]     Discharge diagnosis: Invasive mucinous adenocarcinoma     Procedure: Robotic assisted laparoscopic right hemicolectomy    Hospital course: Patient was admitted to the hospital after undergoing robotic right " hemicolectomy.  Her postoperative course was uneventful.  She was tolerating clear liquid diet on postoperative day 1 and the diet was advanced to regular diet she tolerated without any problem.  Pain was well controlled.  She had her Lin catheter removed and voided without any problem.  Postoperative day 2 she was doing great, tolerating diet and having bowel function.  Pain was well controlled.  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  Medical oncology was consulted.  Patient was discharged home in stable condition without any problem     Meds:      Your medication list      START taking these medications      Instructions Last Dose Given Next Dose Due   celecoxib 200 MG capsule  Commonly known as: CeleBREX  Start taking on: March 5, 2022      Take 1 capsule by mouth Daily.       ondansetron 4 MG tablet  Commonly known as: ZOFRAN      Take 1 tablet by mouth Every 6 (Six) Hours As Needed for Nausea or Vomiting.       pregabalin 75 MG capsule  Commonly known as: LYRICA  Start taking on: March 5, 2022      Take 1 capsule by mouth Daily.          CHANGE how you take these medications      Instructions Last Dose Given Next Dose Due   metoprolol succinate XL 50 MG 24 hr tablet  Commonly known as: TOPROL-XL  What changed: when to take this      Take 1 tablet by mouth Daily.       omeprazole 20 MG capsule  Commonly known as: priLOSEC  What changed: when to take this      Take 1 capsule by mouth Daily.          CONTINUE taking these medications      Instructions Last Dose Given Next Dose Due   aspirin-acetaminophen-caffeine 250-250-65 MG per tablet  Commonly known as: EXCEDRIN MIGRAINE      Take 1 tablet by mouth Every 6 (Six) Hours As Needed for Headache.       atorvastatin 10 MG tablet  Commonly known as: LIPITOR      Take 1 tablet by mouth Every Night.       DULoxetine 60 MG capsule  Commonly known as: CYMBALTA      Take 1 capsule by  mouth Every Night.       IMODIUM A-D PO      Take  by mouth As Needed.       Slow Fe 142 (45 Fe) MG tablet controlled-release  Generic drug: Ferrous Sulfate ER      Take 142 mg by mouth Daily.          STOP taking these medications    Chlorhexidine Gluconate Cloth 2 % pads              Where to Get Your Medications      These medications were sent to [facility] - [address] [address]    Hours: 7:00 AM-6:00 PM Mon-Fri, 8:00 AM-4:30 PM Sat-Sun (Closed 12-12:30PM) Phone: [phone]   · celecoxib 200 MG capsule  · ondansetron 4 MG tablet  · pregabalin 75 MG capsule         Instructions:    - No heavy lifting of more than 15 lb for 6 weeks. Can start doing aerobic type exercise in 4 weeks.   - No driving while taking pain medications  - Take medications as prescribed.   - Can shower, no bath tub    Follow up: Dr. Wellington in 2 weeks, call for appointment   Follow-up with Dr. Burton as instructed     Malcolm Wellington MD  General, Minimally Invasive and Endoscopic Surgery  [facility] [place], operated by [facility]      Electronically signed by Malcolm Wellington MD at 03/04/22 2497

## 2022-03-10 NOTE — PAYOR COMM NOTE
"Kayla Byers (66 y.o. Female)     DC SUMMARY FOR [id number]              Date of Birth   1955    Social Security Number       Address   [address] [id number]    Home Phone   [phone]    MRN   [id number]       [employer]   [employer]    Marital Status                               Admission Date   3/2/22    Admission Type   Elective    Admitting Provider   Malcolm Wellington MD    Attending Provider       Department, Room/Bed   [address], P684/1       Discharge Date   3/4/2022    Discharge Disposition   Home or Self Care    Discharge Destination                               Attending Provider: (none)   Allergies: Penicillins    Isolation: None   Infection: None   Code Status: Prior   Advance Care Planning Activity    Ht: 157.5 cm (62\")   Wt: 75.3 kg (166 lb 0.1 oz)    Admission Cmt: None   Principal Problem: Colonic mass [K63.89]                 Active Insurance as of 3/2/2022     Primary Coverage     Payor Plan Insurance Group Employer/Plan Group    [facility]      Payor Plan Address Payor Plan Phone Number Payor Plan Fax Number Effective Dates    [address] [address] [phone]  11/1/2021 - None Entered    [facility] [id number]       Subscriber Name Subscriber Birth Date Member ID       KAYLA BYERS 1955 [id number]                 Emergency Contacts      (Rel.) Home Phone Work Phone Mobile Phone    Alonso Byers (Spouse) -- -- [phone]               Discharge Summary      Malcolm Wellington MD at 03/04/22 1340          Admission date: 3/2/2022   Discharge date: 3/4/2022    Admission diagnosis: Colonic mass [K63.89]     Discharge diagnosis: Invasive mucinous adenocarcinoma     Procedure: Robotic assisted laparoscopic right hemicolectomy    Hospital course: Patient was admitted to the hospital after undergoing robotic right " hemicolectomy.  Her postoperative course was uneventful.  She was tolerating clear liquid diet on postoperative day 1 and the diet was advanced to regular diet she tolerated without any problem.  Pain was well controlled.  She had her Lin catheter removed and voided without any problem.  Postoperative day 2 she was doing great, tolerating diet and having bowel function.  Pain was well controlled.  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  Medical oncology was consulted.  Patient was discharged home in stable condition without any problem     Meds:      Your medication list      START taking these medications      Instructions Last Dose Given Next Dose Due   celecoxib 200 MG capsule  Commonly known as: CeleBREX  Start taking on: March 5, 2022      Take 1 capsule by mouth Daily.       ondansetron 4 MG tablet  Commonly known as: ZOFRAN      Take 1 tablet by mouth Every 6 (Six) Hours As Needed for Nausea or Vomiting.       pregabalin 75 MG capsule  Commonly known as: LYRICA  Start taking on: March 5, 2022      Take 1 capsule by mouth Daily.          CHANGE how you take these medications      Instructions Last Dose Given Next Dose Due   metoprolol succinate XL 50 MG 24 hr tablet  Commonly known as: TOPROL-XL  What changed: when to take this      Take 1 tablet by mouth Daily.       omeprazole 20 MG capsule  Commonly known as: priLOSEC  What changed: when to take this      Take 1 capsule by mouth Daily.          CONTINUE taking these medications      Instructions Last Dose Given Next Dose Due   aspirin-acetaminophen-caffeine 250-250-65 MG per tablet  Commonly known as: EXCEDRIN MIGRAINE      Take 1 tablet by mouth Every 6 (Six) Hours As Needed for Headache.       atorvastatin 10 MG tablet  Commonly known as: LIPITOR      Take 1 tablet by mouth Every Night.       DULoxetine 60 MG capsule  Commonly known as: CYMBALTA      Take 1 capsule by  mouth Every Night.       IMODIUM A-D PO      Take  by mouth As Needed.       Slow Fe 142 (45 Fe) MG tablet controlled-release  Generic drug: Ferrous Sulfate ER      Take 142 mg by mouth Daily.          STOP taking these medications    Chlorhexidine Gluconate Cloth 2 % pads              Where to Get Your Medications      These medications were sent to [facility] - [address] [address]    Hours: 7:00 AM-6:00 PM Mon-Fri, 8:00 AM-4:30 PM Sat-Sun (Closed 12-12:30PM) Phone: [phone]   · celecoxib 200 MG capsule  · ondansetron 4 MG tablet  · pregabalin 75 MG capsule         Instructions:    - No heavy lifting of more than 15 lb for 6 weeks. Can start doing aerobic type exercise in 4 weeks.   - No driving while taking pain medications  - Take medications as prescribed.   - Can shower, no bath tub    Follow up: Dr. Wellington in 2 weeks, call for appointment   Follow-up with Dr. Burton as instructed     Malcolm Wellington MD  General, Minimally Invasive and Endoscopic Surgery  [facility]      Electronically signed by Malcolm Wellington MD at 03/04/22 1186

## 2022-03-18 ENCOUNTER — OFFICE VISIT (OUTPATIENT)
Dept: SURGERY | Facility: CLINIC | Age: 67
End: 2022-03-18

## 2022-03-18 DIAGNOSIS — C18.2 MALIGNANT NEOPLASM OF ASCENDING COLON: Primary | ICD-10-CM

## 2022-03-18 PROCEDURE — 99024 POSTOP FOLLOW-UP VISIT: CPT | Performed by: SURGERY

## 2022-03-18 NOTE — PROGRESS NOTES
CHIEF COMPLAINT:   Chief Complaint   Patient presents with   • Post-op     Robotic assisted laparoscopic right hemicolectomy 3/2/22       HISTORY OF PRESENT ILLNESS:  This is a 66 y.o. female who presents for a post-operative visit after undergoing a robotic assisted laparoscopic right hemicolectomy on 3/2/2022     Patient reports she is doing well. Eating well without any significant nausea. Having good bowel function. No problems with constipation or diarrhea. No urinary complaints. Denies fever. Ambulating well and slowly returning to normal activities.    Pathology:   Final Diagnosis   1. Terminal Ileum, Cecum, Appendix and Ascending Colon, Right Hemicolectomy: INVASIVE MODERATELY       MUCINOUS ADENOCARCINOMA.               A. Tumor site: Cecum.               B. Tumor size: 3.8 cm.               C. Tumor extent: tumor invades through the muscularis propria into pericolorectal adipose tissue.               D. No lymphovascular nor perineural invasion identified.               E. All margins are free of carcinoma.               F. Tumor comes to within 3 cm of the radial margin.               G. Additional findings: Benign appendix with attached benign mesothelial cyst.               H. Lymph nodes: Three of twenty-six lymph nodes, positive for metastatic carcinoma (3/26).                            1. The largest metastatic focus measures 0.7 cm without extranodal extension.                I. No tumor deposits identified.               J. Pathologic stage: pT3,N1b.     swm/pkm    Electronically signed by Sera Sanchez MD on 3/4/2022 at 1304   Synoptic Checklist     COLON AND RECTUM: Resection, Including Transanal Disk Excision of Rectal Neoplasms  8th Edition - Protocol posted: 12/17/2021  COLON AND RECTUM: RESECTION - All Specimens  SPECIMEN   Procedure  Right hemicolectomy    TUMOR   Tumor Site  Cecum    Histologic Type  Adenocarcinoma    Histologic Grade  G2, moderately differentiated    Tumor Size   Greatest dimension (Centimeters): 3.8 cm   Tumor Extent  Invades through muscularis propria into pericolorectal tissue    Macroscopic Tumor Perforation  Not identified    Lymphovascular Invasion  Not identified    Perineural Invasion  Not identified    Treatment Effect  No known presurgical therapy    MARGINS   Margin Status for Invasive Carcinoma  All margins negative for invasive carcinoma    Closest Margin(s) to Invasive Carcinoma  Radial (circumferential) or mesenteric: 3.0    Margin Status for Non-Invasive Tumor  All margins negative for high-grade dysplasia / intramucosal carcinoma and low-grade dysplasia    REGIONAL LYMPH NODES   Regional Lymph Node Status  Tumor present in regional lymph node(s)    Number of Lymph Nodes with Tumor  3    Number of Lymph Nodes Examined  26    Tumor Deposits  Not identified    PATHOLOGIC STAGE CLASSIFICATION (pTNM, AJCC 8th Edition)   Reporting of pT, pN, and (when applicable) pM categories is based on information available to the pathologist at the time the report is issued. As per the AJCC (Chapter 1, 8th Ed.) it is the managing physician’s responsibility to establish the final pathologic stage based upon all pertinent information, including but potentially not limited to this pathology report.   pT Category  pT3    pN Category  pN1b    .        PHYSICAL EXAM:  Lungs: Clear  Heart:RRR  ABD: Incisions are healing well without any erythema or signs of infection. No hernias  Ext: no significant edema, calves nontender    A/P:  This is a 66 y.o. female who is s/p robotic assisted laparoscopic right hemicolectomy . Tolerating diet and having normal bowel movements.  Interestingly, she has not been having any more diarrhea due to her microscopic colitis.  Patient has stage III colon cancer.  Will need to have chemotherapy.  Discussed with her about the need to place Mediport for chemotherapy.  I explained the procedure to her as well as the risk and benefits including bleeding,  infection, pneumo and hemothorax.  She verbalized understanding    Pathology report reviewed and related to the patient    I advised the patient to continue to refrain from doing any heavy lifting of more than 15 pounds for a total of 6 weeks.  Patient may start doing an aerobic type exercise in 4 weeks after surgery.    Patient was given time to ask questions and all of them were answered appropriately.  The patient verbalized understanding and agreed with the plan.    Patient will follow-up at our office on a prn basis unless there are any problems.    Plan for Mediport placement, surgical scheduling started    Malcolm Wellington MD  General, Minimally Invasive and Endoscopic Surgery  [facility]    [address], [address]  [place], [place], [address]  P: [phone]  F: [phone]

## 2022-03-24 ENCOUNTER — LAB (OUTPATIENT)
Dept: OTHER | Facility: HOSPITAL | Age: 67
End: 2022-03-24

## 2022-03-24 ENCOUNTER — OFFICE VISIT (OUTPATIENT)
Dept: ONCOLOGY | Facility: CLINIC | Age: 67
End: 2022-03-24

## 2022-03-24 VITALS
SYSTOLIC BLOOD PRESSURE: 146 MMHG | HEART RATE: 97 BPM | RESPIRATION RATE: 16 BRPM | HEIGHT: 62 IN | DIASTOLIC BLOOD PRESSURE: 83 MMHG | WEIGHT: 171.4 LBS | TEMPERATURE: 97.5 F | OXYGEN SATURATION: 97 % | BODY MASS INDEX: 31.54 KG/M2

## 2022-03-24 DIAGNOSIS — C18.2 MALIGNANT NEOPLASM OF ASCENDING COLON: Primary | ICD-10-CM

## 2022-03-24 DIAGNOSIS — C18.2 MALIGNANT NEOPLASM OF ASCENDING COLON: ICD-10-CM

## 2022-03-24 PROBLEM — Z45.2 ENCOUNTER FOR FITTING AND ADJUSTMENT OF VASCULAR CATHETER: Status: ACTIVE | Noted: 2022-03-24

## 2022-03-24 PROBLEM — K90.9 IRON MALABSORPTION: Status: ACTIVE | Noted: 2022-03-24

## 2022-03-24 LAB
ALBUMIN SERPL-MCNC: 4 G/DL (ref 3.5–5.2)
ALBUMIN/GLOB SERPL: 1.7 G/DL
ALP SERPL-CCNC: 154 U/L (ref 39–117)
ALT SERPL W P-5'-P-CCNC: 19 U/L (ref 1–33)
ANION GAP SERPL CALCULATED.3IONS-SCNC: 10.6 MMOL/L (ref 5–15)
AST SERPL-CCNC: 16 U/L (ref 1–32)
BASOPHILS # BLD MANUAL: 0.06 10*3/MM3 (ref 0–0.2)
BASOPHILS NFR BLD MANUAL: 2 % (ref 0–1.5)
BILIRUB SERPL-MCNC: <0.2 MG/DL (ref 0–1.2)
BUN SERPL-MCNC: 12 MG/DL (ref 8–23)
BUN/CREAT SERPL: 16.2 (ref 7–25)
CALCIUM SPEC-SCNC: 9.3 MG/DL (ref 8.6–10.5)
CHLORIDE SERPL-SCNC: 105 MMOL/L (ref 98–107)
CO2 SERPL-SCNC: 26.4 MMOL/L (ref 22–29)
CREAT SERPL-MCNC: 0.74 MG/DL (ref 0.57–1)
DEPRECATED RDW RBC AUTO: 41.2 FL (ref 37–54)
EGFRCR SERPLBLD CKD-EPI 2021: 89.9 ML/MIN/1.73
EOSINOPHIL # BLD MANUAL: 0.28 10*3/MM3 (ref 0–0.4)
EOSINOPHIL NFR BLD MANUAL: 9 % (ref 0.3–6.2)
ERYTHROCYTE [DISTWIDTH] IN BLOOD BY AUTOMATED COUNT: 14.4 % (ref 12.3–15.4)
GLOBULIN UR ELPH-MCNC: 2.3 GM/DL
GLUCOSE SERPL-MCNC: 102 MG/DL (ref 65–99)
HCT VFR BLD AUTO: 34 % (ref 34–46.6)
HGB BLD-MCNC: 10.2 G/DL (ref 12–15.9)
HYPOCHROMIA BLD QL: ABNORMAL
LYMPHOCYTES # BLD MANUAL: 1.22 10*3/MM3 (ref 0.7–3.1)
LYMPHOCYTES NFR BLD MANUAL: 12 % (ref 5–12)
MCH RBC QN AUTO: 23.8 PG (ref 26.6–33)
MCHC RBC AUTO-ENTMCNC: 30 G/DL (ref 31.5–35.7)
MCV RBC AUTO: 79.3 FL (ref 79–97)
MONOCYTES # BLD: 0.38 10*3/MM3 (ref 0.1–0.9)
NEUTROPHILS # BLD AUTO: 1.19 10*3/MM3 (ref 1.7–7)
NEUTROPHILS NFR BLD MANUAL: 38 % (ref 42.7–76)
PLAT MORPH BLD: NORMAL
PLATELET # BLD AUTO: 315 10*3/MM3 (ref 140–450)
PMV BLD AUTO: 8.7 FL (ref 6–12)
POTASSIUM SERPL-SCNC: 4.3 MMOL/L (ref 3.5–5.2)
PROT SERPL-MCNC: 6.3 G/DL (ref 6–8.5)
RBC # BLD AUTO: 4.29 10*6/MM3 (ref 3.77–5.28)
SCAN SLIDE: NORMAL
SODIUM SERPL-SCNC: 142 MMOL/L (ref 136–145)
VARIANT LYMPHS NFR BLD MANUAL: 1 % (ref 0–5)
VARIANT LYMPHS NFR BLD MANUAL: 38 % (ref 19.6–45.3)
WBC MORPH BLD: NORMAL
WBC NRBC COR # BLD: 3.13 10*3/MM3 (ref 3.4–10.8)

## 2022-03-24 PROCEDURE — 80053 COMPREHEN METABOLIC PANEL: CPT | Performed by: INTERNAL MEDICINE

## 2022-03-24 PROCEDURE — 85007 BL SMEAR W/DIFF WBC COUNT: CPT | Performed by: INTERNAL MEDICINE

## 2022-03-24 PROCEDURE — 85025 COMPLETE CBC W/AUTO DIFF WBC: CPT | Performed by: INTERNAL MEDICINE

## 2022-03-24 PROCEDURE — G2212 PROLONG OUTPT/OFFICE VIS: HCPCS | Performed by: INTERNAL MEDICINE

## 2022-03-24 PROCEDURE — 99215 OFFICE O/P EST HI 40 MIN: CPT | Performed by: INTERNAL MEDICINE

## 2022-03-24 PROCEDURE — 36415 COLL VENOUS BLD VENIPUNCTURE: CPT

## 2022-03-24 RX ORDER — SODIUM CHLORIDE 9 MG/ML
250 INJECTION, SOLUTION INTRAVENOUS ONCE
Status: CANCELLED | OUTPATIENT
Start: 2022-04-07

## 2022-03-24 RX ORDER — ACETAMINOPHEN 325 MG/1
650 TABLET ORAL ONCE
Status: CANCELLED | OUTPATIENT
Start: 2022-03-31

## 2022-03-24 RX ORDER — ACETAMINOPHEN 325 MG/1
650 TABLET ORAL ONCE
Status: CANCELLED | OUTPATIENT
Start: 2022-04-14

## 2022-03-24 RX ORDER — DIPHENHYDRAMINE HCL 25 MG
25 CAPSULE ORAL ONCE
Status: CANCELLED | OUTPATIENT
Start: 2022-04-07

## 2022-03-24 RX ORDER — HEPARIN SODIUM (PORCINE) LOCK FLUSH IV SOLN 100 UNIT/ML 100 UNIT/ML
500 SOLUTION INTRAVENOUS AS NEEDED
Status: CANCELLED | OUTPATIENT
Start: 2022-03-24

## 2022-03-24 RX ORDER — SODIUM CHLORIDE 9 MG/ML
250 INJECTION, SOLUTION INTRAVENOUS ONCE
Status: CANCELLED | OUTPATIENT
Start: 2022-04-14

## 2022-03-24 RX ORDER — SODIUM CHLORIDE 0.9 % (FLUSH) 0.9 %
10 SYRINGE (ML) INJECTION AS NEEDED
Status: CANCELLED | OUTPATIENT
Start: 2022-03-24

## 2022-03-24 RX ORDER — ACETAMINOPHEN 325 MG/1
650 TABLET ORAL ONCE
Status: CANCELLED | OUTPATIENT
Start: 2022-04-07

## 2022-03-24 RX ORDER — DIPHENHYDRAMINE HCL 25 MG
25 CAPSULE ORAL ONCE
Status: CANCELLED | OUTPATIENT
Start: 2022-04-14

## 2022-03-24 RX ORDER — SODIUM CHLORIDE 9 MG/ML
250 INJECTION, SOLUTION INTRAVENOUS ONCE
Status: CANCELLED | OUTPATIENT
Start: 2022-03-31

## 2022-03-24 RX ORDER — DIPHENHYDRAMINE HCL 25 MG
25 CAPSULE ORAL ONCE
Status: CANCELLED | OUTPATIENT
Start: 2022-03-31

## 2022-03-24 NOTE — PROGRESS NOTES
Subjective   Kayla Adkins is a 66 y.o. female.  Referred by Dr. Rivas for evaluation of leukopenia    History of Present Illness   Ms. Adkins is a 65-year-old postmenopausal  who has been referred by Dr. Rivas for evaluation of leukopenia, neutropenia.  Patient reports that she has had longstanding leukopenia and her white blood cell count normally ranges in the threes.  On her most recent visit with Dr. Rivas white blood cell count was noted to be 2560 on 3/2/2021 and 2660 on 4/5/2021.  This prompted a referral to hematology.  Patient reports that she had a extensive work-up including a bone marrow biopsy for the same condition about 17 years ago.  She thinks that this was performed at [facility].  According to patient no clear etiology was determined after the work-up.  She was recommended to continue with periodic labs.  No treatment was required.    She was seen in December and had continued on oral iron.  She was tolerating that fairly well.  However she had worsening of colitis for about 2 months prior to that visit.  She was scheduled for colonoscopy in January 2022.    She had a routine surveillance colonoscopy performed by Dr. Whitlock on 2/22/2022.  On this there was an infiltrative nonobstructing medium-sized mass in the proximal ascending colon 1 to 2 cm distal to the cecum.  This was partially circumferential involving 1 foot of the lumen.  Biopsies were taken.  There is also mildly congested mucosa in the entire colon.  Random biopsies were taken.  She also had an EGD at the same time which reported normal esophagus, stomach and duodenum.  Pathology evaluation from the biopsy reported superficial fragments of at least intramucosal carcinoma.  Random colon biopsies reported increased lamina propria, chronic inflammation with surface epithelial alteration and increased intraepithelial lymphocytes consistent with lymphocytic colitis.  Biopsies from the stomach reported chronic  inactive gastritis.  Negative for intestinal metaplasia and H. pylori.  Biopsies from the duodenum was negative.    She was seen by Dr. Wellington who performed a laparoscopic right hemicolectomy on 3/2/2022.  Pathology showed a 3.8 cm mass at the cecum invading through the muscularis propria into the pericolorectal adipose tissue.  No lymphovascular or perineural invasion.  All margins were clear.  The radial margin was 3 cm.  3 out of 26 lymph nodes were positive for metastatic disease.  The largest metastatic focus was 0.7 cm with extranodal extension.  Pathological stage PT3N1B.  The tumor was moderately differentiated, grade 2 adenocarcinoma.    No loss of nuclear expression of MMR proteins.  Low probability of microsatellite instability.  MSI status-microsatellite stable.  This is by PCR.    Normal CEA and 0.77 on the day prior to surgery.    She was seen by Dr. Burtno as inpatient and recommended adjuvant chemotherapy with FOLFOX.    She has recovered well from surgery and the incisions have healed extremely well.  She does report daily headaches for which she was taking Excedrin previously however on discharge she was given Lyrica and Celebrex to help with the pain from the surgery.  This seems to have helped her significantly with the headaches.  She has not had any headaches since discharge.  She also had diarrhea which she thought was secondary to colitis prior to the surgery.  However since surgery she has not had any diarrhea.  She is accompanied by her  to the visit today and both of them have several questions regarding the adjuvant chemotherapy, her medications, prognosis.    The following portions of the patient's history were reviewed and updated as appropriate: allergies, current medications, past family history, past medical history, past social history and problem list.    Past Medical History:   Diagnosis Date   • Anemia    • Anxiety    • Bladder infection    • Colitis    • Colon cancer (HCC)     • GERD (gastroesophageal reflux disease)    • Headache    • High cholesterol    • Hypertension    • Knee injury, initial encounter-left 7/1/2019   • Neutropenia (HCC) 1/3/2017   • Prediabetes 3/4/2019      Past Surgical History:   Procedure Laterality Date   • COLON RESECTION Right 3/2/2022    Procedure: LAPAROSCOPIC RIGHT HEMICOLECTOMY WITH DAVINCI ROBOT;  Surgeon: Malcolm Wellington MD;  Location: [facility] MAIN OR;  Service: Robotics - DaVinci;  Laterality: Right;   • COLONOSCOPY     • COLONOSCOPY W/ POLYPECTOMY N/A 2/22/2022    Procedure: COLONOSCOPY INTO CECUM WITH COLD BIOPSIES;  Surgeon: Irvin Clayton MD;  [facility];  Service: Gastroenterology;  Laterality: N/A;  PRE: DIARRHEA, ANEMIA  POST: ASCENDING COLON MASS   • ENDOSCOPY N/A 2/22/2022    Procedure: ESOPHAGOGASTRODUODENOSCOPY WITH BIOPSY;  Surgeon: Irvin Clayton MD;  Location: [facility] ENDOSCOPY;  Service: Gastroenterology;  Laterality: N/A;  PRE: ANEMIA  POST: NORMAL EGD   • TONSILLECTOMY          Family History   Problem Relation Age of Onset   • Hypertension Mother    • Heart disease Father    • Hypertension Sister    • Malig Hyperthermia Neg Hx       Social History     Socioeconomic History   • Marital status:    Tobacco Use   • Smoking status: Never Smoker   • Smokeless tobacco: Never Used   Vaping Use   • Vaping Use: Never used   Substance and Sexual Activity   • Alcohol use: Yes     Comment: seldom   • Drug use: Never   • Sexual activity: Defer      OB History    No obstetric history on file.          Allergies   Allergen Reactions   • Penicillins Rash      Review of systems as mentioned in the HPI.    Objective   There were no vitals taken for this visit.     Physical Exam  Vitals reviewed.   Constitutional:       Appearance: Normal appearance. She is normal weight.   HENT:      Head: Normocephalic and atraumatic.      Nose: Nose normal.      Mouth/Throat:      Mouth: Mucous membranes are moist.    Eyes:      Extraocular Movements: Extraocular movements intact.      Pupils: Pupils are equal, round, and reactive to light.   Cardiovascular:      Rate and Rhythm: Normal rate.      Heart sounds: Normal heart sounds. No murmur heard.    No gallop.   Pulmonary:      Effort: Pulmonary effort is normal. No respiratory distress.      Breath sounds: Normal breath sounds. No wheezing.   Abdominal:      General: Abdomen is flat. Bowel sounds are normal. There is no distension.      Palpations: Abdomen is soft.      Tenderness: There is no abdominal tenderness.      Comments: Abdominal incisions have healed well.   Musculoskeletal:         General: Normal range of motion.      Cervical back: Normal range of motion.      Right lower leg: No edema.      Left lower leg: No edema.   Skin:     Findings: No bruising or rash.   Neurological:      General: No focal deficit present.      Mental Status: She is alert and oriented to person, place, and time. Mental status is at baseline.   Psychiatric:         Behavior: Behavior normal.       Admission on 03/02/2022, Discharged on 03/04/2022   Component Date Value Ref Range Status   • ABO Type 03/02/2022 A   Final   • RH type 03/02/2022 Positive   Final   • Antibody Screen 03/02/2022 Negative   Final   • T&S Expiration Date 03/02/2022 3/5/2022 11:59:59 PM   Final   • Addendum 2 03/02/2022    Final                    Value:This result contains rich text formatting which cannot be displayed here.   • Addendum 03/02/2022    Final                    Value:This result contains rich text formatting which cannot be displayed here.   • Case Report 03/02/2022    Final                    Value:Surgical Pathology Report                         Case: [id number]                                  Authorizing Provider:  Malcolm Wellington,  Collected:           03/02/2022 03:21 PM                                 MD                                                                            [facility]  Received:            03/02/2022 10:02 PM                                 MAIN OR                                                                      Pathologist:           Sera Sanchez MD                                                    Specimen:    Large Intestine, Right / Ascending Colon, right hemicolectomy                             • Final Diagnosis 03/02/2022    Final                    Value:This result contains rich text formatting which cannot be displayed here.   • Synoptic Checklist 03/02/2022    Final                    Value:COLON AND RECTUM: Resection, Including Transanal Disk Excision of Rectal Neoplasms                            COLON AND RECTUM: RESECTION - All Specimens                            8th Edition - Protocol posted: 12/17/2021                                                        SPECIMEN                               Procedure:    Right hemicolectomy                                                         TUMOR                               Tumor Site:    Cecum                                Histologic Type:    Adenocarcinoma                                Histologic Grade:    G2, moderately differentiated                                Tumor Size:    Greatest dimension (Centimeters): 3.8 cm                               Tumor Extent:    Invades through muscularis propria into pericolorectal tissue                                Macroscopic Tumor Perforation:    Not identified                                Lymphovascular Invasion:    Not identified                                Perineural Invasion:    Not identified                                Treatment Effect:    No known presurgical therapy                                                         MARGINS                               Margin Status for Invasive Carcinoma:    All margins negative for invasive carcinoma                                  Closest  Margin(s) to Invasive Carcinoma:    Radial (circumferential) or mesenteric: 3.0                                Margin Status for Non-Invasive Tumor:    All margins negative for high-grade dysplasia / intramucosal carcinoma and low-grade dysplasia                                                         REGIONAL LYMPH NODES                               Regional Lymph Node Status:                                     :    Tumor present in regional lymph node(s)                                    Number of Lymph Nodes with Tumor:    3                                  Number of Lymph Nodes Examined:    26                                Tumor Deposits:    Not identified                                                         DISTANT METASTASIS                                                        PATHOLOGIC STAGE CLASSIFICATION (pTNM, AJCC 8th Edition)                               Reporting of pT, pN, and (when applicable) pM categories is based on information available to the pathologist at the time the report is issued. As per the AJCC (Chapter 1, 8th Ed.) it is the managing physician’s responsibility to establish the final pathologic stage based upon all pertinent information, including but potentially not limited to this pathology report.                               pT Category:    pT3                                pN Category:    pN1b      • Comment 03/02/2022    Final                    Value:This result contains rich text formatting which cannot be displayed here.   • Gross Description 03/02/2022    Final                    Value:This result contains rich text formatting which cannot be displayed here.   • Glucose 03/03/2022 146 (A) 65 - 99 mg/dL Final   • BUN 03/03/2022 6 (A) 8 - 23 mg/dL Final   • Creatinine 03/03/2022 0.65  0.57 - 1.00 mg/dL Final   • Sodium 03/03/2022 141  136 - 145 mmol/L Final   • Potassium 03/03/2022 4.1  3.5 - 5.2 mmol/L Final   • Chloride 03/03/2022 104  98 - 107 mmol/L Final   • CO2  03/03/2022 24.0  22.0 - 29.0 mmol/L Final   • Calcium 03/03/2022 8.6  8.6 - 10.5 mg/dL Final   • BUN/Creatinine Ratio 03/03/2022 9.2  7.0 - 25.0 Final   • Anion Gap 03/03/2022 13.0  5.0 - 15.0 mmol/L Final   • eGFR 03/03/2022 97.8  >60.0 mL/min/1.73 Final    National Kidney Foundation and American Society of Nephrology (ASN) Task Force recommended calculation based on the Chronic Kidney Disease Epidemiology Collaboration (CKD-EPI) equation refit without adjustment for race.   • WBC 03/03/2022 6.83  3.40 - 10.80 10*3/mm3 Final   • RBC 03/03/2022 4.39  3.77 - 5.28 10*6/mm3 Final   • Hemoglobin 03/03/2022 10.8 (A) 12.0 - 15.9 g/dL Final   • Hematocrit 03/03/2022 34.2  34.0 - 46.6 % Final   • MCV 03/03/2022 77.9 (A) 79.0 - 97.0 fL Final   • MCH 03/03/2022 24.6 (A) 26.6 - 33.0 pg Final   • MCHC 03/03/2022 31.6  31.5 - 35.7 g/dL Final   • RDW 03/03/2022 15.2  12.3 - 15.4 % Final   • RDW-SD 03/03/2022 42.9  37.0 - 54.0 fl Final   • MPV 03/03/2022 8.9  6.0 - 12.0 fL Final   • Platelets 03/03/2022 383  140 - 450 10*3/mm3 Final   • Neutrophil % 03/03/2022 83.1 (A) 42.7 - 76.0 % Final   • Lymphocyte % 03/03/2022 8.3 (A) 19.6 - 45.3 % Final   • Monocyte % 03/03/2022 8.2  5.0 - 12.0 % Final   • Eosinophil % 03/03/2022 0.0 (A) 0.3 - 6.2 % Final   • Basophil % 03/03/2022 0.1  0.0 - 1.5 % Final   • Immature Grans % 03/03/2022 0.3  0.0 - 0.5 % Final   • Neutrophils, Absolute 03/03/2022 5.67  1.70 - 7.00 10*3/mm3 Final   • Lymphocytes, Absolute 03/03/2022 0.57 (A) 0.70 - 3.10 10*3/mm3 Final   • Monocytes, Absolute 03/03/2022 0.56  0.10 - 0.90 10*3/mm3 Final   • Eosinophils, Absolute 03/03/2022 0.00  0.00 - 0.40 10*3/mm3 Final   • Basophils, Absolute 03/03/2022 0.01  0.00 - 0.20 10*3/mm3 Final   • Immature Grans, Absolute 03/03/2022 0.02  0.00 - 0.05 10*3/mm3 Final   • nRBC 03/03/2022 0.0  0.0 - 0.2 /100 WBC Final   • Ferritin 03/04/2022 27.50  13.00 - 150.00 ng/mL Final   • Iron 03/04/2022 19 (A) 37 - 145 mcg/dL Final   • Iron  Saturation 03/04/2022 5 (A) 20 - 50 % Final   • Transferrin 03/04/2022 252  200 - 360 mg/dL Final   • TIBC 03/04/2022 375  298 - 536 mcg/dL Final   Pre-Admission Testing on 03/01/2022   Component Date Value Ref Range Status   • WBC 03/01/2022 2.79 (A) 3.40 - 10.80 10*3/mm3 Final   • RBC 03/01/2022 4.71  3.77 - 5.28 10*6/mm3 Final   • Hemoglobin 03/01/2022 11.4 (A) 12.0 - 15.9 g/dL Final   • Hematocrit 03/01/2022 37.2  34.0 - 46.6 % Final   • MCV 03/01/2022 79.0  79.0 - 97.0 fL Final   • MCH 03/01/2022 24.2 (A) 26.6 - 33.0 pg Final   • MCHC 03/01/2022 30.6 (A) 31.5 - 35.7 g/dL Final   • RDW 03/01/2022 15.6 (A) 12.3 - 15.4 % Final   • RDW-SD 03/01/2022 44.3  37.0 - 54.0 fl Final   • MPV 03/01/2022 8.8  6.0 - 12.0 fL Final   • Platelets 03/01/2022 376  140 - 450 10*3/mm3 Final   • Glucose 03/01/2022 114 (A) 65 - 99 mg/dL Final   • BUN 03/01/2022 13  8 - 23 mg/dL Final   • Creatinine 03/01/2022 0.62  0.57 - 1.00 mg/dL Final   • Sodium 03/01/2022 139  136 - 145 mmol/L Final   • Potassium 03/01/2022 3.9  3.5 - 5.2 mmol/L Final   • Chloride 03/01/2022 105  98 - 107 mmol/L Final   • CO2 03/01/2022 24.0  22.0 - 29.0 mmol/L Final   • Calcium 03/01/2022 8.9  8.6 - 10.5 mg/dL Final   • BUN/Creatinine Ratio 03/01/2022 21.0  7.0 - 25.0 Final   • Anion Gap 03/01/2022 10.0  5.0 - 15.0 mmol/L Final   • eGFR 03/01/2022 99.0  >60.0 mL/min/1.73 Final    National Kidney Foundation and American Society of Nephrology (ASN) Task Force recommended calculation based on the Chronic Kidney Disease Epidemiology Collaboration (CKD-EPI) equation refit without adjustment for race.   • CEA 03/01/2022 0.77  ng/mL Final   • COVID19 03/01/2022 Not Detected  Not Detected - Ref. Range Final   • QT Interval 03/01/2022 377  ms Final   Hospital Outpatient Visit on 02/25/2022   Component Date Value Ref Range Status   • Creatinine 02/25/2022 0.70  0.60 - 1.30 mg/dL Final    Serial Number: [id number]  [id number]        CT Chest With Contrast  Diagnostic    Result Date: 2/25/2022  COMBINED IMPRESSION:  1.  Mild slightly irregular wall thickening of the cecum likely corresponds to the reported mass on recent colonoscopy. Adjacent prominent and mildly enlarged right hemiabdominal/lower quadrant lymph nodes, measuring up to 1.2 cm, are nonspecific. Given the close proximity to the adjacent colonic mass, metastatic disease is not excluded. Recommend further evaluation with PET/CT and/or short-term follow-up imaging. 2.  A few scattered tiny pulmonary nodules are nonspecific. Recommend comparison with prior imaging, if available, and short-term follow-up imaging.  This report was finalized on 2/25/2022 1:38 PM by Dr. Mirian Ac M.D.      CT Abdomen Pelvis With Contrast    Result Date: 2/25/2022  COMBINED IMPRESSION:  1.  Mild slightly irregular wall thickening of the cecum likely corresponds to the reported mass on recent colonoscopy. Adjacent prominent and mildly enlarged right hemiabdominal/lower quadrant lymph nodes, measuring up to 1.2 cm, are nonspecific. Given the close proximity to the adjacent colonic mass, metastatic disease is not excluded. Recommend further evaluation with PET/CT and/or short-term follow-up imaging. 2.  A few scattered tiny pulmonary nodules are nonspecific. Recommend comparison with prior imaging, if available, and short-term follow-up imaging.  This report was finalized on 2/25/2022 1:38 PM by Dr. Mirian Ac M.D.         3/24/2022 CBC-WBC 3.13, hemoglobin 10.2, platelets 315,000  Absolute neutrophil count 1.19  CMP-blood sugar 102, alkaline phosphatase 154 otherwise within normal limits    CT of the chest abdomen pelvis 2/25/2022-images independently reviewed and interpreted by me.  Scattered tiny pulmonary nodules which are nonspecific.  Follow-up imaging recommended.  On CT of the abdomen there is thickening of the cecum which corresponds to the mass on the colonoscopy.  There is adjacent enlarged right lower quadrant  lymph nodes measuring up to 1.2 cm.    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  No lymphovascular or perineural invasion.  All the margins are negative.  Tumor comes to within 3 cm of the radial margin.  3 out of 26 lymph nodes positive for metastatic carcinoma with the largest focus measuring 0.7 cm without extranodal extension.  · No tumor deposits identified  · PT3N1B  · MSI low probability on immunohistochemistry and stable on PCR  · No family history of colon cancer, patient for genetic testing  · We discussed the salient features of pathology at length today.  · Explained to her that this is stage IIIb colon cancer however since his T3N1 I would consider this low risk stage III.  There is no other high risk features noted on pathology either.  · Given that the tumor is stage III there is definite benefit from adjuvant chemotherapy.  · We discussed the 2 available regimens including XELOX and FOLFOX.  · Explained to her that based on the IDEA data 3 months of XELOX is noninferior to 6 months of adjuvant chemotherapy however with FOLFOX the noninferiority has not been established.  Explained to her that 6 months of chemotherapy improves disease-free survival by 2 to 3%.  · Discussed the adverse effects of XELOX as well as FOLFOX.  · After discussing the pros and cons we have decided to proceed with FOLFOX for total of 6 months but if she is unable to tolerate due to neurotoxicity then we could potentially stop after 3 months of treatment.  · Adverse effects of chemotherapy including but not limited to myelosuppression, increased risk of infections, nausea, vomiting, cold sensitivity, diarrhea, mucositis,  alopecia, altered taste, fatigue, cardiotoxicity, neurotoxicity discussed.  · She is scheduled for port placement on 4/11/2022  · I will plan to see her back on 4/14/2022 and start chemotherapy on 4/18/2022.  · She will need Neulasta support due to the pre-existing neutropenia.  · Chemotherapy education will be performed     *Leukopenia  · Predominantly neutropenia  · Mild increase in monocyte count  · This is chronic and unchanged  · No increased infections  · Had a previous work-up including a bone marrow biopsy with no clear etiology  · Differential includes nutritional, chronic idiopathic, autoimmune.  · She is currently not on any medications which could account for the leukopenia.  · No splenomegaly on exam  · 5/13/2021-flow cytometry negative  · LDH normal at 174  · Reticulocyte count 1.07%  · Ferritin low at 5.7  · Iron saturation low at 3%  · Folic acid normal at 11.8, B12 415  · Rheumatoid factor negative  · AVIVA negative  · ESR normal at 29  · Serum copper normal at 134  · 12/16/2021, WBC 3.16 today.  Patient denies frequent infections.  · CBC 3/24/2022 reviewed and WBC count 3.13, absolute neutrophil count 1.19, hemoglobin 10.2, platelets 315,000  · Given pre-existing neutropenia she will be receiving Neulasta with each cycle of FOLFOX.    *Anemia  · Microcytic  · Likely secondary to iron deficiency  · 4/22/2021 iron saturation 3%, TIBC today 508, ferritin 5.70.  The patient was initiated on oral iron every other day.  · 8/26/2021, hemoglobin today 10.1.  The patient has been tolerating oral iron every other day.  We will increase oral iron to daily and recheck iron labs in 4 months.  · 12/16/2021, patient is taking oral iron every day and tolerating this well.  Hemoglobin today 10.0.  Iron saturation 45, TIBC 413, ferritin 10.20.  · 3/4/2022 ferritin 27.5, iron saturation 5%, TIBC 375, iron studies consistent with iron deficiency  · 3/24/2022 hemoglobin 10.2  · We will administer IV iron once approved to  help with improvement in anemia    *Wasqslwvu-ol-bs-date on screening mammograms , she is due for mammogram in April 2022.    Start chemotherapy education    *Headaches  · They have been significantly better since discharge from the hospital.  · This could be because of Lyrica or Celebrex.  · Explained to her that if the headaches recur we would be willing to prescribe Lyrica    *Colitis  · Lymphocytic colitis noted on the biopsy of the colon  · There is a flare of colitis we may have to consider using steroids  · Currently she does not have any diarrhea  · Explained to her that 5-FU can increase the risk of diarrhea.  · We will have her follow-up with GI    PLAN:   · Adjuvant FOLFOX  · Port placement  · Chemotherapy education  · IV iron once approved    75 minutes spent on the encounter including reviewing the scans, reviewing the medical records, face-to-face time, documentation of the same day.    Mago Alvarez MD

## 2022-03-28 ENCOUNTER — TELEPHONE (OUTPATIENT)
Dept: ONCOLOGY | Facility: CLINIC | Age: 67
End: 2022-03-28

## 2022-03-28 NOTE — TELEPHONE ENCOUNTER
Caller: Kayla Adkins    Relationship: Self    Best call back number: [phone]    What was the call regarding: PATIENT RECEIVED A CALL OVER THE WEEKEND THAT HER INSURANCE IS GOING TO COVER HER IRON INFUSIONS AND SHE IS WANTING TO GET THOSE SCHEDULED     Do you require a callback: YES

## 2022-03-28 NOTE — TELEPHONE ENCOUNTER
----- Message from Crystal Flores RN sent at 3/28/2022 12:02 PM EDT -----  Please schedule venofer, 3 doses.  She has some upcoming appts already scheduled, would be ok to coordinate with those    Thank you!    ----- Message -----  From: Isabel Anderson RN  Sent: 3/28/2022  11:48 AM EDT  To: Crystal Flores RN    I just received the PA for Venofer 300 mg weekly x 3 valid 3/25/22 - 5/1/22 and she can be scheduled.    Thank you

## 2022-03-29 ENCOUNTER — INFUSION (OUTPATIENT)
Dept: ONCOLOGY | Facility: HOSPITAL | Age: 67
End: 2022-03-29

## 2022-03-29 VITALS
HEIGHT: 61 IN | RESPIRATION RATE: 18 BRPM | BODY MASS INDEX: 32.51 KG/M2 | OXYGEN SATURATION: 97 % | WEIGHT: 172.2 LBS | DIASTOLIC BLOOD PRESSURE: 72 MMHG | HEART RATE: 88 BPM | TEMPERATURE: 96.6 F | SYSTOLIC BLOOD PRESSURE: 113 MMHG

## 2022-03-29 DIAGNOSIS — D50.8 OTHER IRON DEFICIENCY ANEMIA: ICD-10-CM

## 2022-03-29 DIAGNOSIS — K90.9 IRON MALABSORPTION: Primary | ICD-10-CM

## 2022-03-29 PROCEDURE — 63710000001 DIPHENHYDRAMINE PER 50 MG: Performed by: INTERNAL MEDICINE

## 2022-03-29 PROCEDURE — 25010000002 IRON SUCROSE PER 1 MG: Performed by: INTERNAL MEDICINE

## 2022-03-29 PROCEDURE — 96365 THER/PROPH/DIAG IV INF INIT: CPT

## 2022-03-29 RX ORDER — DIPHENHYDRAMINE HCL 25 MG
25 CAPSULE ORAL ONCE
Status: COMPLETED | OUTPATIENT
Start: 2022-03-29 | End: 2022-03-29

## 2022-03-29 RX ORDER — ACETAMINOPHEN 325 MG/1
650 TABLET ORAL ONCE
Status: COMPLETED | OUTPATIENT
Start: 2022-03-29 | End: 2022-03-29

## 2022-03-29 RX ORDER — SODIUM CHLORIDE 9 MG/ML
250 INJECTION, SOLUTION INTRAVENOUS ONCE
Status: COMPLETED | OUTPATIENT
Start: 2022-03-29 | End: 2022-03-29

## 2022-03-29 RX ADMIN — IRON SUCROSE 300 MG: 20 INJECTION, SOLUTION INTRAVENOUS at 10:59

## 2022-03-29 RX ADMIN — DIPHENHYDRAMINE HYDROCHLORIDE 25 MG: 25 CAPSULE ORAL at 10:30

## 2022-03-29 RX ADMIN — ACETAMINOPHEN 650 MG: 325 TABLET ORAL at 10:30

## 2022-03-29 RX ADMIN — SODIUM CHLORIDE 250 ML: 9 INJECTION, SOLUTION INTRAVENOUS at 10:29

## 2022-03-31 ENCOUNTER — APPOINTMENT (OUTPATIENT)
Dept: OTHER | Facility: HOSPITAL | Age: 67
End: 2022-03-31

## 2022-04-01 ENCOUNTER — TELEPHONE (OUTPATIENT)
Dept: ONCOLOGY | Facility: CLINIC | Age: 67
End: 2022-04-01

## 2022-04-01 NOTE — TELEPHONE ENCOUNTER
"Clinical Case Management/[facility]:  Telephone    Patient is a 66 year old woman with recent diagnosis of malignant neoplasm of ascending colon stage IIIB, per chart review. Patient was seen by Dr. Alvarez on 3/24/2022 and she completed the NCCN Distress Thermometer and Problems List for Patient on which she scored 6/10 and marked fear as her concern.     OSW called patient to introduce self/role, assess for needs, and provide support. Per patient, she and her  are retired, they have a strong support system, and they have the \"convenience of living close\" to [facility]. Patient reported that the fear she was experiencing improves each time she gains understanding of her diagnosis, disease process, and treatment.     Patient accepted OSW's direct contact information should needs arise. OSW to remain available and plans to meet patient for a face-to-face introduction when patient has a Monday or Friday appointment at [facility]. Patient expressed gratitude for this call.     MAGGIE Khan, CSW  Oncology Social Worker   [facility]    "

## 2022-04-04 NOTE — PROGRESS NOTES
TREATMENT  PREPARATION    Kayla Adkins  [id number]  1955    Chief Complaint: Treatment preparation and needs assessment    History of present illness:  Kayla Adkins is a 66 y.o. year old female who is here today for treatment preparation and needs assessment.  The patient has been diagnosed with   Encounter Diagnoses   Name Primary?   • Malignant neoplasm of ascending colon (HCC) Yes   • Colonic mass     and is scheduled to begin treatment with:     Oncology History:    Oncology/Hematology History   Malignant neoplasm of ascending colon (HCC)   3/4/2022 Initial Diagnosis    Malignant neoplasm of ascending colon (HCC)     3/4/2022 Cancer Staged    Staging form: Colon And Rectum, AJCC 8th Edition  - Clinical stage from 3/4/2022: Stage IIIB (cT3, cN1b, cM0) - Signed by Jessica Burton MD PhD on 3/4/2022     4/18/2022 -  Chemotherapy    OP COLON mFOLFOX6 OXALIplatin / Leucovorin / Fluorouracil         The current medication list and allergy list were reviewed and reconciled.     Past Medical History, Past Surgical History, Social History, Family History have been reviewed and are without significant changes except as mentioned.    Physical Exam:    Vitals:    04/05/22 0927   BP: 111/76   Pulse: 96   Resp: 18   Temp: 98.5 °F (36.9 °C)   SpO2: 96%     Vitals:    04/05/22 0927   PainSc: 0-No pain        ECOG score: 0             Physical Exam  HENT:      Head: Normocephalic and atraumatic.   Eyes:      Extraocular Movements: Extraocular movements intact.      Conjunctiva/sclera: Conjunctivae normal.   Pulmonary:      Effort: Pulmonary effort is normal. No respiratory distress.   Neurological:      General: No focal deficit present.      Mental Status: She is alert and oriented to person, place, and time.   Psychiatric:         Mood and Affect: Mood normal.         Behavior: Behavior normal.           NEEDS ASSESSMENTS    Genetics  The patient's new diagnosis and family history have been reviewed for genetic  counseling needs. A genetic referral is recommended.     Psychosocial and Barriers to care1-4 (Minimal Depression)  The patient has completed a PHQ-9 Depression Screening and the Distress Thermometer (DT) today.  PHQ-9 results show PHQ-2 Total Score: 0 PHQ-9 Total Score: PHQ-9 Total Score: 0     The patient scored their distress today as Distress Level: 4 on a scale of 0-10 with 0 being no distress and 10 being extreme distress. Problems marked by the patient as being an issue for them within the last week include Emotional concerns  Worry: Yes (pt marked worry and anxiety) .      Results were reviewed along with psychosocial resources offered by our cancer center.  Our Supportive Oncology team will be flagged for a score of 4 or above, and a same day call will be made for a score of 9 or 10.  A mental health referral is offered at that time. Patients who score less than 4 have been educated on our support services and can be referred to our  upon request.  The patient will not be referred to ur .       Nutrition  The patient has completed the malnutrition screening today. They scored Malnutrition Screening Tool  Have you recently lost weight without trying?  If yes, how much weight have you lost?: 0--> No  Have you been eating poorly because of a decreased appetite?: 0--> No  MST score: 0   with a score of 0-1 meaning not at risk in a score of 2 or greater meaning at risk.  Patients with a score of 3 or higher will be referred to our oncology dietitian for support. Patients beginning at risk treatment regimens or who have dietary concerns will also be referred to our oncology dietitian. The patient will not be referred to our oncology dietician.     Functional Assessment  Persons who are age 70 or greater will be screened for qualification of a comprehensive geriatric assessment by our survivorship nurse practitioner.  Older adults with cancer face unique challenges. These may include an  "increased risk of drug reactions, financial burdens, and caregiver stress. The patient scored   . Patients scoring 14 or lower will referred for an older adult functional assessment with the survivorship advanced practice registered nurse to ensure all needed support is provided as patients plan for their treatments. The patient will not be referred for assessment.    Intravenous Access Assessment  The patient and I discussed planned intravenous chemo/biotherapy as well as other IV treatments that are often needed throughout the course of treatment. These may include, but are not limited to blood transfusions, antibiotics, and IV hydration. Discussed that depending on selected treatment and vein assessment, patient may require venous access device (VAD) which could include but not limited to a Mediport or PICC line. Risks and benefits of VADs reviewed. The patient will be treated via Port.    Reproductive/Sexual Activity .onc  People should avoid becoming pregnant and should not get a partner pregnant while undergoing chemo/biotherapy.  People of childbearing age should use effective contraception during active therapy. The best recommendation for all people is to use a barrier method for a minimum of 1 week after the last infusion of chemo/biotherapy to prevent your partner being exposed to byproducts from treatment medications in bodily fluids. Effective contraception should be discussed with your oncology team to make sure it is safe to take based on your diagnosis. Possible options include oral contraceptives, barrier methods. Chemo/biotherapy can change your ability to reproduce children in the future.  There are options for fertility preservation.  The patient is not interested in a referral for fertility preservation.    Advanced Care Planning  Advance Care Planning   The patient and I discussed advanced care planning, \"Conversations that Matter\".   This service is offered for development of advance " directives with a certified ACP facilitator.  The patient does have an up-to-date advanced directive. This document is on file with our office. The patient is not interested in an appointment with one of our facilitators to create or update their advanced directives.     Smoking cessation  Tobacco Use: Low Risk    • Smoking Tobacco Use: Never Smoker   • Smokeless Tobacco Use: Never Used     Patient and I discussed their tobacco use history. Referral not be made for smoking cessation.      Palliative Care  When appropriate, the patient and I discussed the availability palliative care services and when appropriate Hospice care. Palliative care is not the same as Hospice care which was explained to the patient.  The patient is not interested in additional information from our  on these services.     Survivorship   When appropriate, we discussed that we will refer the patient to survivorship clinic to discuss next steps following completion of planned treatment.  Reviewed this visit will include assessment of your physical, psychological, functional, and spiritual needs as a survivor and the need at attend this visit when scheduled.    TREATMENT EDUCATION    Today I met with the patient to discuss the chemo/biotherapy regimen recommended for treatment of Malignant neoplasm of ascending colon (HCC)  - ondansetron (ZOFRAN) 8 MG tablet    Colonic mass  - pregabalin (LYRICA) 75 MG capsule  .  The patient was given explanation of treatment premed side effects including office policy that prohibits patients to drive if sedating medications are administered, MD explanation given regarding benefits, side effects, toxicities and goals of treatment.  The patient received a Chemotherapy/Biotherapy Plan Summary including diagnosis and explanation of specific treatment plan.    SIDE EFFECTS:  Common side effects were discussed with the patient and/or significant other.  Discussion included where applicable hair  loss/discoloration, anemia/fatigue, infection/chills/fever, appetite, bleeding risk/precautions, constipation, diarrhea, mouth sores, taste alteration, loss of appetite, nausea/vomiting, peripheral neuropathy, skin/nail changes, rash, muscle aches/weakness, photosensitivity, weight gain/loss, hearing loss, dizziness, menopausal symptoms, menstrual irregularity, sterility, high blood pressure, heart damage, liver damage, lung damage, kidney damage, DVT/PE risk, fluid retention, pleural/pericardial effusion, somnolence, electrolyte/LFT imbalance, vein exercises and/or the possible need for vascular access/port placement.  The patient was advised that although uncommon, leakage of an infused medication from the vein or venous access device may lead to skin breakdown and/or other tissue damage.  The patient was advised that he/she may have pain, bleeding, and/or bruising from the insertion of a needle in their vein or venous access device (port).  The patient was further advised that, in spite of proper technique, infection with redness and irritation may rarely occur at the site where the needle was inserted.  The patient was advised that if complications occur, additional medical treatment is available.  Finally, where applicable we have reviewed rare but potential immune mediated side effects including shortness of breath, cough, chest pain (pneumonitis), abdominal pain, diarrhea (colitis), thyroiditis (hypothyroid or hyperthyroid), hepatitis and liver dysfunction, nephritis and renal dysfunction.    Discussion also included side effects specific to drugs in the treatment plan, specifically:    Treatment Plans     Name Type Plan Dates Plan Provider         Active    OP SUPPORTIVE Iron Sucrose (Venofer) ONCOLOGY SUPPORTIVE CARE 1  3/29/2022 - Present Mago Alvarez MD     OP COLON mFOLFOX6 OXALIplatin / Leucovorin / Fluorouracil ONCOLOGY TREATMENT  4/17/2022 - Present Mago Alvarez MD                    Questions  answered and additional information discussed on topics including:  Anemia, Thrombocytopenia, Neutropenia, Nutrition and appetite changes, Diarrhea, Nausea & vomiting and Mouth sores       Assessment and Plan:    Diagnoses and all orders for this visit:    1. Malignant neoplasm of ascending colon (HCC) (Primary)  -     ondansetron (ZOFRAN) 8 MG tablet; Take 1 tablet by mouth 3 (Three) Times a Day As Needed for Nausea or Vomiting.  Dispense: 30 tablet; Refill: 5    2. Colonic mass  -     pregabalin (LYRICA) 75 MG capsule; Take 1 capsule by mouth Daily.  Dispense: 30 capsule; Refill: 2      No orders of the defined types were placed in this encounter.        1. The patient and I have reviewed their diagnosis and scheduled treatment plan. Needs assessment was completed where applicable including genetics, psychosocial needs, barriers to care, VAD evaluation, advanced care planning, survivorship, and palliative care services where indicated. Referrals have been ordered as appropriate based upon evaluation today and patient desires.   2. Chemo/biotherapy teaching was completed today and consent obtained. See separate documentation for further details.  3. Adequate time was given to answer questions.  Patient made aware of their care team members and contact information if they have questions or problems throughout the treatment course.  4. Discussion held and written information provided describing frequency of office visits and ongoing monitoring throughout the treatment plan.     5. Reviewed with patient any prescribed medication sent to pharmacy.  Education provided regarding proper storage, safe handling, and proper disposal of unused medication.  6. Proper handling of body fluids and waste discussed and written information provided.  7. If appropriate, patient had pretreatment labs drawn today.    Learning assessment completed at initial patient encounter. See separate flowsheet. Chemo/biotherapy education  comprehension assessed at today's visit.    I spent 60 minutes caring for Kayla on this date of service. This time includes time spent by me in the following activities: preparing for the visit, reviewing tests, obtaining and/or reviewing a separately obtained history, performing a medically appropriate examination and/or evaluation, counseling and educating the patient/family/caregiver, ordering medications, tests, or procedures, referring and communicating with other health care professionals, documenting information in the medical record, independently interpreting results and communicating that information with the patient/family/caregiver and care coordination.     Mariza Logan, APRN   04/05/22

## 2022-04-05 ENCOUNTER — APPOINTMENT (OUTPATIENT)
Dept: OTHER | Facility: HOSPITAL | Age: 67
End: 2022-04-05

## 2022-04-05 ENCOUNTER — INFUSION (OUTPATIENT)
Dept: ONCOLOGY | Facility: HOSPITAL | Age: 67
End: 2022-04-05

## 2022-04-05 ENCOUNTER — OFFICE VISIT (OUTPATIENT)
Dept: ONCOLOGY | Facility: CLINIC | Age: 67
End: 2022-04-05

## 2022-04-05 VITALS
HEIGHT: 61 IN | SYSTOLIC BLOOD PRESSURE: 111 MMHG | DIASTOLIC BLOOD PRESSURE: 76 MMHG | TEMPERATURE: 98.5 F | BODY MASS INDEX: 32.15 KG/M2 | HEART RATE: 96 BPM | RESPIRATION RATE: 18 BRPM | WEIGHT: 170.3 LBS | OXYGEN SATURATION: 96 %

## 2022-04-05 DIAGNOSIS — K63.89 COLONIC MASS: ICD-10-CM

## 2022-04-05 DIAGNOSIS — K90.9 IRON MALABSORPTION: Primary | ICD-10-CM

## 2022-04-05 DIAGNOSIS — D50.8 OTHER IRON DEFICIENCY ANEMIA: ICD-10-CM

## 2022-04-05 DIAGNOSIS — Z82.49 FAMILY HISTORY OF CORONARY ARTERY DISEASE: ICD-10-CM

## 2022-04-05 DIAGNOSIS — C18.2 MALIGNANT NEOPLASM OF ASCENDING COLON: Primary | ICD-10-CM

## 2022-04-05 PROCEDURE — 96365 THER/PROPH/DIAG IV INF INIT: CPT

## 2022-04-05 PROCEDURE — 96366 THER/PROPH/DIAG IV INF ADDON: CPT

## 2022-04-05 PROCEDURE — 25010000002 IRON SUCROSE PER 1 MG: Performed by: INTERNAL MEDICINE

## 2022-04-05 PROCEDURE — 99215 OFFICE O/P EST HI 40 MIN: CPT | Performed by: NURSE PRACTITIONER

## 2022-04-05 RX ORDER — PREGABALIN 75 MG/1
75 CAPSULE ORAL DAILY
Qty: 30 CAPSULE | Refills: 2 | Status: SHIPPED | OUTPATIENT
Start: 2022-04-05 | End: 2022-04-05 | Stop reason: SDUPTHER

## 2022-04-05 RX ORDER — PREGABALIN 75 MG/1
75 CAPSULE ORAL DAILY
Qty: 30 CAPSULE | Refills: 2 | Status: SHIPPED | OUTPATIENT
Start: 2022-04-05 | End: 2022-04-15 | Stop reason: ALTCHOICE

## 2022-04-05 RX ORDER — DIPHENHYDRAMINE HCL 25 MG
25 CAPSULE ORAL ONCE
Status: DISCONTINUED | OUTPATIENT
Start: 2022-04-05 | End: 2022-04-05 | Stop reason: HOSPADM

## 2022-04-05 RX ORDER — ONDANSETRON HYDROCHLORIDE 8 MG/1
8 TABLET, FILM COATED ORAL 3 TIMES DAILY PRN
Qty: 30 TABLET | Refills: 5 | Status: SHIPPED | OUTPATIENT
Start: 2022-04-05 | End: 2022-10-14

## 2022-04-05 RX ORDER — SODIUM CHLORIDE 9 MG/ML
250 INJECTION, SOLUTION INTRAVENOUS ONCE
Status: COMPLETED | OUTPATIENT
Start: 2022-04-05 | End: 2022-04-05

## 2022-04-05 RX ORDER — ACETAMINOPHEN 325 MG/1
650 TABLET ORAL ONCE
Status: DISCONTINUED | OUTPATIENT
Start: 2022-04-05 | End: 2022-04-05 | Stop reason: HOSPADM

## 2022-04-05 RX ADMIN — SODIUM CHLORIDE 250 ML: 900 INJECTION, SOLUTION INTRAVENOUS at 11:06

## 2022-04-05 RX ADMIN — IRON SUCROSE 300 MG: 20 INJECTION, SOLUTION INTRAVENOUS at 11:04

## 2022-04-05 NOTE — PROGRESS NOTES
[facility] Hematology/Oncology Treatment Plan Summary    Name: Kayla THOMAS Blaine  Acct# [id number]  MD: Dr. Alvarez    Diagnosis:     ICD-10-CM ICD-9-CM   1. Malignant neoplasm of ascending colon (HCC)  C18.2 153.6   2. Colonic mass  K63.89 569.89   3. Family history of coronary artery disease  Z82.49 V17.3         Goal of treatment: adjuvant    Treatment Medication(s):   1. 5-FU  2. Leucovorin  3. Oxaliplatin    Frequency: every 2 weeks    Number of cycles: 12    Starting on: 04/18/2022    Items for home use: Thermometer    Rx written for: [x] Nausea    [] Pre-Treatment   ondansetron 8 mg by mouth every 8 hours as needed for nausea    Notes:     Next Steps: PORT     Completing Provider: DALE Li           Date/time: 04/05/2022      Please note: You will be seen by a provider frequently with your treatment plan. This plan may change depending on many factors, if so, this will be discussed with you by your physician.  Last update 03/2022.

## 2022-04-08 ENCOUNTER — LAB (OUTPATIENT)
Dept: LAB | Facility: HOSPITAL | Age: 67
End: 2022-04-08

## 2022-04-08 DIAGNOSIS — C18.2 MALIGNANT NEOPLASM OF ASCENDING COLON: ICD-10-CM

## 2022-04-08 LAB — SARS-COV-2 ORF1AB RESP QL NAA+PROBE: NOT DETECTED

## 2022-04-08 PROCEDURE — U0004 COV-19 TEST NON-CDC HGH THRU: HCPCS

## 2022-04-08 PROCEDURE — C9803 HOPD COVID-19 SPEC COLLECT: HCPCS

## 2022-04-10 ENCOUNTER — ANESTHESIA EVENT (OUTPATIENT)
Dept: PERIOP | Facility: HOSPITAL | Age: 67
End: 2022-04-10

## 2022-04-11 ENCOUNTER — HOSPITAL ENCOUNTER (OUTPATIENT)
Facility: HOSPITAL | Age: 67
Setting detail: HOSPITAL OUTPATIENT SURGERY
Discharge: HOME | End: 2022-04-11
Attending: SURGERY | Admitting: SURGERY

## 2022-04-11 ENCOUNTER — APPOINTMENT (OUTPATIENT)
Dept: GENERAL RADIOLOGY | Facility: HOSPITAL | Age: 67
End: 2022-04-11

## 2022-04-11 ENCOUNTER — ANESTHESIA (OUTPATIENT)
Dept: PERIOP | Facility: HOSPITAL | Age: 67
End: 2022-04-11

## 2022-04-11 VITALS
HEART RATE: 68 BPM | TEMPERATURE: 98.1 F | OXYGEN SATURATION: 98 % | DIASTOLIC BLOOD PRESSURE: 60 MMHG | SYSTOLIC BLOOD PRESSURE: 132 MMHG | RESPIRATION RATE: 16 BRPM

## 2022-04-11 DIAGNOSIS — C18.2 MALIGNANT NEOPLASM OF ASCENDING COLON: ICD-10-CM

## 2022-04-11 PROCEDURE — 25010000002 FENTANYL CITRATE (PF) 50 MCG/ML SOLUTION

## 2022-04-11 PROCEDURE — 36561 INSERT TUNNELED CV CATH: CPT

## 2022-04-11 PROCEDURE — 76000 FLUOROSCOPY <1 HR PHYS/QHP: CPT | Performed by: SURGERY

## 2022-04-11 PROCEDURE — 77001 FLUOROGUIDE FOR VEIN DEVICE: CPT | Performed by: SURGERY

## 2022-04-11 PROCEDURE — 25010000002 PROPOFOL 10 MG/ML EMULSION

## 2022-04-11 PROCEDURE — 25010000002 VANCOMYCIN 10 G RECONSTITUTED SOLUTION: Performed by: SURGERY

## 2022-04-11 PROCEDURE — 36561 INSERT TUNNELED CV CATH: CPT | Performed by: SURGERY

## 2022-04-11 PROCEDURE — 25010000002 ONDANSETRON PER 1 MG

## 2022-04-11 PROCEDURE — C1788 PORT, INDWELLING, IMP: HCPCS | Performed by: SURGERY

## 2022-04-11 PROCEDURE — 25010000002 HEPARIN (PORCINE) PER 1000 UNITS: Performed by: SURGERY

## 2022-04-11 DEVICE — PRT INTRO VASC/INTERV VORTEX FILL/HL DETACH/POLYURET/CATH 8F: Type: IMPLANTABLE DEVICE | Site: SUBCLAVIAN | Status: FUNCTIONAL

## 2022-04-11 RX ORDER — FENTANYL CITRATE 50 UG/ML
50 INJECTION, SOLUTION INTRAMUSCULAR; INTRAVENOUS
Status: DISCONTINUED | OUTPATIENT
Start: 2022-04-11 | End: 2022-04-11 | Stop reason: HOSPADM

## 2022-04-11 RX ORDER — EPHEDRINE SULFATE 50 MG/ML
5 INJECTION, SOLUTION INTRAVENOUS ONCE AS NEEDED
Status: DISCONTINUED | OUTPATIENT
Start: 2022-04-11 | End: 2022-04-11 | Stop reason: HOSPADM

## 2022-04-11 RX ORDER — LIDOCAINE HYDROCHLORIDE 20 MG/ML
INJECTION, SOLUTION INFILTRATION; PERINEURAL AS NEEDED
Status: DISCONTINUED | OUTPATIENT
Start: 2022-04-11 | End: 2022-04-11 | Stop reason: SURG

## 2022-04-11 RX ORDER — MIDAZOLAM HYDROCHLORIDE 1 MG/ML
1 INJECTION INTRAMUSCULAR; INTRAVENOUS
Status: DISCONTINUED | OUTPATIENT
Start: 2022-04-11 | End: 2022-04-11 | Stop reason: HOSPADM

## 2022-04-11 RX ORDER — PROPOFOL 10 MG/ML
VIAL (ML) INTRAVENOUS AS NEEDED
Status: DISCONTINUED | OUTPATIENT
Start: 2022-04-11 | End: 2022-04-11 | Stop reason: SURG

## 2022-04-11 RX ORDER — HYDROMORPHONE HYDROCHLORIDE 1 MG/ML
0.5 INJECTION, SOLUTION INTRAMUSCULAR; INTRAVENOUS; SUBCUTANEOUS
Status: DISCONTINUED | OUTPATIENT
Start: 2022-04-11 | End: 2022-04-11 | Stop reason: HOSPADM

## 2022-04-11 RX ORDER — IBUPROFEN 600 MG/1
600 TABLET ORAL ONCE AS NEEDED
Status: DISCONTINUED | OUTPATIENT
Start: 2022-04-11 | End: 2022-04-11 | Stop reason: HOSPADM

## 2022-04-11 RX ORDER — PROMETHAZINE HYDROCHLORIDE 25 MG/1
25 SUPPOSITORY RECTAL ONCE AS NEEDED
Status: DISCONTINUED | OUTPATIENT
Start: 2022-04-11 | End: 2022-04-11 | Stop reason: HOSPADM

## 2022-04-11 RX ORDER — DIPHENHYDRAMINE HYDROCHLORIDE 50 MG/ML
12.5 INJECTION INTRAMUSCULAR; INTRAVENOUS
Status: DISCONTINUED | OUTPATIENT
Start: 2022-04-11 | End: 2022-04-11 | Stop reason: HOSPADM

## 2022-04-11 RX ORDER — SODIUM CHLORIDE 0.9 % (FLUSH) 0.9 %
3 SYRINGE (ML) INJECTION EVERY 12 HOURS SCHEDULED
Status: DISCONTINUED | OUTPATIENT
Start: 2022-04-11 | End: 2022-04-11 | Stop reason: HOSPADM

## 2022-04-11 RX ORDER — LIDOCAINE HYDROCHLORIDE 10 MG/ML
0.5 INJECTION, SOLUTION EPIDURAL; INFILTRATION; INTRACAUDAL; PERINEURAL ONCE AS NEEDED
Status: DISCONTINUED | OUTPATIENT
Start: 2022-04-11 | End: 2022-04-11 | Stop reason: HOSPADM

## 2022-04-11 RX ORDER — PROMETHAZINE HYDROCHLORIDE 25 MG/1
25 TABLET ORAL ONCE AS NEEDED
Status: DISCONTINUED | OUTPATIENT
Start: 2022-04-11 | End: 2022-04-11 | Stop reason: HOSPADM

## 2022-04-11 RX ORDER — MIDAZOLAM HYDROCHLORIDE 1 MG/ML
0.5 INJECTION INTRAMUSCULAR; INTRAVENOUS
Status: DISCONTINUED | OUTPATIENT
Start: 2022-04-11 | End: 2022-04-11 | Stop reason: HOSPADM

## 2022-04-11 RX ORDER — BUPIVACAINE HYDROCHLORIDE AND EPINEPHRINE 5; 5 MG/ML; UG/ML
INJECTION, SOLUTION EPIDURAL; INTRACAUDAL; PERINEURAL AS NEEDED
Status: DISCONTINUED | OUTPATIENT
Start: 2022-04-11 | End: 2022-04-11 | Stop reason: HOSPADM

## 2022-04-11 RX ORDER — HYDRALAZINE HYDROCHLORIDE 20 MG/ML
5 INJECTION INTRAMUSCULAR; INTRAVENOUS
Status: DISCONTINUED | OUTPATIENT
Start: 2022-04-11 | End: 2022-04-11 | Stop reason: HOSPADM

## 2022-04-11 RX ORDER — OXYCODONE AND ACETAMINOPHEN 7.5; 325 MG/1; MG/1
1 TABLET ORAL EVERY 4 HOURS PRN
Status: DISCONTINUED | OUTPATIENT
Start: 2022-04-11 | End: 2022-04-11 | Stop reason: HOSPADM

## 2022-04-11 RX ORDER — FLUMAZENIL 0.1 MG/ML
0.2 INJECTION INTRAVENOUS AS NEEDED
Status: DISCONTINUED | OUTPATIENT
Start: 2022-04-11 | End: 2022-04-11 | Stop reason: HOSPADM

## 2022-04-11 RX ORDER — SODIUM CHLORIDE 0.9 % (FLUSH) 0.9 %
3-10 SYRINGE (ML) INJECTION AS NEEDED
Status: DISCONTINUED | OUTPATIENT
Start: 2022-04-11 | End: 2022-04-11 | Stop reason: HOSPADM

## 2022-04-11 RX ORDER — ONDANSETRON 2 MG/ML
INJECTION INTRAMUSCULAR; INTRAVENOUS AS NEEDED
Status: DISCONTINUED | OUTPATIENT
Start: 2022-04-11 | End: 2022-04-11 | Stop reason: SURG

## 2022-04-11 RX ORDER — ONDANSETRON 2 MG/ML
4 INJECTION INTRAMUSCULAR; INTRAVENOUS ONCE AS NEEDED
Status: DISCONTINUED | OUTPATIENT
Start: 2022-04-11 | End: 2022-04-11 | Stop reason: HOSPADM

## 2022-04-11 RX ORDER — OXYCODONE HYDROCHLORIDE AND ACETAMINOPHEN 5; 325 MG/1; MG/1
1 TABLET ORAL ONCE AS NEEDED
Status: DISCONTINUED | OUTPATIENT
Start: 2022-04-11 | End: 2022-04-11 | Stop reason: HOSPADM

## 2022-04-11 RX ORDER — HYDROCODONE BITARTRATE AND ACETAMINOPHEN 7.5; 325 MG/1; MG/1
1 TABLET ORAL ONCE AS NEEDED
Status: DISCONTINUED | OUTPATIENT
Start: 2022-04-11 | End: 2022-04-11 | Stop reason: HOSPADM

## 2022-04-11 RX ORDER — LABETALOL HYDROCHLORIDE 5 MG/ML
5 INJECTION, SOLUTION INTRAVENOUS
Status: DISCONTINUED | OUTPATIENT
Start: 2022-04-11 | End: 2022-04-11 | Stop reason: HOSPADM

## 2022-04-11 RX ORDER — FAMOTIDINE 10 MG/ML
20 INJECTION, SOLUTION INTRAVENOUS ONCE
Status: COMPLETED | OUTPATIENT
Start: 2022-04-11 | End: 2022-04-11

## 2022-04-11 RX ORDER — GLYCOPYRROLATE 0.2 MG/ML
INJECTION INTRAMUSCULAR; INTRAVENOUS AS NEEDED
Status: DISCONTINUED | OUTPATIENT
Start: 2022-04-11 | End: 2022-04-11 | Stop reason: SURG

## 2022-04-11 RX ORDER — NALOXONE HCL 0.4 MG/ML
0.2 VIAL (ML) INJECTION AS NEEDED
Status: DISCONTINUED | OUTPATIENT
Start: 2022-04-11 | End: 2022-04-11 | Stop reason: HOSPADM

## 2022-04-11 RX ORDER — DIPHENHYDRAMINE HCL 25 MG
25 CAPSULE ORAL
Status: DISCONTINUED | OUTPATIENT
Start: 2022-04-11 | End: 2022-04-11 | Stop reason: HOSPADM

## 2022-04-11 RX ORDER — SODIUM CHLORIDE, SODIUM LACTATE, POTASSIUM CHLORIDE, CALCIUM CHLORIDE 600; 310; 30; 20 MG/100ML; MG/100ML; MG/100ML; MG/100ML
9 INJECTION, SOLUTION INTRAVENOUS CONTINUOUS
Status: DISCONTINUED | OUTPATIENT
Start: 2022-04-11 | End: 2022-04-11 | Stop reason: HOSPADM

## 2022-04-11 RX ORDER — FENTANYL CITRATE 50 UG/ML
INJECTION, SOLUTION INTRAMUSCULAR; INTRAVENOUS AS NEEDED
Status: DISCONTINUED | OUTPATIENT
Start: 2022-04-11 | End: 2022-04-11 | Stop reason: SURG

## 2022-04-11 RX ADMIN — FAMOTIDINE 20 MG: 10 INJECTION INTRAVENOUS at 06:24

## 2022-04-11 RX ADMIN — VANCOMYCIN HYDROCHLORIDE 1250 MG: 10 INJECTION, POWDER, LYOPHILIZED, FOR SOLUTION INTRAVENOUS at 07:27

## 2022-04-11 RX ADMIN — FENTANYL CITRATE 25 MCG: 50 INJECTION INTRAMUSCULAR; INTRAVENOUS at 08:05

## 2022-04-11 RX ADMIN — PROPOFOL 120 MCG/KG/MIN: 10 INJECTION, EMULSION INTRAVENOUS at 07:41

## 2022-04-11 RX ADMIN — SODIUM CHLORIDE, POTASSIUM CHLORIDE, SODIUM LACTATE AND CALCIUM CHLORIDE 9 ML/HR: 600; 310; 30; 20 INJECTION, SOLUTION INTRAVENOUS at 06:20

## 2022-04-11 RX ADMIN — PROPOFOL 50 MG: 10 INJECTION, EMULSION INTRAVENOUS at 07:41

## 2022-04-11 RX ADMIN — GLYCOPYRROLATE 0.1 MG: 0.2 INJECTION INTRAMUSCULAR; INTRAVENOUS at 07:41

## 2022-04-11 RX ADMIN — ONDANSETRON 4 MG: 2 INJECTION INTRAMUSCULAR; INTRAVENOUS at 08:15

## 2022-04-11 RX ADMIN — FENTANYL CITRATE 25 MCG: 50 INJECTION INTRAMUSCULAR; INTRAVENOUS at 07:50

## 2022-04-11 RX ADMIN — LIDOCAINE HYDROCHLORIDE 80 MG: 20 INJECTION, SOLUTION INFILTRATION; PERINEURAL at 07:41

## 2022-04-11 NOTE — OP NOTE
PREOPERATIVE DIAGNOSIS: Malignant neoplasm of ascending colon (HCC) [C18.2]  POSTOPERATIVE DIAGNOSIS: Same    PROCEDURE:   1) Right subclavian vein MediPort placement  2) Radiological interpretation.    SURGEON/STAFF: FELY Wellington  Assistant: Gavi Polk PA-C  was responsible for performing the following activities: Retraction, Suturing, Closing and Placing Dressing and their skilled assistance was necessary for the success of this case.  ANESTHESIA: MAC.    BLOOD LOSS: Minimal  COUNTS: Needle and sponge count correct.  SPECIMENS: None  FINDINGS: Mediport tip at SVC atrial junction    INDICATIONS FOR OPERATION: Kayla Adkins is 66 y.o. year old female who has stage 3 colon cancer and he will need chemotherapy I offered mediport placement.  Risk and benefits of the procedure were explained to the patient as well as to his family that they include but are not limited to bleeding infections and pneumothorax. They verbalized understanding and agree with the plan.    OPERATION: The patient was brought to the operating room in stable condition. Preoperative antibiotics were given and sequential compression devices were applied. At this time, the patient was laid supine on the operating room table. Monitored anesthesia was induced by the Anesthesia service without difficulty. The patient's chest was prepped and draped in the usual sterile fashion.    With 0.5% Marcaine with epinephrine in the right chest area was infiltrated. With the access needle the right subclavian vein was accessed. Guidewire was inserted. Position of the guidewire was confirmed by fluoroscopy guidance and he was found to be on the right heart. At this moment the needle was removed. Subcutaneous pocket was then created below the access needle site. After half percent Marcaine with epinephrine was infiltrated and incision was performed with scalpel. Dissection was carried onto the subcutaneous tissue. With Bovie electrocautery the subcutaneous  pocket was created. The dilator was placed through the guidewire with Seldinger technique. This was performed under fluoroscopy. Guidewire was removed.  The tubing was placed subcutaneously from the subcutaneous pocket to the access site. The tubing was placed through the dilator. The dilator was removed. Fluoroscopy was performed to position the tubing at the SVC atrial junction. The tubing was then connected to the subcutaneous port. The port was placed in the subcutaneous pocket. Flush with heparinized saline was performed. There was no resistance to the flow. Confirmation of the final position of the port was performed by fluoroscopy and it was found to be adequate.  The wound was then closed in 2 layers with 3-0 Vicryl and 4-0 Monocryl.. The wound was then covered with Steri-Strips. The port was accessed with the access needle. This was covered with a 4 x 4 dressing and Tegaderm. The patient tolerated the procedure well and was taken to recovery in stable condition. Instrument and sponge counts were correct.    Malcolm Wellington MD  General, Minimally Invasive and Endoscopic Surgery  [facility]    [address] [place] [address], [address]  [place], [place], [address]  P: [phone]  F: [phone]

## 2022-04-11 NOTE — ANESTHESIA PREPROCEDURE EVALUATION
Anesthesia Evaluation     Patient summary reviewed and Nursing notes reviewed   no history of anesthetic complications:  NPO Solid Status: > 8 hours  NPO Liquid Status: > 2 hours           Airway   Mallampati: I  TM distance: >3 FB  Neck ROM: full  No difficulty expected  Dental    (+) implants        Pulmonary - normal exam   Cardiovascular - normal exam  Exercise tolerance: good (4-7 METS)    ECG reviewed  Patient on routine beta blocker and Beta blocker given within 24 hours of surgery    (+) hypertension, hyperlipidemia,   (-) angina, orthopnea, PND, THORNTON      Neuro/Psych  (+) headaches, psychiatric history Anxiety,    GI/Hepatic/Renal/Endo    (+)  GERD well controlled,      Musculoskeletal     Abdominal    Substance History      OB/GYN          Other   arthritis,    history of cancer (colon)                    Anesthesia Plan    ASA 2     general   (I have reviewed the patient's history and chart with the patient, including all pertinent laboratory results and imaging. I have explained the risks of anesthesia including but not limited to dental damage, corneal abrasion, nerve injury, MI, stroke, aspiration, and death. Patient has agreed to proceed.     /79 (BP Location: Right arm, Patient Position: Sitting)   Pulse 99   Temp 36.5 °C (97.7 °F) (Oral)   Resp 18   LMP  (LMP Unknown)   SpO2 96%   )  intravenous induction     Anesthetic plan, all risks, benefits, and alternatives have been provided, discussed and informed consent has been obtained with: patient.        CODE STATUS:

## 2022-04-11 NOTE — PROGRESS NOTES
RM:________    Referral Provider: Mariza Logan, AP* Emmett Rivas MD    NEW PATIENT/ CONSULT  PREVIOUS CARDIOLOGIST:   CARDIAC TESTING:     : 1955   AGE: 66 y.o.    04/15/2022  REASON FOR VISIT/  CC:      WT: ____________ BP: __________L __________R/ HR_______________    CHEST PAIN: _____________    SOA: ____________PALPS: __________      LIGHTHEADED: ___________ FATIGUE: _______________ EDEMA______________  ALLERGIES:  Penicillins  SMOKING HISTORY  Social History     Tobacco Use   • Smoking status: Never Smoker   • Smokeless tobacco: Never Used   Vaping Use   • Vaping Use: Never used   Substance Use Topics   • Alcohol use: Not Currently     Comment: seldom   • Drug use: Never          CHILDREN: _______________________       CAFFEINE USE________  ALCOHOL _____________  OCCUPATION_____________  Past Surgical History:   Procedure Laterality Date   • COLON RESECTION Right 2022    Procedure: LAPAROSCOPIC RIGHT HEMICOLECTOMY WITH DAVINCI ROBOT;  Surgeon: Malcolm Wellington MD;  Location: [facility] OR;  Service: Robotics - DaVinci;  Laterality: Right;   • COLONOSCOPY     • COLONOSCOPY W/ POLYPECTOMY N/A 2022    Procedure: COLONOSCOPY INTO CECUM WITH COLD BIOPSIES;  Surgeon: Irvin Clayton MD;  Location: [facility] ENDOSCOPY;  Service: Gastroenterology;  Laterality: N/A;  PRE: DIARRHEA, ANEMIA  POST: ASCENDING COLON MASS   • ENDOSCOPY N/A 2022    Procedure: ESOPHAGOGASTRODUODENOSCOPY WITH BIOPSY;  Surgeon: Irvin Clayton MD;  Location: [facility] ENDOSCOPY;  Service: Gastroenterology;  Laterality: N/A;  PRE: ANEMIA  POST: NORMAL EGD   • TONSILLECTOMY                  FAMILY HISTORY  HEART DISEASE  CHF  DIABETES  CARDIAC ARREST  STROKE  CANCER  ANEURYSM                                                             H/O: MI_____   STROKE________   GOUT_____   ANEMIA______     CAROTID________ HIV____ CAD_______ HYPERCHOL _____    H/O: CHF _____   RF____ DM___  HTN_______PVD____THYROID DISEASE_______    PMH: GI ____   HEPATITIS ___ KIDNEY DISEASE ___ LUNG DISEASE _______     SLEEP APNEA ____ BLOOD CLOTS ____ DVT ____ VEIN STRIPPING ___________     CANCER _________________________________ CHEMO/ RADIATION__________

## 2022-04-11 NOTE — ANESTHESIA POSTPROCEDURE EVALUATION
Patient: Kayla THOMAS Lucille    Procedure Summary     Date: 04/11/22 Room / Location:  [facility]    Anesthesia Start: 0731 Anesthesia Stop: 0854    Procedure: INSERTION VENOUS ACCESS DEVICE with subcutaneous port (N/A ) Diagnosis:       Malignant neoplasm of ascending colon (HCC)      (Malignant neoplasm of ascending colon (HCC) [C18.2])    Surgeons: Malcolm Wellington MD Provider: Carmelita Lassiter MD    Anesthesia Type: general ASA Status: 2          Anesthesia Type: general    Vitals  Vitals Value Taken Time   /60 04/11/22 0920   Temp 36.7 °C (98.1 °F) 04/11/22 0851   Pulse 68 04/11/22 0920   Resp 16 04/11/22 0920   SpO2 98 % 04/11/22 0920           Post Anesthesia Care and Evaluation    Patient location during evaluation: bedside  Patient participation: complete - patient participated  Level of consciousness: awake  Pain management: adequate  Airway patency: patent  Anesthetic complications: No anesthetic complications  PONV Status: controlled  Cardiovascular status: acceptable  Respiratory status: acceptable  Hydration status: acceptable    Comments: --------------------            04/11/22 0920     --------------------   BP:       132/60     Pulse:      68       Resp:       16       Temp:                SpO2:      98%      --------------------

## 2022-04-11 NOTE — BRIEF OP NOTE
INSERTION VENOUS ACCESS DEVICE  Progress Note    Kayla Adkins  4/11/2022    Pre-op Diagnosis:   Malignant neoplasm of ascending colon (HCC) [C18.2]       Post-Op Diagnosis Codes:     * Malignant neoplasm of ascending colon (HCC) [C18.2]    Procedure/CPT® Codes:        Procedure(s):  INSERTION VENOUS ACCESS DEVICE with subcutaneous port    Surgeon(s):  Malcolm Wellington MD    Anesthesia: General    Staff:   Circulator: Homero Duong RN  Radiology Technologist: Karmen Parkinson  Scrub Person: Lula Esparza  Assistant: Gavi Polk PA-C  Assistant: Gavi Polk PA-C      Estimated Blood Loss: 15 mL    Urine Voided: * No values recorded between 4/11/2022  7:31 AM and 4/11/2022  8:38 AM *    Specimens:                None          Drains: * No LDAs found *    Findings: port tip at SVC atrial junction    Complications: None    Assistant: Gavi Polk PA-C  was responsible for performing the following activities: Retraction, Suturing, Closing and Placing Dressing and their skilled assistance was necessary for the success of this case.    Malcolm Wellington MD     Date: 4/11/2022  Time: 08:39 EDT

## 2022-04-12 ENCOUNTER — INFUSION (OUTPATIENT)
Dept: ONCOLOGY | Facility: HOSPITAL | Age: 67
End: 2022-04-12

## 2022-04-12 VITALS
SYSTOLIC BLOOD PRESSURE: 128 MMHG | DIASTOLIC BLOOD PRESSURE: 77 MMHG | OXYGEN SATURATION: 99 % | HEART RATE: 96 BPM | HEIGHT: 62 IN | WEIGHT: 172.6 LBS | TEMPERATURE: 97.1 F | RESPIRATION RATE: 18 BRPM | BODY MASS INDEX: 31.76 KG/M2

## 2022-04-12 DIAGNOSIS — D50.8 OTHER IRON DEFICIENCY ANEMIA: ICD-10-CM

## 2022-04-12 DIAGNOSIS — K90.9 IRON MALABSORPTION: Primary | ICD-10-CM

## 2022-04-12 DIAGNOSIS — Z45.2 ENCOUNTER FOR FITTING AND ADJUSTMENT OF VASCULAR CATHETER: ICD-10-CM

## 2022-04-12 PROCEDURE — 96365 THER/PROPH/DIAG IV INF INIT: CPT

## 2022-04-12 PROCEDURE — 25010000002 IRON SUCROSE PER 1 MG: Performed by: INTERNAL MEDICINE

## 2022-04-12 PROCEDURE — 25010000002 HEPARIN LOCK FLUSH PER 10 UNITS: Performed by: INTERNAL MEDICINE

## 2022-04-12 PROCEDURE — 96366 THER/PROPH/DIAG IV INF ADDON: CPT

## 2022-04-12 RX ORDER — HEPARIN SODIUM (PORCINE) LOCK FLUSH IV SOLN 100 UNIT/ML 100 UNIT/ML
500 SOLUTION INTRAVENOUS AS NEEDED
Status: DISCONTINUED | OUTPATIENT
Start: 2022-04-12 | End: 2022-04-12 | Stop reason: HOSPADM

## 2022-04-12 RX ORDER — SODIUM CHLORIDE 0.9 % (FLUSH) 0.9 %
10 SYRINGE (ML) INJECTION AS NEEDED
Status: CANCELLED | OUTPATIENT
Start: 2022-04-12

## 2022-04-12 RX ORDER — SODIUM CHLORIDE 0.9 % (FLUSH) 0.9 %
10 SYRINGE (ML) INJECTION AS NEEDED
Status: DISCONTINUED | OUTPATIENT
Start: 2022-04-12 | End: 2022-04-12 | Stop reason: HOSPADM

## 2022-04-12 RX ORDER — SODIUM CHLORIDE 9 MG/ML
250 INJECTION, SOLUTION INTRAVENOUS ONCE
Status: COMPLETED | OUTPATIENT
Start: 2022-04-12 | End: 2022-04-12

## 2022-04-12 RX ORDER — HEPARIN SODIUM (PORCINE) LOCK FLUSH IV SOLN 100 UNIT/ML 100 UNIT/ML
500 SOLUTION INTRAVENOUS AS NEEDED
Status: CANCELLED | OUTPATIENT
Start: 2022-04-12

## 2022-04-12 RX ADMIN — Medication 10 ML: at 13:53

## 2022-04-12 RX ADMIN — Medication 500 UNITS: at 13:53

## 2022-04-12 RX ADMIN — IRON SUCROSE 300 MG: 20 INJECTION, SOLUTION INTRAVENOUS at 12:02

## 2022-04-12 RX ADMIN — SODIUM CHLORIDE 250 ML: 9 INJECTION, SOLUTION INTRAVENOUS at 12:02

## 2022-04-12 NOTE — NURSING NOTE
Pt had port placed yesterday. Needle was left in for venofer today. RN cleaned site and placed new dressing over port site.

## 2022-04-13 RX ORDER — LIDOCAINE AND PRILOCAINE 25; 25 MG/G; MG/G
CREAM TOPICAL
Qty: 30 G | Refills: 5 | Status: SHIPPED | OUTPATIENT
Start: 2022-04-13 | End: 2022-10-14

## 2022-04-14 ENCOUNTER — LAB (OUTPATIENT)
Dept: OTHER | Facility: HOSPITAL | Age: 67
End: 2022-04-14

## 2022-04-14 ENCOUNTER — OFFICE VISIT (OUTPATIENT)
Dept: ONCOLOGY | Facility: CLINIC | Age: 67
End: 2022-04-14

## 2022-04-14 VITALS
RESPIRATION RATE: 20 BRPM | BODY MASS INDEX: 31.41 KG/M2 | HEIGHT: 62 IN | TEMPERATURE: 97.2 F | WEIGHT: 170.7 LBS | OXYGEN SATURATION: 97 % | SYSTOLIC BLOOD PRESSURE: 123 MMHG | HEART RATE: 92 BPM | DIASTOLIC BLOOD PRESSURE: 81 MMHG

## 2022-04-14 DIAGNOSIS — C18.2 MALIGNANT NEOPLASM OF ASCENDING COLON: ICD-10-CM

## 2022-04-14 DIAGNOSIS — C18.2 MALIGNANT NEOPLASM OF ASCENDING COLON: Primary | ICD-10-CM

## 2022-04-14 LAB
ALBUMIN SERPL-MCNC: 4.4 G/DL (ref 3.5–5.2)
ALBUMIN/GLOB SERPL: 1.8 G/DL
ALP SERPL-CCNC: 150 U/L (ref 39–117)
ALT SERPL W P-5'-P-CCNC: 21 U/L (ref 1–33)
ANION GAP SERPL CALCULATED.3IONS-SCNC: 11.6 MMOL/L (ref 5–15)
ANISOCYTOSIS BLD QL: ABNORMAL
AST SERPL-CCNC: 19 U/L (ref 1–32)
BASOPHILS # BLD AUTO: 0.01 10*3/MM3 (ref 0–0.2)
BASOPHILS # BLD MANUAL: 0.05 10*3/MM3 (ref 0–0.2)
BASOPHILS NFR BLD AUTO: 0.4 % (ref 0–1.5)
BASOPHILS NFR BLD MANUAL: 2 % (ref 0–1.5)
BILIRUB SERPL-MCNC: <0.2 MG/DL (ref 0–1.2)
BUN SERPL-MCNC: 10 MG/DL (ref 8–23)
BUN/CREAT SERPL: 13.7 (ref 7–25)
CALCIUM SPEC-SCNC: 9.6 MG/DL (ref 8.6–10.5)
CHLORIDE SERPL-SCNC: 103 MMOL/L (ref 98–107)
CLUMPED PLATELETS: PRESENT
CO2 SERPL-SCNC: 24.4 MMOL/L (ref 22–29)
CREAT SERPL-MCNC: 0.73 MG/DL (ref 0.57–1)
DEPRECATED RDW RBC AUTO: 49.6 FL (ref 37–54)
EGFRCR SERPLBLD CKD-EPI 2021: 91.4 ML/MIN/1.73
EOSINOPHIL # BLD AUTO: 0.09 10*3/MM3 (ref 0–0.4)
EOSINOPHIL # BLD MANUAL: 0.08 10*3/MM3 (ref 0–0.4)
EOSINOPHIL NFR BLD AUTO: 3.3 % (ref 0.3–6.2)
EOSINOPHIL NFR BLD MANUAL: 3 % (ref 0.3–6.2)
ERYTHROCYTE [DISTWIDTH] IN BLOOD BY AUTOMATED COUNT: 17.3 % (ref 12.3–15.4)
GLOBULIN UR ELPH-MCNC: 2.5 GM/DL
GLUCOSE SERPL-MCNC: 151 MG/DL (ref 65–99)
HCT VFR BLD AUTO: 39.1 % (ref 34–46.6)
HGB BLD-MCNC: 12 G/DL (ref 12–15.9)
HYPOCHROMIA BLD QL: ABNORMAL
IMM GRANULOCYTES # BLD AUTO: 0.01 10*3/MM3 (ref 0–0.05)
IMM GRANULOCYTES NFR BLD AUTO: 0.4 % (ref 0–0.5)
LYMPHOCYTES # BLD AUTO: 1.15 10*3/MM3 (ref 0.7–3.1)
LYMPHOCYTES # BLD MANUAL: 1.07 10*3/MM3 (ref 0.7–3.1)
LYMPHOCYTES NFR BLD AUTO: 42 % (ref 19.6–45.3)
LYMPHOCYTES NFR BLD MANUAL: 10 % (ref 5–12)
MCH RBC QN AUTO: 24.6 PG (ref 26.6–33)
MCHC RBC AUTO-ENTMCNC: 30.7 G/DL (ref 31.5–35.7)
MCV RBC AUTO: 80.3 FL (ref 79–97)
MONOCYTES # BLD AUTO: 0.32 10*3/MM3 (ref 0.1–0.9)
MONOCYTES # BLD: 0.27 10*3/MM3 (ref 0.1–0.9)
MONOCYTES NFR BLD AUTO: 11.7 % (ref 5–12)
NEUTROPHILS # BLD AUTO: 1.26 10*3/MM3 (ref 1.7–7)
NEUTROPHILS NFR BLD AUTO: 1.16 10*3/MM3 (ref 1.7–7)
NEUTROPHILS NFR BLD AUTO: 42.2 % (ref 42.7–76)
NEUTROPHILS NFR BLD MANUAL: 46 % (ref 42.7–76)
NRBC BLD AUTO-RTO: 0 /100 WBC (ref 0–0.2)
PLATELET # BLD AUTO: 296 10*3/MM3 (ref 140–450)
PMV BLD AUTO: 9.1 FL (ref 6–12)
POTASSIUM SERPL-SCNC: 4 MMOL/L (ref 3.5–5.2)
PROT SERPL-MCNC: 6.9 G/DL (ref 6–8.5)
RBC # BLD AUTO: 4.87 10*6/MM3 (ref 3.77–5.28)
SCAN SLIDE: NORMAL
SODIUM SERPL-SCNC: 139 MMOL/L (ref 136–145)
VARIANT LYMPHS NFR BLD MANUAL: 2 % (ref 0–5)
VARIANT LYMPHS NFR BLD MANUAL: 37 % (ref 19.6–45.3)
WBC MORPH BLD: NORMAL
WBC NRBC COR # BLD: 2.74 10*3/MM3 (ref 3.4–10.8)

## 2022-04-14 PROCEDURE — 80053 COMPREHEN METABOLIC PANEL: CPT | Performed by: INTERNAL MEDICINE

## 2022-04-14 PROCEDURE — 36415 COLL VENOUS BLD VENIPUNCTURE: CPT

## 2022-04-14 PROCEDURE — 85007 BL SMEAR W/DIFF WBC COUNT: CPT | Performed by: INTERNAL MEDICINE

## 2022-04-14 PROCEDURE — 85025 COMPLETE CBC W/AUTO DIFF WBC: CPT | Performed by: INTERNAL MEDICINE

## 2022-04-14 PROCEDURE — 99214 OFFICE O/P EST MOD 30 MIN: CPT | Performed by: INTERNAL MEDICINE

## 2022-04-14 NOTE — PROGRESS NOTES
Subjective   Kayla Adkins is a 66 y.o. female.  Referred by Dr. Rivas for evaluation of leukopenia    History of Present Illness   Ms. Adkins is a 66-year-old postmenopausal  who has been referred by Dr. Rivas for evaluation of leukopenia, neutropenia.  Patient reports that she has had longstanding leukopenia and her white blood cell count normally ranges in the threes.  On her most recent visit with Dr. Rivas white blood cell count was noted to be 2560 on 3/2/2021 and 2660 on 4/5/2021.  This prompted a referral to hematology.  Patient reports that she had a extensive work-up including a bone marrow biopsy for the same condition about 17 years ago.  She thinks that this was performed at [facility].  According to patient no clear etiology was determined after the work-up.  She was recommended to continue with periodic labs.  No treatment was required.    She was seen in December and had continued on oral iron.  She was tolerating that fairly well.  However she had worsening of colitis for about 2 months prior to that visit.  She was scheduled for colonoscopy in January 2022.    She had a routine surveillance colonoscopy performed by Dr. Whitlock on 2/22/2022.  On this there was an infiltrative nonobstructing medium-sized mass in the proximal ascending colon 1 to 2 cm distal to the cecum.  This was partially circumferential involving 1 foot of the lumen.  Biopsies were taken.  There is also mildly congested mucosa in the entire colon.  Random biopsies were taken.  She also had an EGD at the same time which reported normal esophagus, stomach and duodenum.  Pathology evaluation from the biopsy reported superficial fragments of at least intramucosal carcinoma.  Random colon biopsies reported increased lamina propria, chronic inflammation with surface epithelial alteration and increased intraepithelial lymphocytes consistent with lymphocytic colitis.  Biopsies from the stomach reported chronic  inactive gastritis.  Negative for intestinal metaplasia and H. pylori.  Biopsies from the duodenum was negative.    She was seen by Dr. Wellington who performed a laparoscopic right hemicolectomy on 3/2/2022.  Pathology showed a 3.8 cm mass at the cecum invading through the muscularis propria into the pericolorectal adipose tissue.  No lymphovascular or perineural invasion.  All margins were clear.  The radial margin was 3 cm.  3 out of 26 lymph nodes were positive for metastatic disease.  The largest metastatic focus was 0.7 cm with extranodal extension.  Pathological stage PT3N1B.  The tumor was moderately differentiated, grade 2 adenocarcinoma.    No loss of nuclear expression of MMR proteins.  Low probability of microsatellite instability.  MSI status-microsatellite stable.  This is by PCR.    Normal CEA and 0.77 on the day prior to surgery.    She was seen by Dr. Burton as inpatient and recommended adjuvant chemotherapy with FOLFOX.    Interval history  Ms. Adkins presents to the clinic today for follow-up accompanied by her .  She continues to experience daily headaches for which she has been using Excedrin.  She felt like the Lyrica might have helped and this was prescribed however she took the Lyrica with no improvement in symptoms.  Therefore she is back taking Excedrin.  She had 1 bout of diarrhea following the surgery but none since.  She had a port placed on 4/11/2022.  She is concerned about the cardiotoxicity associated with chemotherapy and has an appoint with Dr. Kennedy for an initial evaluation.  She has questions regarding the on pro versus injectable Neulasta.  No other new complaints at this time.    The following portions of the patient's history were reviewed and updated as appropriate: allergies, current medications, past family history, past medical history, past social history and problem list.    Past Medical History:   Diagnosis Date   • Anemia    • Anxiety    • Bladder infection    •  Colitis    • Colon cancer (HCC)    • GERD (gastroesophageal reflux disease)    • Headache    • High cholesterol    • Hypertension    • Knee injury, initial encounter-left 07/01/2019   • Neutropenia (HCC) 01/03/2017   • Prediabetes 03/04/2019      Past Surgical History:   Procedure Laterality Date   • COLON RESECTION Right 03/02/2022    Procedure: LAPAROSCOPIC RIGHT HEMICOLECTOMY WITH DAVINCI ROBOT;  Surgeon: Malcolm Wellington MD;  Location: [facility] OR;  Service: Robotics - DaVinci;  Laterality: Right;   • COLONOSCOPY     • COLONOSCOPY W/ POLYPECTOMY N/A 02/22/2022    Procedure: COLONOSCOPY INTO CECUM WITH COLD BIOPSIES;  Surgeon: Irvin Clyaton MD;  [facility] END[facility]COPY;  Service: Gastroenterology;  Laterality: N/A;  PRE: DIARRHEA, ANEMIA  POST: ASCENDING COLON MASS   • ENDOSCOPY N/A 02/22/2022    Procedure: ESOPHAGOGASTRODUODENOSCOPY WITH BIOPSY;  Surgeon: Irvin Clayton MD;  Location: [facility] ENDOSCOPY;  Service: Gastroenterology;  Laterality: N/A;  PRE: ANEMIA  POST: NORMAL EGD   • TONSILLECTOMY     • VENOUS ACCESS DEVICE (PORT) INSERTION N/A 4/11/2022    Procedure: INSERTION VENOUS ACCESS DEVICE with subcutaneous port;  Surgeon: Malcolm Wellington MD;  Location: [facility] OR [facility];  Service: General;  Laterality: N/A;        Family History   Problem Relation Age of Onset   • Hypertension Mother    • Heart disease Father    • Hypertension Sister    • Malig Hyperthermia Neg Hx       Social History     Socioeconomic History   • Marital status:    Tobacco Use   • Smoking status: Never Smoker   • Smokeless tobacco: Never Used   Vaping Use   • Vaping Use: Never used   Substance and Sexual Activity   • Alcohol use: Not Currently     Comment: seldom   • Drug use: Never   • Sexual activity: Defer      OB History    No obstetric history on file.          Allergies   Allergen Reactions   • Penicillins Rash      Review of systems as mentioned in the HPI.    Objective   Blood  "pressure 123/81, pulse 92, temperature 97.2 °F (36.2 °C), temperature source Temporal, resp. rate 20, height 157.5 cm (62.01\"), weight 77.4 kg (170 lb 11.2 oz), SpO2 97 %.     Physical Exam  Vitals reviewed.   Constitutional:       Appearance: Normal appearance. She is normal weight.   HENT:      Head: Normocephalic and atraumatic.      Nose: Nose normal.      Mouth/Throat:      Mouth: Mucous membranes are moist.   Eyes:      Extraocular Movements: Extraocular movements intact.      Pupils: Pupils are equal, round, and reactive to light.   Cardiovascular:      Rate and Rhythm: Normal rate.      Heart sounds: Normal heart sounds. No murmur heard.    No gallop.   Pulmonary:      Effort: Pulmonary effort is normal. No respiratory distress.      Breath sounds: Normal breath sounds. No wheezing.   Abdominal:      General: Abdomen is flat. Bowel sounds are normal. There is no distension.      Palpations: Abdomen is soft.      Tenderness: There is no abdominal tenderness.      Comments: Abdominal incisions have healed well.   Musculoskeletal:         General: Normal range of motion.      Cervical back: Normal range of motion.      Right lower leg: No edema.      Left lower leg: No edema.   Skin:     Findings: No bruising or rash.   Neurological:      General: No focal deficit present.      Mental Status: She is alert and oriented to person, place, and time. Mental status is at baseline.   Psychiatric:         Behavior: Behavior normal.       Lab on 04/14/2022   Component Date Value Ref Range Status   • WBC 04/14/2022 2.74 (A) 3.40 - 10.80 10*3/mm3 Final   • RBC 04/14/2022 4.87  3.77 - 5.28 10*6/mm3 Final   • Hemoglobin 04/14/2022 12.0  12.0 - 15.9 g/dL Final   • Hematocrit 04/14/2022 39.1  34.0 - 46.6 % Final   • MCV 04/14/2022 80.3  79.0 - 97.0 fL Final   • MCH 04/14/2022 24.6 (A) 26.6 - 33.0 pg Final   • MCHC 04/14/2022 30.7 (A) 31.5 - 35.7 g/dL Final   • RDW 04/14/2022 17.3 (A) 12.3 - 15.4 % Final   • RDW-SD 04/14/2022 " 49.6  37.0 - 54.0 fl Final   • MPV 04/14/2022 9.1  6.0 - 12.0 fL Final   • Platelets 04/14/2022 296  140 - 450 10*3/mm3 Final   • Neutrophil % 04/14/2022 42.2 (A) 42.7 - 76.0 % Final   • Lymphocyte % 04/14/2022 42.0  19.6 - 45.3 % Final   • Monocyte % 04/14/2022 11.7  5.0 - 12.0 % Final   • Eosinophil % 04/14/2022 3.3  0.3 - 6.2 % Final   • Basophil % 04/14/2022 0.4  0.0 - 1.5 % Final   • Immature Grans % 04/14/2022 0.4  0.0 - 0.5 % Final   • Neutrophils, Absolute 04/14/2022 1.16 (A) 1.70 - 7.00 10*3/mm3 Final   • Lymphocytes, Absolute 04/14/2022 1.15  0.70 - 3.10 10*3/mm3 Final   • Monocytes, Absolute 04/14/2022 0.32  0.10 - 0.90 10*3/mm3 Final   • Eosinophils, Absolute 04/14/2022 0.09  0.00 - 0.40 10*3/mm3 Final   • Basophils, Absolute 04/14/2022 0.01  0.00 - 0.20 10*3/mm3 Final   • Immature Grans, Absolute 04/14/2022 0.01  0.00 - 0.05 10*3/mm3 Final   • nRBC 04/14/2022 0.0  0.0 - 0.2 /100 WBC Final   • Scan Slide 04/14/2022    Final    See Manual Differential Results   • Neutrophil % 04/14/2022 46.0  42.7 - 76.0 % Final   • Lymphocyte % 04/14/2022 37.0  19.6 - 45.3 % Final   • Monocyte % 04/14/2022 10.0  5.0 - 12.0 % Final   • Eosinophil % 04/14/2022 3.0  0.3 - 6.2 % Final   • Basophil % 04/14/2022 2.0 (A) 0.0 - 1.5 % Final   • Atypical Lymphocyte % 04/14/2022 2.0  0.0 - 5.0 % Final   • Neutrophils Absolute 04/14/2022 1.26 (A) 1.70 - 7.00 10*3/mm3 Final   • Lymphocytes Absolute 04/14/2022 1.07  0.70 - 3.10 10*3/mm3 Final   • Monocytes Absolute 04/14/2022 0.27  0.10 - 0.90 10*3/mm3 Final   • Eosinophils Absolute 04/14/2022 0.08  0.00 - 0.40 10*3/mm3 Final   • Basophils Absolute 04/14/2022 0.05  0.00 - 0.20 10*3/mm3 Final   • Anisocytosis 04/14/2022 Slight/1+  None Seen Final   • Hypochromia 04/14/2022 Slight/1+  None Seen Final   • WBC Morphology 04/14/2022 Normal  Normal Final   • Clumped Platelets 04/14/2022 Present  None Seen Final   Lab on 04/08/2022   Component Date Value Ref Range Status   • COVID19  04/08/2022 Not Detected  Not Detected - Ref. Range Final   Lab on 03/24/2022   Component Date Value Ref Range Status   • Glucose 03/24/2022 102 (A) 65 - 99 mg/dL Final   • BUN 03/24/2022 12  8 - 23 mg/dL Final   • Creatinine 03/24/2022 0.74  0.57 - 1.00 mg/dL Final   • Sodium 03/24/2022 142  136 - 145 mmol/L Final   • Potassium 03/24/2022 4.3  3.5 - 5.2 mmol/L Final   • Chloride 03/24/2022 105  98 - 107 mmol/L Final   • CO2 03/24/2022 26.4  22.0 - 29.0 mmol/L Final   • Calcium 03/24/2022 9.3  8.6 - 10.5 mg/dL Final   • Total Protein 03/24/2022 6.3  6.0 - 8.5 g/dL Final   • Albumin 03/24/2022 4.00  3.50 - 5.20 g/dL Final   • ALT (SGPT) 03/24/2022 19  1 - 33 U/L Final   • AST (SGOT) 03/24/2022 16  1 - 32 U/L Final   • Alkaline Phosphatase 03/24/2022 154 (A) 39 - 117 U/L Final   • Total Bilirubin 03/24/2022 <0.2  0.0 - 1.2 mg/dL Final   • Globulin 03/24/2022 2.3  gm/dL Final   • A/G Ratio 03/24/2022 1.7  g/dL Final   • BUN/Creatinine Ratio 03/24/2022 16.2  7.0 - 25.0 Final   • Anion Gap 03/24/2022 10.6  5.0 - 15.0 mmol/L Final   • eGFR 03/24/2022 89.9  >60.0 mL/min/1.73 Final    National Kidney Foundation and American Society of Nephrology (ASN) Task Force recommended calculation based on the Chronic Kidney Disease Epidemiology Collaboration (CKD-EPI) equation refit without adjustment for race.   • WBC 03/24/2022 3.13 (A) 3.40 - 10.80 10*3/mm3 Final   • RBC 03/24/2022 4.29  3.77 - 5.28 10*6/mm3 Final   • Hemoglobin 03/24/2022 10.2 (A) 12.0 - 15.9 g/dL Final   • Hematocrit 03/24/2022 34.0  34.0 - 46.6 % Final   • MCV 03/24/2022 79.3  79.0 - 97.0 fL Final   • MCH 03/24/2022 23.8 (A) 26.6 - 33.0 pg Final   • MCHC 03/24/2022 30.0 (A) 31.5 - 35.7 g/dL Final   • RDW 03/24/2022 14.4  12.3 - 15.4 % Final   • RDW-SD 03/24/2022 41.2  37.0 - 54.0 fl Final   • MPV 03/24/2022 8.7  6.0 - 12.0 fL Final   • Platelets 03/24/2022 315  140 - 450 10*3/mm3 Final   • Scan Slide 03/24/2022    Final    See Manual Differential Results   •  Neutrophil % 03/24/2022 38.0 (A) 42.7 - 76.0 % Final   • Lymphocyte % 03/24/2022 38.0  19.6 - 45.3 % Final   • Monocyte % 03/24/2022 12.0  5.0 - 12.0 % Final   • Eosinophil % 03/24/2022 9.0 (A) 0.3 - 6.2 % Final   • Basophil % 03/24/2022 2.0 (A) 0.0 - 1.5 % Final   • Atypical Lymphocyte % 03/24/2022 1.0  0.0 - 5.0 % Final   • Neutrophils Absolute 03/24/2022 1.19 (A) 1.70 - 7.00 10*3/mm3 Final   • Lymphocytes Absolute 03/24/2022 1.22  0.70 - 3.10 10*3/mm3 Final   • Monocytes Absolute 03/24/2022 0.38  0.10 - 0.90 10*3/mm3 Final   • Eosinophils Absolute 03/24/2022 0.28  0.00 - 0.40 10*3/mm3 Final   • Basophils Absolute 03/24/2022 0.06  0.00 - 0.20 10*3/mm3 Final   • Hypochromia 03/24/2022 Slight/1+  None Seen Final   • WBC Morphology 03/24/2022 Normal  Normal Final   • Platelet Morphology 03/24/2022 Normal  Normal Final        XR Chest Post CVA Port    Result Date: 4/11/2022  Placement of right subclavian Mediport tip in the SVC. No evidence for pneumothorax.  This report was finalized on 4/11/2022 10:15 AM by Dr. Ranulfo Francis M.D.         3/24/2022 CBC-WBC 3.13, hemoglobin 10.2, platelets 315,000  Absolute neutrophil count 1.19  CMP-blood sugar 102, alkaline phosphatase 154 otherwise within normal limits    CT of the chest abdomen pelvis 2/25/2022-images independently reviewed and interpreted by me.  Scattered tiny pulmonary nodules which are nonspecific.  Follow-up imaging recommended.  On CT of the abdomen there is thickening of the cecum which corresponds to the mass on the colonoscopy.  There is adjacent enlarged right lower quadrant lymph nodes measuring up to 1.2 cm.    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  No lymphovascular or perineural invasion.  All the margins are negative.  Tumor comes to within 3 cm of the radial margin.  3 out of 26 lymph nodes positive for metastatic carcinoma with the largest focus measuring 0.7 cm without extranodal extension.  · No tumor deposits identified  · PT3N1B  · MSI low probability on immunohistochemistry and stable on PCR  · No family history of colon cancer, patient for genetic testing  · We discussed the salient features of pathology at length today.  · Explained to her that this is stage IIIb colon cancer however since his T3N1 I would consider this low risk stage III.  There is no other high risk features noted on pathology either.  · Given that the tumor is stage III there is definite benefit from adjuvant chemotherapy.  · We discussed the 2 available regimens including XELOX and FOLFOX.  · Explained to her that based on the IDEA data 3 months of XELOX is noninferior to 6 months of adjuvant chemotherapy however with FOLFOX the noninferiority has not been established.  Explained to her that 6 months of chemotherapy improves disease-free survival by additional 2 to 3% over 3 months.  · Discussed the adverse effects of XELOX as well as FOLFOX.  · After discussing the pros and cons we have decided to proceed with FOLFOX for total of 6 months but if she is unable to tolerate due to neurotoxicity then we could potentially stop after 3 months of treatment.  · Adverse effects of chemotherapy including but not limited to myelosuppression, increased risk of infections, nausea, vomiting, cold sensitivity, diarrhea, mucositis, alopecia, altered taste, fatigue, cardiotoxicity, neurotoxicity discussed.  · Port placed on 4/11/2022  · Scheduled to start cycle 1 FOLFOX on  4/18/2022  · Neulasta support given pre-existing neutropenia    *Leukopenia  · Predominantly neutropenia  · Mild increase in monocyte count  · This is chronic and unchanged  · No increased infections  · Had a previous work-up including a bone marrow biopsy with no clear etiology  · Differential includes nutritional, chronic idiopathic, autoimmune.  · She is currently not on any medications which could account for the leukopenia.  · No splenomegaly on exam  · 5/13/2021-flow cytometry negative  · LDH normal at 174  · Reticulocyte count 1.07%  · Ferritin low at 5.7  · Iron saturation low at 3%  · Folic acid normal at 11.8, B12 415  · Rheumatoid factor negative  · AVIVA negative  · ESR normal at 29  · Serum copper normal at 134  · 12/16/2021, WBC 3.16 today.  Patient denies frequent infections.  · CBC 3/24/2022 reviewed and WBC count 3.13, absolute neutrophil count 1.19, hemoglobin 10.2, platelets 315,000  · Given pre-existing neutropenia she will be receiving Neulasta with each cycle of FOLFOX.  · 4/14/2022-WBC 2.74 with an absolute neutrophil count of 1.16    *Anemia  · Microcytic  · Likely secondary to iron deficiency  · 4/22/2021 iron saturation 3%, TIBC today 508, ferritin 5.70.  The patient was initiated on oral iron every other day.  · 8/26/2021, hemoglobin today 10.1.  The patient has been tolerating oral iron every other day.  We will increase oral iron to daily and recheck iron labs in 4 months.  · 12/16/2021, patient is taking oral iron every day and tolerating this well.  Hemoglobin today 10.0.  Iron saturation 45, TIBC 413, ferritin 10.20.  · 3/4/2022 ferritin 27.5, iron saturation 5%, TIBC 375, iron studies consistent with iron deficiency  · 3/24/2022 hemoglobin 10.2  · Received 3 doses of Venofer.  · Hemoglobin improved to 12 on 4/14/2022    *Rvpewjffy-yz-xs-date on screening mammograms , she is due for mammogram in April 2022.    *Headaches  · She has daily headaches  · She has been using Excedrin for the  same which helps with the headaches.  · She thought Lyrica was probably helping with the headaches and tried the same however no improvement in symptoms therefore plans to continue with Excedrin.    *Colitis  · Lymphocytic colitis noted on the biopsy of the colon  · There is a flare of colitis we may have to consider using steroids  · Currently she does not have any diarrhea  · Explained to her that 5-FU can increase the risk of diarrhea.  · We will have her follow-up with GI  · In the event of diarrhea we will try Imodium first and if no improvement in symptoms then consider steroids.    PLAN:   · Adjuvant FOLFOX to start 4/18/2022  · For placed 4/11/2022    .    Mago Alvarez MD

## 2022-04-15 ENCOUNTER — DOCUMENTATION (OUTPATIENT)
Dept: ONCOLOGY | Facility: CLINIC | Age: 67
End: 2022-04-15

## 2022-04-15 ENCOUNTER — HOSPITAL ENCOUNTER (OUTPATIENT)
Dept: CARDIOLOGY | Facility: HOSPITAL | Age: 67
Discharge: HOME | End: 2022-04-15
Admitting: INTERNAL MEDICINE

## 2022-04-15 ENCOUNTER — OFFICE VISIT (OUTPATIENT)
Dept: CARDIOLOGY | Facility: CLINIC | Age: 67
End: 2022-04-15

## 2022-04-15 VITALS
DIASTOLIC BLOOD PRESSURE: 80 MMHG | HEART RATE: 117 BPM | SYSTOLIC BLOOD PRESSURE: 140 MMHG | BODY MASS INDEX: 31.39 KG/M2 | WEIGHT: 170.6 LBS | HEIGHT: 62 IN

## 2022-04-15 DIAGNOSIS — Z82.49 FAMILY HISTORY OF PREMATURE CORONARY ARTERY DISEASE: ICD-10-CM

## 2022-04-15 DIAGNOSIS — C18.2 MALIGNANT NEOPLASM OF ASCENDING COLON: ICD-10-CM

## 2022-04-15 DIAGNOSIS — R73.9 HYPERGLYCEMIA: ICD-10-CM

## 2022-04-15 DIAGNOSIS — I10 ESSENTIAL HYPERTENSION: ICD-10-CM

## 2022-04-15 DIAGNOSIS — R06.02 SHORTNESS OF BREATH: ICD-10-CM

## 2022-04-15 DIAGNOSIS — R00.0 SINUS TACHYCARDIA: ICD-10-CM

## 2022-04-15 DIAGNOSIS — C18.2 MALIGNANT NEOPLASM OF ASCENDING COLON: Primary | ICD-10-CM

## 2022-04-15 PROCEDURE — 93356 MYOCRD STRAIN IMG SPCKL TRCK: CPT | Performed by: INTERNAL MEDICINE

## 2022-04-15 PROCEDURE — 93306 TTE W/DOPPLER COMPLETE: CPT

## 2022-04-15 PROCEDURE — 93306 TTE W/DOPPLER COMPLETE: CPT | Performed by: INTERNAL MEDICINE

## 2022-04-15 PROCEDURE — 93000 ELECTROCARDIOGRAM COMPLETE: CPT | Performed by: INTERNAL MEDICINE

## 2022-04-15 PROCEDURE — 93356 MYOCRD STRAIN IMG SPCKL TRCK: CPT

## 2022-04-15 PROCEDURE — 99204 OFFICE O/P NEW MOD 45 MIN: CPT | Performed by: INTERNAL MEDICINE

## 2022-04-15 PROCEDURE — 25010000002 PERFLUTREN (DEFINITY) 8.476 MG IN SODIUM CHLORIDE (PF) 0.9 % 10 ML INJECTION: Performed by: INTERNAL MEDICINE

## 2022-04-15 RX ADMIN — PERFLUTREN 2 ML: 6.52 INJECTION, SUSPENSION INTRAVENOUS at 15:32

## 2022-04-15 NOTE — PROGRESS NOTES
Date of Office Visit: 04/15/22  Encounter Provider: Homero Kennedy MD  Place of Service: [facility] CARDIOLOGY  Patient Name: Kayla Adkins  :1955    Chief Complaint   Patient presents with   • cardio-oncology   :     HPI:     Ms. Adkins is 66 y.o. and presents today in cardio-oncology evaluation. She has requested this visit to be pro-active prior to starting chemotherapy with FOLFOX next week. She has stage III colon cancer s/p surgical excision.    She has a history of mild hypertension and has been on metoprolol.  She has chronically had an elevated heart rate when at doctor's visits.  She has had hyperglycemia but has not received a formal diagnosis of diabetes.  She has hypertension which is treated with atorvastatin.  Her father had an MI at a young age, 47.    She gets winded with exertion, but it sounds mostly physiologic.  She notes it with high levels of exertion and with inclines, but it only lasts a few moments and then it resolves.  She is never in distress.  She does not have chest pain, lightheadedness, syncope, or leg swelling.    Past Medical History:   Diagnosis Date   • Anemia    • Anxiety    • Bladder infection    • Colitis    • Colon cancer (HCC)    • GERD (gastroesophageal reflux disease)    • Headache    • Hyperlipidemia    • Hypertension    • Knee injury, initial encounter-left 2019   • Neutropenia (HCC) 2017   • Prediabetes 2019       Past Surgical History:   Procedure Laterality Date   • COLON RESECTION Right 2022    Procedure: LAPAROSCOPIC RIGHT HEMICOLECTOMY WITH DAVINCI ROBOT;  Surgeon: Malcolm Wellington MD;  Location: [facility] MAIN OR;  Service: Robotics - DaVinci;  Laterality: Right;   • COLONOSCOPY     • COLONOSCOPY W/ POLYPECTOMY N/A 2022    Procedure: COLONOSCOPY INTO CECUM WITH COLD BIOPSIES;  Surgeon: Irvin Clayton MD;  Location: [facility] ENDOSCOPY;  Service: Gastroenterology;  Laterality:  N/A;  PRE: DIARRHEA, ANEMIA  POST: ASCENDING COLON MASS   • ENDOSCOPY N/A 02/22/2022    Procedure: ESOPHAGOGASTRODUODENOSCOPY WITH BIOPSY;  Surgeon: Irvin Clayton MD;  Location: [facility] ENDOSCOPY;  Service: Gastroenterology;  Laterality: N/A;  PRE: ANEMIA  POST: NORMAL EGD   • TONSILLECTOMY     • VENOUS ACCESS DEVICE (PORT) INSERTION N/A 4/11/2022    Procedure: INSERTION VENOUS ACCESS DEVICE with subcutaneous port;  Surgeon: Malcolm Wellington MD;  Location: [facility] OR [facility] [place];  Service: General;  Laterality: N/A;       Social History     Socioeconomic History   • Marital status:    • Number of children: 2   Tobacco Use   • Smoking status: Never Smoker   • Smokeless tobacco: Never Used   Vaping Use   • Vaping Use: Never used   Substance and Sexual Activity   • Alcohol use: Not Currently     Comment: seldom   • Drug use: Never   • Sexual activity: Defer       Family History   Problem Relation Age of Onset   • Hypertension Mother    • Heart disease Father    • Heart attack Father 47        first MI age 47; second MI 57   • Hypertension Sister    • No Known Problems Brother    • Malig Hyperthermia Neg Hx        Review of Systems   Respiratory: Positive for snoring.    Psychiatric/Behavioral: The patient is nervous/anxious.    All other systems reviewed and are negative.      Allergies   Allergen Reactions   • Penicillins Rash         Current Outpatient Medications:   •  aspirin-acetaminophen-caffeine (EXCEDRIN MIGRAINE) 250-250-65 MG per tablet, Take 1 tablet by mouth Every 6 (Six) Hours As Needed for Headache., Disp: , Rfl:   •  atorvastatin (LIPITOR) 10 MG tablet, Take 1 tablet by mouth Every Night., Disp: 90 tablet, Rfl: 0  •  DULoxetine (CYMBALTA) 60 MG capsule, Take 1 capsule by mouth Every Night., Disp: 90 capsule, Rfl: 0  •  Ferrous Sulfate ER (Slow Fe) 142 (45 Fe) MG tablet controlled-release, Take 142 mg by mouth Daily., Disp: 30 tablet, Rfl: 3  •  lidocaine-prilocaine (EMLA) 2.5-2.5 % cream,  "Apply nickel size amount to port site 30 min before appt time do not rub in cover with plastic wrap, Disp: 30 g, Rfl: 5  •  metoprolol succinate XL (TOPROL-XL) 50 MG 24 hr tablet, Take 1 tablet by mouth Daily. (Patient taking differently: Take 50 mg by mouth Every Night.), Disp: 90 tablet, Rfl: 0  •  omeprazole (priLOSEC) 20 MG capsule, Take 1 capsule by mouth Daily. (Patient taking differently: Take 20 mg by mouth Every Night.), Disp: 90 capsule, Rfl: 0  •  ondansetron (ZOFRAN) 8 MG tablet, Take 1 tablet by mouth 3 (Three) Times a Day As Needed for Nausea or Vomiting., Disp: 30 tablet, Rfl: 5  •  celecoxib (CeleBREX) 200 MG capsule, Take 1 capsule by mouth Daily., Disp: 10 capsule, Rfl: 0      Objective:     Vitals:    04/15/22 1336   BP: 140/80   BP Location: Left arm   Pulse: 117   Weight: 77.4 kg (170 lb 9.6 oz)   Height: 157.5 cm (62.01\")     STOP-Bang Score  Have you been diagnosed with Sleep Apnea?: no  Snoring?: yes  Tired?: no  Observed?: yes  Pressure?: no  Stop Score: 2  Body Mass Index more than 35 kg/m2?: no  Age older than 50 year old?: yes  Neck large? \">17\"/43cm-M, >16\"/41cm-F: no  Gender=Male?: no  Total Stop-Bang Score: 3    Body mass index is 31.19 kg/m².    Vitals reviewed.   Constitutional:       General: Not in acute distress.     Appearance: Obese.   Eyes:      Conjunctiva/sclera: Conjunctivae normal.      Pupils: Pupils are equal, round, and reactive to light.   HENT:      Head: Normocephalic.      Nose: Nose normal.         Comments: masked  Neck:      Vascular: No JVD. JVD normal.      Lymphadenopathy: No cervical adenopathy.   Pulmonary:      Effort: Pulmonary effort is normal.      Breath sounds: Normal breath sounds.   Cardiovascular:      Tachycardia present. Regular rhythm.      No gallop.   Pulses:     Intact distal pulses.   Edema:     Peripheral edema absent.   Abdominal:      Palpations: Abdomen is soft.      Tenderness: There is no abdominal tenderness.   Musculoskeletal: Normal " range of motion.      Cervical back: Normal range of motion. Skin:     General: Skin is warm and dry.      Findings: No rash.   Neurological:      General: No focal deficit present.      Mental Status: Alert, oriented to person, place, and time and oriented to person, place and time.      Cranial Nerves: No cranial nerve deficit.   Psychiatric:         Behavior: Behavior normal.         Thought Content: Thought content normal.         Judgment: Judgment normal.           ECG 12 Lead    Date/Time: 4/15/2022 1:59 PM  Performed by: Homero Kennedy MD  Authorized by: Homero Kennedy MD   Comparison: compared with previous ECG   Comparison to previous ECG: HR is increased c/w prior  Rhythm: sinus tachycardia  Conduction: conduction normal  ST Segments: ST segments normal  T Waves: T waves normal  QRS axis: normal  Other: no other findings    Clinical impression: normal ECG              Assessment:       Diagnosis Plan   1. Malignant neoplasm of ascending colon (HCC)     2. Essential hypertension     3. Sinus tachycardia     4. Hyperglycemia            Plan:       Mrs Adkins will be starting therapy with FOLFOX next week.  She has a history of hypertension on metoprolol.  She has a history of sinus tachycardia when at the doctor's office.  Review of her vital signs over the last week show a range of 68 to 117 bpm.  She has mild exertional dyspnea which sounds physiologic.    We talked about the potential cardiac side effects of 5-fluorouracil.  Thankfully, they are not terribly common, and seem to be related to the time the dose is given or shortly thereafter.  I do not suspect that she will have any long-term cardiac effects from the treatment.  There is no evidence that pretreating with beta-blockers or angiotensin receptor blockers will prevent the potential side effects of chest pain, shortness of breath, possible myocarditis, etc.    I did get an echo, which is completely normal.  Her heart rate had come down to  the 80s by the time of her visit today.  I asked her to let me know if she has any cardiac events during treatment, but otherwise she can follow-up with us as needed.    Sincerely,       Homero Kennedy MD

## 2022-04-15 NOTE — PROGRESS NOTES
Fax rec from [facility] stating pts Lidocaine-Prilocaine cream needs a PA. I have submitted the PA to [facility] through covermymeds.    Waiting for a decision.     Kimmy Goodwin  Specialty Pharmacy Technician

## 2022-04-15 NOTE — PROGRESS NOTES
Lidocaine-Prilocaine approved from 4/13/22-7/15/22-[facility]     Kimmy Goodwin  Specialty Pharmacy Technician

## 2022-04-18 ENCOUNTER — INFUSION (OUTPATIENT)
Dept: ONCOLOGY | Facility: HOSPITAL | Age: 67
End: 2022-04-18

## 2022-04-18 VITALS
HEART RATE: 89 BPM | WEIGHT: 170.6 LBS | SYSTOLIC BLOOD PRESSURE: 114 MMHG | OXYGEN SATURATION: 98 % | DIASTOLIC BLOOD PRESSURE: 74 MMHG | BODY MASS INDEX: 31.39 KG/M2 | RESPIRATION RATE: 18 BRPM | TEMPERATURE: 97.4 F | HEIGHT: 62 IN

## 2022-04-18 DIAGNOSIS — C18.2 MALIGNANT NEOPLASM OF ASCENDING COLON: Primary | ICD-10-CM

## 2022-04-18 LAB
ALBUMIN SERPL-MCNC: 4.3 G/DL (ref 3.5–5.2)
ALBUMIN/GLOB SERPL: 1.9 G/DL
ALP SERPL-CCNC: 146 U/L (ref 39–117)
ALT SERPL W P-5'-P-CCNC: 32 U/L (ref 1–33)
ANION GAP SERPL CALCULATED.3IONS-SCNC: 13.5 MMOL/L (ref 5–15)
AST SERPL-CCNC: 23 U/L (ref 1–32)
BH CV ECHO LEFT VENTRICLE GLOBAL LONGITUDINAL STRAIN: -20.3 %
BH CV ECHO MEAS - ACS: 1.65 CM
BH CV ECHO MEAS - AO MAX PG: 5.3 MMHG
BH CV ECHO MEAS - AO MEAN PG: 3.5 MMHG
BH CV ECHO MEAS - AO ROOT DIAM: 3 CM
BH CV ECHO MEAS - AO V2 MAX: 115.4 CM/SEC
BH CV ECHO MEAS - AO V2 VTI: 21.9 CM
BH CV ECHO MEAS - AVA(I,D): 2.7 CM2
BH CV ECHO MEAS - EDV(CUBED): 13.3 ML
BH CV ECHO MEAS - EDV(MOD-SP2): 61 ML
BH CV ECHO MEAS - EDV(MOD-SP4): 75 ML
BH CV ECHO MEAS - EF(MOD-BP): 64.1 %
BH CV ECHO MEAS - EF(MOD-SP2): 65.6 %
BH CV ECHO MEAS - EF(MOD-SP4): 61.3 %
BH CV ECHO MEAS - ESV(CUBED): 4.4 ML
BH CV ECHO MEAS - ESV(MOD-SP2): 21 ML
BH CV ECHO MEAS - ESV(MOD-SP4): 29 ML
BH CV ECHO MEAS - FS: 30.8 %
BH CV ECHO MEAS - IVS/LVPW: 0.92 CM
BH CV ECHO MEAS - IVSD: 1.06 CM
BH CV ECHO MEAS - LAT PEAK E' VEL: 7.7 CM/SEC
BH CV ECHO MEAS - LV DIASTOLIC VOL/BSA (35-75): 42 CM2
BH CV ECHO MEAS - LV MASS(C)D: 69.6 GRAMS
BH CV ECHO MEAS - LV MAX PG: 4.7 MMHG
BH CV ECHO MEAS - LV MEAN PG: 2.48 MMHG
BH CV ECHO MEAS - LV SYSTOLIC VOL/BSA (12-30): 16.3 CM2
BH CV ECHO MEAS - LV V1 MAX: 108 CM/SEC
BH CV ECHO MEAS - LV V1 VTI: 19 CM
BH CV ECHO MEAS - LVIDD: 2.37 CM
BH CV ECHO MEAS - LVIDS: 1.64 CM
BH CV ECHO MEAS - LVOT AREA: 3.1 CM2
BH CV ECHO MEAS - LVOT DIAM: 2 CM
BH CV ECHO MEAS - LVPWD: 1.15 CM
BH CV ECHO MEAS - MED PEAK E' VEL: 9.6 CM/SEC
BH CV ECHO MEAS - MV A DUR: 0.12 SEC
BH CV ECHO MEAS - MV A MAX VEL: 91.7 CM/SEC
BH CV ECHO MEAS - MV DEC SLOPE: 314.7 CM/SEC2
BH CV ECHO MEAS - MV DEC TIME: 0.11 MSEC
BH CV ECHO MEAS - MV E MAX VEL: 50.6 CM/SEC
BH CV ECHO MEAS - MV E/A: 0.55
BH CV ECHO MEAS - MV MAX PG: 4 MMHG
BH CV ECHO MEAS - MV MEAN PG: 1.79 MMHG
BH CV ECHO MEAS - MV V2 VTI: 12.3 CM
BH CV ECHO MEAS - MVA(VTI): 4.8 CM2
BH CV ECHO MEAS - PA ACC TIME: 0.1 SEC
BH CV ECHO MEAS - PA PR(ACCEL): 35 MMHG
BH CV ECHO MEAS - PA V2 MAX: 90.9 CM/SEC
BH CV ECHO MEAS - PI END-D VEL: 138.5 CM/SEC
BH CV ECHO MEAS - PULM A REVS DUR: 0.13 SEC
BH CV ECHO MEAS - PULM A REVS VEL: 31.7 CM/SEC
BH CV ECHO MEAS - PULM DIAS VEL: 27.4 CM/SEC
BH CV ECHO MEAS - PULM SYS VEL: 46.3 CM/SEC
BH CV ECHO MEAS - RV MAX PG: 1.63 MMHG
BH CV ECHO MEAS - RV V1 MAX: 63.8 CM/SEC
BH CV ECHO MEAS - RV V1 VTI: 10.9 CM
BH CV ECHO MEAS - RVOT DIAM: 1.95 CM
BH CV ECHO MEAS - SI(MOD-SP2): 22.4 ML/M2
BH CV ECHO MEAS - SI(MOD-SP4): 25.8 ML/M2
BH CV ECHO MEAS - SV(LVOT): 59.6 ML
BH CV ECHO MEAS - SV(MOD-SP2): 40 ML
BH CV ECHO MEAS - SV(MOD-SP4): 46 ML
BH CV ECHO MEAS - SV(RVOT): 32.5 ML
BH CV ECHO MEAS - TAPSE (>1.6): 1.71 CM
BH CV ECHO MEASUREMENTS AVERAGE E/E' RATIO: 5.85
BH CV XLRA - RV BASE: 2.7 CM
BH CV XLRA - RV LENGTH: 6.8 CM
BH CV XLRA - RV MID: 2.02 CM
BH CV XLRA - TDI S': 11.7 CM/SEC
BILIRUB SERPL-MCNC: <0.2 MG/DL (ref 0–1.2)
BUN SERPL-MCNC: 16 MG/DL (ref 8–23)
BUN/CREAT SERPL: 26.2 (ref 7–25)
CALCIUM SPEC-SCNC: 9.4 MG/DL (ref 8.6–10.5)
CHLORIDE SERPL-SCNC: 104 MMOL/L (ref 98–107)
CO2 SERPL-SCNC: 20.5 MMOL/L (ref 22–29)
CREAT SERPL-MCNC: 0.61 MG/DL (ref 0.57–1)
EGFRCR SERPLBLD CKD-EPI 2021: 99.4 ML/MIN/1.73
GLOBULIN UR ELPH-MCNC: 2.3 GM/DL
GLUCOSE SERPL-MCNC: 170 MG/DL (ref 65–99)
LEFT ATRIUM VOLUME INDEX: 12.6 ML/M2
MAXIMAL PREDICTED HEART RATE: 155 BPM
POTASSIUM SERPL-SCNC: 4 MMOL/L (ref 3.5–5.2)
PROT SERPL-MCNC: 6.6 G/DL (ref 6–8.5)
SINUS: 2.5 CM
SODIUM SERPL-SCNC: 138 MMOL/L (ref 136–145)
STJ: 2.49 CM
STRESS TARGET HR: 132 BPM

## 2022-04-18 PROCEDURE — 85025 COMPLETE CBC W/AUTO DIFF WBC: CPT | Performed by: FAMILY MEDICINE

## 2022-04-18 PROCEDURE — 96411 CHEMO IV PUSH ADDL DRUG: CPT

## 2022-04-18 PROCEDURE — 96415 CHEMO IV INFUSION ADDL HR: CPT

## 2022-04-18 PROCEDURE — 25010000002 FLUOROURACIL PER 500 MG: Performed by: INTERNAL MEDICINE

## 2022-04-18 PROCEDURE — 80053 COMPREHEN METABOLIC PANEL: CPT | Performed by: FAMILY MEDICINE

## 2022-04-18 PROCEDURE — 25010000002 PALONOSETRON PER 25 MCG: Performed by: NURSE PRACTITIONER

## 2022-04-18 PROCEDURE — 96375 TX/PRO/DX INJ NEW DRUG ADDON: CPT

## 2022-04-18 PROCEDURE — 25010000002 OXALIPLATIN PER 0.5 MG: Performed by: INTERNAL MEDICINE

## 2022-04-18 PROCEDURE — 96416 CHEMO PROLONG INFUSE W/PUMP: CPT

## 2022-04-18 PROCEDURE — 96413 CHEMO IV INFUSION 1 HR: CPT

## 2022-04-18 PROCEDURE — 25010000002 LEUCOVORIN CALCIUM PER 50 MG: Performed by: INTERNAL MEDICINE

## 2022-04-18 PROCEDURE — 96368 THER/DIAG CONCURRENT INF: CPT

## 2022-04-18 PROCEDURE — 96367 TX/PROPH/DG ADDL SEQ IV INF: CPT

## 2022-04-18 PROCEDURE — 25010000002 DEXAMETHASONE SODIUM PHOSPHATE 100 MG/10ML SOLUTION: Performed by: NURSE PRACTITIONER

## 2022-04-18 PROCEDURE — 25010000002 FOSAPREPITANT PER 1 MG: Performed by: NURSE PRACTITIONER

## 2022-04-18 RX ORDER — DEXTROSE MONOHYDRATE 50 MG/ML
250 INJECTION, SOLUTION INTRAVENOUS ONCE
Status: COMPLETED | OUTPATIENT
Start: 2022-04-18 | End: 2022-04-18

## 2022-04-18 RX ORDER — DIPHENHYDRAMINE HYDROCHLORIDE 50 MG/ML
50 INJECTION INTRAMUSCULAR; INTRAVENOUS AS NEEDED
Status: CANCELLED | OUTPATIENT
Start: 2022-04-18

## 2022-04-18 RX ORDER — FLUOROURACIL 50 MG/ML
400 INJECTION, SOLUTION INTRAVENOUS ONCE
Status: COMPLETED | OUTPATIENT
Start: 2022-04-18 | End: 2022-04-18

## 2022-04-18 RX ORDER — FAMOTIDINE 10 MG/ML
20 INJECTION, SOLUTION INTRAVENOUS AS NEEDED
Status: CANCELLED | OUTPATIENT
Start: 2022-04-18

## 2022-04-18 RX ORDER — PALONOSETRON 0.05 MG/ML
0.25 INJECTION, SOLUTION INTRAVENOUS ONCE
Status: COMPLETED | OUTPATIENT
Start: 2022-04-18 | End: 2022-04-18

## 2022-04-18 RX ADMIN — DEXAMETHASONE SODIUM PHOSPHATE 12 MG: 10 INJECTION, SOLUTION INTRAMUSCULAR; INTRAVENOUS at 09:55

## 2022-04-18 RX ADMIN — LEUCOVORIN CALCIUM 720 MG: 350 INJECTION, POWDER, LYOPHILIZED, FOR SOLUTION INTRAMUSCULAR; INTRAVENOUS at 11:56

## 2022-04-18 RX ADMIN — FLUOROURACIL 4300 MG: 50 INJECTION, SOLUTION INTRAVENOUS at 14:08

## 2022-04-18 RX ADMIN — PALONOSETRON HYDROCHLORIDE 0.25 MG: 0.25 INJECTION INTRAVENOUS at 09:54

## 2022-04-18 RX ADMIN — FLUOROURACIL 720 MG: 50 INJECTION, SOLUTION INTRAVENOUS at 14:03

## 2022-04-18 RX ADMIN — OXALIPLATIN 150 MG: 5 INJECTION, SOLUTION, CONCENTRATE INTRAVENOUS at 11:59

## 2022-04-18 RX ADMIN — SODIUM CHLORIDE 100 ML: 9 INJECTION, SOLUTION INTRAVENOUS at 11:12

## 2022-04-18 RX ADMIN — DEXTROSE MONOHYDRATE 250 ML: 50 INJECTION, SOLUTION INTRAVENOUS at 10:20

## 2022-04-18 NOTE — NURSING NOTE
Lab Results   Component Value Date    WBC 2.64 (L) 04/18/2022    HGB 11.8 (L) 04/18/2022    HCT 37.9 04/18/2022    MCV 81.2 04/18/2022     04/18/2022     CBC reviewed with Dr. Dela Cruz.  ANC 1360.  Ok to treat today per MD.

## 2022-04-19 ENCOUNTER — TELEPHONE (OUTPATIENT)
Dept: ONCOLOGY | Facility: CLINIC | Age: 67
End: 2022-04-19

## 2022-04-19 RX ORDER — PROCHLORPERAZINE MALEATE 10 MG
10 TABLET ORAL EVERY 6 HOURS PRN
Qty: 60 TABLET | Refills: 2 | Status: SHIPPED | OUTPATIENT
Start: 2022-04-19 | End: 2022-10-14

## 2022-04-19 NOTE — TELEPHONE ENCOUNTER
On-call MD note:  Patient was started on for cycle adjuvant chemotherapy FOLFOX 6 on 4/18/2022.    Her  called her this evening, reporting patient has some nausea, and heartburns and the discomfort in the chest.  It does not sound like cardiac chest pain.  Patient had took Zofran 3 times today.  I prescribed Compazine today to her [facility], to be used interchangeably with Zofran.  She probably will not get Compazine tonight due to the pharmacy.  Nevertheless her  was recommended to get TUMS tonight for patient to use.    Patient also takes Prilosec daily.  This will need to be continued.    Spoke with the patient's  and he voiced understanding.    RAMON GALVAN M.D., Ph.D.     4/19/2022

## 2022-04-20 ENCOUNTER — APPOINTMENT (OUTPATIENT)
Dept: ONCOLOGY | Facility: HOSPITAL | Age: 67
End: 2022-04-20

## 2022-04-20 ENCOUNTER — INFUSION (OUTPATIENT)
Dept: ONCOLOGY | Facility: HOSPITAL | Age: 67
End: 2022-04-20

## 2022-04-20 VITALS
BODY MASS INDEX: 30.73 KG/M2 | OXYGEN SATURATION: 97 % | SYSTOLIC BLOOD PRESSURE: 127 MMHG | WEIGHT: 167 LBS | RESPIRATION RATE: 18 BRPM | TEMPERATURE: 97.2 F | HEIGHT: 62 IN | HEART RATE: 91 BPM | DIASTOLIC BLOOD PRESSURE: 80 MMHG

## 2022-04-20 DIAGNOSIS — Z45.2 ENCOUNTER FOR FITTING AND ADJUSTMENT OF VASCULAR CATHETER: Primary | ICD-10-CM

## 2022-04-20 DIAGNOSIS — Z45.2 ENCOUNTER FOR FITTING AND ADJUSTMENT OF VASCULAR CATHETER: ICD-10-CM

## 2022-04-20 DIAGNOSIS — C18.2 MALIGNANT NEOPLASM OF ASCENDING COLON: Primary | ICD-10-CM

## 2022-04-20 PROCEDURE — 25010000002 PEGFILGRASTIM 6 MG/0.6ML PREFILLED SYRINGE KIT: Performed by: INTERNAL MEDICINE

## 2022-04-20 PROCEDURE — 96360 HYDRATION IV INFUSION INIT: CPT

## 2022-04-20 PROCEDURE — 96361 HYDRATE IV INFUSION ADD-ON: CPT

## 2022-04-20 PROCEDURE — 25010000002 HEPARIN LOCK FLUSH PER 10 UNITS: Performed by: INTERNAL MEDICINE

## 2022-04-20 PROCEDURE — 25010000002 PROCHLORPERAZINE 10 MG/2ML SOLUTION: Performed by: NURSE PRACTITIONER

## 2022-04-20 PROCEDURE — 96374 THER/PROPH/DIAG INJ IV PUSH: CPT

## 2022-04-20 PROCEDURE — 96377 APPLICATON ON-BODY INJECTOR: CPT

## 2022-04-20 RX ORDER — SODIUM CHLORIDE 0.9 % (FLUSH) 0.9 %
10 SYRINGE (ML) INJECTION AS NEEDED
Status: CANCELLED | OUTPATIENT
Start: 2022-04-20

## 2022-04-20 RX ORDER — SODIUM CHLORIDE 0.9 % (FLUSH) 0.9 %
10 SYRINGE (ML) INJECTION AS NEEDED
Status: DISCONTINUED | OUTPATIENT
Start: 2022-04-20 | End: 2022-04-20 | Stop reason: HOSPADM

## 2022-04-20 RX ORDER — HEPARIN SODIUM (PORCINE) LOCK FLUSH IV SOLN 100 UNIT/ML 100 UNIT/ML
500 SOLUTION INTRAVENOUS AS NEEDED
Status: DISCONTINUED | OUTPATIENT
Start: 2022-04-20 | End: 2022-04-20 | Stop reason: HOSPADM

## 2022-04-20 RX ORDER — SODIUM CHLORIDE 9 MG/ML
1000 INJECTION, SOLUTION INTRAVENOUS ONCE
Status: COMPLETED | OUTPATIENT
Start: 2022-04-20 | End: 2022-04-20

## 2022-04-20 RX ORDER — HEPARIN SODIUM (PORCINE) LOCK FLUSH IV SOLN 100 UNIT/ML 100 UNIT/ML
500 SOLUTION INTRAVENOUS AS NEEDED
Status: CANCELLED | OUTPATIENT
Start: 2022-04-20

## 2022-04-20 RX ORDER — PROCHLORPERAZINE EDISYLATE 5 MG/ML
10 INJECTION INTRAMUSCULAR; INTRAVENOUS ONCE
Status: DISCONTINUED | OUTPATIENT
Start: 2022-04-20 | End: 2022-04-20 | Stop reason: HOSPADM

## 2022-04-20 RX ORDER — SODIUM CHLORIDE 9 MG/ML
1000 INJECTION, SOLUTION INTRAVENOUS ONCE
Status: DISCONTINUED | OUTPATIENT
Start: 2022-04-20 | End: 2022-04-20 | Stop reason: HOSPADM

## 2022-04-20 RX ORDER — PROCHLORPERAZINE EDISYLATE 5 MG/ML
10 INJECTION INTRAMUSCULAR; INTRAVENOUS ONCE
Status: COMPLETED | OUTPATIENT
Start: 2022-04-20 | End: 2022-04-20

## 2022-04-20 RX ADMIN — SODIUM CHLORIDE 1000 ML: 9 INJECTION, SOLUTION INTRAVENOUS at 12:15

## 2022-04-20 RX ADMIN — Medication 10 ML: at 14:17

## 2022-04-20 RX ADMIN — PEGFILGRASTIM 6 MG: KIT SUBCUTANEOUS at 11:30

## 2022-04-20 RX ADMIN — Medication 500 UNITS: at 14:17

## 2022-04-20 RX ADMIN — PROCHLORPERAZINE EDISYLATE 10 MG: 5 INJECTION INTRAMUSCULAR; INTRAVENOUS at 12:23

## 2022-04-20 NOTE — PROGRESS NOTES
Pt present for disconnect and on pro. Pt reports n/v last night and nausea this morning. Pt took 4 mg of zofran with no relief. Pt reports s/w Dr. Burton last night and was prescribed 10 mg compazine. Pt has not picked up script at pharmacy. Instructed pt to alternate with 8 mg zofran and compazine. Pt requesting IV fluids. S/w Natalya Thornton. V/o to administer 1 LNS and compazine 10 mg IV. Pt added onto infusion schedule.

## 2022-04-21 ENCOUNTER — TELEPHONE (OUTPATIENT)
Dept: FAMILY MEDICINE CLINIC | Facility: CLINIC | Age: 67
End: 2022-04-21

## 2022-04-21 ENCOUNTER — TELEPHONE (OUTPATIENT)
Dept: ONCOLOGY | Facility: CLINIC | Age: 67
End: 2022-04-21

## 2022-04-21 RX ORDER — OLANZAPINE 5 MG/1
5 TABLET ORAL NIGHTLY
Qty: 30 TABLET | Refills: 3 | Status: SHIPPED | OUTPATIENT
Start: 2022-04-21 | End: 2022-08-05

## 2022-04-21 NOTE — TELEPHONE ENCOUNTER
I spoke with Mr Adkins. Dr Alvarez asked me to check in with her regarding her nausea. He reports that she is better today. They have been alternating the zofran and compazine.  I advised him she has also sent in olanzapine to her pharmacy and will take that at bedtime daily.  He had some questions regarding cold foods and I answered all his questions to his satisfaction.  I asked him to keep my direct line handy and not to hesitate to call for any other issues.  He was thankful for the call.

## 2022-04-21 NOTE — PROGRESS NOTES
Subjective   Kayla Adkins is a 66 y.o. female.  Referred by Dr. Rivas for evaluation of leukopenia    History of Present Illness   Ms. Adkins is a 66-year-old postmenopausal  who has been referred by Dr. Rivas for evaluation of leukopenia, neutropenia.  Patient reports that she has had longstanding leukopenia and her white blood cell count normally ranges in the threes.  On her most recent visit with Dr. Rivas white blood cell count was noted to be 2560 on 3/2/2021 and 2660 on 4/5/2021.  This prompted a referral to hematology.  Patient reports that she had a extensive work-up including a bone marrow biopsy for the same condition about 17 years ago.  She thinks that this was performed at [facility].  According to patient no clear etiology was determined after the work-up.  She was recommended to continue with periodic labs.  No treatment was required.    She was seen in December and had continued on oral iron.  She was tolerating that fairly well.  However she had worsening of colitis for about 2 months prior to that visit.  She was scheduled for colonoscopy in January 2022.    She had a routine surveillance colonoscopy performed by Dr. Whitlock on 2/22/2022.  On this there was an infiltrative nonobstructing medium-sized mass in the proximal ascending colon 1 to 2 cm distal to the cecum.  This was partially circumferential involving 1 foot of the lumen.  Biopsies were taken.  There is also mildly congested mucosa in the entire colon.  Random biopsies were taken.  She also had an EGD at the same time which reported normal esophagus, stomach and duodenum.  Pathology evaluation from the biopsy reported superficial fragments of at least intramucosal carcinoma.  Random colon biopsies reported increased lamina propria, chronic inflammation with surface epithelial alteration and increased intraepithelial lymphocytes consistent with lymphocytic colitis.  Biopsies from the stomach reported chronic  inactive gastritis.  Negative for intestinal metaplasia and H. pylori.  Biopsies from the duodenum was negative.    She was seen by Dr. Wellington who performed a laparoscopic right hemicolectomy on 3/2/2022.  Pathology showed a 3.8 cm mass at the cecum invading through the muscularis propria into the pericolorectal adipose tissue.  No lymphovascular or perineural invasion.  All margins were clear.  The radial margin was 3 cm.  3 out of 26 lymph nodes were positive for metastatic disease.  The largest metastatic focus was 0.7 cm with extranodal extension.  Pathological stage PT3N1B.  The tumor was moderately differentiated, grade 2 adenocarcinoma.    No loss of nuclear expression of MMR proteins.  Low probability of microsatellite instability.  MSI status-microsatellite stable.  This is by PCR.    Normal CEA and 0.77 on the day prior to surgery.    She was seen by Dr. Burton as inpatient and recommended adjuvant chemotherapy with FOLFOX.    Patient initiating cycle 1 FOLFOX 4/18/2022.  Neulasta support given due to baseline neutropenia.    Interval history:  Ms. Adkins returns to the office today accompanied by her  for 1 week toxicity check following initiation of FOLFOX chemotherapy with Neulasta support.  Unfortunately the patient had a difficult time with nausea and vomiting beginning on day 2 and last seen into most of day 3.  They were able to speak with Dr. Burton (on-call) who prescribed Compazine and instructed the patient to alternate Zofran and Compazine routinely.  They then spoke with our nursing staff when she came in for 5-FU unhook on 4/20/2022 at which time she also received IV fluids.  Thankfully she turned the corner and has had a fairly good week since that time, though she does continue currently alternating Compazine and Zofran.  We did also prescribe olanzapine to take nightly and she notes this has also helped her mood which we discussed is part of what that drug can do along with  helping sleep as well.  We discussed at this point she should be able to back off routine dosing of antiemetics as she should be entering a better time during the cycle in which she should feel better.    The patient also struggle with fairly significant heartburn on day 2 though this may have been exacerbated by the fact that she threw up her Prilosec.  She was able to gain some relief with Tums which we discussed is fine to continue as needed.    She is more neutropenic today though not far from baseline, having dropped from 1.3 at the start of chemotherapy to 0.8 currently.  We discussed her Neulasta shot has likely not kicked in yet.  She does understand to take Claritin to help with bony pain.    She and her  deny other concerns at this time.    The following portions of the patient's history were reviewed and updated as appropriate: allergies, current medications, past family history, past medical history, past social history and problem list.    Past Medical History:   Diagnosis Date   • Anemia    • Anxiety    • Bladder infection    • Colitis    • Colon cancer (HCC)    • GERD (gastroesophageal reflux disease)    • Headache    • Hyperlipidemia    • Hypertension    • Knee injury, initial encounter-left 07/01/2019   • Neutropenia (HCC) 01/03/2017   • Prediabetes 03/04/2019      Past Surgical History:   Procedure Laterality Date   • COLON RESECTION Right 03/02/2022    Procedure: LAPAROSCOPIC RIGHT HEMICOLECTOMY WITH DAVINCI ROBOT;  Surgeon: Malcolm Wellington MD;  Location: [facility] OR;  Service: Robotics - DaVinci;  Laterality: Right;   • COLONOSCOPY     • COLONOSCOPY W/ POLYPECTOMY N/A 02/22/2022    Procedure: COLONOSCOPY INTO CECUM WITH COLD BIOPSIES;  Surgeon: Irvin Clayton MD;  Location: [facility] ENDOSCOPY;  Service: Gastroenterology;  Laterality: N/A;  PRE: DIARRHEA, ANEMIA  POST: ASCENDING COLON MASS   • ENDOSCOPY N/A 02/22/2022    Procedure: ESOPHAGOGASTRODUODENOSCOPY WITH BIOPSY;   "Surgeon: Irvin Clayton MD;  Location: [facility] ENDOSCOPY;  Service: Gastroenterology;  Laterality: N/A;  PRE: ANEMIA  POST: NORMAL EGD   • TONSILLECTOMY     • VENOUS ACCESS DEVICE (PORT) INSERTION N/A 4/11/2022    Procedure: INSERTION VENOUS ACCESS DEVICE with subcutaneous port;  Surgeon: Malcolm Wellington MD;  [place] [facility] [place] [facility]  Service: General;  Laterality: N/A;        Family History   Problem Relation Age of Onset   • Hypertension Mother    • Heart disease Father    • Heart attack Father 47        first MI age 47; second MI 57   • Hypertension Sister    • No Known Problems Brother    • Malig Hyperthermia Neg Hx       Social History     Socioeconomic History   • Marital status:    • Number of children: 2   Tobacco Use   • Smoking status: Never Smoker   • Smokeless tobacco: Never Used   Vaping Use   • Vaping Use: Never used   Substance and Sexual Activity   • Alcohol use: Not Currently     Comment: seldom   • Drug use: Never   • Sexual activity: Defer      OB History    No obstetric history on file.          Allergies   Allergen Reactions   • Penicillins Rash      Review of systems as mentioned in the HPI.    Objective   Blood pressure 111/73, pulse 81, temperature 97.3 °F (36.3 °C), temperature source Temporal, resp. rate 16, height 157 cm (61.81\"), weight 75.3 kg (166 lb), SpO2 97 %.     Physical Exam  Vitals reviewed.   Constitutional:       Appearance: Normal appearance. She is normal weight.   HENT:      Head: Normocephalic and atraumatic.      Nose: Nose normal.      Mouth/Throat:      Mouth: Mucous membranes are moist.   Eyes:      Extraocular Movements: Extraocular movements intact.      Pupils: Pupils are equal, round, and reactive to light.   Cardiovascular:      Rate and Rhythm: Normal rate.      Heart sounds: Normal heart sounds. No murmur heard.    No gallop.   Pulmonary:      Effort: Pulmonary effort is normal. No respiratory distress.      Breath sounds: Normal breath " sounds. No wheezing.      Comments: Mediport benign  Abdominal:      General: Abdomen is flat. Bowel sounds are normal. There is no distension.      Palpations: Abdomen is soft.      Tenderness: There is no abdominal tenderness.      Comments: Abdominal incisions have healed well.   Musculoskeletal:         General: Normal range of motion.      Cervical back: Normal range of motion.      Right lower leg: No edema.      Left lower leg: No edema.   Skin:     Findings: No bruising or rash.   Neurological:      General: No focal deficit present.      Mental Status: She is alert and oriented to person, place, and time. Mental status is at baseline.   Psychiatric:         Behavior: Behavior normal.       I have reexamined the patient and the results are consistent with the previously documented exam except as updated. DALE Medina        DIAGNOSTIC DATA:  Results from last 7 days   Lab Units 04/25/22  0926   WBC 10*3/mm3 2.17*   NEUTROS ABS 10*3/mm3 0.81*   HEMOGLOBIN g/dL 10.9*   HEMATOCRIT % 35.1   PLATELETS 10*3/mm3 128*     Results from last 7 days   Lab Units 04/25/22  0926   SODIUM mmol/L 140   POTASSIUM mmol/L 3.5   CHLORIDE mmol/L 107   CO2 mmol/L 25.5   BUN mg/dL 12   CREATININE mg/dL 0.74   CALCIUM mg/dL 8.9   ALBUMIN g/dL 4.00   BILIRUBIN mg/dL 0.2   ALK PHOS U/L 139*   ALT (SGPT) U/L 9   AST (SGOT) U/L 11   GLUCOSE mg/dL 141*             Adult Transthoracic Echo Complete W/ Cont if Necessary Per Protocol    Addendum Date: 4/15/2022    · Calculated left ventricular EF = 64.1% Estimated left ventricular EF was in agreement with the calculated left ventricular EF. Left ventricular systolic function is normal. Normal global longitudinal LV strain (GLS) = -20.3%. Left ventricle strain data was reviewed by the physician and found to be accurate. Normal left ventricular cavity size and wall thickness noted. All left ventricular wall segments contract normally. Left ventricular diastolic function is  consistent with (grade I) impaired relaxation.      XR Chest Post CVA Port    Result Date: 4/11/2022  Placement of right subclavian Mediport tip in the SVC. No evidence for pneumothorax.  This report was finalized on 4/11/2022 10:15 AM by Dr. Ranulfo Francis M.D.         3/24/2022 CBC-WBC 3.13, hemoglobin 10.2, platelets 315,000  Absolute neutrophil count 1.19  CMP-blood sugar 102, alkaline phosphatase 154 otherwise within normal limits    CT of the chest abdomen pelvis 2/25/2022-images independently reviewed and interpreted by me.  Scattered tiny pulmonary nodules which are nonspecific.  Follow-up imaging recommended.  On CT of the abdomen there is thickening of the cecum which corresponds to the mass on the colonoscopy.  There is adjacent enlarged right lower quadrant lymph nodes measuring up to 1.2 cm.    4/14/2022  CBC-WBC 2.74, hemoglobin 12, platelets 296,000  Absolute neutrophil count 1.16  CMP-blood sugar elevated at 151, alkaline phosphatase elevated at 150 otherwise within normal limits    Assessment/Plan      66-year-old postmenopausal  lady with longstanding leukopenia/neutropenia and anemia and recently diagnosed T3N1A adenocarcinoma of the colon    *[id number] adenocarcinoma of the ascending colon  · The tumor measures 3.8 cm located in the cecum, invades through the muscularis propria into the pericolorectal adipose tissue.  No lymphovascular or perineural invasion.  All the margins are negative.  Tumor comes to within 3 cm of the radial margin.  3 out of 26 lymph nodes positive for metastatic carcinoma with the largest focus measuring 0.7 cm without extranodal extension.  · No tumor deposits identified  · PT3N1B  · MSI low probability on immunohistochemistry and stable on PCR  · No family history of colon cancer, patient for genetic testing  · We discussed the salient features of pathology at length today.  · Explained to her that this is stage IIIb colon cancer however since his T3N1 I would  consider this low risk stage III.  There is no other high risk features noted on pathology either.  · Given that the tumor is stage III there is definite benefit from adjuvant chemotherapy.  · We discussed the 2 available regimens including XELOX and FOLFOX.  · Explained to her that based on the IDEA data 3 months of XELOX is noninferior to 6 months of adjuvant chemotherapy however with FOLFOX the noninferiority has not been established.  Explained to her that 6 months of chemotherapy improves disease-free survival by additional 2 to 3% over 3 months.  · Discussed the adverse effects of XELOX as well as FOLFOX.  · After discussing the pros and cons we have decided to proceed with FOLFOX for total of 6 months but if she is unable to tolerate due to neurotoxicity then we could potentially stop after 3 months of treatment.  · Adverse effects of chemotherapy including but not limited to myelosuppression, increased risk of infections, nausea, vomiting, cold sensitivity, diarrhea, mucositis, alopecia, altered taste, fatigue, cardiotoxicity, neurotoxicity discussed.  · Port placed on 4/11/2022  · Scheduled to start cycle 1 FOLFOX on 4/18/2022  · Neulasta support given pre-existing neutropenia    *Leukopenia  · Predominantly neutropenia  · Mild increase in monocyte count  · This is chronic and unchanged  · No increased infections  · Had a previous work-up including a bone marrow biopsy with no clear etiology  · Differential includes nutritional, chronic idiopathic, autoimmune.  · She is currently not on any medications which could account for the leukopenia.  · No splenomegaly on exam  · 5/13/2021-flow cytometry negative  · LDH normal at 174  · Reticulocyte count 1.07%  · Ferritin low at 5.7  · Iron saturation low at 3%  · Folic acid normal at 11.8, B12 415  · Rheumatoid factor negative  · AVIVA negative  · ESR normal at 29  · Serum copper normal at 134  · 12/16/2021, WBC 3.16 today.  Patient denies frequent  infections.  · CBC 3/24/2022 reviewed and WBC count 3.13, absolute neutrophil count 1.19, hemoglobin 10.2, platelets 315,000  · Given pre-existing neutropenia she will be receiving Neulasta with each cycle of FOLFOX.  · 4/14/2022-WBC 2.74 with an absolute neutrophil count of 1.16  · 4/25/2022 (1 week out from C1 FOLFOX) ANC 0.8. Neulasta given just 5 days ago. Suspect it has not kicked in yet. She understands to take Claritin for help with bony pain.    *Anemia  · Microcytic  · Likely secondary to iron deficiency  · 4/22/2021 iron saturation 3%, TIBC today 508, ferritin 5.70.  The patient was initiated on oral iron every other day.  · 8/26/2021, hemoglobin today 10.1.  The patient has been tolerating oral iron every other day.  We will increase oral iron to daily and recheck iron labs in 4 months.  · 12/16/2021, patient is taking oral iron every day and tolerating this well.  Hemoglobin today 10.0.  Iron saturation 45, TIBC 413, ferritin 10.20.  · 3/4/2022 ferritin 27.5, iron saturation 5%, TIBC 375, iron studies consistent with iron deficiency  · 3/24/2022 hemoglobin 10.2  · Received 3 doses of Venofer.  · Hemoglobin 10.9 today post C1 chemo    *Qtuholume-fq-qh-date on screening mammograms , she is due for mammogram in April 2022.    *Headaches  · She has daily headaches  · She has been using Excedrin for the same which helps with the headaches.  · She thought Lyrica was probably helping with the headaches and tried the same however no improvement in symptoms therefore plans to continue with Excedrin.  · Patient does continue with headaches though manageable.  We discussed that routine Zofran in addition to the IV Aloxi we are giving her can exacerbate this and to monitor.    *Colitis  · Lymphocytic colitis noted on the biopsy of the colon  · There is a flare of colitis we may have to consider using steroids  · Currently she does not have any diarrhea  · Explained to her that 5-FU can increase the risk of  diarrhea.  · We will have her follow-up with GI  · In the event of diarrhea we will try Imodium first and if no improvement in symptoms then consider steroids.    PLAN:   · No need for IV fluids today.  · Patient instructed to slowly back off routine dosing of Zofran and Compazine as this should begin the better week for her.  · She will return in 1 week for cycle 2 FOLFOX.  Due to scheduling constraints she will actually see Dr. Alvarez on day 4 of that cycle.  Continue Neulasta support on day 3.  · She will again receive IV Emend, Aloxi and Decadron with chemotherapy.    · She will then begin routine dosing of Zofran alternating with Compazine for at least 48 hours after chemo considering her poor tolerance with cycle 1.  · She will also continue olanzapine 5 mg nightly.  · She will continue scheduled Prilosec and add Tums as needed for additional heartburn which has been helpful already.  · Patient encouraged to call with any question concerns prior to scheduled return.    I spent 42 minutes caring for Kayla on this date of service. This time includes time spent by me in the following activities: preparing for the visit, reviewing tests, obtaining and/or reviewing a separately obtained history, performing a medically appropriate examination and/or evaluation, counseling and educating the patient/family/caregiver, documenting information in the medical record, independently interpreting results and communicating that information with the patient/family/caregiver and care coordination        Dinorah Fuller, APRN

## 2022-04-21 NOTE — TELEPHONE ENCOUNTER
PATIENTS SPOUSE CALLING WANTING TO KNOW IF THERE IS ANY LABS NEEDED FOR HER MED CHECK APPOINTMENT A LAB WAS COMPLETED ON 04/18/22 HE WASN'T SURE IF THIS IS ALL  NEEDED.      PLEASE ADVISE   [phone]

## 2022-04-25 ENCOUNTER — INFUSION (OUTPATIENT)
Dept: ONCOLOGY | Facility: HOSPITAL | Age: 67
End: 2022-04-25

## 2022-04-25 ENCOUNTER — LAB (OUTPATIENT)
Dept: OTHER | Facility: HOSPITAL | Age: 67
End: 2022-04-25

## 2022-04-25 ENCOUNTER — OFFICE VISIT (OUTPATIENT)
Dept: ONCOLOGY | Facility: CLINIC | Age: 67
End: 2022-04-25

## 2022-04-25 VITALS
HEIGHT: 62 IN | HEART RATE: 81 BPM | BODY MASS INDEX: 30.55 KG/M2 | OXYGEN SATURATION: 97 % | RESPIRATION RATE: 16 BRPM | TEMPERATURE: 97.3 F | WEIGHT: 166 LBS | DIASTOLIC BLOOD PRESSURE: 73 MMHG | SYSTOLIC BLOOD PRESSURE: 111 MMHG

## 2022-04-25 DIAGNOSIS — R11.2 CHEMOTHERAPY INDUCED NAUSEA AND VOMITING: ICD-10-CM

## 2022-04-25 DIAGNOSIS — Z79.899 HIGH RISK MEDICATION USE: ICD-10-CM

## 2022-04-25 DIAGNOSIS — Z45.2 ENCOUNTER FOR FITTING AND ADJUSTMENT OF VASCULAR CATHETER: ICD-10-CM

## 2022-04-25 DIAGNOSIS — D50.8 OTHER IRON DEFICIENCY ANEMIA: ICD-10-CM

## 2022-04-25 DIAGNOSIS — C18.2 MALIGNANT NEOPLASM OF ASCENDING COLON: Primary | ICD-10-CM

## 2022-04-25 DIAGNOSIS — T45.1X5A CHEMOTHERAPY INDUCED NAUSEA AND VOMITING: ICD-10-CM

## 2022-04-25 LAB
ALBUMIN SERPL-MCNC: 4 G/DL (ref 3.5–5.2)
ALBUMIN/GLOB SERPL: 1.9 G/DL
ALP SERPL-CCNC: 139 U/L (ref 39–117)
ALT SERPL W P-5'-P-CCNC: 9 U/L (ref 1–33)
ANION GAP SERPL CALCULATED.3IONS-SCNC: 7.5 MMOL/L (ref 5–15)
ANISOCYTOSIS BLD QL: NORMAL
AST SERPL-CCNC: 11 U/L (ref 1–32)
BASOPHILS # BLD AUTO: 0.01 10*3/MM3 (ref 0–0.2)
BASOPHILS NFR BLD AUTO: 0.5 % (ref 0–1.5)
BILIRUB SERPL-MCNC: 0.2 MG/DL (ref 0–1.2)
BUN SERPL-MCNC: 12 MG/DL (ref 8–23)
BUN/CREAT SERPL: 16.2 (ref 7–25)
CALCIUM SPEC-SCNC: 8.9 MG/DL (ref 8.6–10.5)
CHLORIDE SERPL-SCNC: 107 MMOL/L (ref 98–107)
CO2 SERPL-SCNC: 25.5 MMOL/L (ref 22–29)
CREAT SERPL-MCNC: 0.74 MG/DL (ref 0.57–1)
DEPRECATED RDW RBC AUTO: 52.9 FL (ref 37–54)
EGFRCR SERPLBLD CKD-EPI 2021: 89.9 ML/MIN/1.73
EOSINOPHIL # BLD AUTO: 0.44 10*3/MM3 (ref 0–0.4)
EOSINOPHIL NFR BLD AUTO: 20.3 % (ref 0.3–6.2)
ERYTHROCYTE [DISTWIDTH] IN BLOOD BY AUTOMATED COUNT: 17.4 % (ref 12.3–15.4)
GLOBULIN UR ELPH-MCNC: 2.1 GM/DL
GLUCOSE SERPL-MCNC: 141 MG/DL (ref 65–99)
HCT VFR BLD AUTO: 35.1 % (ref 34–46.6)
HGB BLD-MCNC: 10.9 G/DL (ref 12–15.9)
IMM GRANULOCYTES # BLD AUTO: 0.03 10*3/MM3 (ref 0–0.05)
IMM GRANULOCYTES NFR BLD AUTO: 1.4 % (ref 0–0.5)
LYMPHOCYTES # BLD AUTO: 0.64 10*3/MM3 (ref 0.7–3.1)
LYMPHOCYTES NFR BLD AUTO: 29.5 % (ref 19.6–45.3)
MCH RBC QN AUTO: 25.8 PG (ref 26.6–33)
MCHC RBC AUTO-ENTMCNC: 31.1 G/DL (ref 31.5–35.7)
MCV RBC AUTO: 83 FL (ref 79–97)
MONOCYTES # BLD AUTO: 0.24 10*3/MM3 (ref 0.1–0.9)
MONOCYTES NFR BLD AUTO: 11.1 % (ref 5–12)
NEUTROPHILS NFR BLD AUTO: 0.81 10*3/MM3 (ref 1.7–7)
NEUTROPHILS NFR BLD AUTO: 37.2 % (ref 42.7–76)
NRBC BLD AUTO-RTO: 0 /100 WBC (ref 0–0.2)
OVALOCYTES BLD QL SMEAR: NORMAL
PLAT MORPH BLD: NORMAL
PLATELET # BLD AUTO: 128 10*3/MM3 (ref 140–450)
PMV BLD AUTO: 9.8 FL (ref 6–12)
POTASSIUM SERPL-SCNC: 3.5 MMOL/L (ref 3.5–5.2)
PROT SERPL-MCNC: 6.1 G/DL (ref 6–8.5)
RBC # BLD AUTO: 4.23 10*6/MM3 (ref 3.77–5.28)
SODIUM SERPL-SCNC: 140 MMOL/L (ref 136–145)
WBC MORPH BLD: NORMAL
WBC NRBC COR # BLD: 2.17 10*3/MM3 (ref 3.4–10.8)

## 2022-04-25 PROCEDURE — 36591 DRAW BLOOD OFF VENOUS DEVICE: CPT

## 2022-04-25 PROCEDURE — 85007 BL SMEAR W/DIFF WBC COUNT: CPT | Performed by: NURSE PRACTITIONER

## 2022-04-25 PROCEDURE — 85025 COMPLETE CBC W/AUTO DIFF WBC: CPT | Performed by: NURSE PRACTITIONER

## 2022-04-25 PROCEDURE — 99215 OFFICE O/P EST HI 40 MIN: CPT | Performed by: NURSE PRACTITIONER

## 2022-04-25 PROCEDURE — 25010000002 HEPARIN LOCK FLUSH PER 10 UNITS: Performed by: INTERNAL MEDICINE

## 2022-04-25 PROCEDURE — 80053 COMPREHEN METABOLIC PANEL: CPT | Performed by: NURSE PRACTITIONER

## 2022-04-25 RX ORDER — SODIUM CHLORIDE 0.9 % (FLUSH) 0.9 %
10 SYRINGE (ML) INJECTION AS NEEDED
Status: CANCELLED | OUTPATIENT
Start: 2022-04-25

## 2022-04-25 RX ORDER — SODIUM CHLORIDE 0.9 % (FLUSH) 0.9 %
10 SYRINGE (ML) INJECTION AS NEEDED
Status: DISCONTINUED | OUTPATIENT
Start: 2022-04-25 | End: 2022-04-25 | Stop reason: HOSPADM

## 2022-04-25 RX ORDER — HEPARIN SODIUM (PORCINE) LOCK FLUSH IV SOLN 100 UNIT/ML 100 UNIT/ML
500 SOLUTION INTRAVENOUS AS NEEDED
Status: DISCONTINUED | OUTPATIENT
Start: 2022-04-25 | End: 2022-04-25 | Stop reason: HOSPADM

## 2022-04-25 RX ORDER — HEPARIN SODIUM (PORCINE) LOCK FLUSH IV SOLN 100 UNIT/ML 100 UNIT/ML
500 SOLUTION INTRAVENOUS AS NEEDED
Status: CANCELLED | OUTPATIENT
Start: 2022-04-25

## 2022-04-25 RX ADMIN — Medication 10 ML: at 10:07

## 2022-04-25 RX ADMIN — Medication 500 UNITS: at 10:07

## 2022-05-02 ENCOUNTER — INFUSION (OUTPATIENT)
Dept: ONCOLOGY | Facility: HOSPITAL | Age: 67
End: 2022-05-02

## 2022-05-02 VITALS
HEIGHT: 63 IN | TEMPERATURE: 97.7 F | OXYGEN SATURATION: 98 % | WEIGHT: 170.6 LBS | HEART RATE: 79 BPM | SYSTOLIC BLOOD PRESSURE: 126 MMHG | BODY MASS INDEX: 30.23 KG/M2 | DIASTOLIC BLOOD PRESSURE: 74 MMHG | RESPIRATION RATE: 18 BRPM

## 2022-05-02 DIAGNOSIS — C18.2 MALIGNANT NEOPLASM OF ASCENDING COLON: Primary | ICD-10-CM

## 2022-05-02 LAB
ALBUMIN SERPL-MCNC: 3.7 G/DL (ref 3.5–5.2)
ALBUMIN/GLOB SERPL: 1.9 G/DL
ALP SERPL-CCNC: 118 U/L (ref 39–117)
ALT SERPL W P-5'-P-CCNC: 21 U/L (ref 1–33)
ANION GAP SERPL CALCULATED.3IONS-SCNC: 8.9 MMOL/L (ref 5–15)
AST SERPL-CCNC: 19 U/L (ref 1–32)
BASOPHILS # BLD AUTO: 0.02 10*3/MM3 (ref 0–0.2)
BASOPHILS NFR BLD AUTO: 0.5 % (ref 0–1.5)
BILIRUB SERPL-MCNC: <0.2 MG/DL (ref 0–1.2)
BUN SERPL-MCNC: 7 MG/DL (ref 8–23)
BUN/CREAT SERPL: 10.6 (ref 7–25)
CALCIUM SPEC-SCNC: 8.8 MG/DL (ref 8.6–10.5)
CHLORIDE SERPL-SCNC: 107 MMOL/L (ref 98–107)
CO2 SERPL-SCNC: 27.1 MMOL/L (ref 22–29)
CREAT SERPL-MCNC: 0.66 MG/DL (ref 0.57–1)
DEPRECATED RDW RBC AUTO: 53.7 FL (ref 37–54)
EGFRCR SERPLBLD CKD-EPI 2021: 97.5 ML/MIN/1.73
EOSINOPHIL # BLD AUTO: 0.2 10*3/MM3 (ref 0–0.4)
EOSINOPHIL NFR BLD AUTO: 5.4 % (ref 0.3–6.2)
ERYTHROCYTE [DISTWIDTH] IN BLOOD BY AUTOMATED COUNT: 18.6 % (ref 12.3–15.4)
GLOBULIN UR ELPH-MCNC: 1.9 GM/DL
GLUCOSE SERPL-MCNC: 130 MG/DL (ref 65–99)
HCT VFR BLD AUTO: 32.1 % (ref 34–46.6)
HGB BLD-MCNC: 9.9 G/DL (ref 12–15.9)
IMM GRANULOCYTES # BLD AUTO: 0.07 10*3/MM3 (ref 0–0.05)
IMM GRANULOCYTES NFR BLD AUTO: 1.9 % (ref 0–0.5)
LYMPHOCYTES # BLD AUTO: 1.23 10*3/MM3 (ref 0.7–3.1)
LYMPHOCYTES NFR BLD AUTO: 33.3 % (ref 19.6–45.3)
MCH RBC QN AUTO: 25.8 PG (ref 26.6–33)
MCHC RBC AUTO-ENTMCNC: 30.8 G/DL (ref 31.5–35.7)
MCV RBC AUTO: 83.6 FL (ref 79–97)
MONOCYTES # BLD AUTO: 0.3 10*3/MM3 (ref 0.1–0.9)
MONOCYTES NFR BLD AUTO: 8.1 % (ref 5–12)
NEUTROPHILS NFR BLD AUTO: 1.87 10*3/MM3 (ref 1.7–7)
NEUTROPHILS NFR BLD AUTO: 50.8 % (ref 42.7–76)
NRBC BLD AUTO-RTO: 0.5 /100 WBC (ref 0–0.2)
PLATELET # BLD AUTO: 185 10*3/MM3 (ref 140–450)
PMV BLD AUTO: 9.7 FL (ref 6–12)
POTASSIUM SERPL-SCNC: 3.3 MMOL/L (ref 3.5–5.2)
PROT SERPL-MCNC: 5.6 G/DL (ref 6–8.5)
RBC # BLD AUTO: 3.84 10*6/MM3 (ref 3.77–5.28)
SODIUM SERPL-SCNC: 143 MMOL/L (ref 136–145)
WBC NRBC COR # BLD: 3.69 10*3/MM3 (ref 3.4–10.8)

## 2022-05-02 PROCEDURE — 80053 COMPREHEN METABOLIC PANEL: CPT | Performed by: NURSE PRACTITIONER

## 2022-05-02 PROCEDURE — 25010000002 LEUCOVORIN 500 MG RECONSTITUTED SOLUTION 1 EACH VIAL: Performed by: NURSE PRACTITIONER

## 2022-05-02 PROCEDURE — 85025 COMPLETE CBC W/AUTO DIFF WBC: CPT | Performed by: NURSE PRACTITIONER

## 2022-05-02 PROCEDURE — 96401 CHEMO ANTI-NEOPL SQ/IM: CPT

## 2022-05-02 PROCEDURE — 96415 CHEMO IV INFUSION ADDL HR: CPT

## 2022-05-02 PROCEDURE — 96375 TX/PRO/DX INJ NEW DRUG ADDON: CPT

## 2022-05-02 PROCEDURE — 25010000002 FOSAPREPITANT PER 1 MG: Performed by: NURSE PRACTITIONER

## 2022-05-02 PROCEDURE — 25010000002 DEXAMETHASONE SODIUM PHOSPHATE 100 MG/10ML SOLUTION: Performed by: NURSE PRACTITIONER

## 2022-05-02 PROCEDURE — 25010000002 FLUOROURACIL PER 500 MG: Performed by: NURSE PRACTITIONER

## 2022-05-02 PROCEDURE — 25010000002 OXALIPLATIN PER 0.5 MG: Performed by: NURSE PRACTITIONER

## 2022-05-02 PROCEDURE — 96411 CHEMO IV PUSH ADDL DRUG: CPT

## 2022-05-02 PROCEDURE — 96416 CHEMO PROLONG INFUSE W/PUMP: CPT

## 2022-05-02 PROCEDURE — 25010000002 PALONOSETRON PER 25 MCG: Performed by: NURSE PRACTITIONER

## 2022-05-02 PROCEDURE — 96367 TX/PROPH/DG ADDL SEQ IV INF: CPT

## 2022-05-02 PROCEDURE — 96413 CHEMO IV INFUSION 1 HR: CPT

## 2022-05-02 PROCEDURE — 96368 THER/DIAG CONCURRENT INF: CPT

## 2022-05-02 RX ORDER — DIPHENHYDRAMINE HYDROCHLORIDE 50 MG/ML
50 INJECTION INTRAMUSCULAR; INTRAVENOUS AS NEEDED
Status: CANCELLED | OUTPATIENT
Start: 2022-05-02

## 2022-05-02 RX ORDER — POTASSIUM CHLORIDE 20 MEQ/1
20 TABLET, EXTENDED RELEASE ORAL ONCE
Status: COMPLETED | OUTPATIENT
Start: 2022-05-02 | End: 2022-05-02

## 2022-05-02 RX ORDER — DEXTROSE MONOHYDRATE 50 MG/ML
250 INJECTION, SOLUTION INTRAVENOUS ONCE
Status: COMPLETED | OUTPATIENT
Start: 2022-05-02 | End: 2022-05-02

## 2022-05-02 RX ORDER — FAMOTIDINE 10 MG/ML
20 INJECTION, SOLUTION INTRAVENOUS AS NEEDED
Status: CANCELLED | OUTPATIENT
Start: 2022-05-02

## 2022-05-02 RX ORDER — FLUOROURACIL 50 MG/ML
400 INJECTION, SOLUTION INTRAVENOUS ONCE
Status: COMPLETED | OUTPATIENT
Start: 2022-05-02 | End: 2022-05-02

## 2022-05-02 RX ORDER — PALONOSETRON 0.05 MG/ML
0.25 INJECTION, SOLUTION INTRAVENOUS ONCE
Status: COMPLETED | OUTPATIENT
Start: 2022-05-02 | End: 2022-05-02

## 2022-05-02 RX ADMIN — LEUCOVORIN CALCIUM 720 MG: 500 INJECTION, POWDER, LYOPHILIZED, FOR SOLUTION INTRAMUSCULAR; INTRAVENOUS at 11:46

## 2022-05-02 RX ADMIN — FLUOROURACIL 720 MG: 50 INJECTION, SOLUTION INTRAVENOUS at 13:54

## 2022-05-02 RX ADMIN — OXALIPLATIN 150 MG: 5 INJECTION, SOLUTION, CONCENTRATE INTRAVENOUS at 11:47

## 2022-05-02 RX ADMIN — DEXAMETHASONE SODIUM PHOSPHATE 12 MG: 10 INJECTION, SOLUTION INTRAMUSCULAR; INTRAVENOUS at 10:55

## 2022-05-02 RX ADMIN — PALONOSETRON 0.25 MG: 0.25 INJECTION, SOLUTION INTRAVENOUS at 10:53

## 2022-05-02 RX ADMIN — DEXTROSE MONOHYDRATE 250 ML: 50 INJECTION, SOLUTION INTRAVENOUS at 10:30

## 2022-05-02 RX ADMIN — FLUOROURACIL 4300 MG: 50 INJECTION, SOLUTION INTRAVENOUS at 13:57

## 2022-05-02 RX ADMIN — SODIUM CHLORIDE 100 ML: 9 INJECTION, SOLUTION INTRAVENOUS at 11:10

## 2022-05-02 RX ADMIN — POTASSIUM CHLORIDE 20 MEQ: 1500 TABLET, EXTENDED RELEASE ORAL at 10:53

## 2022-05-02 NOTE — NURSING NOTE
Pt here for C2D1 of FOLFOX. Labs reivewed with Shae HUNTER with verbal order for a one time dose of KCL 20 meq po and ok to treat today.    Pt tolerated infusion without complications with chemo ball connected and clamps open. Pt discharged in stable condition and instructed to call the office for any concerns. Pt vu. Copy of AVS given to pt and f/u appt reviewed.

## 2022-05-04 ENCOUNTER — INFUSION (OUTPATIENT)
Dept: ONCOLOGY | Facility: HOSPITAL | Age: 67
End: 2022-05-04

## 2022-05-04 ENCOUNTER — APPOINTMENT (OUTPATIENT)
Dept: ONCOLOGY | Facility: HOSPITAL | Age: 67
End: 2022-05-04

## 2022-05-04 DIAGNOSIS — C18.2 MALIGNANT NEOPLASM OF ASCENDING COLON: Primary | ICD-10-CM

## 2022-05-04 DIAGNOSIS — Z45.2 ENCOUNTER FOR FITTING AND ADJUSTMENT OF VASCULAR CATHETER: ICD-10-CM

## 2022-05-04 PROCEDURE — 96377 APPLICATON ON-BODY INJECTOR: CPT

## 2022-05-04 PROCEDURE — 25010000002 HEPARIN LOCK FLUSH PER 10 UNITS: Performed by: INTERNAL MEDICINE

## 2022-05-04 PROCEDURE — 25010000002 PEGFILGRASTIM 6 MG/0.6ML PREFILLED SYRINGE KIT: Performed by: NURSE PRACTITIONER

## 2022-05-04 RX ORDER — SODIUM CHLORIDE 0.9 % (FLUSH) 0.9 %
10 SYRINGE (ML) INJECTION AS NEEDED
Status: DISCONTINUED | OUTPATIENT
Start: 2022-05-04 | End: 2022-05-04 | Stop reason: HOSPADM

## 2022-05-04 RX ORDER — SODIUM CHLORIDE 0.9 % (FLUSH) 0.9 %
10 SYRINGE (ML) INJECTION AS NEEDED
Status: CANCELLED | OUTPATIENT
Start: 2022-05-04

## 2022-05-04 RX ORDER — HEPARIN SODIUM (PORCINE) LOCK FLUSH IV SOLN 100 UNIT/ML 100 UNIT/ML
500 SOLUTION INTRAVENOUS AS NEEDED
Status: CANCELLED | OUTPATIENT
Start: 2022-05-04

## 2022-05-04 RX ORDER — HEPARIN SODIUM (PORCINE) LOCK FLUSH IV SOLN 100 UNIT/ML 100 UNIT/ML
500 SOLUTION INTRAVENOUS AS NEEDED
Status: DISCONTINUED | OUTPATIENT
Start: 2022-05-04 | End: 2022-05-04 | Stop reason: HOSPADM

## 2022-05-04 RX ADMIN — PEGFILGRASTIM 6 MG: KIT SUBCUTANEOUS at 11:56

## 2022-05-04 RX ADMIN — Medication 500 UNITS: at 11:30

## 2022-05-04 RX ADMIN — Medication 10 ML: at 11:30

## 2022-05-05 ENCOUNTER — LAB (OUTPATIENT)
Dept: OTHER | Facility: HOSPITAL | Age: 67
End: 2022-05-05

## 2022-05-05 ENCOUNTER — OFFICE VISIT (OUTPATIENT)
Dept: ONCOLOGY | Facility: CLINIC | Age: 67
End: 2022-05-05

## 2022-05-05 VITALS
TEMPERATURE: 97.3 F | DIASTOLIC BLOOD PRESSURE: 78 MMHG | SYSTOLIC BLOOD PRESSURE: 114 MMHG | BODY MASS INDEX: 31.1 KG/M2 | WEIGHT: 169 LBS | OXYGEN SATURATION: 96 % | HEIGHT: 62 IN | RESPIRATION RATE: 16 BRPM | HEART RATE: 79 BPM

## 2022-05-05 DIAGNOSIS — C18.2 MALIGNANT NEOPLASM OF ASCENDING COLON: Primary | ICD-10-CM

## 2022-05-05 DIAGNOSIS — Z79.899 HIGH RISK MEDICATION USE: ICD-10-CM

## 2022-05-05 LAB
ALBUMIN SERPL-MCNC: 4.4 G/DL (ref 3.5–5.2)
ALBUMIN/GLOB SERPL: 2.2 G/DL
ALP SERPL-CCNC: 132 U/L (ref 39–117)
ALT SERPL W P-5'-P-CCNC: 21 U/L (ref 1–33)
ANION GAP SERPL CALCULATED.3IONS-SCNC: 8.4 MMOL/L (ref 5–15)
AST SERPL-CCNC: 18 U/L (ref 1–32)
BASOPHILS # BLD AUTO: 0.03 10*3/MM3 (ref 0–0.2)
BASOPHILS NFR BLD AUTO: 0.5 % (ref 0–1.5)
BILIRUB SERPL-MCNC: <0.2 MG/DL (ref 0–1.2)
BUN SERPL-MCNC: 14 MG/DL (ref 8–23)
BUN/CREAT SERPL: 15.7 (ref 7–25)
CALCIUM SPEC-SCNC: 9.3 MG/DL (ref 8.6–10.5)
CHLORIDE SERPL-SCNC: 103 MMOL/L (ref 98–107)
CO2 SERPL-SCNC: 27.6 MMOL/L (ref 22–29)
CREAT SERPL-MCNC: 0.89 MG/DL (ref 0.57–1)
DEPRECATED RDW RBC AUTO: 57.6 FL (ref 37–54)
EGFRCR SERPLBLD CKD-EPI 2021: 72.1 ML/MIN/1.73
EOSINOPHIL # BLD AUTO: 0.03 10*3/MM3 (ref 0–0.4)
EOSINOPHIL NFR BLD AUTO: 0.5 % (ref 0.3–6.2)
ERYTHROCYTE [DISTWIDTH] IN BLOOD BY AUTOMATED COUNT: 19.9 % (ref 12.3–15.4)
GLOBULIN UR ELPH-MCNC: 2 GM/DL
GLUCOSE SERPL-MCNC: 108 MG/DL (ref 65–99)
HCT VFR BLD AUTO: 39.2 % (ref 34–46.6)
HGB BLD-MCNC: 12.2 G/DL (ref 12–15.9)
IMM GRANULOCYTES # BLD AUTO: 0.02 10*3/MM3 (ref 0–0.05)
IMM GRANULOCYTES NFR BLD AUTO: 0.3 % (ref 0–0.5)
LYMPHOCYTES # BLD AUTO: 1.54 10*3/MM3 (ref 0.7–3.1)
LYMPHOCYTES NFR BLD AUTO: 24.9 % (ref 19.6–45.3)
MCH RBC QN AUTO: 25.9 PG (ref 26.6–33)
MCHC RBC AUTO-ENTMCNC: 31.1 G/DL (ref 31.5–35.7)
MCV RBC AUTO: 83.2 FL (ref 79–97)
MONOCYTES # BLD AUTO: 0.08 10*3/MM3 (ref 0.1–0.9)
MONOCYTES NFR BLD AUTO: 1.3 % (ref 5–12)
NEUTROPHILS NFR BLD AUTO: 4.48 10*3/MM3 (ref 1.7–7)
NEUTROPHILS NFR BLD AUTO: 72.5 % (ref 42.7–76)
NRBC BLD AUTO-RTO: 0 /100 WBC (ref 0–0.2)
PLATELET # BLD AUTO: 269 10*3/MM3 (ref 140–450)
PMV BLD AUTO: 9.5 FL (ref 6–12)
POTASSIUM SERPL-SCNC: 4.6 MMOL/L (ref 3.5–5.2)
PROT SERPL-MCNC: 6.4 G/DL (ref 6–8.5)
RBC # BLD AUTO: 4.71 10*6/MM3 (ref 3.77–5.28)
SODIUM SERPL-SCNC: 139 MMOL/L (ref 136–145)
WBC NRBC COR # BLD: 6.18 10*3/MM3 (ref 3.4–10.8)

## 2022-05-05 PROCEDURE — 80053 COMPREHEN METABOLIC PANEL: CPT | Performed by: INTERNAL MEDICINE

## 2022-05-05 PROCEDURE — 36415 COLL VENOUS BLD VENIPUNCTURE: CPT

## 2022-05-05 PROCEDURE — 85025 COMPLETE CBC W/AUTO DIFF WBC: CPT | Performed by: INTERNAL MEDICINE

## 2022-05-05 PROCEDURE — 99214 OFFICE O/P EST MOD 30 MIN: CPT | Performed by: INTERNAL MEDICINE

## 2022-05-05 NOTE — PROGRESS NOTES
Subjective   Kayla Adkins is a 66 y.o. female.  Referred by Dr. Rivas for evaluation of leukopenia    History of Present Illness   Ms. Adkins is a 66-year-old postmenopausal  who has been referred by Dr. Rivas for evaluation of leukopenia, neutropenia.  Patient reports that she has had longstanding leukopenia and her white blood cell count normally ranges in the threes.  On her most recent visit with Dr. Rivas white blood cell count was noted to be 2560 on 3/2/2021 and 2660 on 4/5/2021.  This prompted a referral to hematology.  Patient reports that she had a extensive work-up including a bone marrow biopsy for the same condition about 17 years ago.  She thinks that this was performed at [facility].  According to patient no clear etiology was determined after the work-up.  She was recommended to continue with periodic labs.  No treatment was required.    She was seen in December and had continued on oral iron.  She was tolerating that fairly well.  However she had worsening of colitis for about 2 months prior to that visit.  She was scheduled for colonoscopy in January 2022.    She had a routine surveillance colonoscopy performed by Dr. Whitlock on 2/22/2022.  On this there was an infiltrative nonobstructing medium-sized mass in the proximal ascending colon 1 to 2 cm distal to the cecum.  This was partially circumferential involving 1 foot of the lumen.  Biopsies were taken.  There is also mildly congested mucosa in the entire colon.  Random biopsies were taken.  She also had an EGD at the same time which reported normal esophagus, stomach and duodenum.  Pathology evaluation from the biopsy reported superficial fragments of at least intramucosal carcinoma.  Random colon biopsies reported increased lamina propria, chronic inflammation with surface epithelial alteration and increased intraepithelial lymphocytes consistent with lymphocytic colitis.  Biopsies from the stomach reported chronic  inactive gastritis.  Negative for intestinal metaplasia and H. pylori.  Biopsies from the duodenum was negative.    She was seen by Dr. Wellington who performed a laparoscopic right hemicolectomy on 3/2/2022.  Pathology showed a 3.8 cm mass at the cecum invading through the muscularis propria into the pericolorectal adipose tissue.  No lymphovascular or perineural invasion.  All margins were clear.  The radial margin was 3 cm.  3 out of 26 lymph nodes were positive for metastatic disease.  The largest metastatic focus was 0.7 cm with extranodal extension.  Pathological stage PT3N1B.  The tumor was moderately differentiated, grade 2 adenocarcinoma.    No loss of nuclear expression of MMR proteins.  Low probability of microsatellite instability.  MSI status-microsatellite stable.  This is by PCR.    Normal CEA and 0.77 on the day prior to surgery.    She was seen by Dr. Burton as inpatient and recommended adjuvant chemotherapy with FOLFOX.    Patient initiating cycle 1 FOLFOX 4/18/2022.  Neulasta support given due to baseline neutropenia.    Interval history:  Ms. Adkins presents to clinic today for follow-up.  She received the second cycle of FOLFOX 5/2/2022.  Receive Neulasta on Monday.  Following the second cycle of FOLFOX the nausea has improved significantly.  Headaches have not been nearly as bad as the first cycle.  No diarrhea.  She noticed a little bit of blood in her stool yesterday.  This was bright red and does not have any today.    The following portions of the patient's history were reviewed and updated as appropriate: allergies, current medications, past family history, past medical history, past social history and problem list.    Past Medical History:   Diagnosis Date   • Anemia    • Anxiety    • Bladder infection    • Colitis    • Colon cancer (HCC)    • GERD (gastroesophageal reflux disease)    • Headache    • Hyperlipidemia    • Hypertension    • Knee injury, initial encounter-left 07/01/2019   •  Neutropenia (HCC) 01/03/2017   • Prediabetes 03/04/2019      Past Surgical History:   Procedure Laterality Date   • COLON RESECTION Right 03/02/2022    Procedure: LAPAROSCOPIC RIGHT HEMICOLECTOMY WITH DAVINCI ROBOT;  Surgeon: Malcolm Wellington MD;  Location: [facility] OR;  Service: Robotics - DaVinci;  Laterality: Right;   • COLONOSCOPY     • COLONOSCOPY W/ POLYPECTOMY N/A 02/22/2022    Procedure: COLONOSCOPY INTO CECUM WITH COLD BIOPSIES;  Surgeon: Irvin Clayton MD;  Location: [facility] ENDOSCOPY;  Service: Gastroenterology;  Laterality: N/A;  PRE: DIARRHEA, ANEMIA  POST: ASCENDING COLON MASS   • ENDOSCOPY N/A 02/22/2022    Procedure: ESOPHAGOGASTRODUODENOSCOPY WITH BIOPSY;  Surgeon: Irvin Clayton MD;  Location: [facility] ENDOSCOPY;  Service: Gastroenterology;  Laterality: N/A;  PRE: ANEMIA  POST: NORMAL EGD   • TONSILLECTOMY     • VENOUS ACCESS DEVICE (PORT) INSERTION N/A 4/11/2022    Procedure: INSERTION VENOUS ACCESS DEVICE with subcutaneous port;  Surgeon: Malcolm Wellington MD;  Location: [facility];  Service: General;  Laterality: N/A;        Family History   Problem Relation Age of Onset   • Hypertension Mother    • Heart disease Father    • Heart attack Father 47        first MI age 47; second MI 57   • Hypertension Sister    • No Known Problems Brother    • Malig Hyperthermia Neg Hx       Social History     Socioeconomic History   • Marital status:    • Number of children: 2   Tobacco Use   • Smoking status: Never Smoker   • Smokeless tobacco: Never Used   Vaping Use   • Vaping Use: Never used   Substance and Sexual Activity   • Alcohol use: Not Currently     Comment: seldom   • Drug use: Never   • Sexual activity: Defer      OB History    No obstetric history on file.          Allergies   Allergen Reactions   • Penicillins Rash      Review of systems as mentioned in the HPI.    Objective   Blood pressure 114/78, pulse 79, temperature 97.3 °F (36.3 °C), temperature  "source Temporal, resp. rate 16, height 158 cm (62.21\"), weight 76.7 kg (169 lb), SpO2 96 %.     Physical Exam  Vitals reviewed.   Constitutional:       Appearance: Normal appearance. She is normal weight.   HENT:      Head: Normocephalic and atraumatic.      Nose: Nose normal.      Mouth/Throat:      Mouth: Mucous membranes are moist.   Eyes:      Extraocular Movements: Extraocular movements intact.      Pupils: Pupils are equal, round, and reactive to light.   Cardiovascular:      Rate and Rhythm: Normal rate.      Heart sounds: Normal heart sounds. No murmur heard.    No gallop.   Pulmonary:      Effort: Pulmonary effort is normal. No respiratory distress.      Breath sounds: Normal breath sounds. No wheezing.      Comments: Mediport benign  Sutures present at the incision site of the port.  Abdominal:      General: Abdomen is flat. Bowel sounds are normal. There is no distension.      Palpations: Abdomen is soft.      Tenderness: There is no abdominal tenderness.      Comments: Abdominal incisions have healed well.  Neulasta on the abdominal wall.   Musculoskeletal:         General: Normal range of motion.      Cervical back: Normal range of motion.      Right lower leg: No edema.      Left lower leg: No edema.   Skin:     Findings: No bruising or rash.   Neurological:      General: No focal deficit present.      Mental Status: She is alert and oriented to person, place, and time. Mental status is at baseline.   Psychiatric:         Behavior: Behavior normal.             DIAGNOSTIC DATA:  Results from last 7 days   Lab Units 05/05/22  0725 05/02/22  0939   WBC 10*3/mm3 6.18 3.69   NEUTROS ABS 10*3/mm3 4.48 1.87   HEMOGLOBIN g/dL 12.2 9.9*   HEMATOCRIT % 39.2 32.1*   PLATELETS 10*3/mm3 269 185     Results from last 7 days   Lab Units 05/05/22  0725 05/02/22  0939   SODIUM mmol/L 139 143   POTASSIUM mmol/L 4.6 3.3*   CHLORIDE mmol/L 103 107   CO2 mmol/L 27.6 27.1   BUN mg/dL 14 7*   CREATININE mg/dL 0.89 0.66 "   CALCIUM mg/dL 9.3 8.8   ALBUMIN g/dL 4.40 3.70   BILIRUBIN mg/dL <0.2 <0.2   ALK PHOS U/L 132* 118*   ALT (SGPT) U/L 21 21   AST (SGOT) U/L 18 19   GLUCOSE mg/dL 108* 130*           CBC reviewed and WBC 6.28, hemoglobin 12.2 and platelets 269  CMP reviewed and within normal limits    Adult Transthoracic Echo Complete W/ Cont if Necessary Per Protocol    Addendum Date: 4/15/2022    · Calculated left ventricular EF = 64.1% Estimated left ventricular EF was in agreement with the calculated left ventricular EF. Left ventricular systolic function is normal. Normal global longitudinal LV strain (GLS) = -20.3%. Left ventricle strain data was reviewed by the physician and found to be accurate. Normal left ventricular cavity size and wall thickness noted. All left ventricular wall segments contract normally. Left ventricular diastolic function is consistent with (grade I) impaired relaxation.      XR Chest Post CVA Port    Result Date: 4/11/2022  Placement of right subclavian Mediport tip in the SVC. No evidence for pneumothorax.  This report was finalized on 4/11/2022 10:15 AM by Dr. Ranulfo Francis M.D.           Assessment/Plan      66-year-old postmenopausal  lady with longstanding leukopenia/neutropenia and anemia and recently diagnosed T3N1A adenocarcinoma of the colon    *[id number] adenocarcinoma of the ascending colon  · The tumor measures 3.8 cm located in the cecum, invades through the muscularis propria into the pericolorectal adipose tissue.  No lymphovascular or perineural invasion.  All the margins are negative.  Tumor comes to within 3 cm of the radial margin.  3 out of 26 lymph nodes positive for metastatic carcinoma with the largest focus measuring 0.7 cm without extranodal extension.  · No tumor deposits identified  · PT3N1B  · MSI low probability on immunohistochemistry and stable on PCR  · No family history of colon cancer  · Explained to her that this is stage IIIb colon cancer however since  his T3N1 I would consider this low risk stage III.  There is no other high risk features noted on pathology either.  · Given that the tumor is stage III there is definite benefit from adjuvant chemotherapy.  · We discussed the 2 available regimens including XELOX and FOLFOX.  · Explained to her that based on the IDEA data 3 months of XELOX is noninferior to 6 months of adjuvant chemotherapy however with FOLFOX the noninferiority has not been established.  Explained to her that 6 months of chemotherapy improves disease-free survival by additional 2 to 3% over 3 months.  · Discussed the adverse effects of XELOX as well as FOLFOX.  · After discussing the pros and cons we have decided to proceed with FOLFOX for total of 6 months but if she is unable to tolerate due to neurotoxicity then we could potentially stop after 3 months of treatment.  · Port placed on 4/11/2022  · Cycle 1 FOLFOX on 4/18/2022  · Cycle 2 FOLFOX on 5/2/2022  · Neulasta support given pre-existing neutropenia  · Overall tolerating treatment well with expected side effects.    *Leukopenia  · Predominantly neutropenia  · Mild increase in monocyte count  · This is chronic and unchanged  · No increased infections  · Had a previous work-up including a bone marrow biopsy with no clear etiology  · Differential includes nutritional, chronic idiopathic, autoimmune.  · She is currently not on any medications which could account for the leukopenia.  · No splenomegaly on exam  · 5/13/2021-flow cytometry negative  · LDH normal at 174  · Reticulocyte count 1.07%  · Ferritin low at 5.7  · Iron saturation low at 3%  · Folic acid normal at 11.8, B12 415  · Rheumatoid factor negative  · AVIVA negative  · ESR normal at 29  · Serum copper normal at 134  · 12/16/2021, WBC 3.16 today.  Patient denies frequent infections.  · CBC 3/24/2022 reviewed and WBC count 3.13, absolute neutrophil count 1.19, hemoglobin 10.2, platelets 315,000  · Given pre-existing neutropenia she will  be receiving Neulasta with each cycle of FOLFOX.  · 4/14/2022-WBC 2.74 with an absolute neutrophil count of 1.16  · 4/25/2022 (1 week out from C1 FOLFOX) ANC 0.8. Neulasta given just 5 days ago. Suspect it has not kicked in yet. She understands to take Claritin for help with bony pain.  · WBC count today normal at greater than 6.    *Anemia  · Microcytic  · Likely secondary to iron deficiency  · 4/22/2021 iron saturation 3%, TIBC today 508, ferritin 5.70.  The patient was initiated on oral iron every other day.  · 8/26/2021, hemoglobin today 10.1.  The patient has been tolerating oral iron every other day.  We will increase oral iron to daily and recheck iron labs in 4 months.  · 12/16/2021, patient is taking oral iron every day and tolerating this well.  Hemoglobin today 10.0.  Iron saturation 45, TIBC 413, ferritin 10.20.  · 3/4/2022 ferritin 27.5, iron saturation 5%, TIBC 375, iron studies consistent with iron deficiency  · 3/24/2022 hemoglobin 10.2  · Received 3 doses of Venofer.  · Hemoglobin 10.9 today post C1 chemo  · Hemoglobin normal today at 12 point    *Imfhlnzhj-uy-cm-date on screening mammograms , she is due for mammogram in April 2022.    *Headaches  · She has daily headaches  · She has been using Excedrin for the same which helps with the headaches.  · She thought Lyrica was probably helping with the headaches and tried the same however no improvement in symptoms therefore plans to continue with Excedrin.  · Has not had any headaches since the most recent cycle.    *Colitis  · Lymphocytic colitis noted on the biopsy of the colon  · There is a flare of colitis we may have to consider using steroids  · Currently she does not have any diarrhea  · Explained to her that 5-FU can increase the risk of diarrhea.  · We will have her follow-up with GI  · In the event of diarrhea we will try Imodium first and if no improvement in symptoms then consider steroids.  · No diarrhea.    PLAN:   · Schedule cycle 3  FOLFOX on 5/17/2022  · Follow-up with me on the unhook day of 5/19/2022, Neulasta on 5/19/2022    Patient is on treatment requiring close monitoring for toxicity.        Mago Alvarez MD

## 2022-05-11 ENCOUNTER — TELEPHONE (OUTPATIENT)
Dept: ONCOLOGY | Facility: CLINIC | Age: 67
End: 2022-05-11

## 2022-05-11 RX ORDER — LEVOFLOXACIN 500 MG/1
500 TABLET, FILM COATED ORAL DAILY
Qty: 3 TABLET | Refills: 0 | Status: SHIPPED | OUTPATIENT
Start: 2022-05-11 | End: 2022-05-17

## 2022-05-11 NOTE — TELEPHONE ENCOUNTER
Returned call to patient who is experiencing symptoms of UTI.  She reports frequency, urgency, burning since Monday.  She has been using Azo, but is not helping, she has had several UTI's in the past and knows the symptoms.  She has no fever and she is allergic to Penicillin.  Please advise.

## 2022-05-16 DIAGNOSIS — K21.9 GASTROESOPHAGEAL REFLUX DISEASE WITHOUT ESOPHAGITIS: ICD-10-CM

## 2022-05-16 DIAGNOSIS — I10 ESSENTIAL HYPERTENSION: ICD-10-CM

## 2022-05-16 DIAGNOSIS — E78.49 OTHER HYPERLIPIDEMIA: ICD-10-CM

## 2022-05-16 DIAGNOSIS — F41.9 ANXIETY: ICD-10-CM

## 2022-05-16 RX ORDER — DULOXETIN HYDROCHLORIDE 60 MG/1
60 CAPSULE, DELAYED RELEASE ORAL NIGHTLY
Qty: 90 CAPSULE | Refills: 0 | OUTPATIENT
Start: 2022-05-16

## 2022-05-16 RX ORDER — OMEPRAZOLE 20 MG/1
20 CAPSULE, DELAYED RELEASE ORAL DAILY
Qty: 90 CAPSULE | Refills: 0 | OUTPATIENT
Start: 2022-05-16

## 2022-05-16 RX ORDER — ATORVASTATIN CALCIUM 10 MG/1
10 TABLET, FILM COATED ORAL NIGHTLY
Qty: 90 TABLET | Refills: 0 | OUTPATIENT
Start: 2022-05-16

## 2022-05-16 RX ORDER — METOPROLOL SUCCINATE 50 MG/1
50 TABLET, EXTENDED RELEASE ORAL DAILY
Qty: 90 TABLET | Refills: 0 | OUTPATIENT
Start: 2022-05-16

## 2022-05-16 NOTE — TELEPHONE ENCOUNTER
Caller: Alonso Adkins    Relationship: Emergency Contact    Best call back number:[phone]     Requested Prescriptions:   Requested Prescriptions     Pending Prescriptions Disp Refills   • metoprolol succinate XL (TOPROL-XL) 50 MG 24 hr tablet 90 tablet 0     Sig: Take 1 tablet by mouth Daily.   • DULoxetine (CYMBALTA) 60 MG capsule 90 capsule 0     Sig: Take 1 capsule by mouth Every Night.   • omeprazole (priLOSEC) 20 MG capsule 90 capsule 0     Sig: Take 1 capsule by mouth Daily.   • atorvastatin (LIPITOR) 10 MG tablet 90 tablet 0     Sig: Take 1 tablet by mouth Every Night.        Pharmacy where request should be sent: [address] AT [facility] [phone] [facility] [phone] FX     Additional details provided by patient: 1 WEEK LEFT    Does the patient have less than a 3 day supply:  [] Yes  [x] No    Herve Owusu Rep   05/16/22 14:08 EDT

## 2022-05-17 ENCOUNTER — INFUSION (OUTPATIENT)
Dept: ONCOLOGY | Facility: HOSPITAL | Age: 67
End: 2022-05-17

## 2022-05-17 VITALS
BODY MASS INDEX: 31.65 KG/M2 | SYSTOLIC BLOOD PRESSURE: 122 MMHG | HEART RATE: 81 BPM | WEIGHT: 172 LBS | HEIGHT: 62 IN | OXYGEN SATURATION: 95 % | TEMPERATURE: 97.3 F | RESPIRATION RATE: 18 BRPM | DIASTOLIC BLOOD PRESSURE: 78 MMHG

## 2022-05-17 DIAGNOSIS — C18.2 MALIGNANT NEOPLASM OF ASCENDING COLON: Primary | ICD-10-CM

## 2022-05-17 LAB
ALBUMIN SERPL-MCNC: 4.1 G/DL (ref 3.5–5.2)
ALBUMIN/GLOB SERPL: 2.2 G/DL
ALP SERPL-CCNC: 180 U/L (ref 39–117)
ALT SERPL W P-5'-P-CCNC: 31 U/L (ref 1–33)
ANION GAP SERPL CALCULATED.3IONS-SCNC: 9.1 MMOL/L (ref 5–15)
AST SERPL-CCNC: 30 U/L (ref 1–32)
BASOPHILS # BLD AUTO: 0.03 10*3/MM3 (ref 0–0.2)
BASOPHILS NFR BLD AUTO: 0.4 % (ref 0–1.5)
BILIRUB SERPL-MCNC: <0.2 MG/DL (ref 0–1.2)
BUN SERPL-MCNC: 13 MG/DL (ref 8–23)
BUN/CREAT SERPL: 18.6 (ref 7–25)
CALCIUM SPEC-SCNC: 9.2 MG/DL (ref 8.6–10.5)
CHLORIDE SERPL-SCNC: 104 MMOL/L (ref 98–107)
CO2 SERPL-SCNC: 25.9 MMOL/L (ref 22–29)
CREAT SERPL-MCNC: 0.7 MG/DL (ref 0.57–1)
DEPRECATED RDW RBC AUTO: 62.5 FL (ref 37–54)
EGFRCR SERPLBLD CKD-EPI 2021: 96.1 ML/MIN/1.73
EOSINOPHIL # BLD AUTO: 0.26 10*3/MM3 (ref 0–0.4)
EOSINOPHIL NFR BLD AUTO: 3.6 % (ref 0.3–6.2)
ERYTHROCYTE [DISTWIDTH] IN BLOOD BY AUTOMATED COUNT: 21.4 % (ref 12.3–15.4)
GLOBULIN UR ELPH-MCNC: 1.9 GM/DL
GLUCOSE SERPL-MCNC: 115 MG/DL (ref 65–99)
HCT VFR BLD AUTO: 36.6 % (ref 34–46.6)
HGB BLD-MCNC: 11.4 G/DL (ref 12–15.9)
IMM GRANULOCYTES # BLD AUTO: 0.14 10*3/MM3 (ref 0–0.05)
IMM GRANULOCYTES NFR BLD AUTO: 1.9 % (ref 0–0.5)
LYMPHOCYTES # BLD AUTO: 1.3 10*3/MM3 (ref 0.7–3.1)
LYMPHOCYTES NFR BLD AUTO: 18 % (ref 19.6–45.3)
MCH RBC QN AUTO: 26.6 PG (ref 26.6–33)
MCHC RBC AUTO-ENTMCNC: 31.1 G/DL (ref 31.5–35.7)
MCV RBC AUTO: 85.3 FL (ref 79–97)
MONOCYTES # BLD AUTO: 0.45 10*3/MM3 (ref 0.1–0.9)
MONOCYTES NFR BLD AUTO: 6.2 % (ref 5–12)
NEUTROPHILS NFR BLD AUTO: 5.06 10*3/MM3 (ref 1.7–7)
NEUTROPHILS NFR BLD AUTO: 69.9 % (ref 42.7–76)
NRBC BLD AUTO-RTO: 0.4 /100 WBC (ref 0–0.2)
PLATELET # BLD AUTO: 186 10*3/MM3 (ref 140–450)
PMV BLD AUTO: 9.7 FL (ref 6–12)
POTASSIUM SERPL-SCNC: 3.8 MMOL/L (ref 3.5–5.2)
PROT SERPL-MCNC: 6 G/DL (ref 6–8.5)
RBC # BLD AUTO: 4.29 10*6/MM3 (ref 3.77–5.28)
SODIUM SERPL-SCNC: 139 MMOL/L (ref 136–145)
WBC NRBC COR # BLD: 7.24 10*3/MM3 (ref 3.4–10.8)

## 2022-05-17 PROCEDURE — 80053 COMPREHEN METABOLIC PANEL: CPT | Performed by: NURSE PRACTITIONER

## 2022-05-17 PROCEDURE — 25010000002 LEUCOVORIN CALCIUM PER 50 MG: Performed by: NURSE PRACTITIONER

## 2022-05-17 PROCEDURE — 96368 THER/DIAG CONCURRENT INF: CPT

## 2022-05-17 PROCEDURE — 96416 CHEMO PROLONG INFUSE W/PUMP: CPT

## 2022-05-17 PROCEDURE — 25010000002 FLUOROURACIL PER 500 MG: Performed by: NURSE PRACTITIONER

## 2022-05-17 PROCEDURE — 96415 CHEMO IV INFUSION ADDL HR: CPT

## 2022-05-17 PROCEDURE — 96375 TX/PRO/DX INJ NEW DRUG ADDON: CPT

## 2022-05-17 PROCEDURE — 25010000002 OXALIPLATIN PER 0.5 MG: Performed by: NURSE PRACTITIONER

## 2022-05-17 PROCEDURE — 25010000002 FOSAPREPITANT PER 1 MG: Performed by: NURSE PRACTITIONER

## 2022-05-17 PROCEDURE — 96411 CHEMO IV PUSH ADDL DRUG: CPT

## 2022-05-17 PROCEDURE — 85025 COMPLETE CBC W/AUTO DIFF WBC: CPT | Performed by: NURSE PRACTITIONER

## 2022-05-17 PROCEDURE — 25010000002 DEXAMETHASONE SODIUM PHOSPHATE 100 MG/10ML SOLUTION: Performed by: NURSE PRACTITIONER

## 2022-05-17 PROCEDURE — 96413 CHEMO IV INFUSION 1 HR: CPT

## 2022-05-17 PROCEDURE — 96367 TX/PROPH/DG ADDL SEQ IV INF: CPT

## 2022-05-17 PROCEDURE — 25010000002 PALONOSETRON PER 25 MCG: Performed by: NURSE PRACTITIONER

## 2022-05-17 RX ORDER — DIPHENHYDRAMINE HYDROCHLORIDE 50 MG/ML
50 INJECTION INTRAMUSCULAR; INTRAVENOUS AS NEEDED
Status: CANCELLED | OUTPATIENT
Start: 2022-05-17

## 2022-05-17 RX ORDER — FAMOTIDINE 10 MG/ML
20 INJECTION, SOLUTION INTRAVENOUS AS NEEDED
Status: CANCELLED | OUTPATIENT
Start: 2022-05-17

## 2022-05-17 RX ORDER — PALONOSETRON 0.05 MG/ML
0.25 INJECTION, SOLUTION INTRAVENOUS ONCE
Status: COMPLETED | OUTPATIENT
Start: 2022-05-17 | End: 2022-05-17

## 2022-05-17 RX ORDER — FLUOROURACIL 50 MG/ML
400 INJECTION, SOLUTION INTRAVENOUS ONCE
Status: COMPLETED | OUTPATIENT
Start: 2022-05-17 | End: 2022-05-17

## 2022-05-17 RX ORDER — DEXTROSE MONOHYDRATE 50 MG/ML
250 INJECTION, SOLUTION INTRAVENOUS ONCE
Status: COMPLETED | OUTPATIENT
Start: 2022-05-17 | End: 2022-05-17

## 2022-05-17 RX ADMIN — FLUOROURACIL 4300 MG: 50 INJECTION, SOLUTION INTRAVENOUS at 12:26

## 2022-05-17 RX ADMIN — PALONOSETRON 0.25 MG: 0.05 INJECTION, SOLUTION INTRAVENOUS at 09:16

## 2022-05-17 RX ADMIN — DEXAMETHASONE SODIUM PHOSPHATE 12 MG: 10 INJECTION, SOLUTION INTRAMUSCULAR; INTRAVENOUS at 09:17

## 2022-05-17 RX ADMIN — FLUOROURACIL 720 MG: 50 INJECTION, SOLUTION INTRAVENOUS at 12:26

## 2022-05-17 RX ADMIN — LEUCOVORIN CALCIUM 720 MG: 350 INJECTION, POWDER, LYOPHILIZED, FOR SOLUTION INTRAMUSCULAR; INTRAVENOUS at 10:09

## 2022-05-17 RX ADMIN — OXALIPLATIN 150 MG: 5 INJECTION, SOLUTION, CONCENTRATE INTRAVENOUS at 10:09

## 2022-05-17 RX ADMIN — DEXTROSE MONOHYDRATE 250 ML: 50 INJECTION, SOLUTION INTRAVENOUS at 09:15

## 2022-05-17 RX ADMIN — SODIUM CHLORIDE 100 ML: 9 INJECTION, SOLUTION INTRAVENOUS at 09:35

## 2022-05-19 ENCOUNTER — APPOINTMENT (OUTPATIENT)
Dept: OTHER | Facility: HOSPITAL | Age: 67
End: 2022-05-19

## 2022-05-19 ENCOUNTER — OFFICE VISIT (OUTPATIENT)
Dept: ONCOLOGY | Facility: CLINIC | Age: 67
End: 2022-05-19

## 2022-05-19 ENCOUNTER — INFUSION (OUTPATIENT)
Dept: ONCOLOGY | Facility: HOSPITAL | Age: 67
End: 2022-05-19

## 2022-05-19 VITALS
BODY MASS INDEX: 31.28 KG/M2 | OXYGEN SATURATION: 96 % | HEIGHT: 62 IN | HEART RATE: 92 BPM | RESPIRATION RATE: 16 BRPM | SYSTOLIC BLOOD PRESSURE: 141 MMHG | TEMPERATURE: 97.1 F | DIASTOLIC BLOOD PRESSURE: 82 MMHG | WEIGHT: 170 LBS

## 2022-05-19 DIAGNOSIS — C18.2 MALIGNANT NEOPLASM OF ASCENDING COLON: Primary | ICD-10-CM

## 2022-05-19 DIAGNOSIS — Z45.2 ENCOUNTER FOR FITTING AND ADJUSTMENT OF VASCULAR CATHETER: ICD-10-CM

## 2022-05-19 PROCEDURE — 96377 APPLICATON ON-BODY INJECTOR: CPT

## 2022-05-19 PROCEDURE — 25010000002 HEPARIN LOCK FLUSH PER 10 UNITS: Performed by: INTERNAL MEDICINE

## 2022-05-19 PROCEDURE — 25010000002 PEGFILGRASTIM 6 MG/0.6ML PREFILLED SYRINGE KIT: Performed by: INTERNAL MEDICINE

## 2022-05-19 PROCEDURE — 99214 OFFICE O/P EST MOD 30 MIN: CPT | Performed by: INTERNAL MEDICINE

## 2022-05-19 RX ORDER — SODIUM CHLORIDE 0.9 % (FLUSH) 0.9 %
10 SYRINGE (ML) INJECTION AS NEEDED
Status: DISCONTINUED | OUTPATIENT
Start: 2022-05-19 | End: 2022-05-19 | Stop reason: HOSPADM

## 2022-05-19 RX ORDER — SODIUM CHLORIDE 0.9 % (FLUSH) 0.9 %
10 SYRINGE (ML) INJECTION AS NEEDED
Status: CANCELLED | OUTPATIENT
Start: 2022-05-19

## 2022-05-19 RX ORDER — HEPARIN SODIUM (PORCINE) LOCK FLUSH IV SOLN 100 UNIT/ML 100 UNIT/ML
500 SOLUTION INTRAVENOUS AS NEEDED
Status: DISCONTINUED | OUTPATIENT
Start: 2022-05-19 | End: 2022-05-19 | Stop reason: HOSPADM

## 2022-05-19 RX ORDER — HEPARIN SODIUM (PORCINE) LOCK FLUSH IV SOLN 100 UNIT/ML 100 UNIT/ML
500 SOLUTION INTRAVENOUS AS NEEDED
Status: CANCELLED | OUTPATIENT
Start: 2022-05-19

## 2022-05-19 RX ADMIN — Medication 10 ML: at 09:18

## 2022-05-19 RX ADMIN — PEGFILGRASTIM 6 MG: KIT SUBCUTANEOUS at 09:18

## 2022-05-19 RX ADMIN — Medication 500 UNITS: at 09:18

## 2022-05-19 NOTE — PROGRESS NOTES
Subjective   Kayla Adkins is a 66 y.o. female.  Referred by Dr. Rivas for evaluation of leukopenia    History of Present Illness   Ms. Adkins is a 66-year-old postmenopausal  who has been referred by Dr. Rivas for evaluation of leukopenia, neutropenia.  Patient reports that she has had longstanding leukopenia and her white blood cell count normally ranges in the threes.  On her most recent visit with Dr. Rivas white blood cell count was noted to be 2560 on 3/2/2021 and 2660 on 4/5/2021.  This prompted a referral to hematology.  Patient reports that she had a extensive work-up including a bone marrow biopsy for the same condition about 17 years ago.  She thinks that this was performed at [facility].  According to patient no clear etiology was determined after the work-up.  She was recommended to continue with periodic labs.  No treatment was required.    She was seen in December and had continued on oral iron.  She was tolerating that fairly well.  However she had worsening of colitis for about 2 months prior to that visit.  She was scheduled for colonoscopy in January 2022.    She had a routine surveillance colonoscopy performed by Dr. Whitlock on 2/22/2022.  On this there was an infiltrative nonobstructing medium-sized mass in the proximal ascending colon 1 to 2 cm distal to the cecum.  This was partially circumferential involving 1 foot of the lumen.  Biopsies were taken.  There is also mildly congested mucosa in the entire colon.  Random biopsies were taken.  She also had an EGD at the same time which reported normal esophagus, stomach and duodenum.  Pathology evaluation from the biopsy reported superficial fragments of at least intramucosal carcinoma.  Random colon biopsies reported increased lamina propria, chronic inflammation with surface epithelial alteration and increased intraepithelial lymphocytes consistent with lymphocytic colitis.  Biopsies from the stomach reported chronic  inactive gastritis.  Negative for intestinal metaplasia and H. pylori.  Biopsies from the duodenum was negative.    She was seen by Dr. Wellington who performed a laparoscopic right hemicolectomy on 3/2/2022.  Pathology showed a 3.8 cm mass at the cecum invading through the muscularis propria into the pericolorectal adipose tissue.  No lymphovascular or perineural invasion.  All margins were clear.  The radial margin was 3 cm.  3 out of 26 lymph nodes were positive for metastatic disease.  The largest metastatic focus was 0.7 cm with extranodal extension.  Pathological stage PT3N1B.  The tumor was moderately differentiated, grade 2 adenocarcinoma.    No loss of nuclear expression of MMR proteins.  Low probability of microsatellite instability.  MSI status-microsatellite stable.  This is by PCR.    Normal CEA and 0.77 on the day prior to surgery.    She was seen by Dr. Burton as inpatient and recommended adjuvant chemotherapy with FOLFOX.    Patient initiating cycle 1 FOLFOX 4/18/2022.  Neulasta support given due to baseline neutropenia.    Cycle 2 FOLFOX 5/2/2022    Cycle 3 FOLFOX 5/17/2022    Interval history:  Ms. Adkins presents to clinic today for follow-up.  She received cycle 3 of FOLFOX on 5/17/2022.  Presents today for unhelped and also receiving Neulasta.  She tolerated cycle 2 fairly well.  She does have some cold sensitivity which lasts for 4 to 5 days after chemotherapy but no other problems.  No diarrhea.  She does continue to have intermittent headaches.  Following closely include chemotherapy she did experience some nausea for which she took Compazine.  3 hours after the Compazine patient experienced that her tongue felt heavy and felt like her speech for slurring however her  did not necessarily notice any slurred speech.  Denies any other neurological symptoms associated with that.  She now feels fine.    The following portions of the patient's history were reviewed and updated as appropriate:  allergies, current medications, past family history, past medical history, past social history and problem list.    Past Medical History:   Diagnosis Date   • Anemia    • Anxiety    • Bladder infection    • Colitis    • Colon cancer (HCC)    • GERD (gastroesophageal reflux disease)    • Headache    • Hyperlipidemia    • Hypertension    • Knee injury, initial encounter-left 07/01/2019   • Neutropenia (HCC) 01/03/2017   • Prediabetes 03/04/2019      Past Surgical History:   Procedure Laterality Date   • COLON RESECTION Right 03/02/2022    Procedure: LAPAROSCOPIC RIGHT HEMICOLECTOMY WITH DAVINCI ROBOT;  Surgeon: Malcolm Wellington MD;  Location: [facility] MAIN OR;  Service: Robotics - DaVinci;  Laterality: Right;   • COLONOSCOPY     • COLONOSCOPY W/ POLYPECTOMY N/A 02/22/2022    Procedure: COLONOSCOPY INTO CECUM WITH COLD BIOPSIES;  Surgeon: Irvin Clayton MD;  Location: [facility] ENDOSCOPY;  Service: Gastroenterology;  Laterality: N/A;  PRE: DIARRHEA, ANEMIA  POST: ASCENDING COLON MASS   • ENDOSCOPY N/A 02/22/2022    Procedure: ESOPHAGOGASTRODUODENOSCOPY WITH BIOPSY;  Surgeon: Irvin Clayton MD;  Location: [facility] ENDOSCOPY;  Service: Gastroenterology;  Laterality: N/A;  PRE: ANEMIA  POST: NORMAL EGD   • TONSILLECTOMY     • VENOUS ACCESS DEVICE (PORT) INSERTION N/A 4/11/2022    Procedure: INSERTION VENOUS ACCESS DEVICE with subcutaneous port;  Surgeon: Malcolm Wellington MD;  Location: [facility] OR [facility];  Service: General;  Laterality: N/A;        Family History   Problem Relation Age of Onset   • Hypertension Mother    • Heart disease Father    • Heart attack Father 47        first MI age 47; second MI 57   • Hypertension Sister    • No Known Problems Brother    • Malig Hyperthermia Neg Hx       Social History     Socioeconomic History   • Marital status:    • Number of children: 2   Tobacco Use   • Smoking status: Never Smoker   • Smokeless tobacco: Never Used   Vaping Use   • Vaping Use:  "Never used   Substance and Sexual Activity   • Alcohol use: Not Currently     Comment: seldom   • Drug use: Never   • Sexual activity: Defer      OB History    No obstetric history on file.          Allergies   Allergen Reactions   • Penicillins Rash      Review of systems as mentioned in the HPI.    Objective   Blood pressure 141/82, pulse 92, temperature 97.1 °F (36.2 °C), temperature source Temporal, resp. rate 16, height 157.5 cm (62.01\"), weight 77.1 kg (170 lb), SpO2 96 %.     Physical Exam  Vitals reviewed.   Constitutional:       Appearance: Normal appearance. She is normal weight.   HENT:      Head: Normocephalic and atraumatic.      Nose: Nose normal.      Mouth/Throat:      Mouth: Mucous membranes are moist.   Eyes:      Extraocular Movements: Extraocular movements intact.      Pupils: Pupils are equal, round, and reactive to light.   Cardiovascular:      Rate and Rhythm: Normal rate.      Heart sounds: Normal heart sounds. No murmur heard.    No gallop.   Pulmonary:      Effort: Pulmonary effort is normal. No respiratory distress.      Breath sounds: Normal breath sounds. No wheezing.      Comments: Mediport benign  Sutures present at the incision site of the port.  Abdominal:      General: Abdomen is flat. Bowel sounds are normal. There is no distension.      Palpations: Abdomen is soft.      Tenderness: There is no abdominal tenderness.      Comments: Abdominal incisions have healed well.     Musculoskeletal:         General: Normal range of motion.      Cervical back: Normal range of motion.      Right lower leg: No edema.      Left lower leg: No edema.   Skin:     Findings: No bruising or rash.   Neurological:      General: No focal deficit present.      Mental Status: She is alert and oriented to person, place, and time. Mental status is at baseline.   Psychiatric:         Behavior: Behavior normal.             DIAGNOSTIC DATA:  Results from last 7 days   Lab Units 05/17/22  0818   WBC 10*3/mm3 7.24 "   NEUTROS ABS 10*3/mm3 5.06   HEMOGLOBIN g/dL 11.4*   HEMATOCRIT % 36.6   PLATELETS 10*3/mm3 186     Results from last 7 days   Lab Units 05/17/22  0818   SODIUM mmol/L 139   POTASSIUM mmol/L 3.8   CHLORIDE mmol/L 104   CO2 mmol/L 25.9   BUN mg/dL 13   CREATININE mg/dL 0.70   CALCIUM mg/dL 9.2   ALBUMIN g/dL 4.10   BILIRUBIN mg/dL <0.2   ALK PHOS U/L 180*   ALT (SGPT) U/L 31   AST (SGOT) U/L 30   GLUCOSE mg/dL 115*           5/17/2022 CBC and CMP reviewed  CBC normal except for a hemoglobin of 11.4  CMP reviewed and alkaline phosphatase 180 otherwise within normal limits.    No radiology results for the last 30 days.       Assessment & Plan      66-year-old postmenopausal  lady with longstanding leukopenia/neutropenia and anemia and recently diagnosed T3N1A adenocarcinoma of the colon    *[id number] adenocarcinoma of the ascending colon  · The tumor measures 3.8 cm located in the cecum, invades through the muscularis propria into the pericolorectal adipose tissue.  No lymphovascular or perineural invasion.  All the margins are negative.  Tumor comes to within 3 cm of the radial margin.  3 out of 26 lymph nodes positive for metastatic carcinoma with the largest focus measuring 0.7 cm without extranodal extension.  · No tumor deposits identified  · PT3N1B  · MSI low probability on immunohistochemistry and stable on PCR  · No family history of colon cancer  · Explained to her that this is stage IIIb colon cancer however since his T3N1 I would consider this low risk stage III.  There is no other high risk features noted on pathology either.  · Given that the tumor is stage III there is definite benefit from adjuvant chemotherapy.  · We discussed the 2 available regimens including XELOX and FOLFOX.  · Explained to her that based on the IDEA data 3 months of XELOX is noninferior to 6 months of adjuvant chemotherapy however with FOLFOX the noninferiority has not been established.  Explained to her that 6 months of  chemotherapy improves disease-free survival by additional 2 to 3% over 3 months.  · Discussed the adverse effects of XELOX as well as FOLFOX.  · After discussing the pros and cons we have decided to proceed with FOLFOX for total of 6 months but if she is unable to tolerate due to neurotoxicity then we could potentially stop after 3 months of treatment.  · Port placed on 4/11/2022  · Cycle 1 FOLFOX on 4/18/2022  · Cycle 2 FOLFOX on 5/2/2022  · Cycle 3 FOLFOX on 5/17/2022  · Patient tolerated chemotherapy fairly well.  Continue with planned treatments    *Leukopenia  · Predominantly neutropenia  · Mild increase in monocyte count  · This is chronic and unchanged  · No increased infections  · Had a previous work-up including a bone marrow biopsy with no clear etiology  · Differential includes nutritional, chronic idiopathic, autoimmune.  · She is currently not on any medications which could account for the leukopenia.  · No splenomegaly on exam  · 5/13/2021-flow cytometry negative  · LDH normal at 174  · Reticulocyte count 1.07%  · Ferritin low at 5.7  · Iron saturation low at 3%  · Folic acid normal at 11.8, B12 415  · Rheumatoid factor negative  · AVIVA negative  · ESR normal at 29  · Serum copper normal at 134  · 12/16/2021, WBC 3.16 today.  Patient denies frequent infections.  · CBC 3/24/2022 reviewed and WBC count 3.13, absolute neutrophil count 1.19, hemoglobin 10.2, platelets 315,000  · Given pre-existing neutropenia she will be receiving Neulasta with each cycle of FOLFOX.  · 4/14/2022-WBC 2.74 with an absolute neutrophil count of 1.16  · 4/25/2022 (1 week out from C1 FOLFOX) ANC 0.8. Neulasta given just 5 days ago. Suspect it has not kicked in yet. She understands to take Claritin for help with bony pain.  · 5/17/2022 WBC count greater than 7    *Anemia  · Microcytic  · Likely secondary to iron deficiency  · 4/22/2021 iron saturation 3%, TIBC today 508, ferritin 5.70.  The patient was initiated on oral iron every  other day.  · 8/26/2021, hemoglobin today 10.1.  The patient has been tolerating oral iron every other day.  We will increase oral iron to daily and recheck iron labs in 4 months.  · 12/16/2021, patient is taking oral iron every day and tolerating this well.  Hemoglobin today 10.0.  Iron saturation 45, TIBC 413, ferritin 10.20.  · 3/4/2022 ferritin 27.5, iron saturation 5%, TIBC 375, iron studies consistent with iron deficiency  · 3/24/2022 hemoglobin 10.2  · Received 3 doses of Venofer.  · Hemoglobin 10.9 today post C1 chemo  · Hemoglobin stable at 11.4      *Azryaoiyi-il-rm-date on screening mammograms , she is due for mammogram in April 2022.    *Headaches  · She has daily headaches  · She has been using Excedrin for the same which helps with the headaches.  · Continue Excedrin as needed    *Colitis  · Lymphocytic colitis noted on the biopsy of the colon  · There is a flare of colitis we may have to consider using steroids  · Currently she does not have any diarrhea  · Explained to her that 5-FU can increase the risk of diarrhea.  · We will have her follow-up with GI  · In the event of diarrhea we will try Imodium first and if no improvement in symptoms then consider steroids.  · No diarrhea    PLAN:   · Schedule cycle 4 and 5 FOLFOX   · Follow-up with me on the unhook day     Patient is on treatment requiring close monitoring for toxicity.        Mago Alvarez MD

## 2022-05-24 RX ORDER — METOPROLOL SUCCINATE 50 MG/1
50 TABLET, EXTENDED RELEASE ORAL NIGHTLY
Qty: 90 TABLET | Refills: 1 | Status: SHIPPED | OUTPATIENT
Start: 2022-05-24 | End: 2022-12-05 | Stop reason: SDUPTHER

## 2022-05-24 RX ORDER — OMEPRAZOLE 20 MG/1
20 CAPSULE, DELAYED RELEASE ORAL NIGHTLY
Qty: 90 CAPSULE | Refills: 1 | Status: SHIPPED | OUTPATIENT
Start: 2022-05-24 | End: 2022-12-05 | Stop reason: SDUPTHER

## 2022-05-24 RX ORDER — DULOXETIN HYDROCHLORIDE 60 MG/1
60 CAPSULE, DELAYED RELEASE ORAL NIGHTLY
Qty: 90 CAPSULE | Refills: 1 | Status: SHIPPED | OUTPATIENT
Start: 2022-05-24 | End: 2022-12-05 | Stop reason: SDUPTHER

## 2022-05-24 RX ORDER — ATORVASTATIN CALCIUM 10 MG/1
10 TABLET, FILM COATED ORAL NIGHTLY
Qty: 90 TABLET | Refills: 1 | Status: SHIPPED | OUTPATIENT
Start: 2022-05-24 | End: 2022-12-05 | Stop reason: SDUPTHER

## 2022-05-24 NOTE — TELEPHONE ENCOUNTER
Pt's  called states pt unable to come in right now due to cancer treatments.  Wants to know if a refill on these meds can be sent in until she can come into office.

## 2022-05-25 NOTE — TELEPHONE ENCOUNTER
6-month prescriptions have been sent to pharmacy on record.  Please call Ms. Adkins and let her know.  Thanks, Dr. Rivas

## 2022-05-31 ENCOUNTER — INFUSION (OUTPATIENT)
Dept: ONCOLOGY | Facility: HOSPITAL | Age: 67
End: 2022-05-31

## 2022-05-31 VITALS
TEMPERATURE: 98.1 F | SYSTOLIC BLOOD PRESSURE: 127 MMHG | DIASTOLIC BLOOD PRESSURE: 85 MMHG | HEIGHT: 62 IN | OXYGEN SATURATION: 96 % | RESPIRATION RATE: 18 BRPM | WEIGHT: 171.2 LBS | BODY MASS INDEX: 31.5 KG/M2 | HEART RATE: 86 BPM

## 2022-05-31 DIAGNOSIS — C18.2 MALIGNANT NEOPLASM OF ASCENDING COLON: Primary | ICD-10-CM

## 2022-05-31 LAB
ALBUMIN SERPL-MCNC: 4.1 G/DL (ref 3.5–5.2)
ALBUMIN/GLOB SERPL: 2.2 G/DL
ALP SERPL-CCNC: 195 U/L (ref 39–117)
ALT SERPL W P-5'-P-CCNC: 35 U/L (ref 1–33)
ANION GAP SERPL CALCULATED.3IONS-SCNC: 7 MMOL/L (ref 5–15)
AST SERPL-CCNC: 27 U/L (ref 1–32)
BASOPHILS # BLD AUTO: 0.03 10*3/MM3 (ref 0–0.2)
BASOPHILS NFR BLD AUTO: 0.6 % (ref 0–1.5)
BILIRUB SERPL-MCNC: 0.2 MG/DL (ref 0–1.2)
BUN SERPL-MCNC: 10 MG/DL (ref 8–23)
BUN/CREAT SERPL: 15.2 (ref 7–25)
CALCIUM SPEC-SCNC: 9.1 MG/DL (ref 8.6–10.5)
CHLORIDE SERPL-SCNC: 107 MMOL/L (ref 98–107)
CO2 SERPL-SCNC: 26 MMOL/L (ref 22–29)
CREAT SERPL-MCNC: 0.66 MG/DL (ref 0.57–1)
DEPRECATED RDW RBC AUTO: 72.9 FL (ref 37–54)
EGFRCR SERPLBLD CKD-EPI 2021: 97.5 ML/MIN/1.73
EOSINOPHIL # BLD AUTO: 0.18 10*3/MM3 (ref 0–0.4)
EOSINOPHIL NFR BLD AUTO: 3.9 % (ref 0.3–6.2)
ERYTHROCYTE [DISTWIDTH] IN BLOOD BY AUTOMATED COUNT: 23.4 % (ref 12.3–15.4)
GLOBULIN UR ELPH-MCNC: 1.9 GM/DL
GLUCOSE SERPL-MCNC: 117 MG/DL (ref 65–99)
HCT VFR BLD AUTO: 36.2 % (ref 34–46.6)
HGB BLD-MCNC: 11.3 G/DL (ref 12–15.9)
IMM GRANULOCYTES # BLD AUTO: 0.2 10*3/MM3 (ref 0–0.05)
IMM GRANULOCYTES NFR BLD AUTO: 4.3 % (ref 0–0.5)
LYMPHOCYTES # BLD AUTO: 1.07 10*3/MM3 (ref 0.7–3.1)
LYMPHOCYTES NFR BLD AUTO: 22.9 % (ref 19.6–45.3)
MCH RBC QN AUTO: 27.3 PG (ref 26.6–33)
MCHC RBC AUTO-ENTMCNC: 31.2 G/DL (ref 31.5–35.7)
MCV RBC AUTO: 87.4 FL (ref 79–97)
MONOCYTES # BLD AUTO: 0.39 10*3/MM3 (ref 0.1–0.9)
MONOCYTES NFR BLD AUTO: 8.4 % (ref 5–12)
NEUTROPHILS NFR BLD AUTO: 2.8 10*3/MM3 (ref 1.7–7)
NEUTROPHILS NFR BLD AUTO: 59.9 % (ref 42.7–76)
NRBC BLD AUTO-RTO: 0.4 /100 WBC (ref 0–0.2)
PLATELET # BLD AUTO: 102 10*3/MM3 (ref 140–450)
PMV BLD AUTO: 10.3 FL (ref 6–12)
POTASSIUM SERPL-SCNC: 3.6 MMOL/L (ref 3.5–5.2)
PROT SERPL-MCNC: 6 G/DL (ref 6–8.5)
RBC # BLD AUTO: 4.14 10*6/MM3 (ref 3.77–5.28)
SODIUM SERPL-SCNC: 140 MMOL/L (ref 136–145)
WBC NRBC COR # BLD: 4.67 10*3/MM3 (ref 3.4–10.8)

## 2022-05-31 PROCEDURE — 96367 TX/PROPH/DG ADDL SEQ IV INF: CPT

## 2022-05-31 PROCEDURE — 96416 CHEMO PROLONG INFUSE W/PUMP: CPT

## 2022-05-31 PROCEDURE — 96411 CHEMO IV PUSH ADDL DRUG: CPT

## 2022-05-31 PROCEDURE — 25010000002 PALONOSETRON PER 25 MCG: Performed by: NURSE PRACTITIONER

## 2022-05-31 PROCEDURE — 25010000002 DEXAMETHASONE SODIUM PHOSPHATE 100 MG/10ML SOLUTION: Performed by: NURSE PRACTITIONER

## 2022-05-31 PROCEDURE — 96415 CHEMO IV INFUSION ADDL HR: CPT

## 2022-05-31 PROCEDURE — 96375 TX/PRO/DX INJ NEW DRUG ADDON: CPT

## 2022-05-31 PROCEDURE — 96368 THER/DIAG CONCURRENT INF: CPT

## 2022-05-31 PROCEDURE — 25010000002 OXALIPLATIN PER 0.5 MG: Performed by: NURSE PRACTITIONER

## 2022-05-31 PROCEDURE — 80053 COMPREHEN METABOLIC PANEL: CPT | Performed by: INTERNAL MEDICINE

## 2022-05-31 PROCEDURE — 85025 COMPLETE CBC W/AUTO DIFF WBC: CPT | Performed by: INTERNAL MEDICINE

## 2022-05-31 PROCEDURE — 25010000002 LEUCOVORIN CALCIUM PER 50 MG: Performed by: NURSE PRACTITIONER

## 2022-05-31 PROCEDURE — 96413 CHEMO IV INFUSION 1 HR: CPT

## 2022-05-31 PROCEDURE — 25010000002 FOSAPREPITANT PER 1 MG: Performed by: NURSE PRACTITIONER

## 2022-05-31 PROCEDURE — 25010000002 FLUOROURACIL PER 500 MG: Performed by: NURSE PRACTITIONER

## 2022-05-31 RX ORDER — LORATADINE 10 MG/1
10 TABLET ORAL DAILY
COMMUNITY
End: 2022-10-14

## 2022-05-31 RX ORDER — FLUOROURACIL 50 MG/ML
400 INJECTION, SOLUTION INTRAVENOUS ONCE
Status: COMPLETED | OUTPATIENT
Start: 2022-05-31 | End: 2022-05-31

## 2022-05-31 RX ORDER — ASPIRIN 81 MG/1
81 TABLET ORAL DAILY
COMMUNITY

## 2022-05-31 RX ORDER — DEXTROSE MONOHYDRATE 50 MG/ML
250 INJECTION, SOLUTION INTRAVENOUS ONCE
Status: COMPLETED | OUTPATIENT
Start: 2022-05-31 | End: 2022-05-31

## 2022-05-31 RX ORDER — FAMOTIDINE 10 MG/ML
20 INJECTION, SOLUTION INTRAVENOUS AS NEEDED
Status: CANCELLED | OUTPATIENT
Start: 2022-05-31

## 2022-05-31 RX ORDER — PALONOSETRON 0.05 MG/ML
0.25 INJECTION, SOLUTION INTRAVENOUS ONCE
Status: COMPLETED | OUTPATIENT
Start: 2022-05-31 | End: 2022-05-31

## 2022-05-31 RX ORDER — DIPHENHYDRAMINE HYDROCHLORIDE 50 MG/ML
50 INJECTION INTRAMUSCULAR; INTRAVENOUS AS NEEDED
Status: CANCELLED | OUTPATIENT
Start: 2022-05-31

## 2022-05-31 RX ADMIN — FLUOROURACIL 4300 MG: 50 INJECTION, SOLUTION INTRAVENOUS at 12:33

## 2022-05-31 RX ADMIN — OXALIPLATIN 150 MG: 5 INJECTION, SOLUTION, CONCENTRATE INTRAVENOUS at 10:25

## 2022-05-31 RX ADMIN — FLUOROURACIL 720 MG: 50 INJECTION, SOLUTION INTRAVENOUS at 12:33

## 2022-05-31 RX ADMIN — PALONOSETRON 0.25 MG: 0.05 INJECTION, SOLUTION INTRAVENOUS at 09:25

## 2022-05-31 RX ADMIN — DEXAMETHASONE SODIUM PHOSPHATE 12 MG: 100 INJECTION INTRAMUSCULAR; INTRAVENOUS at 09:27

## 2022-05-31 RX ADMIN — SODIUM CHLORIDE 100 ML: 9 INJECTION, SOLUTION INTRAVENOUS at 09:48

## 2022-05-31 RX ADMIN — LEUCOVORIN CALCIUM 720 MG: 350 INJECTION, POWDER, LYOPHILIZED, FOR SOLUTION INTRAMUSCULAR; INTRAVENOUS at 10:25

## 2022-05-31 RX ADMIN — DEXTROSE MONOHYDRATE 250 ML: 50 INJECTION, SOLUTION INTRAVENOUS at 09:25

## 2022-05-31 NOTE — NURSING NOTE
Reviewed CBC with DALE Orozco with platelets of 102,000. Per Natalya, okay to proceed with treatment today as planned.

## 2022-06-02 ENCOUNTER — INFUSION (OUTPATIENT)
Dept: ONCOLOGY | Facility: HOSPITAL | Age: 67
End: 2022-06-02

## 2022-06-02 DIAGNOSIS — C18.2 MALIGNANT NEOPLASM OF ASCENDING COLON: Primary | ICD-10-CM

## 2022-06-02 DIAGNOSIS — Z45.2 ENCOUNTER FOR FITTING AND ADJUSTMENT OF VASCULAR CATHETER: ICD-10-CM

## 2022-06-02 PROCEDURE — 25010000002 HEPARIN LOCK FLUSH PER 10 UNITS: Performed by: INTERNAL MEDICINE

## 2022-06-02 PROCEDURE — 25010000002 PEGFILGRASTIM 6 MG/0.6ML PREFILLED SYRINGE KIT: Performed by: NURSE PRACTITIONER

## 2022-06-02 PROCEDURE — 96377 APPLICATON ON-BODY INJECTOR: CPT

## 2022-06-02 RX ORDER — HEPARIN SODIUM (PORCINE) LOCK FLUSH IV SOLN 100 UNIT/ML 100 UNIT/ML
500 SOLUTION INTRAVENOUS AS NEEDED
Status: DISCONTINUED | OUTPATIENT
Start: 2022-06-02 | End: 2022-06-02 | Stop reason: HOSPADM

## 2022-06-02 RX ORDER — SODIUM CHLORIDE 0.9 % (FLUSH) 0.9 %
10 SYRINGE (ML) INJECTION AS NEEDED
Status: CANCELLED | OUTPATIENT
Start: 2022-06-02

## 2022-06-02 RX ORDER — HEPARIN SODIUM (PORCINE) LOCK FLUSH IV SOLN 100 UNIT/ML 100 UNIT/ML
500 SOLUTION INTRAVENOUS AS NEEDED
Status: CANCELLED | OUTPATIENT
Start: 2022-06-02

## 2022-06-02 RX ORDER — SODIUM CHLORIDE 0.9 % (FLUSH) 0.9 %
10 SYRINGE (ML) INJECTION AS NEEDED
Status: DISCONTINUED | OUTPATIENT
Start: 2022-06-02 | End: 2022-06-02 | Stop reason: HOSPADM

## 2022-06-02 RX ADMIN — Medication 500 UNITS: at 09:59

## 2022-06-02 RX ADMIN — PEGFILGRASTIM 6 MG: KIT SUBCUTANEOUS at 10:00

## 2022-06-02 RX ADMIN — Medication 10 ML: at 09:59

## 2022-06-10 NOTE — PROGRESS NOTES
Subjective   Kayla Adkins is a 66 y.o. female.  Referred by Dr. Rivas for evaluation of leukopenia    History of Present Illness   Ms. Adkins is a 66-year-old postmenopausal  who has been referred by Dr. Rivas for evaluation of leukopenia, neutropenia.  Patient reports that she has had longstanding leukopenia and her white blood cell count normally ranges in the threes.  On her most recent visit with Dr. Rivas white blood cell count was noted to be 2560 on 3/2/2021 and 2660 on 4/5/2021.  This prompted a referral to hematology.  Patient reports that she had a extensive work-up including a bone marrow biopsy for the same condition about 17 years ago.  She thinks that this was performed at [facility].  According to patient no clear etiology was determined after the work-up.  She was recommended to continue with periodic labs.  No treatment was required.    She was seen in December and had continued on oral iron.  She was tolerating that fairly well.  However she had worsening of colitis for about 2 months prior to that visit.  She was scheduled for colonoscopy in January 2022.    She had a routine surveillance colonoscopy performed by Dr. Whitlock on 2/22/2022.  On this there was an infiltrative nonobstructing medium-sized mass in the proximal ascending colon 1 to 2 cm distal to the cecum.  This was partially circumferential involving 1 foot of the lumen.  Biopsies were taken.  There is also mildly congested mucosa in the entire colon.  Random biopsies were taken.  She also had an EGD at the same time which reported normal esophagus, stomach and duodenum.  Pathology evaluation from the biopsy reported superficial fragments of at least intramucosal carcinoma.  Random colon biopsies reported increased lamina propria, chronic inflammation with surface epithelial alteration and increased intraepithelial lymphocytes consistent with lymphocytic colitis.  Biopsies from the stomach reported chronic  inactive gastritis.  Negative for intestinal metaplasia and H. pylori.  Biopsies from the duodenum was negative.    She was seen by Dr. Wellington who performed a laparoscopic right hemicolectomy on 3/2/2022.  Pathology showed a 3.8 cm mass at the cecum invading through the muscularis propria into the pericolorectal adipose tissue.  No lymphovascular or perineural invasion.  All margins were clear.  The radial margin was 3 cm.  3 out of 26 lymph nodes were positive for metastatic disease.  The largest metastatic focus was 0.7 cm with extranodal extension.  Pathological stage PT3N1B.  The tumor was moderately differentiated, grade 2 adenocarcinoma.    No loss of nuclear expression of MMR proteins.  Low probability of microsatellite instability.  MSI status-microsatellite stable.  This is by PCR.    Normal CEA and 0.77 on the day prior to surgery.    She was seen by Dr. Burton as inpatient and recommended adjuvant chemotherapy with FOLFOX.    Patient initiating cycle 1 FOLFOX 4/18/2022.  Neulasta support given due to baseline neutropenia.    Cycle 2 FOLFOX 5/2/2022    Cycle 3 FOLFOX 5/17/2022    Interval history:  The patient returns today for follow up and evaluation prior to cycle 5 FOLFOX.  She is seen today with her  present.  She has been feeling reasonably well.  She is eating and drinking adequately, and her weight remains stable.  She does continue to take 1 Compazine tablet every morning prophylactically for nausea.  Bowels are moving regularly.  She continues to experience cold sensitivity for 4-5 days following chemotherapy.  She denies fever or chills.  She denies nausea or vomiting.  She denies new or worsening pain.  She denies signs or symptoms of peripheral neuropathy.  She denies signs or symptoms of bleeding.  She has no new concerns today.    The following portions of the patient's history were reviewed and updated as appropriate: allergies, current medications, past family history, past medical  history, past social history and problem list.    Past Medical History:   Diagnosis Date   • Anemia    • Anxiety    • Bladder infection    • Colitis    • Colon cancer (HCC)    • GERD (gastroesophageal reflux disease)    • Headache    • Hyperlipidemia    • Hypertension    • Knee injury, initial encounter-left 07/01/2019   • Neutropenia (HCC) 01/03/2017   • Prediabetes 03/04/2019      Past Surgical History:   Procedure Laterality Date   • COLON RESECTION Right 03/02/2022    Procedure: LAPAROSCOPIC RIGHT HEMICOLECTOMY WITH DAVINCI ROBOT;  Surgeon: Malcolm Wellington MD;  Location: [facility] OR;  Service: Robotics - DaVinci;  Laterality: Right;   • COLONOSCOPY     • COLONOSCOPY W/ POLYPECTOMY N/A 02/22/2022    Procedure: COLONOSCOPY INTO CECUM WITH COLD BIOPSIES;  Surgeon: Irvin Clayton MD;  Location: [facility] ENDOSCOPY;  Service: Gastroenterology;  Laterality: N/A;  PRE: DIARRHEA, ANEMIA  POST: ASCENDING COLON MASS   • ENDOSCOPY N/A 02/22/2022    Procedure: ESOPHAGOGASTRODUODENOSCOPY WITH BIOPSY;  Surgeon: Irvin Clayton MD;  Location: [facility] ENDOSCOPY;  Service: Gastroenterology;  Laterality: N/A;  PRE: ANEMIA  POST: NORMAL EGD   • TONSILLECTOMY     • VENOUS ACCESS DEVICE (PORT) INSERTION N/A 4/11/2022    Procedure: INSERTION VENOUS ACCESS DEVICE with subcutaneous port;  Surgeon: Malcolm Wellington MD;  Location: [facility] OR [facility];  Service: General;  Laterality: N/A;        Family History   Problem Relation Age of Onset   • Hypertension Mother    • Heart disease Father    • Heart attack Father 47        first MI age 47; second MI 57   • Hypertension Sister    • No Known Problems Brother    • Malig Hyperthermia Neg Hx       Social History     Socioeconomic History   • Marital status:    • Number of children: 2   Tobacco Use   • Smoking status: Never Smoker   • Smokeless tobacco: Never Used   Vaping Use   • Vaping Use: Never used   Substance and Sexual Activity   • Alcohol use: Not  "Currently     Comment: seldom   • Drug use: Never   • Sexual activity: Defer      OB History    No obstetric history on file.          Allergies   Allergen Reactions   • Penicillins Rash      Review of systems as mentioned in the HPI.    Objective   Blood pressure 127/79, pulse 87, temperature 96.4 °F (35.8 °C), temperature source Temporal, resp. rate 16, height 157.5 cm (62.01\"), weight 77.3 kg (170 lb 8 oz), SpO2 96 %.     Physical Exam  Vitals reviewed.   Constitutional:       Appearance: Normal appearance. She is normal weight.   HENT:      Head: Normocephalic and atraumatic.      Nose: Nose normal.      Mouth/Throat:      Mouth: Mucous membranes are moist.   Eyes:      Extraocular Movements: Extraocular movements intact.      Pupils: Pupils are equal, round, and reactive to light.   Cardiovascular:      Rate and Rhythm: Normal rate.      Heart sounds: Normal heart sounds. No murmur heard.    No gallop.   Pulmonary:      Effort: Pulmonary effort is normal. No respiratory distress.      Breath sounds: Normal breath sounds. No wheezing.      Comments: Mediport benign  Sutures present at the incision site of the port.  Abdominal:      General: Abdomen is flat. Bowel sounds are normal. There is no distension.      Palpations: Abdomen is soft.      Tenderness: There is no abdominal tenderness.      Comments: Abdominal incisions have healed well.     Musculoskeletal:         General: Normal range of motion.      Cervical back: Normal range of motion.      Right lower leg: No edema.      Left lower leg: No edema.   Skin:     Findings: No bruising or rash.   Neurological:      General: No focal deficit present.      Mental Status: She is alert and oriented to person, place, and time. Mental status is at baseline.   Psychiatric:         Behavior: Behavior normal.       DIAGNOSTIC DATA:  Results from last 7 days   Lab Units 06/14/22  0750   WBC 10*3/mm3 4.53   NEUTROS ABS 10*3/mm3 2.95   HEMOGLOBIN g/dL 12.1   HEMATOCRIT " % 36.9   PLATELETS 10*3/mm3 103*               6/14/2022  CBC-WBC 4.53, ANC 2.95, hemoglobin 12.1, platelets 103,000.  CMP-AST 44, ALT 51, total bilirubin 0.2, alk phos 264.    No radiology results for the last 30 days.       Assessment & Plan      66-year-old postmenopausal  lady with longstanding leukopenia/neutropenia and anemia and recently diagnosed T3N1A adenocarcinoma of the colon    *[id number] adenocarcinoma of the ascending colon  · The tumor measures 3.8 cm located in the cecum, invades through the muscularis propria into the pericolorectal adipose tissue.  No lymphovascular or perineural invasion.  All the margins are negative.  Tumor comes to within 3 cm of the radial margin.  3 out of 26 lymph nodes positive for metastatic carcinoma with the largest focus measuring 0.7 cm without extranodal extension.  · No tumor deposits identified  · PT3N1B  · MSI low probability on immunohistochemistry and stable on PCR  · No family history of colon cancer  · Explained to her that this is stage IIIb colon cancer however since his T3N1 I would consider this low risk stage III.  There is no other high risk features noted on pathology either.  · Given that the tumor is stage III there is definite benefit from adjuvant chemotherapy.  · We discussed the 2 available regimens including XELOX and FOLFOX.  · Explained to her that based on the IDEA data 3 months of XELOX is noninferior to 6 months of adjuvant chemotherapy however with FOLFOX the noninferiority has not been established.  Explained to her that 6 months of chemotherapy improves disease-free survival by additional 2 to 3% over 3 months.  · Discussed the adverse effects of XELOX as well as FOLFOX.  · After discussing the pros and cons we have decided to proceed with FOLFOX for total of 6 months but if she is unable to tolerate due to neurotoxicity then we could potentially stop after 3 months of treatment.  · Port placed on 4/11/2022  · Cycle 1 FOLFOX on  4/18/2022  · Cycle 2 FOLFOX on 5/2/2022  · Cycle 3 FOLFOX on 5/17/2022  · Cycle 4 FOLFOX on 5/31/2022  · Cycle 5 FOLFOX 6/14/2022.  Platelets stable at 103,000.  Slight elevation in LFTs today with AST 44, ALT 51.  Patient advised to avoid Tylenol.  Continue to monitor closely.    *Leukopenia  · Predominantly neutropenia  · Mild increase in monocyte count  · This is chronic and unchanged  · No increased infections  · Had a previous work-up including a bone marrow biopsy with no clear etiology  · Differential includes nutritional, chronic idiopathic, autoimmune.  · She is currently not on any medications which could account for the leukopenia.  · No splenomegaly on exam  · 5/13/2021-flow cytometry negative  · LDH normal at 174  · Reticulocyte count 1.07%  · Ferritin low at 5.7  · Iron saturation low at 3%  · Folic acid normal at 11.8, B12 415  · Rheumatoid factor negative  · AVIVA negative  · ESR normal at 29  · Serum copper normal at 134  · 12/16/2021, WBC 3.16 today.  Patient denies frequent infections.  · CBC 3/24/2022 reviewed and WBC count 3.13, absolute neutrophil count 1.19, hemoglobin 10.2, platelets 315,000  · Given pre-existing neutropenia she will be receiving Neulasta with each cycle of FOLFOX.  · 4/14/2022-WBC 2.74 with an absolute neutrophil count of 1.16  · 4/25/2022 (1 week out from C1 FOLFOX) ANC 0.8. Neulasta given just 5 days ago. Suspect it has not kicked in yet. She understands to take Claritin for help with bony pain.  · 6/14/2022 WBC 4.53, ANC 2.95.    *Anemia  · Microcytic  · Likely secondary to iron deficiency  · 4/22/2021 iron saturation 3%, TIBC today 508, ferritin 5.70.  The patient was initiated on oral iron every other day.  · 8/26/2021, hemoglobin today 10.1.  The patient has been tolerating oral iron every other day.  We will increase oral iron to daily and recheck iron labs in 4 months.  · 12/16/2021, patient is taking oral iron every day and tolerating this well.  Hemoglobin today  10.0.  Iron saturation 45, TIBC 413, ferritin 10.20.  · 3/4/2022 ferritin 27.5, iron saturation 5%, TIBC 375, iron studies consistent with iron deficiency  · 3/24/2022 hemoglobin 10.2  · Received 3 doses of Venofer.  · 6/14/2022 Hemoglobin today 12.1.    *[id number]-zx-bm-date on screening mammograms , she is due for mammogram in April 2022.    *Headaches  · She has daily headaches  · She has been using Excedrin for the same which helps with the headaches.  · Continue Excedrin as needed    *Colitis  · Lymphocytic colitis noted on the biopsy of the colon  · There is a flare of colitis we may have to consider using steroids  · Currently she does not have any diarrhea  · Explained to her that 5-FU can increase the risk of diarrhea.  · We will have her follow-up with GI  · In the event of diarrhea we will try Imodium first and if no improvement in symptoms then consider steroids.  · No diarrhea    *Thrombocytopenia  · Secondary to chemotherapy.  · Platelets today stable at 103,000.  Denies signs or symptoms of bleeding.  Continue to monitor.    PLAN:   · Proceed today with cycle 5 FOLFOX.    · Return in 2 weeks for MD follow up and cycle 6 FOLFOX.    Patient is on treatment requiring close monitoring for toxicity.    DALE Orozco  06/14/22

## 2022-06-14 ENCOUNTER — INFUSION (OUTPATIENT)
Dept: ONCOLOGY | Facility: HOSPITAL | Age: 67
End: 2022-06-14

## 2022-06-14 ENCOUNTER — OFFICE VISIT (OUTPATIENT)
Dept: ONCOLOGY | Facility: CLINIC | Age: 67
End: 2022-06-14

## 2022-06-14 VITALS
HEIGHT: 62 IN | TEMPERATURE: 96.4 F | RESPIRATION RATE: 16 BRPM | DIASTOLIC BLOOD PRESSURE: 79 MMHG | SYSTOLIC BLOOD PRESSURE: 127 MMHG | OXYGEN SATURATION: 96 % | WEIGHT: 170.5 LBS | HEART RATE: 87 BPM | BODY MASS INDEX: 31.38 KG/M2

## 2022-06-14 DIAGNOSIS — D69.59 CHEMOTHERAPY-INDUCED THROMBOCYTOPENIA: ICD-10-CM

## 2022-06-14 DIAGNOSIS — Z79.899 HIGH RISK MEDICATION USE: ICD-10-CM

## 2022-06-14 DIAGNOSIS — T45.1X5A CHEMOTHERAPY-INDUCED THROMBOCYTOPENIA: ICD-10-CM

## 2022-06-14 DIAGNOSIS — C18.2 MALIGNANT NEOPLASM OF ASCENDING COLON: Primary | ICD-10-CM

## 2022-06-14 LAB
ALBUMIN SERPL-MCNC: 4 G/DL (ref 3.5–5.2)
ALBUMIN/GLOB SERPL: 1.7 G/DL
ALP SERPL-CCNC: 264 U/L (ref 39–117)
ALT SERPL W P-5'-P-CCNC: 51 U/L (ref 1–33)
ANION GAP SERPL CALCULATED.3IONS-SCNC: 7.2 MMOL/L (ref 5–15)
ANISOCYTOSIS BLD QL: NORMAL
AST SERPL-CCNC: 44 U/L (ref 1–32)
BASOPHILS # BLD AUTO: 0.03 10*3/MM3 (ref 0–0.2)
BASOPHILS NFR BLD AUTO: 0.7 % (ref 0–1.5)
BILIRUB SERPL-MCNC: 0.2 MG/DL (ref 0–1.2)
BUN SERPL-MCNC: 9 MG/DL (ref 8–23)
BUN/CREAT SERPL: 13.2 (ref 7–25)
CALCIUM SPEC-SCNC: 9.3 MG/DL (ref 8.6–10.5)
CHLORIDE SERPL-SCNC: 105 MMOL/L (ref 98–107)
CO2 SERPL-SCNC: 26.8 MMOL/L (ref 22–29)
CREAT SERPL-MCNC: 0.68 MG/DL (ref 0.57–1)
DEPRECATED RDW RBC AUTO: 77.5 FL (ref 37–54)
EGFRCR SERPLBLD CKD-EPI 2021: 96.8 ML/MIN/1.73
EOSINOPHIL # BLD AUTO: 0.18 10*3/MM3 (ref 0–0.4)
EOSINOPHIL NFR BLD AUTO: 4 % (ref 0.3–6.2)
ERYTHROCYTE [DISTWIDTH] IN BLOOD BY AUTOMATED COUNT: 24.1 % (ref 12.3–15.4)
GLOBULIN UR ELPH-MCNC: 2.3 GM/DL
GLUCOSE SERPL-MCNC: 109 MG/DL (ref 65–99)
HCT VFR BLD AUTO: 36.9 % (ref 34–46.6)
HGB BLD-MCNC: 12.1 G/DL (ref 12–15.9)
IMM GRANULOCYTES # BLD AUTO: 0.05 10*3/MM3 (ref 0–0.05)
IMM GRANULOCYTES NFR BLD AUTO: 1.1 % (ref 0–0.5)
LYMPHOCYTES # BLD AUTO: 0.89 10*3/MM3 (ref 0.7–3.1)
LYMPHOCYTES NFR BLD AUTO: 19.6 % (ref 19.6–45.3)
MCH RBC QN AUTO: 29.3 PG (ref 26.6–33)
MCHC RBC AUTO-ENTMCNC: 32.8 G/DL (ref 31.5–35.7)
MCV RBC AUTO: 89.3 FL (ref 79–97)
MONOCYTES # BLD AUTO: 0.43 10*3/MM3 (ref 0.1–0.9)
MONOCYTES NFR BLD AUTO: 9.5 % (ref 5–12)
NEUTROPHILS NFR BLD AUTO: 2.95 10*3/MM3 (ref 1.7–7)
NEUTROPHILS NFR BLD AUTO: 65.1 % (ref 42.7–76)
NRBC BLD AUTO-RTO: 0 /100 WBC (ref 0–0.2)
PLAT MORPH BLD: NORMAL
PLATELET # BLD AUTO: 103 10*3/MM3 (ref 140–450)
PMV BLD AUTO: 9.9 FL (ref 6–12)
POTASSIUM SERPL-SCNC: 4.3 MMOL/L (ref 3.5–5.2)
PROT SERPL-MCNC: 6.3 G/DL (ref 6–8.5)
RBC # BLD AUTO: 4.13 10*6/MM3 (ref 3.77–5.28)
SODIUM SERPL-SCNC: 139 MMOL/L (ref 136–145)
WBC MORPH BLD: NORMAL
WBC NRBC COR # BLD: 4.53 10*3/MM3 (ref 3.4–10.8)

## 2022-06-14 PROCEDURE — 85007 BL SMEAR W/DIFF WBC COUNT: CPT | Performed by: NURSE PRACTITIONER

## 2022-06-14 PROCEDURE — 85025 COMPLETE CBC W/AUTO DIFF WBC: CPT | Performed by: NURSE PRACTITIONER

## 2022-06-14 PROCEDURE — 25010000002 OXALIPLATIN PER 0.5 MG: Performed by: NURSE PRACTITIONER

## 2022-06-14 PROCEDURE — 80053 COMPREHEN METABOLIC PANEL: CPT | Performed by: NURSE PRACTITIONER

## 2022-06-14 PROCEDURE — 99214 OFFICE O/P EST MOD 30 MIN: CPT | Performed by: NURSE PRACTITIONER

## 2022-06-14 PROCEDURE — 25010000002 FLUOROURACIL PER 500 MG: Performed by: NURSE PRACTITIONER

## 2022-06-14 PROCEDURE — 96375 TX/PRO/DX INJ NEW DRUG ADDON: CPT

## 2022-06-14 PROCEDURE — 25010000002 FOSAPREPITANT PER 1 MG: Performed by: NURSE PRACTITIONER

## 2022-06-14 PROCEDURE — 96411 CHEMO IV PUSH ADDL DRUG: CPT

## 2022-06-14 PROCEDURE — 25010000002 DEXAMETHASONE SODIUM PHOSPHATE 100 MG/10ML SOLUTION: Performed by: NURSE PRACTITIONER

## 2022-06-14 PROCEDURE — 96367 TX/PROPH/DG ADDL SEQ IV INF: CPT

## 2022-06-14 PROCEDURE — 96416 CHEMO PROLONG INFUSE W/PUMP: CPT

## 2022-06-14 PROCEDURE — 96413 CHEMO IV INFUSION 1 HR: CPT

## 2022-06-14 PROCEDURE — 96415 CHEMO IV INFUSION ADDL HR: CPT

## 2022-06-14 PROCEDURE — 25010000002 PALONOSETRON PER 25 MCG: Performed by: NURSE PRACTITIONER

## 2022-06-14 PROCEDURE — 25010000002 LEUCOVORIN 500 MG RECONSTITUTED SOLUTION 1 EACH VIAL: Performed by: NURSE PRACTITIONER

## 2022-06-14 PROCEDURE — 96368 THER/DIAG CONCURRENT INF: CPT

## 2022-06-14 RX ORDER — PALONOSETRON 0.05 MG/ML
0.25 INJECTION, SOLUTION INTRAVENOUS ONCE
Status: COMPLETED | OUTPATIENT
Start: 2022-06-14 | End: 2022-06-14

## 2022-06-14 RX ORDER — FLUOROURACIL 50 MG/ML
400 INJECTION, SOLUTION INTRAVENOUS ONCE
Status: CANCELLED | OUTPATIENT
Start: 2022-06-14

## 2022-06-14 RX ORDER — DIPHENHYDRAMINE HYDROCHLORIDE 50 MG/ML
50 INJECTION INTRAMUSCULAR; INTRAVENOUS AS NEEDED
Status: CANCELLED | OUTPATIENT
Start: 2022-06-14

## 2022-06-14 RX ORDER — DEXTROSE MONOHYDRATE 50 MG/ML
250 INJECTION, SOLUTION INTRAVENOUS ONCE
Status: COMPLETED | OUTPATIENT
Start: 2022-06-14 | End: 2022-06-14

## 2022-06-14 RX ORDER — FLUOROURACIL 50 MG/ML
400 INJECTION, SOLUTION INTRAVENOUS ONCE
Status: COMPLETED | OUTPATIENT
Start: 2022-06-14 | End: 2022-06-14

## 2022-06-14 RX ORDER — DEXTROSE MONOHYDRATE 50 MG/ML
250 INJECTION, SOLUTION INTRAVENOUS ONCE
Status: CANCELLED | OUTPATIENT
Start: 2022-06-14

## 2022-06-14 RX ORDER — PALONOSETRON 0.05 MG/ML
0.25 INJECTION, SOLUTION INTRAVENOUS ONCE
Status: CANCELLED | OUTPATIENT
Start: 2022-06-14

## 2022-06-14 RX ORDER — FAMOTIDINE 10 MG/ML
20 INJECTION, SOLUTION INTRAVENOUS AS NEEDED
Status: CANCELLED | OUTPATIENT
Start: 2022-06-14

## 2022-06-14 RX ADMIN — DEXTROSE MONOHYDRATE 250 ML: 50 INJECTION, SOLUTION INTRAVENOUS at 09:27

## 2022-06-14 RX ADMIN — LEUCOVORIN CALCIUM 720 MG: 500 INJECTION, POWDER, LYOPHILIZED, FOR SOLUTION INTRAMUSCULAR; INTRAVENOUS at 10:18

## 2022-06-14 RX ADMIN — PALONOSETRON 0.25 MG: 0.05 INJECTION, SOLUTION INTRAVENOUS at 09:27

## 2022-06-14 RX ADMIN — DEXAMETHASONE SODIUM PHOSPHATE 12 MG: 100 INJECTION INTRAMUSCULAR; INTRAVENOUS at 09:29

## 2022-06-14 RX ADMIN — FLUOROURACIL 720 MG: 50 INJECTION, SOLUTION INTRAVENOUS at 12:21

## 2022-06-14 RX ADMIN — FLUOROURACIL 4300 MG: 50 INJECTION, SOLUTION INTRAVENOUS at 12:21

## 2022-06-14 RX ADMIN — SODIUM CHLORIDE 100 ML: 9 INJECTION, SOLUTION INTRAVENOUS at 09:44

## 2022-06-14 RX ADMIN — OXALIPLATIN 150 MG: 5 INJECTION, SOLUTION, CONCENTRATE INTRAVENOUS at 10:18

## 2022-06-16 ENCOUNTER — INFUSION (OUTPATIENT)
Dept: ONCOLOGY | Facility: HOSPITAL | Age: 67
End: 2022-06-16

## 2022-06-16 DIAGNOSIS — C18.2 MALIGNANT NEOPLASM OF ASCENDING COLON: Primary | ICD-10-CM

## 2022-06-16 DIAGNOSIS — Z45.2 ENCOUNTER FOR FITTING AND ADJUSTMENT OF VASCULAR CATHETER: ICD-10-CM

## 2022-06-16 PROCEDURE — 96377 APPLICATON ON-BODY INJECTOR: CPT

## 2022-06-16 PROCEDURE — 25010000002 PEGFILGRASTIM 6 MG/0.6ML PREFILLED SYRINGE KIT: Performed by: INTERNAL MEDICINE

## 2022-06-16 PROCEDURE — 25010000002 HEPARIN LOCK FLUSH PER 10 UNITS: Performed by: INTERNAL MEDICINE

## 2022-06-16 RX ORDER — SODIUM CHLORIDE 0.9 % (FLUSH) 0.9 %
10 SYRINGE (ML) INJECTION AS NEEDED
Status: DISCONTINUED | OUTPATIENT
Start: 2022-06-16 | End: 2022-06-16 | Stop reason: HOSPADM

## 2022-06-16 RX ORDER — HEPARIN SODIUM (PORCINE) LOCK FLUSH IV SOLN 100 UNIT/ML 100 UNIT/ML
500 SOLUTION INTRAVENOUS AS NEEDED
Status: CANCELLED | OUTPATIENT
Start: 2022-06-16

## 2022-06-16 RX ORDER — HEPARIN SODIUM (PORCINE) LOCK FLUSH IV SOLN 100 UNIT/ML 100 UNIT/ML
500 SOLUTION INTRAVENOUS AS NEEDED
Status: DISCONTINUED | OUTPATIENT
Start: 2022-06-16 | End: 2022-06-16 | Stop reason: HOSPADM

## 2022-06-16 RX ORDER — SODIUM CHLORIDE 0.9 % (FLUSH) 0.9 %
10 SYRINGE (ML) INJECTION AS NEEDED
Status: CANCELLED | OUTPATIENT
Start: 2022-06-16

## 2022-06-16 RX ADMIN — Medication 500 UNITS: at 10:14

## 2022-06-16 RX ADMIN — PEGFILGRASTIM 6 MG: KIT SUBCUTANEOUS at 10:14

## 2022-06-16 RX ADMIN — Medication 10 ML: at 10:14

## 2022-06-27 NOTE — PROGRESS NOTES
Subjective   Kayla Adkins is a 66 y.o. female.  Referred by Dr. Rivas for evaluation of leukopenia    History of Present Illness   Ms. Adkins is a 66-year-old postmenopausal  who has been referred by Dr. Rivas for evaluation of leukopenia, neutropenia.  Patient reports that she has had longstanding leukopenia and her white blood cell count normally ranges in the threes.  On her most recent visit with Dr. Rivas white blood cell count was noted to be 2560 on 3/2/2021 and 2660 on 4/5/2021.  This prompted a referral to hematology.  Patient reports that she had a extensive work-up including a bone marrow biopsy for the same condition about 17 years ago.  She thinks that this was performed at [facility].  According to patient no clear etiology was determined after the work-up.  She was recommended to continue with periodic labs.  No treatment was required.    She was seen in December and had continued on oral iron.  She was tolerating that fairly well.  However she had worsening of colitis for about 2 months prior to that visit.  She was scheduled for colonoscopy in January 2022.    She had a routine surveillance colonoscopy performed by Dr. Whitlock on 2/22/2022.  On this there was an infiltrative nonobstructing medium-sized mass in the proximal ascending colon 1 to 2 cm distal to the cecum.  This was partially circumferential involving 1 foot of the lumen.  Biopsies were taken.  There is also mildly congested mucosa in the entire colon.  Random biopsies were taken.  She also had an EGD at the same time which reported normal esophagus, stomach and duodenum.  Pathology evaluation from the biopsy reported superficial fragments of at least intramucosal carcinoma.  Random colon biopsies reported increased lamina propria, chronic inflammation with surface epithelial alteration and increased intraepithelial lymphocytes consistent with lymphocytic colitis.  Biopsies from the stomach reported chronic  inactive gastritis.  Negative for intestinal metaplasia and H. pylori.  Biopsies from the duodenum was negative.    She was seen by Dr. Wellington who performed a laparoscopic right hemicolectomy on 3/2/2022.  Pathology showed a 3.8 cm mass at the cecum invading through the muscularis propria into the pericolorectal adipose tissue.  No lymphovascular or perineural invasion.  All margins were clear.  The radial margin was 3 cm.  3 out of 26 lymph nodes were positive for metastatic disease.  The largest metastatic focus was 0.7 cm with extranodal extension.  Pathological stage PT3N1B.  The tumor was moderately differentiated, grade 2 adenocarcinoma.    No loss of nuclear expression of MMR proteins.  Low probability of microsatellite instability.  MSI status-microsatellite stable.  This is by PCR.    Normal CEA and 0.77 on the day prior to surgery.    She was seen by Dr. Burton as inpatient and recommended adjuvant chemotherapy with FOLFOX.    Patient initiating cycle 1 FOLFOX 4/18/2022.  Neulasta support given due to baseline neutropenia.    Cycle 2 FOLFOX 5/2/2022    Cycle 3 FOLFOX 5/17/2022    Interval history:  The patient returns today for follow-up and evaluation prior to cycle #6 FOLFOX.  She is seen today with her  present.  She has been feeling very well.  She does report experiencing tingling in her fingertips and toes for approximately 1-2 days after chemotherapy.  She reports this happened very intermittently, approximately 5 times over the 1-2 days and only lasted a few seconds.  After the 1-2 days following chemotherapy, she did not experience any further tingling.  She did continue to experience cold sensitivity for 4-5 days following chemotherapy.  She is eating and drinking adequately, weight remains stable.  Bowels moving regularly.  Continues Compazine once daily in the a.m. prophylactically for nausea.  No issues with nausea or vomiting.  She denies fever or chills.  She denies new or worsening  pain.  She has no new concerns today.    The following portions of the patient's history were reviewed and updated as appropriate: allergies, current medications, past family history, past medical history, past social history and problem list.    Past Medical History:   Diagnosis Date   • Anemia    • Anxiety    • Bladder infection    • Colitis    • Colon cancer (HCC)    • GERD (gastroesophageal reflux disease)    • Headache    • Hyperlipidemia    • Hypertension    • Knee injury, initial encounter-left 07/01/2019   • Neutropenia (HCC) 01/03/2017   • Prediabetes 03/04/2019      Past Surgical History:   Procedure Laterality Date   • COLON RESECTION Right 03/02/2022    Procedure: LAPAROSCOPIC RIGHT HEMICOLECTOMY WITH DAVINCI ROBOT;  Surgeon: Malcolm Wellington MD;  Location: [facility] MAIN OR;  Service: Robotics - DaVinci;  Laterality: Right;   • COLONOSCOPY     • COLONOSCOPY W/ POLYPECTOMY N/A 02/22/2022    Procedure: COLONOSCOPY INTO CECUM WITH COLD BIOPSIES;  Surgeon: Irvin Clayton MD;  Location: [facility] ENDOSCOPY;  Service: Gastroenterology;  Laterality: N/A;  PRE: DIARRHEA, ANEMIA  POST: ASCENDING COLON MASS   • ENDOSCOPY N/A 02/22/2022    Procedure: ESOPHAGOGASTRODUODENOSCOPY WITH BIOPSY;  Surgeon: Irvin Clayton MD;  Location: [facility] ENDOSCOPY;  Service: Gastroenterology;  Laterality: N/A;  PRE: ANEMIA  POST: NORMAL EGD   • TONSILLECTOMY     • VENOUS ACCESS DEVICE (PORT) INSERTION N/A 4/11/2022    Procedure: INSERTION VENOUS ACCESS DEVICE with subcutaneous port;  Surgeon: Malcolm Wellington MD;  [place] [facility] [place] [facility] [place]  Service: General;  Laterality: N/A;        Family History   Problem Relation Age of Onset   • Hypertension Mother    • Heart disease Father    • Heart attack Father 47        first MI age 47; second MI 57   • Hypertension Sister    • No Known Problems Brother    • Malig Hyperthermia Neg Hx       Social History     Socioeconomic History   • Marital status:    •  "Number of children: 2   Tobacco Use   • Smoking status: Never Smoker   • Smokeless tobacco: Never Used   Vaping Use   • Vaping Use: Never used   Substance and Sexual Activity   • Alcohol use: Not Currently     Comment: seldom   • Drug use: Never   • Sexual activity: Defer      OB History    No obstetric history on file.          Allergies   Allergen Reactions   • Penicillins Rash      Review of systems as mentioned in the HPI.    Objective   Blood pressure 124/73, pulse 89, temperature 97.1 °F (36.2 °C), temperature source Temporal, resp. rate 18, height 157.5 cm (62.01\"), weight 77.1 kg (169 lb 14.4 oz), SpO2 96 %.     Physical Exam  Vitals reviewed.   Constitutional:       Appearance: Normal appearance. She is normal weight.   HENT:      Head: Normocephalic and atraumatic.      Nose: Nose normal.      Mouth/Throat:      Mouth: Mucous membranes are moist.   Eyes:      Extraocular Movements: Extraocular movements intact.      Pupils: Pupils are equal, round, and reactive to light.   Cardiovascular:      Rate and Rhythm: Normal rate.      Heart sounds: Normal heart sounds. No murmur heard.    No gallop.   Pulmonary:      Effort: Pulmonary effort is normal. No respiratory distress.      Breath sounds: Normal breath sounds. No wheezing.      Comments: Mediport benign  Sutures present at the incision site of the port.  Abdominal:      General: Abdomen is flat. Bowel sounds are normal. There is no distension.      Palpations: Abdomen is soft.      Tenderness: There is no abdominal tenderness.      Comments: Abdominal incisions have healed well.     Musculoskeletal:         General: Normal range of motion.      Cervical back: Normal range of motion.      Right lower leg: No edema.      Left lower leg: No edema.   Skin:     Findings: No bruising or rash.   Neurological:      General: No focal deficit present.      Mental Status: She is alert and oriented to person, place, and time. Mental status is at baseline. "   Psychiatric:         Behavior: Behavior normal.       DIAGNOSTIC DATA:                6/28/2022  CBC-WBC 4.58, ANC 3.05, hemoglobin 11.8, platelets 97,000.    No radiology results for the last 30 days.       Assessment & Plan      66-year-old postmenopausal  lady with longstanding leukopenia/neutropenia and anemia and recently diagnosed T3N1A adenocarcinoma of the colon    *[id number] adenocarcinoma of the ascending colon  · The tumor measures 3.8 cm located in the cecum, invades through the muscularis propria into the pericolorectal adipose tissue.  No lymphovascular or perineural invasion.  All the margins are negative.  Tumor comes to within 3 cm of the radial margin.  3 out of 26 lymph nodes positive for metastatic carcinoma with the largest focus measuring 0.7 cm without extranodal extension.  · No tumor deposits identified  · PT3N1B  · MSI low probability on immunohistochemistry and stable on PCR  · No family history of colon cancer  · Explained to her that this is stage IIIb colon cancer however since his T3N1 I would consider this low risk stage III.  There is no other high risk features noted on pathology either.  · Given that the tumor is stage III there is definite benefit from adjuvant chemotherapy.  · We discussed the 2 available regimens including XELOX and FOLFOX.  · Explained to her that based on the IDEA data 3 months of XELOX is noninferior to 6 months of adjuvant chemotherapy however with FOLFOX the noninferiority has not been established.  Explained to her that 6 months of chemotherapy improves disease-free survival by additional 2 to 3% over 3 months.  · Discussed the adverse effects of XELOX as well as FOLFOX.  · After discussing the pros and cons we have decided to proceed with FOLFOX for total of 6 months but if she is unable to tolerate due to neurotoxicity then we could potentially stop after 3 months of treatment.  · Port placed on 4/11/2022  · Cycle 1 FOLFOX on 4/18/2022  · Cycle  2 FOLFOX on 5/2/2022  · Cycle 3 FOLFOX on 5/17/2022  · Cycle 4 FOLFOX on 5/31/2022  · Cycle 5 FOLFOX 6/14/2022.  Platelets stable at 103,000.  Slight elevation in LFTs with AST 44, ALT 51.  Patient advised to avoid Tylenol.  Continue to monitor closely.  · 6/28/2022  · Cycle 6 FOLFOX 6/28/2022.  Patient did experiencing intermittent tingling in the fingertips and toes for 1-2 days following last cycle of chemotherapy.  She reports this happened approximately 5 times over the 1-2 days, then resolved and never recurred.  We will monitor this closely, and proceed with chemotherapy today.    *Leukopenia  · Predominantly neutropenia  · Mild increase in monocyte count  · This is chronic and unchanged  · No increased infections  · Had a previous work-up including a bone marrow biopsy with no clear etiology  · Differential includes nutritional, chronic idiopathic, autoimmune.  · She is currently not on any medications which could account for the leukopenia.  · No splenomegaly on exam  · 5/13/2021-flow cytometry negative  · LDH normal at 174  · Reticulocyte count 1.07%  · Ferritin low at 5.7  · Iron saturation low at 3%  · Folic acid normal at 11.8, B12 415  · Rheumatoid factor negative  · AVIVA negative  · ESR normal at 29  · Serum copper normal at 134  · 12/16/2021, WBC 3.16 today.  Patient denies frequent infections.  · CBC 3/24/2022 reviewed and WBC count 3.13, absolute neutrophil count 1.19, hemoglobin 10.2, platelets 315,000  · Given pre-existing neutropenia she will be receiving Neulasta with each cycle of FOLFOX.  · 4/14/2022-WBC 2.74 with an absolute neutrophil count of 1.16  · 4/25/2022 (1 week out from C1 FOLFOX) ANC 0.8. Neulasta given just 5 days ago. Suspect it has not kicked in yet. She understands to take Claritin for help with bony pain.  · 6/14/2022 WBC 4.53, ANC 2.95.  · 6/28/2022 WBC 4.58, ANC 3.05.    *Anemia  · Microcytic  · Likely secondary to iron deficiency  · 4/22/2021 iron saturation 3%, TIBC today  508, ferritin 5.70.  The patient was initiated on oral iron every other day.  · 8/26/2021, hemoglobin today 10.1.  The patient has been tolerating oral iron every other day.  We will increase oral iron to daily and recheck iron labs in 4 months.  · 12/16/2021, patient is taking oral iron every day and tolerating this well.  Hemoglobin today 10.0.  Iron saturation 45, TIBC 413, ferritin 10.20.  · 3/4/2022 ferritin 27.5, iron saturation 5%, TIBC 375, iron studies consistent with iron deficiency  · 3/24/2022 hemoglobin 10.2  · Received 3 doses of Venofer.  · 6/14/2022 Hemoglobin 12.1.  · 6/28/2022 hemoglobin 11.8.    *Zriaaukuj-le-bj-date on screening mammograms , she is due for mammogram in April 2022.    *Headaches  · She has daily headaches  · She has been using Excedrin for the same which helps with the headaches.  · Continue Excedrin as needed    *Colitis  · Lymphocytic colitis noted on the biopsy of the colon  · There is a flare of colitis we may have to consider using steroids  · Currently she does not have any diarrhea  · Explained to her that 5-FU can increase the risk of diarrhea.  · We will have her follow-up with GI  · In the event of diarrhea we will try Imodium first and if no improvement in symptoms then consider steroids.  · No diarrhea    *Thrombocytopenia  · Secondary to chemotherapy.  · Platelets 6/14/2022 stable at 103,000.    · 6/28/2022 platelets today slightly declined to 97,000.  Denies signs or symptoms of bleeding.  Continue to monitor.    PLAN:   · Proceed today with cycle 6 FOLFOX.    · Return in 2 weeks for NP follow up and cycle 8 FOLFOX.  · Return in 4 weeks for MD follow-up and cycle 9 FOLFOX.  Dr. Alvarez will see the patient on day of disconnect, as she is not at our [place] location on Tuesdays, however is here on Thursdays when patient gets disconnected.    Patient is on treatment requiring close monitoring for toxicity.    Natalya Thornton, DALE  06/28/22

## 2022-06-28 ENCOUNTER — INFUSION (OUTPATIENT)
Dept: ONCOLOGY | Facility: HOSPITAL | Age: 67
End: 2022-06-28

## 2022-06-28 ENCOUNTER — OFFICE VISIT (OUTPATIENT)
Dept: ONCOLOGY | Facility: CLINIC | Age: 67
End: 2022-06-28

## 2022-06-28 VITALS
HEIGHT: 62 IN | OXYGEN SATURATION: 96 % | DIASTOLIC BLOOD PRESSURE: 73 MMHG | TEMPERATURE: 97.1 F | BODY MASS INDEX: 31.27 KG/M2 | SYSTOLIC BLOOD PRESSURE: 124 MMHG | WEIGHT: 169.9 LBS | HEART RATE: 89 BPM | RESPIRATION RATE: 18 BRPM

## 2022-06-28 DIAGNOSIS — C18.2 MALIGNANT NEOPLASM OF ASCENDING COLON: Primary | ICD-10-CM

## 2022-06-28 DIAGNOSIS — D69.59 CHEMOTHERAPY-INDUCED THROMBOCYTOPENIA: ICD-10-CM

## 2022-06-28 DIAGNOSIS — Z79.899 HIGH RISK MEDICATION USE: ICD-10-CM

## 2022-06-28 DIAGNOSIS — T45.1X5A CHEMOTHERAPY-INDUCED THROMBOCYTOPENIA: ICD-10-CM

## 2022-06-28 LAB
ALBUMIN SERPL-MCNC: 4 G/DL (ref 3.5–5.2)
ALBUMIN/GLOB SERPL: 1.7 G/DL
ALP SERPL-CCNC: 265 U/L (ref 39–117)
ALT SERPL W P-5'-P-CCNC: 46 U/L (ref 1–33)
ANION GAP SERPL CALCULATED.3IONS-SCNC: 11.6 MMOL/L (ref 5–15)
AST SERPL-CCNC: 35 U/L (ref 1–32)
BASOPHILS # BLD AUTO: 0.03 10*3/MM3 (ref 0–0.2)
BASOPHILS NFR BLD AUTO: 0.7 % (ref 0–1.5)
BILIRUB SERPL-MCNC: 0.2 MG/DL (ref 0–1.2)
BUN SERPL-MCNC: 8 MG/DL (ref 8–23)
BUN/CREAT SERPL: 12.7 (ref 7–25)
CALCIUM SPEC-SCNC: 9.3 MG/DL (ref 8.6–10.5)
CHLORIDE SERPL-SCNC: 106 MMOL/L (ref 98–107)
CO2 SERPL-SCNC: 26.4 MMOL/L (ref 22–29)
CREAT SERPL-MCNC: 0.63 MG/DL (ref 0.57–1)
DEPRECATED RDW RBC AUTO: 74.9 FL (ref 37–54)
EGFRCR SERPLBLD CKD-EPI 2021: 98.6 ML/MIN/1.73
EOSINOPHIL # BLD AUTO: 0.12 10*3/MM3 (ref 0–0.4)
EOSINOPHIL NFR BLD AUTO: 2.6 % (ref 0.3–6.2)
ERYTHROCYTE [DISTWIDTH] IN BLOOD BY AUTOMATED COUNT: 22.5 % (ref 12.3–15.4)
GLOBULIN UR ELPH-MCNC: 2.3 GM/DL
GLUCOSE SERPL-MCNC: 128 MG/DL (ref 65–99)
HCT VFR BLD AUTO: 36.1 % (ref 34–46.6)
HGB BLD-MCNC: 11.8 G/DL (ref 12–15.9)
IMM GRANULOCYTES # BLD AUTO: 0.05 10*3/MM3 (ref 0–0.05)
IMM GRANULOCYTES NFR BLD AUTO: 1.1 % (ref 0–0.5)
LYMPHOCYTES # BLD AUTO: 0.93 10*3/MM3 (ref 0.7–3.1)
LYMPHOCYTES NFR BLD AUTO: 20.3 % (ref 19.6–45.3)
MCH RBC QN AUTO: 29.9 PG (ref 26.6–33)
MCHC RBC AUTO-ENTMCNC: 32.7 G/DL (ref 31.5–35.7)
MCV RBC AUTO: 91.6 FL (ref 79–97)
MONOCYTES # BLD AUTO: 0.4 10*3/MM3 (ref 0.1–0.9)
MONOCYTES NFR BLD AUTO: 8.7 % (ref 5–12)
NEUTROPHILS NFR BLD AUTO: 3.05 10*3/MM3 (ref 1.7–7)
NEUTROPHILS NFR BLD AUTO: 66.6 % (ref 42.7–76)
NRBC BLD AUTO-RTO: 0 /100 WBC (ref 0–0.2)
PLATELET # BLD AUTO: 97 10*3/MM3 (ref 140–450)
PMV BLD AUTO: 10.4 FL (ref 6–12)
POTASSIUM SERPL-SCNC: 3.6 MMOL/L (ref 3.5–5.2)
PROT SERPL-MCNC: 6.3 G/DL (ref 6–8.5)
RBC # BLD AUTO: 3.94 10*6/MM3 (ref 3.77–5.28)
SODIUM SERPL-SCNC: 144 MMOL/L (ref 136–145)
WBC NRBC COR # BLD: 4.58 10*3/MM3 (ref 3.4–10.8)

## 2022-06-28 PROCEDURE — 96367 TX/PROPH/DG ADDL SEQ IV INF: CPT

## 2022-06-28 PROCEDURE — 96416 CHEMO PROLONG INFUSE W/PUMP: CPT

## 2022-06-28 PROCEDURE — 96415 CHEMO IV INFUSION ADDL HR: CPT

## 2022-06-28 PROCEDURE — 96375 TX/PRO/DX INJ NEW DRUG ADDON: CPT

## 2022-06-28 PROCEDURE — 25010000002 DEXAMETHASONE SODIUM PHOSPHATE 100 MG/10ML SOLUTION: Performed by: NURSE PRACTITIONER

## 2022-06-28 PROCEDURE — 96411 CHEMO IV PUSH ADDL DRUG: CPT

## 2022-06-28 PROCEDURE — 25010000002 LEUCOVORIN 500 MG RECONSTITUTED SOLUTION 1 EACH VIAL: Performed by: NURSE PRACTITIONER

## 2022-06-28 PROCEDURE — 25010000002 FLUOROURACIL PER 500 MG: Performed by: NURSE PRACTITIONER

## 2022-06-28 PROCEDURE — 85025 COMPLETE CBC W/AUTO DIFF WBC: CPT | Performed by: NURSE PRACTITIONER

## 2022-06-28 PROCEDURE — 25010000002 OXALIPLATIN PER 0.5 MG: Performed by: NURSE PRACTITIONER

## 2022-06-28 PROCEDURE — 25010000002 PALONOSETRON PER 25 MCG: Performed by: NURSE PRACTITIONER

## 2022-06-28 PROCEDURE — 96368 THER/DIAG CONCURRENT INF: CPT

## 2022-06-28 PROCEDURE — 96413 CHEMO IV INFUSION 1 HR: CPT

## 2022-06-28 PROCEDURE — 25010000002 FOSAPREPITANT PER 1 MG: Performed by: NURSE PRACTITIONER

## 2022-06-28 PROCEDURE — 99214 OFFICE O/P EST MOD 30 MIN: CPT | Performed by: NURSE PRACTITIONER

## 2022-06-28 PROCEDURE — 80053 COMPREHEN METABOLIC PANEL: CPT | Performed by: NURSE PRACTITIONER

## 2022-06-28 RX ORDER — PALONOSETRON 0.05 MG/ML
0.25 INJECTION, SOLUTION INTRAVENOUS ONCE
Status: CANCELLED | OUTPATIENT
Start: 2022-06-28

## 2022-06-28 RX ORDER — DEXTROSE MONOHYDRATE 50 MG/ML
250 INJECTION, SOLUTION INTRAVENOUS ONCE
Status: COMPLETED | OUTPATIENT
Start: 2022-06-28 | End: 2022-06-28

## 2022-06-28 RX ORDER — FLUOROURACIL 50 MG/ML
400 INJECTION, SOLUTION INTRAVENOUS ONCE
Status: CANCELLED | OUTPATIENT
Start: 2022-06-28

## 2022-06-28 RX ORDER — FAMOTIDINE 10 MG/ML
20 INJECTION, SOLUTION INTRAVENOUS AS NEEDED
Status: CANCELLED | OUTPATIENT
Start: 2022-06-28

## 2022-06-28 RX ORDER — DEXTROSE MONOHYDRATE 50 MG/ML
250 INJECTION, SOLUTION INTRAVENOUS ONCE
Status: CANCELLED | OUTPATIENT
Start: 2022-06-28

## 2022-06-28 RX ORDER — FLUOROURACIL 50 MG/ML
400 INJECTION, SOLUTION INTRAVENOUS ONCE
Status: COMPLETED | OUTPATIENT
Start: 2022-06-28 | End: 2022-06-28

## 2022-06-28 RX ORDER — PALONOSETRON 0.05 MG/ML
0.25 INJECTION, SOLUTION INTRAVENOUS ONCE
Status: COMPLETED | OUTPATIENT
Start: 2022-06-28 | End: 2022-06-28

## 2022-06-28 RX ORDER — DIPHENHYDRAMINE HYDROCHLORIDE 50 MG/ML
50 INJECTION INTRAMUSCULAR; INTRAVENOUS AS NEEDED
Status: CANCELLED | OUTPATIENT
Start: 2022-06-28

## 2022-06-28 RX ADMIN — DEXAMETHASONE SODIUM PHOSPHATE 12 MG: 10 INJECTION, SOLUTION INTRAMUSCULAR; INTRAVENOUS at 10:35

## 2022-06-28 RX ADMIN — PALONOSETRON 0.25 MG: 0.05 INJECTION, SOLUTION INTRAVENOUS at 09:55

## 2022-06-28 RX ADMIN — FLUOROURACIL 4300 MG: 50 INJECTION, SOLUTION INTRAVENOUS at 13:03

## 2022-06-28 RX ADMIN — SODIUM CHLORIDE 100 ML: 9 INJECTION, SOLUTION INTRAVENOUS at 10:00

## 2022-06-28 RX ADMIN — DEXTROSE MONOHYDRATE 250 ML: 50 INJECTION, SOLUTION INTRAVENOUS at 09:55

## 2022-06-28 RX ADMIN — OXALIPLATIN 150 MG: 5 INJECTION, SOLUTION INTRAVENOUS at 10:55

## 2022-06-28 RX ADMIN — LEUCOVORIN CALCIUM 720 MG: 500 INJECTION, POWDER, LYOPHILIZED, FOR SOLUTION INTRAMUSCULAR; INTRAVENOUS at 10:55

## 2022-06-28 RX ADMIN — FLUOROURACIL 720 MG: 50 INJECTION, SOLUTION INTRAVENOUS at 13:03

## 2022-06-30 ENCOUNTER — INFUSION (OUTPATIENT)
Dept: ONCOLOGY | Facility: HOSPITAL | Age: 67
End: 2022-06-30

## 2022-06-30 VITALS — TEMPERATURE: 97.5 F

## 2022-06-30 DIAGNOSIS — C18.2 MALIGNANT NEOPLASM OF ASCENDING COLON: ICD-10-CM

## 2022-06-30 DIAGNOSIS — Z45.2 ENCOUNTER FOR FITTING AND ADJUSTMENT OF VASCULAR CATHETER: Primary | ICD-10-CM

## 2022-06-30 PROCEDURE — 25010000002 PEGFILGRASTIM 6 MG/0.6ML PREFILLED SYRINGE KIT: Performed by: NURSE PRACTITIONER

## 2022-06-30 PROCEDURE — 96377 APPLICATON ON-BODY INJECTOR: CPT

## 2022-06-30 PROCEDURE — 25010000002 HEPARIN LOCK FLUSH PER 10 UNITS: Performed by: INTERNAL MEDICINE

## 2022-06-30 PROCEDURE — 96372 THER/PROPH/DIAG INJ SC/IM: CPT

## 2022-06-30 RX ORDER — SODIUM CHLORIDE 0.9 % (FLUSH) 0.9 %
10 SYRINGE (ML) INJECTION AS NEEDED
Status: CANCELLED | OUTPATIENT
Start: 2022-06-30

## 2022-06-30 RX ORDER — HEPARIN SODIUM (PORCINE) LOCK FLUSH IV SOLN 100 UNIT/ML 100 UNIT/ML
500 SOLUTION INTRAVENOUS AS NEEDED
Status: DISCONTINUED | OUTPATIENT
Start: 2022-06-30 | End: 2022-06-30 | Stop reason: HOSPADM

## 2022-06-30 RX ORDER — HEPARIN SODIUM (PORCINE) LOCK FLUSH IV SOLN 100 UNIT/ML 100 UNIT/ML
500 SOLUTION INTRAVENOUS AS NEEDED
Status: CANCELLED | OUTPATIENT
Start: 2022-06-30

## 2022-06-30 RX ORDER — SODIUM CHLORIDE 0.9 % (FLUSH) 0.9 %
10 SYRINGE (ML) INJECTION AS NEEDED
Status: DISCONTINUED | OUTPATIENT
Start: 2022-06-30 | End: 2022-06-30 | Stop reason: HOSPADM

## 2022-06-30 RX ADMIN — Medication 10 ML: at 11:26

## 2022-06-30 RX ADMIN — PEGFILGRASTIM 6 MG: KIT SUBCUTANEOUS at 11:31

## 2022-06-30 RX ADMIN — Medication 500 UNITS: at 11:26

## 2022-07-06 ENCOUNTER — CLINICAL SUPPORT (OUTPATIENT)
Dept: ONCOLOGY | Facility: HOSPITAL | Age: 67
End: 2022-07-06

## 2022-07-06 ENCOUNTER — LAB (OUTPATIENT)
Dept: OTHER | Facility: HOSPITAL | Age: 67
End: 2022-07-06

## 2022-07-06 ENCOUNTER — TELEPHONE (OUTPATIENT)
Dept: ONCOLOGY | Facility: CLINIC | Age: 67
End: 2022-07-06

## 2022-07-06 VITALS
DIASTOLIC BLOOD PRESSURE: 86 MMHG | RESPIRATION RATE: 16 BRPM | OXYGEN SATURATION: 98 % | TEMPERATURE: 98.2 F | HEART RATE: 127 BPM | SYSTOLIC BLOOD PRESSURE: 139 MMHG

## 2022-07-06 DIAGNOSIS — R30.0 BURNING WITH URINATION: ICD-10-CM

## 2022-07-06 DIAGNOSIS — R30.0 BURNING WITH URINATION: Primary | ICD-10-CM

## 2022-07-06 LAB
BACTERIA UR QL AUTO: ABNORMAL /HPF
BILIRUB UR QL STRIP: ABNORMAL
CLARITY UR: ABNORMAL
COLOR UR: ABNORMAL
GLUCOSE UR STRIP-MCNC: ABNORMAL MG/DL
HGB UR QL STRIP.AUTO: ABNORMAL
HYALINE CASTS UR QL AUTO: ABNORMAL /LPF
KETONES UR QL STRIP: ABNORMAL
LEUKOCYTE ESTERASE UR QL STRIP.AUTO: ABNORMAL
NITRITE UR QL STRIP: POSITIVE
PH UR STRIP.AUTO: 5.5 [PH] (ref 5–8)
PROT UR QL STRIP: ABNORMAL
RBC # UR STRIP: ABNORMAL /HPF
REF LAB TEST METHOD: ABNORMAL
SP GR UR STRIP: >=1.03 (ref 1–1.03)
SQUAMOUS #/AREA URNS HPF: ABNORMAL /HPF
UROBILINOGEN UR QL STRIP: ABNORMAL
WBC # UR STRIP: ABNORMAL /HPF

## 2022-07-06 PROCEDURE — 87077 CULTURE AEROBIC IDENTIFY: CPT | Performed by: INTERNAL MEDICINE

## 2022-07-06 PROCEDURE — 87186 SC STD MICRODIL/AGAR DIL: CPT

## 2022-07-06 PROCEDURE — G0463 HOSPITAL OUTPT CLINIC VISIT: HCPCS

## 2022-07-06 PROCEDURE — 87086 URINE CULTURE/COLONY COUNT: CPT | Performed by: INTERNAL MEDICINE

## 2022-07-06 PROCEDURE — 81001 URINALYSIS AUTO W/SCOPE: CPT | Performed by: INTERNAL MEDICINE

## 2022-07-06 RX ORDER — LEVOFLOXACIN 500 MG/1
500 TABLET, FILM COATED ORAL DAILY
Qty: 3 TABLET | Refills: 0 | Status: SHIPPED | OUTPATIENT
Start: 2022-07-06 | End: 2022-08-08

## 2022-07-06 NOTE — NURSING NOTE
Pt presents c/o burning with urination.  Urinalysis reviewed with CAMILLE German with v/o for Levaquin 500mg once daily x 3 days.  Rx called to [facility] pharmacy in [place] per pt request.  Pt instructed to call with worsening or not improving symptoms and v/u.

## 2022-07-06 NOTE — TELEPHONE ENCOUNTER
Returned call to patient who is reporting burning with urination which has been going on for a couple of days.  She has had this in the past and she calls Dr. Alvarez and she will usually prescribe something for the UTI, which is something she takes for 3 days.  She is currently taking AZO.  She is a current chemotherapy patient, who receives Folfox.  Please advise.

## 2022-07-08 LAB
BACTERIA SPEC AEROBE CULT: ABNORMAL
BACTERIA SPEC AEROBE CULT: ABNORMAL

## 2022-07-08 RX ORDER — SULFAMETHOXAZOLE AND TRIMETHOPRIM 800; 160 MG/1; MG/1
1 TABLET ORAL 2 TIMES DAILY
Qty: 6 TABLET | Refills: 0 | Status: SHIPPED | OUTPATIENT
Start: 2022-07-08 | End: 2022-08-16

## 2022-07-09 NOTE — PROGRESS NOTES
Subjective   Kayla Adkins is a 66 y.o. female.  Referred by Dr. Rivas for evaluation of leukopenia    History of Present Illness   Ms. Adkins is a 66-year-old postmenopausal  who has been referred by Dr. Rivas for evaluation of leukopenia, neutropenia.  Patient reports that she has had longstanding leukopenia and her white blood cell count normally ranges in the threes.  On her most recent visit with Dr. Rivas white blood cell count was noted to be 2560 on 3/2/2021 and 2660 on 4/5/2021.  This prompted a referral to hematology.  Patient reports that she had a extensive work-up including a bone marrow biopsy for the same condition about 17 years ago.  She thinks that this was performed at [facility].  According to patient no clear etiology was determined after the work-up.  She was recommended to continue with periodic labs.  No treatment was required.    She was seen in December and had continued on oral iron.  She was tolerating that fairly well.  However she had worsening of colitis for about 2 months prior to that visit.  She was scheduled for colonoscopy in January 2022.    She had a routine surveillance colonoscopy performed by Dr. Whitlock on 2/22/2022.  On this there was an infiltrative nonobstructing medium-sized mass in the proximal ascending colon 1 to 2 cm distal to the cecum.  This was partially circumferential involving 1 foot of the lumen.  Biopsies were taken.  There is also mildly congested mucosa in the entire colon.  Random biopsies were taken.  She also had an EGD at the same time which reported normal esophagus, stomach and duodenum.  Pathology evaluation from the biopsy reported superficial fragments of at least intramucosal carcinoma.  Random colon biopsies reported increased lamina propria, chronic inflammation with surface epithelial alteration and increased intraepithelial lymphocytes consistent with lymphocytic colitis.  Biopsies from the stomach reported chronic  inactive gastritis.  Negative for intestinal metaplasia and H. pylori.  Biopsies from the duodenum was negative.    She was seen by Dr. Wellington who performed a laparoscopic right hemicolectomy on 3/2/2022.  Pathology showed a 3.8 cm mass at the cecum invading through the muscularis propria into the pericolorectal adipose tissue.  No lymphovascular or perineural invasion.  All margins were clear.  The radial margin was 3 cm.  3 out of 26 lymph nodes were positive for metastatic disease.  The largest metastatic focus was 0.7 cm with extranodal extension.  Pathological stage PT3N1B.  The tumor was moderately differentiated, grade 2 adenocarcinoma.    No loss of nuclear expression of MMR proteins.  Low probability of microsatellite instability.  MSI status-microsatellite stable.  This is by PCR.    Normal CEA and 0.77 on the day prior to surgery.    She was seen by Dr. Burton as inpatient and recommended adjuvant chemotherapy with FOLFOX.    Patient initiating cycle 1 FOLFOX 4/18/2022.  Neulasta support given due to baseline neutropenia.    Cycle 2 FOLFOX 5/2/2022    Cycle 3 FOLFOX 5/17/2022    Interval history:  The patient returns today for follow-up and evaluation prior to cycle #7 FOLFOX.  She is seen today with her  present. She again experienced intermittent tingling to the bilateral fingertips for a few days following chemotherapy, this has since resolved.  Otherwise, she continues to tolerate treatment well.  Eating well.  Bowels moving regularly.  Denies fever or chills.  Denies nausea or vomiting.  She denies new or worsening pain.  Denies new concerns today.    The following portions of the patient's history were reviewed and updated as appropriate: allergies, current medications, past family history, past medical history, past social history and problem list.    Past Medical History:   Diagnosis Date   • Anemia    • Anxiety    • Bladder infection    • Colitis    • Colon cancer (HCC)    • GERD  (gastroesophageal reflux disease)    • Headache    • Hyperlipidemia    • Hypertension    • Knee injury, initial encounter-left 07/01/2019   • Neutropenia (HCC) 01/03/2017   • Prediabetes 03/04/2019      Past Surgical History:   Procedure Laterality Date   • COLON RESECTION Right 03/02/2022    Procedure: LAPAROSCOPIC RIGHT HEMICOLECTOMY WITH DAVINCI ROBOT;  Surgeon: Malcolm Wellington MD;  Location: [facility] MAIN OR;  Service: Robotics - DaVinci;  Laterality: Right;   • COLONOSCOPY     • COLONOSCOPY W/ POLYPECTOMY N/A 02/22/2022    Procedure: COLONOSCOPY INTO CECUM WITH COLD BIOPSIES;  Surgeon: Irvin Clayton MD;  Location: [facility] ENDOSCOPY;  Service: Gastroenterology;  Laterality: N/A;  PRE: DIARRHEA, ANEMIA  POST: ASCENDING COLON MASS   • ENDOSCOPY N/A 02/22/2022    Procedure: ESOPHAGOGASTRODUODENOSCOPY WITH BIOPSY;  Surgeon: Irvin Clayton MD;  Location: [facility] ENDOSCOPY;  Service: Gastroenterology;  Laterality: N/A;  PRE: ANEMIA  POST: NORMAL EGD   • TONSILLECTOMY     • VENOUS ACCESS DEVICE (PORT) INSERTION N/A 4/11/2022    Procedure: INSERTION VENOUS ACCESS DEVICE with subcutaneous port;  Surgeon: Malcolm Wellington MD;  [place] [facility] [place] [facility]  Service: General;  Laterality: N/A;        Family History   Problem Relation Age of Onset   • Hypertension Mother    • Heart disease Father    • Heart attack Father 47        first MI age 47; second MI 57   • Hypertension Sister    • No Known Problems Brother    • Malig Hyperthermia Neg Hx       Social History     Socioeconomic History   • Marital status:    • Number of children: 2   Tobacco Use   • Smoking status: Never Smoker   • Smokeless tobacco: Never Used   Vaping Use   • Vaping Use: Never used   Substance and Sexual Activity   • Alcohol use: Not Currently     Comment: seldom   • Drug use: Never   • Sexual activity: Defer      OB History    No obstetric history on file.          Allergies   Allergen Reactions   • Penicillins Rash  "     Review of systems as mentioned in the HPI.    Objective   Blood pressure 119/73, pulse 80, temperature 96.8 °F (36 °C), temperature source Temporal, resp. rate 18, height 157.5 cm (62.01\"), weight 76.7 kg (169 lb 1.6 oz), SpO2 96 %.     Physical Exam  Vitals reviewed.   Constitutional:       Appearance: Normal appearance. She is normal weight.   HENT:      Head: Normocephalic and atraumatic.      Nose: Nose normal.      Mouth/Throat:      Mouth: Mucous membranes are moist.   Eyes:      Extraocular Movements: Extraocular movements intact.      Pupils: Pupils are equal, round, and reactive to light.   Cardiovascular:      Rate and Rhythm: Normal rate.      Heart sounds: Normal heart sounds. No murmur heard.    No gallop.   Pulmonary:      Effort: Pulmonary effort is normal. No respiratory distress.      Breath sounds: Normal breath sounds. No wheezing.      Comments: Mediport benign  Sutures present at the incision site of the port.  Abdominal:      General: Abdomen is flat. Bowel sounds are normal. There is no distension.      Palpations: Abdomen is soft.      Tenderness: There is no abdominal tenderness.      Comments: Abdominal incisions have healed well.     Musculoskeletal:         General: Normal range of motion.      Cervical back: Normal range of motion.      Right lower leg: No edema.      Left lower leg: No edema.   Skin:     Findings: No bruising or rash.   Neurological:      General: No focal deficit present.      Mental Status: She is alert and oriented to person, place, and time. Mental status is at baseline.   Psychiatric:         Behavior: Behavior normal.       DIAGNOSTIC DATA:  Results from last 7 days   Lab Units 07/12/22  0856   WBC 10*3/mm3 4.81   NEUTROS ABS 10*3/mm3 3.23   HEMOGLOBIN g/dL 11.0*   HEMATOCRIT % 33.9*   PLATELETS 10*3/mm3 104*               7/12/2022  CBC-WBC 4.81, ANC 3.23, hemoglobin 11.0, platelets 104,000.  CMP-continued elevation of LFTs with ALT 79, AST 58, total " bilirubin normal at 0.3.    No radiology results for the last 30 days.       Assessment & Plan      66-year-old postmenopausal  lady with longstanding leukopenia/neutropenia and anemia and recently diagnosed T3N1A adenocarcinoma of the colon    *[id number] adenocarcinoma of the ascending colon  · The tumor measures 3.8 cm located in the cecum, invades through the muscularis propria into the pericolorectal adipose tissue.  No lymphovascular or perineural invasion.  All the margins are negative.  Tumor comes to within 3 cm of the radial margin.  3 out of 26 lymph nodes positive for metastatic carcinoma with the largest focus measuring 0.7 cm without extranodal extension.  · No tumor deposits identified  · PT3N1B  · MSI low probability on immunohistochemistry and stable on PCR  · No family history of colon cancer  · Explained to her that this is stage IIIb colon cancer however since his T3N1 I would consider this low risk stage III.  There is no other high risk features noted on pathology either.  · Given that the tumor is stage III there is definite benefit from adjuvant chemotherapy.  · We discussed the 2 available regimens including XELOX and FOLFOX.  · Explained to her that based on the IDEA data 3 months of XELOX is noninferior to 6 months of adjuvant chemotherapy however with FOLFOX the noninferiority has not been established.  Explained to her that 6 months of chemotherapy improves disease-free survival by additional 2 to 3% over 3 months.  · Discussed the adverse effects of XELOX as well as FOLFOX.  · After discussing the pros and cons we have decided to proceed with FOLFOX for total of 6 months but if she is unable to tolerate due to neurotoxicity then we could potentially stop after 3 months of treatment.  · Port placed on 4/11/2022  · Cycle 1 FOLFOX on 4/18/2022  · Cycle 2 FOLFOX on 5/2/2022  · Cycle 3 FOLFOX on 5/17/2022  · Cycle 4 FOLFOX on 5/31/2022  · Cycle 5 FOLFOX 6/14/2022.  Platelets stable at  103,000.  Slight elevation in LFTs with AST 44, ALT 51.  Patient advised to avoid Tylenol.  Continue to monitor closely.  · 7/12/2022 Cycle 7 FOLFOX.  Patient can experience intermittent tingling in the fingertips and toes for a few days following chemotherapy, which has now completely resolved.  She again has slight further elevation in her LFTs with AST 61, ALT 75, total bilirubin remains normal.  We will continue to monitor LFTs and proceed with treatment today.    *Leukopenia  · Predominantly neutropenia  · Mild increase in monocyte count  · This is chronic and unchanged  · No increased infections  · Had a previous work-up including a bone marrow biopsy with no clear etiology  · Differential includes nutritional, chronic idiopathic, autoimmune.  · She is currently not on any medications which could account for the leukopenia.  · No splenomegaly on exam  · 5/13/2021-flow cytometry negative  · LDH normal at 174  · Reticulocyte count 1.07%  · Ferritin low at 5.7  · Iron saturation low at 3%  · Folic acid normal at 11.8, B12 415  · Rheumatoid factor negative  · AVIVA negative  · ESR normal at 29  · Serum copper normal at 134  · 12/16/2021, WBC 3.16 today.  Patient denies frequent infections.  · CBC 3/24/2022 reviewed and WBC count 3.13, absolute neutrophil count 1.19, hemoglobin 10.2, platelets 315,000  · Given pre-existing neutropenia she will be receiving Neulasta with each cycle of FOLFOX.  · 4/14/2022-WBC 2.74 with an absolute neutrophil count of 1.16  · 4/25/2022 (1 week out from C1 FOLFOX) ANC 0.8. Neulasta given just 5 days ago. Suspect it has not kicked in yet. She understands to take Claritin for help with bony pain.  · 6/14/2022 WBC 4.53, ANC 2.95.  · 6/28/2022 WBC 4.58, ANC 3.05.  · 7/12/2022 WBC 4.81.    *Anemia  · Microcytic  · Likely secondary to iron deficiency  · 4/22/2021 iron saturation 3%, TIBC today 508, ferritin 5.70.  The patient was initiated on oral iron every other day.  · 8/26/2021,  hemoglobin today 10.1.  The patient has been tolerating oral iron every other day.  We will increase oral iron to daily and recheck iron labs in 4 months.  · 12/16/2021, patient is taking oral iron every day and tolerating this well.  Hemoglobin today 10.0.  Iron saturation 45, TIBC 413, ferritin 10.20.  · 3/4/2022 ferritin 27.5, iron saturation 5%, TIBC 375, iron studies consistent with iron deficiency  · 3/24/2022 hemoglobin 10.2  · Received 3 doses of Venofer.  · 6/14/2022 Hemoglobin 12.1.  · 6/28/2022 hemoglobin 11.8.  · 7/12/2022 hemoglobin 11.0.    *Nfffqyiwo-rs-sl-date on screening mammograms , she is due for mammogram in April 2022.    *Headaches  · She has daily headaches  · She has been using Excedrin for the same which helps with the headaches.  · Continue Excedrin as needed    *Colitis  · Lymphocytic colitis noted on the biopsy of the colon  · There is a flare of colitis we may have to consider using steroids  · Currently she does not have any diarrhea  · Explained to her that 5-FU can increase the risk of diarrhea.  · We will have her follow-up with GI  · In the event of diarrhea we will try Imodium first and if no improvement in symptoms then consider steroids.  · No diarrhea    *Thrombocytopenia  · Secondary to chemotherapy.  · Platelets 6/14/2022 stable at 103,000.    · 7/12/2022 platelets today 104,000.  Denies signs or symptoms of bleeding.    PLAN:   · Proceed today with cycle 7 FOLFOX.    · Return in 2 weeks for MD follow-up and cycle 8 FOLFOX.  Dr. Alvarez will see the patient on day of disconnect, as she is not at our [place] location on Tuesdays, however is here on Thursdays when patient gets disconnected.    Patient is on treatment requiring close monitoring for toxicity.    Natalya Thornton, DALE  07/12/22

## 2022-07-12 ENCOUNTER — INFUSION (OUTPATIENT)
Dept: ONCOLOGY | Facility: HOSPITAL | Age: 67
End: 2022-07-12

## 2022-07-12 ENCOUNTER — OFFICE VISIT (OUTPATIENT)
Dept: ONCOLOGY | Facility: CLINIC | Age: 67
End: 2022-07-12

## 2022-07-12 VITALS
BODY MASS INDEX: 31.12 KG/M2 | RESPIRATION RATE: 18 BRPM | HEIGHT: 62 IN | DIASTOLIC BLOOD PRESSURE: 73 MMHG | SYSTOLIC BLOOD PRESSURE: 119 MMHG | OXYGEN SATURATION: 96 % | HEART RATE: 80 BPM | WEIGHT: 169.1 LBS | TEMPERATURE: 96.8 F

## 2022-07-12 DIAGNOSIS — T45.1X5A CHEMOTHERAPY-INDUCED THROMBOCYTOPENIA: ICD-10-CM

## 2022-07-12 DIAGNOSIS — C18.2 MALIGNANT NEOPLASM OF ASCENDING COLON: Primary | ICD-10-CM

## 2022-07-12 DIAGNOSIS — D69.59 CHEMOTHERAPY-INDUCED THROMBOCYTOPENIA: ICD-10-CM

## 2022-07-12 DIAGNOSIS — C18.2 MALIGNANT NEOPLASM OF ASCENDING COLON: ICD-10-CM

## 2022-07-12 DIAGNOSIS — Z79.899 HIGH RISK MEDICATION USE: ICD-10-CM

## 2022-07-12 LAB
ALBUMIN SERPL-MCNC: 3.7 G/DL (ref 3.5–5.2)
ALBUMIN/GLOB SERPL: 1.4 G/DL
ALP SERPL-CCNC: 325 U/L (ref 39–117)
ALT SERPL W P-5'-P-CCNC: 79 U/L (ref 1–33)
ANION GAP SERPL CALCULATED.3IONS-SCNC: 10.8 MMOL/L (ref 5–15)
AST SERPL-CCNC: 58 U/L (ref 1–32)
BASOPHILS # BLD AUTO: 0.04 10*3/MM3 (ref 0–0.2)
BASOPHILS NFR BLD AUTO: 0.8 % (ref 0–1.5)
BILIRUB SERPL-MCNC: 0.3 MG/DL (ref 0–1.2)
BUN SERPL-MCNC: 8 MG/DL (ref 8–23)
BUN/CREAT SERPL: 11 (ref 7–25)
CALCIUM SPEC-SCNC: 9 MG/DL (ref 8.6–10.5)
CHLORIDE SERPL-SCNC: 107 MMOL/L (ref 98–107)
CO2 SERPL-SCNC: 23.2 MMOL/L (ref 22–29)
CREAT SERPL-MCNC: 0.73 MG/DL (ref 0.57–1)
DEPRECATED RDW RBC AUTO: 78.3 FL (ref 37–54)
EGFRCR SERPLBLD CKD-EPI 2021: 91.4 ML/MIN/1.73
EOSINOPHIL # BLD AUTO: 0.12 10*3/MM3 (ref 0–0.4)
EOSINOPHIL NFR BLD AUTO: 2.5 % (ref 0.3–6.2)
ERYTHROCYTE [DISTWIDTH] IN BLOOD BY AUTOMATED COUNT: 21.4 % (ref 12.3–15.4)
GLOBULIN UR ELPH-MCNC: 2.6 GM/DL
GLUCOSE SERPL-MCNC: 127 MG/DL (ref 65–99)
HCT VFR BLD AUTO: 33.9 % (ref 34–46.6)
HGB BLD-MCNC: 11 G/DL (ref 12–15.9)
IMM GRANULOCYTES # BLD AUTO: 0.12 10*3/MM3 (ref 0–0.05)
IMM GRANULOCYTES NFR BLD AUTO: 2.5 % (ref 0–0.5)
LYMPHOCYTES # BLD AUTO: 0.79 10*3/MM3 (ref 0.7–3.1)
LYMPHOCYTES NFR BLD AUTO: 16.4 % (ref 19.6–45.3)
MCH RBC QN AUTO: 31.9 PG (ref 26.6–33)
MCHC RBC AUTO-ENTMCNC: 32.4 G/DL (ref 31.5–35.7)
MCV RBC AUTO: 98.3 FL (ref 79–97)
MONOCYTES # BLD AUTO: 0.51 10*3/MM3 (ref 0.1–0.9)
MONOCYTES NFR BLD AUTO: 10.6 % (ref 5–12)
NEUTROPHILS NFR BLD AUTO: 3.23 10*3/MM3 (ref 1.7–7)
NEUTROPHILS NFR BLD AUTO: 67.2 % (ref 42.7–76)
NRBC BLD AUTO-RTO: 0.4 /100 WBC (ref 0–0.2)
PLATELET # BLD AUTO: 104 10*3/MM3 (ref 140–450)
PMV BLD AUTO: 10.5 FL (ref 6–12)
POTASSIUM SERPL-SCNC: 4.6 MMOL/L (ref 3.5–5.2)
PROT SERPL-MCNC: 6.3 G/DL (ref 6–8.5)
RBC # BLD AUTO: 3.45 10*6/MM3 (ref 3.77–5.28)
SODIUM SERPL-SCNC: 141 MMOL/L (ref 136–145)
WBC NRBC COR # BLD: 4.81 10*3/MM3 (ref 3.4–10.8)

## 2022-07-12 PROCEDURE — 96367 TX/PROPH/DG ADDL SEQ IV INF: CPT

## 2022-07-12 PROCEDURE — 25010000002 FOSAPREPITANT PER 1 MG: Performed by: NURSE PRACTITIONER

## 2022-07-12 PROCEDURE — 25010000002 DEXAMETHASONE SODIUM PHOSPHATE 100 MG/10ML SOLUTION: Performed by: NURSE PRACTITIONER

## 2022-07-12 PROCEDURE — 96368 THER/DIAG CONCURRENT INF: CPT

## 2022-07-12 PROCEDURE — 25010000002 FLUOROURACIL PER 500 MG: Performed by: NURSE PRACTITIONER

## 2022-07-12 PROCEDURE — 96416 CHEMO PROLONG INFUSE W/PUMP: CPT

## 2022-07-12 PROCEDURE — 25010000002 OXALIPLATIN PER 0.5 MG: Performed by: NURSE PRACTITIONER

## 2022-07-12 PROCEDURE — 96413 CHEMO IV INFUSION 1 HR: CPT

## 2022-07-12 PROCEDURE — 85025 COMPLETE CBC W/AUTO DIFF WBC: CPT | Performed by: INTERNAL MEDICINE

## 2022-07-12 PROCEDURE — 96375 TX/PRO/DX INJ NEW DRUG ADDON: CPT

## 2022-07-12 PROCEDURE — 99214 OFFICE O/P EST MOD 30 MIN: CPT | Performed by: NURSE PRACTITIONER

## 2022-07-12 PROCEDURE — 25010000002 LEUCOVORIN CALCIUM PER 50 MG: Performed by: NURSE PRACTITIONER

## 2022-07-12 PROCEDURE — 25010000002 PALONOSETRON PER 25 MCG: Performed by: NURSE PRACTITIONER

## 2022-07-12 PROCEDURE — 80053 COMPREHEN METABOLIC PANEL: CPT | Performed by: INTERNAL MEDICINE

## 2022-07-12 PROCEDURE — 96415 CHEMO IV INFUSION ADDL HR: CPT

## 2022-07-12 RX ORDER — DIPHENHYDRAMINE HYDROCHLORIDE 50 MG/ML
50 INJECTION INTRAMUSCULAR; INTRAVENOUS AS NEEDED
Status: CANCELLED | OUTPATIENT
Start: 2022-07-12

## 2022-07-12 RX ORDER — DEXTROSE MONOHYDRATE 50 MG/ML
250 INJECTION, SOLUTION INTRAVENOUS ONCE
Status: CANCELLED | OUTPATIENT
Start: 2022-07-12

## 2022-07-12 RX ORDER — FLUOROURACIL 50 MG/ML
400 INJECTION, SOLUTION INTRAVENOUS ONCE
Status: CANCELLED | OUTPATIENT
Start: 2022-07-12

## 2022-07-12 RX ORDER — DEXTROSE MONOHYDRATE 50 MG/ML
250 INJECTION, SOLUTION INTRAVENOUS ONCE
Status: COMPLETED | OUTPATIENT
Start: 2022-07-12 | End: 2022-07-12

## 2022-07-12 RX ORDER — FAMOTIDINE 10 MG/ML
20 INJECTION, SOLUTION INTRAVENOUS AS NEEDED
Status: CANCELLED | OUTPATIENT
Start: 2022-07-12

## 2022-07-12 RX ORDER — PALONOSETRON 0.05 MG/ML
0.25 INJECTION, SOLUTION INTRAVENOUS ONCE
Status: CANCELLED | OUTPATIENT
Start: 2022-07-12

## 2022-07-12 RX ORDER — PALONOSETRON 0.05 MG/ML
0.25 INJECTION, SOLUTION INTRAVENOUS ONCE
Status: COMPLETED | OUTPATIENT
Start: 2022-07-12 | End: 2022-07-12

## 2022-07-12 RX ORDER — FLUOROURACIL 50 MG/ML
400 INJECTION, SOLUTION INTRAVENOUS ONCE
Status: COMPLETED | OUTPATIENT
Start: 2022-07-12 | End: 2022-07-12

## 2022-07-12 RX ADMIN — DEXAMETHASONE SODIUM PHOSPHATE 12 MG: 10 INJECTION, SOLUTION INTRAMUSCULAR; INTRAVENOUS at 10:37

## 2022-07-12 RX ADMIN — DEXTROSE MONOHYDRATE 100 ML: 50 INJECTION, SOLUTION INTRAVENOUS at 10:00

## 2022-07-12 RX ADMIN — SODIUM CHLORIDE 100 ML: 9 INJECTION, SOLUTION INTRAVENOUS at 10:54

## 2022-07-12 RX ADMIN — OXALIPLATIN 150 MG: 5 INJECTION, SOLUTION, CONCENTRATE INTRAVENOUS at 11:33

## 2022-07-12 RX ADMIN — FLUOROURACIL 720 MG: 50 INJECTION, SOLUTION INTRAVENOUS at 14:06

## 2022-07-12 RX ADMIN — PALONOSETRON 0.25 MG: 0.05 INJECTION, SOLUTION INTRAVENOUS at 10:36

## 2022-07-12 RX ADMIN — FLUOROURACIL 4300 MG: 50 INJECTION, SOLUTION INTRAVENOUS at 14:06

## 2022-07-12 RX ADMIN — LEUCOVORIN CALCIUM 720 MG: 350 INJECTION, POWDER, LYOPHILIZED, FOR SOLUTION INTRAMUSCULAR; INTRAVENOUS at 11:32

## 2022-07-12 NOTE — NURSING NOTE
Pt seen per Natalya HUNTER with CBC reviewed, lab having issues with machine per Gabriela in lab, creat level 0.74, bilirubin 0.2. AST 61, and ALT 75. Reviewed with APRN and ok to proceed with treatment today. Pt tolerated treatment without complications and discharged in stable condition.

## 2022-07-14 ENCOUNTER — INFUSION (OUTPATIENT)
Dept: ONCOLOGY | Facility: HOSPITAL | Age: 67
End: 2022-07-14

## 2022-07-14 DIAGNOSIS — C18.2 MALIGNANT NEOPLASM OF ASCENDING COLON: Primary | ICD-10-CM

## 2022-07-14 DIAGNOSIS — Z45.2 ENCOUNTER FOR FITTING AND ADJUSTMENT OF VASCULAR CATHETER: ICD-10-CM

## 2022-07-14 PROCEDURE — 96372 THER/PROPH/DIAG INJ SC/IM: CPT

## 2022-07-14 PROCEDURE — 25010000002 PEGFILGRASTIM 6 MG/0.6ML PREFILLED SYRINGE KIT: Performed by: NURSE PRACTITIONER

## 2022-07-14 PROCEDURE — 96377 APPLICATON ON-BODY INJECTOR: CPT

## 2022-07-14 PROCEDURE — 25010000002 HEPARIN LOCK FLUSH PER 10 UNITS: Performed by: INTERNAL MEDICINE

## 2022-07-14 RX ORDER — SODIUM CHLORIDE 0.9 % (FLUSH) 0.9 %
10 SYRINGE (ML) INJECTION AS NEEDED
Status: CANCELLED | OUTPATIENT
Start: 2022-07-14

## 2022-07-14 RX ORDER — SODIUM CHLORIDE 0.9 % (FLUSH) 0.9 %
10 SYRINGE (ML) INJECTION AS NEEDED
Status: DISCONTINUED | OUTPATIENT
Start: 2022-07-14 | End: 2022-07-14 | Stop reason: HOSPADM

## 2022-07-14 RX ORDER — HEPARIN SODIUM (PORCINE) LOCK FLUSH IV SOLN 100 UNIT/ML 100 UNIT/ML
500 SOLUTION INTRAVENOUS AS NEEDED
Status: DISCONTINUED | OUTPATIENT
Start: 2022-07-14 | End: 2022-07-14 | Stop reason: HOSPADM

## 2022-07-14 RX ORDER — HEPARIN SODIUM (PORCINE) LOCK FLUSH IV SOLN 100 UNIT/ML 100 UNIT/ML
500 SOLUTION INTRAVENOUS AS NEEDED
Status: CANCELLED | OUTPATIENT
Start: 2022-07-14

## 2022-07-14 RX ADMIN — Medication 10 ML: at 11:22

## 2022-07-14 RX ADMIN — PEGFILGRASTIM 6 MG: KIT SUBCUTANEOUS at 11:20

## 2022-07-14 RX ADMIN — Medication 500 UNITS: at 11:22

## 2022-07-23 ENCOUNTER — DOCUMENTATION (OUTPATIENT)
Dept: ONCOLOGY | Facility: CLINIC | Age: 67
End: 2022-07-23

## 2022-07-23 NOTE — PROGRESS NOTES
ON CALL NOTE:    Patient called to inform us she had IPL performed on 7/21/2022 and woke up 7/22/2022 with swelling.  She was started on Ibuprofen and Benadryl.  She woke up today and swelling was worse so she called back the MD who performed the procedure who told the patient they would like to start her on steroids, but wanted her to confirm with us that she could take steroids while on chemotherapy.  Advised patient there will be no interactions with chemotherapy and steroids.  She is going to call back the MD who performed this procedure to let them know, and will most likely start on steroids for swelling.

## 2022-07-26 ENCOUNTER — INFUSION (OUTPATIENT)
Dept: ONCOLOGY | Facility: HOSPITAL | Age: 67
End: 2022-07-26

## 2022-07-26 VITALS
WEIGHT: 169.2 LBS | DIASTOLIC BLOOD PRESSURE: 83 MMHG | HEIGHT: 62 IN | TEMPERATURE: 97.3 F | RESPIRATION RATE: 18 BRPM | SYSTOLIC BLOOD PRESSURE: 138 MMHG | HEART RATE: 80 BPM | BODY MASS INDEX: 31.14 KG/M2 | OXYGEN SATURATION: 96 %

## 2022-07-26 DIAGNOSIS — C18.2 MALIGNANT NEOPLASM OF ASCENDING COLON: Primary | ICD-10-CM

## 2022-07-26 LAB
ALBUMIN SERPL-MCNC: 4 G/DL (ref 3.5–5.2)
ALBUMIN/GLOB SERPL: 2 G/DL
ALP SERPL-CCNC: 287 U/L (ref 39–117)
ALT SERPL W P-5'-P-CCNC: 36 U/L (ref 1–33)
ANION GAP SERPL CALCULATED.3IONS-SCNC: 11.2 MMOL/L (ref 5–15)
ANISOCYTOSIS BLD QL: ABNORMAL
AST SERPL-CCNC: 30 U/L (ref 1–32)
BASOPHILS # BLD AUTO: 0.09 10*3/MM3 (ref 0–0.2)
BASOPHILS NFR BLD AUTO: 0.7 % (ref 0–1.5)
BILIRUB SERPL-MCNC: 0.3 MG/DL (ref 0–1.2)
BUN SERPL-MCNC: 12 MG/DL (ref 8–23)
BUN/CREAT SERPL: 19.7 (ref 7–25)
CALCIUM SPEC-SCNC: 8.8 MG/DL (ref 8.6–10.5)
CHLORIDE SERPL-SCNC: 107 MMOL/L (ref 98–107)
CO2 SERPL-SCNC: 26.8 MMOL/L (ref 22–29)
CREAT SERPL-MCNC: 0.61 MG/DL (ref 0.57–1)
DEPRECATED RDW RBC AUTO: 71.3 FL (ref 37–54)
EGFRCR SERPLBLD CKD-EPI 2021: 99.4 ML/MIN/1.73
EOSINOPHIL # BLD AUTO: 0.03 10*3/MM3 (ref 0–0.4)
EOSINOPHIL NFR BLD AUTO: 0.2 % (ref 0.3–6.2)
ERYTHROCYTE [DISTWIDTH] IN BLOOD BY AUTOMATED COUNT: 20 % (ref 12.3–15.4)
GLOBULIN UR ELPH-MCNC: 2 GM/DL
GLUCOSE SERPL-MCNC: 137 MG/DL (ref 65–99)
HCT VFR BLD AUTO: 32.9 % (ref 34–46.6)
HGB BLD-MCNC: 10.6 G/DL (ref 12–15.9)
IMM GRANULOCYTES # BLD AUTO: 1.1 10*3/MM3 (ref 0–0.05)
IMM GRANULOCYTES NFR BLD AUTO: 8.2 % (ref 0–0.5)
LYMPHOCYTES # BLD AUTO: 1.52 10*3/MM3 (ref 0.7–3.1)
LYMPHOCYTES # BLD MANUAL: 2.02 10*3/MM3 (ref 0.7–3.1)
LYMPHOCYTES NFR BLD AUTO: 11.3 % (ref 19.6–45.3)
LYMPHOCYTES NFR BLD MANUAL: 3 % (ref 5–12)
MACROCYTES BLD QL SMEAR: ABNORMAL
MCH RBC QN AUTO: 32.9 PG (ref 26.6–33)
MCHC RBC AUTO-ENTMCNC: 32.2 G/DL (ref 31.5–35.7)
MCV RBC AUTO: 102.2 FL (ref 79–97)
METAMYELOCYTES NFR BLD MANUAL: 5 % (ref 0–0)
MONOCYTES # BLD AUTO: 0.81 10*3/MM3 (ref 0.1–0.9)
MONOCYTES # BLD: 0.4 10*3/MM3 (ref 0.1–0.9)
MONOCYTES NFR BLD AUTO: 6 % (ref 5–12)
MYELOCYTES NFR BLD MANUAL: 1 % (ref 0–0)
NEUTROPHILS # BLD AUTO: 10.21 10*3/MM3 (ref 1.7–7)
NEUTROPHILS NFR BLD AUTO: 73.6 % (ref 42.7–76)
NEUTROPHILS NFR BLD AUTO: 9.89 10*3/MM3 (ref 1.7–7)
NEUTROPHILS NFR BLD MANUAL: 76 % (ref 42.7–76)
NRBC BLD AUTO-RTO: 0.8 /100 WBC (ref 0–0.2)
PLAT MORPH BLD: NORMAL
PLATELET # BLD AUTO: 95 10*3/MM3 (ref 140–450)
PMV BLD AUTO: 10.4 FL (ref 6–12)
POTASSIUM SERPL-SCNC: 3.6 MMOL/L (ref 3.5–5.2)
PROT SERPL-MCNC: 6 G/DL (ref 6–8.5)
RBC # BLD AUTO: 3.22 10*6/MM3 (ref 3.77–5.28)
SODIUM SERPL-SCNC: 145 MMOL/L (ref 136–145)
VARIANT LYMPHS NFR BLD MANUAL: 15 % (ref 19.6–45.3)
WBC MORPH BLD: NORMAL
WBC NRBC COR # BLD: 13.44 10*3/MM3 (ref 3.4–10.8)

## 2022-07-26 PROCEDURE — 25010000002 OXALIPLATIN PER 0.5 MG: Performed by: NURSE PRACTITIONER

## 2022-07-26 PROCEDURE — 96367 TX/PROPH/DG ADDL SEQ IV INF: CPT

## 2022-07-26 PROCEDURE — 25010000002 DEXAMETHASONE SODIUM PHOSPHATE 100 MG/10ML SOLUTION: Performed by: NURSE PRACTITIONER

## 2022-07-26 PROCEDURE — 80053 COMPREHEN METABOLIC PANEL: CPT | Performed by: NURSE PRACTITIONER

## 2022-07-26 PROCEDURE — 96375 TX/PRO/DX INJ NEW DRUG ADDON: CPT

## 2022-07-26 PROCEDURE — 96416 CHEMO PROLONG INFUSE W/PUMP: CPT

## 2022-07-26 PROCEDURE — 96415 CHEMO IV INFUSION ADDL HR: CPT

## 2022-07-26 PROCEDURE — 96413 CHEMO IV INFUSION 1 HR: CPT

## 2022-07-26 PROCEDURE — 96411 CHEMO IV PUSH ADDL DRUG: CPT

## 2022-07-26 PROCEDURE — 85007 BL SMEAR W/DIFF WBC COUNT: CPT | Performed by: NURSE PRACTITIONER

## 2022-07-26 PROCEDURE — 96368 THER/DIAG CONCURRENT INF: CPT

## 2022-07-26 PROCEDURE — 25010000002 LEUCOVORIN CALCIUM PER 50 MG: Performed by: NURSE PRACTITIONER

## 2022-07-26 PROCEDURE — 25010000002 FOSAPREPITANT PER 1 MG: Performed by: NURSE PRACTITIONER

## 2022-07-26 PROCEDURE — 25010000002 PALONOSETRON PER 25 MCG: Performed by: NURSE PRACTITIONER

## 2022-07-26 PROCEDURE — 85025 COMPLETE CBC W/AUTO DIFF WBC: CPT | Performed by: NURSE PRACTITIONER

## 2022-07-26 PROCEDURE — 25010000002 FLUOROURACIL PER 500 MG: Performed by: NURSE PRACTITIONER

## 2022-07-26 RX ORDER — FAMOTIDINE 10 MG/ML
20 INJECTION, SOLUTION INTRAVENOUS AS NEEDED
Status: CANCELLED | OUTPATIENT
Start: 2022-07-26

## 2022-07-26 RX ORDER — PALONOSETRON 0.05 MG/ML
0.25 INJECTION, SOLUTION INTRAVENOUS ONCE
Status: COMPLETED | OUTPATIENT
Start: 2022-07-26 | End: 2022-07-26

## 2022-07-26 RX ORDER — FLUOROURACIL 50 MG/ML
400 INJECTION, SOLUTION INTRAVENOUS ONCE
Status: COMPLETED | OUTPATIENT
Start: 2022-07-26 | End: 2022-07-26

## 2022-07-26 RX ORDER — DEXTROSE MONOHYDRATE 50 MG/ML
250 INJECTION, SOLUTION INTRAVENOUS ONCE
Status: COMPLETED | OUTPATIENT
Start: 2022-07-26 | End: 2022-07-26

## 2022-07-26 RX ORDER — DIPHENHYDRAMINE HYDROCHLORIDE 50 MG/ML
50 INJECTION INTRAMUSCULAR; INTRAVENOUS AS NEEDED
Status: CANCELLED | OUTPATIENT
Start: 2022-07-26

## 2022-07-26 RX ADMIN — FLUOROURACIL 4300 MG: 50 INJECTION, SOLUTION INTRAVENOUS at 13:48

## 2022-07-26 RX ADMIN — OXALIPLATIN 150 MG: 5 INJECTION, SOLUTION, CONCENTRATE INTRAVENOUS at 11:32

## 2022-07-26 RX ADMIN — SODIUM CHLORIDE 100 ML: 9 INJECTION, SOLUTION INTRAVENOUS at 10:49

## 2022-07-26 RX ADMIN — LEUCOVORIN CALCIUM 720 MG: 350 INJECTION, POWDER, LYOPHILIZED, FOR SOLUTION INTRAMUSCULAR; INTRAVENOUS at 11:32

## 2022-07-26 RX ADMIN — PALONOSETRON 0.25 MG: 0.05 INJECTION, SOLUTION INTRAVENOUS at 10:29

## 2022-07-26 RX ADMIN — DEXTROSE MONOHYDRATE 100 ML: 50 INJECTION, SOLUTION INTRAVENOUS at 10:26

## 2022-07-26 RX ADMIN — DEXAMETHASONE SODIUM PHOSPHATE 12 MG: 10 INJECTION, SOLUTION INTRAMUSCULAR; INTRAVENOUS at 10:33

## 2022-07-26 RX ADMIN — FLUOROURACIL 720 MG: 50 INJECTION, SOLUTION INTRAVENOUS at 13:48

## 2022-07-26 NOTE — NURSING NOTE
PLatelet count reviewed with LINDA Hoang with approval for treatment. Also there is a note from Natalya from a few days ago regarding steroids that she is on for a post facial procedure (thus elevated WBC).

## 2022-07-28 ENCOUNTER — INFUSION (OUTPATIENT)
Dept: ONCOLOGY | Facility: HOSPITAL | Age: 67
End: 2022-07-28

## 2022-07-28 ENCOUNTER — OFFICE VISIT (OUTPATIENT)
Dept: ONCOLOGY | Facility: CLINIC | Age: 67
End: 2022-07-28

## 2022-07-28 ENCOUNTER — APPOINTMENT (OUTPATIENT)
Dept: OTHER | Facility: HOSPITAL | Age: 67
End: 2022-07-28

## 2022-07-28 VITALS
DIASTOLIC BLOOD PRESSURE: 71 MMHG | BODY MASS INDEX: 30.93 KG/M2 | RESPIRATION RATE: 18 BRPM | OXYGEN SATURATION: 96 % | SYSTOLIC BLOOD PRESSURE: 104 MMHG | HEART RATE: 111 BPM | HEIGHT: 62 IN | WEIGHT: 168.1 LBS | TEMPERATURE: 96.1 F

## 2022-07-28 DIAGNOSIS — C18.2 MALIGNANT NEOPLASM OF ASCENDING COLON: Primary | ICD-10-CM

## 2022-07-28 DIAGNOSIS — Z45.2 ENCOUNTER FOR FITTING AND ADJUSTMENT OF VASCULAR CATHETER: ICD-10-CM

## 2022-07-28 PROCEDURE — 96377 APPLICATON ON-BODY INJECTOR: CPT

## 2022-07-28 PROCEDURE — 25010000002 HEPARIN LOCK FLUSH PER 10 UNITS: Performed by: INTERNAL MEDICINE

## 2022-07-28 PROCEDURE — 99214 OFFICE O/P EST MOD 30 MIN: CPT | Performed by: INTERNAL MEDICINE

## 2022-07-28 PROCEDURE — 25010000002 PEGFILGRASTIM 6 MG/0.6ML PREFILLED SYRINGE KIT: Performed by: INTERNAL MEDICINE

## 2022-07-28 RX ORDER — SODIUM CHLORIDE 0.9 % (FLUSH) 0.9 %
10 SYRINGE (ML) INJECTION AS NEEDED
Status: DISCONTINUED | OUTPATIENT
Start: 2022-07-28 | End: 2022-07-28 | Stop reason: HOSPADM

## 2022-07-28 RX ORDER — SODIUM CHLORIDE 0.9 % (FLUSH) 0.9 %
10 SYRINGE (ML) INJECTION AS NEEDED
Status: CANCELLED | OUTPATIENT
Start: 2022-07-28

## 2022-07-28 RX ORDER — HEPARIN SODIUM (PORCINE) LOCK FLUSH IV SOLN 100 UNIT/ML 100 UNIT/ML
500 SOLUTION INTRAVENOUS AS NEEDED
Status: DISCONTINUED | OUTPATIENT
Start: 2022-07-28 | End: 2022-07-28 | Stop reason: HOSPADM

## 2022-07-28 RX ORDER — HEPARIN SODIUM (PORCINE) LOCK FLUSH IV SOLN 100 UNIT/ML 100 UNIT/ML
500 SOLUTION INTRAVENOUS AS NEEDED
Status: CANCELLED | OUTPATIENT
Start: 2022-07-28

## 2022-07-28 RX ADMIN — PEGFILGRASTIM 6 MG: KIT SUBCUTANEOUS at 10:55

## 2022-07-28 RX ADMIN — Medication 500 UNITS: at 10:53

## 2022-07-28 RX ADMIN — Medication 10 ML: at 10:53

## 2022-07-28 NOTE — PROGRESS NOTES
Subjective   Kayla Adkins is a 66 y.o. female.  Referred by Dr. Rivas for evaluation of leukopenia    History of Present Illness   Ms. Adkins is a 66-year-old postmenopausal  who has been referred by Dr. Rivas for evaluation of leukopenia, neutropenia.  Patient reports that she has had longstanding leukopenia and her white blood cell count normally ranges in the threes.  On her most recent visit with Dr. Rivas white blood cell count was noted to be 2560 on 3/2/2021 and 2660 on 4/5/2021.  This prompted a referral to hematology.  Patient reports that she had a extensive work-up including a bone marrow biopsy for the same condition about 17 years ago.  She thinks that this was performed at [facility].  According to patient no clear etiology was determined after the work-up.  She was recommended to continue with periodic labs.  No treatment was required.    She was seen in December and had continued on oral iron.  She was tolerating that fairly well.  However she had worsening of colitis for about 2 months prior to that visit.  She was scheduled for colonoscopy in January 2022.    She had a routine surveillance colonoscopy performed by Dr. Whitlock on 2/22/2022.  On this there was an infiltrative nonobstructing medium-sized mass in the proximal ascending colon 1 to 2 cm distal to the cecum.  This was partially circumferential involving 1 foot of the lumen.  Biopsies were taken.  There is also mildly congested mucosa in the entire colon.  Random biopsies were taken.  She also had an EGD at the same time which reported normal esophagus, stomach and duodenum.  Pathology evaluation from the biopsy reported superficial fragments of at least intramucosal carcinoma.  Random colon biopsies reported increased lamina propria, chronic inflammation with surface epithelial alteration and increased intraepithelial lymphocytes consistent with lymphocytic colitis.  Biopsies from the stomach reported chronic  inactive gastritis.  Negative for intestinal metaplasia and H. pylori.  Biopsies from the duodenum was negative.    She was seen by Dr. Wellington who performed a laparoscopic right hemicolectomy on 3/2/2022.  Pathology showed a 3.8 cm mass at the cecum invading through the muscularis propria into the pericolorectal adipose tissue.  No lymphovascular or perineural invasion.  All margins were clear.  The radial margin was 3 cm.  3 out of 26 lymph nodes were positive for metastatic disease.  The largest metastatic focus was 0.7 cm with extranodal extension.  Pathological stage PT3N1B.  The tumor was moderately differentiated, grade 2 adenocarcinoma.    No loss of nuclear expression of MMR proteins.  Low probability of microsatellite instability.  MSI status-microsatellite stable.  This is by PCR.    Normal CEA and 0.77 on the day prior to surgery.    She was seen by Dr. Burton as inpatient and recommended adjuvant chemotherapy with FOLFOX.    Patient initiating cycle 1 FOLFOX 4/18/2022.  Neulasta support given due to baseline neutropenia.    Cycle 2 FOLFOX 5/2/2022    Cycle 3 FOLFOX 5/17/2022    Interval history:  Patient presents today for follow-up.  She has received cycle 8 of FOLFOX on 7/26/2022.  Had some leukocytosis secondary to steroids which were prescribed for her reaction following IPL.  She is tolerating chemotherapy extremely well.  Only intermittent tingling.  Nausea well controlled with Zofran.    The following portions of the patient's history were reviewed and updated as appropriate: allergies, current medications, past family history, past medical history, past social history and problem list.    Past Medical History:   Diagnosis Date   • Anemia    • Anxiety    • Bladder infection    • Colitis    • Colon cancer (HCC)    • GERD (gastroesophageal reflux disease)    • Headache    • Hyperlipidemia    • Hypertension    • Knee injury, initial encounter-left 07/01/2019   • Neutropenia (HCC) 01/03/2017   •  Prediabetes 03/04/2019      Past Surgical History:   Procedure Laterality Date   • COLON RESECTION Right 03/02/2022    Procedure: LAPAROSCOPIC RIGHT HEMICOLECTOMY WITH DAVINCI ROBOT;  Surgeon: Malcolm Wellington MD;  Location: [facility] OR;  Service: Robotics - DaVinci;  Laterality: Right;   • COLONOSCOPY     • COLONOSCOPY W/ POLYPECTOMY N/A 02/22/2022    Procedure: COLONOSCOPY INTO CECUM WITH COLD BIOPSIES;  Surgeon: Irvin Clayton MD;  Location: [facility] ENDOSCOPY;  Service: Gastroenterology;  Laterality: N/A;  PRE: DIARRHEA, ANEMIA  POST: ASCENDING COLON MASS   • ENDOSCOPY N/A 02/22/2022    Procedure: ESOPHAGOGASTRODUODENOSCOPY WITH BIOPSY;  Surgeon: Irvin Clayton MD;  Location: [facility] ENDOSCOPY;  Service: Gastroenterology;  Laterality: N/A;  PRE: ANEMIA  POST: NORMAL EGD   • TONSILLECTOMY     • VENOUS ACCESS DEVICE (PORT) INSERTION N/A 4/11/2022    Procedure: INSERTION VENOUS ACCESS DEVICE with subcutaneous port;  Surgeon: Malcolm Wellington MD;  Location: [facility];  Service: General;  Laterality: N/A;        Family History   Problem Relation Age of Onset   • Hypertension Mother    • Heart disease Father    • Heart attack Father 47        first MI age 47; second MI 57   • Hypertension Sister    • No Known Problems Brother    • Malig Hyperthermia Neg Hx       Social History     Socioeconomic History   • Marital status:    • Number of children: 2   Tobacco Use   • Smoking status: Never Smoker   • Smokeless tobacco: Never Used   Vaping Use   • Vaping Use: Never used   Substance and Sexual Activity   • Alcohol use: Not Currently     Comment: seldom   • Drug use: Never   • Sexual activity: Defer      OB History    No obstetric history on file.          Allergies   Allergen Reactions   • Penicillins Rash      Review of systems as mentioned in the HPI.    Objective   Blood pressure 104/71, pulse 111, temperature 96.1 °F (35.6 °C), temperature source Temporal, resp. rate 18,  "height 157.5 cm (62.01\"), weight 76.2 kg (168 lb 1.6 oz), SpO2 96 %.     Physical Exam  Vitals reviewed.   Constitutional:       Appearance: Normal appearance. She is normal weight.   HENT:      Head: Normocephalic and atraumatic.      Nose: Nose normal.      Mouth/Throat:      Mouth: Mucous membranes are moist.   Eyes:      Extraocular Movements: Extraocular movements intact.      Pupils: Pupils are equal, round, and reactive to light.   Cardiovascular:      Rate and Rhythm: Normal rate.      Heart sounds: Normal heart sounds. No murmur heard.    No gallop.   Pulmonary:      Effort: Pulmonary effort is normal. No respiratory distress.      Breath sounds: Normal breath sounds. No wheezing.      Comments: Mediport benign  Sutures present at the incision site of the port.  Abdominal:      General: Abdomen is flat. Bowel sounds are normal. There is no distension.      Palpations: Abdomen is soft.      Tenderness: There is no abdominal tenderness.      Comments: Abdominal incisions have healed well.     Musculoskeletal:         General: Normal range of motion.      Cervical back: Normal range of motion.      Right lower leg: No edema.      Left lower leg: No edema.   Skin:     Findings: No bruising or rash.   Neurological:      General: No focal deficit present.      Mental Status: She is alert and oriented to person, place, and time. Mental status is at baseline.   Psychiatric:         Behavior: Behavior normal.       I have reexamined the patient and the results are consistent with the previously documented exam. Mago Alvarez MD       DIAGNOSTIC DATA:  Results from last 7 days   Lab Units 07/26/22  0906   WBC 10*3/mm3 13.44*   NEUTROS ABS 10*3/mm3 9.89*  10.21*   LYMPHS ABS 10*3/mm3 2.02   HEMOGLOBIN g/dL 10.6*   HEMATOCRIT % 32.9*   PLATELETS 10*3/mm3 95*     Results from last 7 days   Lab Units 07/26/22  0906   SODIUM mmol/L 145   POTASSIUM mmol/L 3.6   CHLORIDE mmol/L 107   CO2 mmol/L 26.8   BUN mg/dL 12 "   CREATININE mg/dL 0.61   CALCIUM mg/dL 8.8   ALBUMIN g/dL 4.00   BILIRUBIN mg/dL 0.3   ALK PHOS U/L 287*   ALT (SGPT) U/L 36*   AST (SGOT) U/L 30   GLUCOSE mg/dL 137*           7/26/2022 CBC and CMP reviewed    No radiology results for the last 30 days.       Assessment & Plan      66-year-old postmenopausal  lady with longstanding leukopenia/neutropenia and anemia and recently diagnosed T3N1A adenocarcinoma of the colon    *[id number] adenocarcinoma of the ascending colon  · The tumor measures 3.8 cm located in the cecum, invades through the muscularis propria into the pericolorectal adipose tissue.  No lymphovascular or perineural invasion.  All the margins are negative.  Tumor comes to within 3 cm of the radial margin.  3 out of 26 lymph nodes positive for metastatic carcinoma with the largest focus measuring 0.7 cm without extranodal extension.  · No tumor deposits identified  · PT3N1B  · MSI low probability on immunohistochemistry and stable on PCR  · No family history of colon cancer  · Explained to her that this is stage IIIb colon cancer however since his T3N1 I would consider this low risk stage III.  There is no other high risk features noted on pathology either.  · Given that the tumor is stage III there is definite benefit from adjuvant chemotherapy.  · We discussed the 2 available regimens including XELOX and FOLFOX.  · Explained to her that based on the IDEA data 3 months of XELOX is noninferior to 6 months of adjuvant chemotherapy however with FOLFOX the noninferiority has not been established.  Explained to her that 6 months of chemotherapy improves disease-free survival by additional 2 to 3% over 3 months.  · Discussed the adverse effects of XELOX as well as FOLFOX.  · After discussing the pros and cons we have decided to proceed with FOLFOX for total of 6 months but if she is unable to tolerate due to neurotoxicity then we could potentially stop after 3 months of treatment.  · Port placed on  4/11/2022  · Cycle 1 FOLFOX on 4/18/2022  · 7/26/2022-cycle 8 FOLFOX.  Continues to tolerate treatment fairly well.  · Continue for total of 6 months of treatment    *Leukopenia  · Predominantly neutropenia  · Mild increase in monocyte count  · This is chronic and unchanged  · No increased infections  · Had a previous work-up including a bone marrow biopsy with no clear etiology  · Differential includes nutritional, chronic idiopathic, autoimmune.  · She is currently not on any medications which could account for the leukopenia.  · No splenomegaly on exam  · 5/13/2021-flow cytometry negative  · LDH normal at 174  · Reticulocyte count 1.07%  · Ferritin low at 5.7  · Iron saturation low at 3%  · Folic acid normal at 11.8, B12 415  · Rheumatoid factor negative  · AVIVA negative  · ESR normal at 29  · Serum copper normal at 134  · 12/16/2021, WBC 3.16 today.  Patient denies frequent infections.  · CBC 3/24/2022 reviewed and WBC count 3.13, absolute neutrophil count 1.19, hemoglobin 10.2, platelets 315,000  · Given pre-existing neutropenia she will be receiving Neulasta with each cycle of FOLFOX.  · 4/14/2022-WBC 2.74 with an absolute neutrophil count of 1.16  · 4/25/2022 (1 week out from C1 FOLFOX) ANC 0.8. Neulasta given just 5 days ago. Suspect it has not kicked in yet. She understands to take Claritin for help with bony pain.  · 6/14/2022 WBC 4.53, ANC 2.95.  · 6/28/2022 WBC 4.58, ANC 3.05.  · 7/26/2022 CBC reviewed and noted to have leukocytosis with a WBC count of 13,000    *Anemia  · Microcytic  · Likely secondary to iron deficiency  · 4/22/2021 iron saturation 3%, TIBC today 508, ferritin 5.70.  The patient was initiated on oral iron every other day.  · 8/26/2021, hemoglobin today 10.1.  The patient has been tolerating oral iron every other day.  We will increase oral iron to daily and recheck iron labs in 4 months.  · 12/16/2021, patient is taking oral iron every day and tolerating this well.  Hemoglobin today  10.0.  Iron saturation 45, TIBC 413, ferritin 10.20.  · 3/4/2022 ferritin 27.5, iron saturation 5%, TIBC 375, iron studies consistent with iron deficiency  · 3/24/2022 hemoglobin 10.2  · Received 3 doses of Venofer.  · 6/14/2022 Hemoglobin 12.1.  · 6/28/2022 hemoglobin 11.8.  · 7/26/2022 hemoglobin stable at 10.6    *Screening-scheduled for mammogram next week    *Headaches  · Improved  · Continue Excedrin as needed    *Colitis  · Lymphocytic colitis noted on the biopsy of the colon  · There is a flare of colitis we may have to consider using steroids  · Currently she does not have any diarrhea  · Explained to her that 5-FU can increase the risk of diarrhea.  · We will have her follow-up with GI  · In the event of diarrhea we will try Imodium first and if no improvement in symptoms then consider steroids.  · No diarrhea    *Thrombocytopenia  · Secondary to chemotherapy.  · Platelet counts stable at around 95,000  · Monitor closely  · May have to dose reduce oxaliplatin if platelet count drops less than 75,000    PLAN:   · Proceed with cycle 9 of FOLFOX next week  · Monitor platelet counts closely and if there is a further drop with subsequent cycles decrease oxaliplatin dose by 20%    Patient is on treatment requiring close monitoring for toxicity.    Mago Alvarez MD  07/28/22

## 2022-08-01 ENCOUNTER — APPOINTMENT (OUTPATIENT)
Dept: WOMENS IMAGING | Facility: HOSPITAL | Age: 67
End: 2022-08-01

## 2022-08-01 PROCEDURE — 77063 BREAST TOMOSYNTHESIS BI: CPT | Performed by: RADIOLOGY

## 2022-08-01 PROCEDURE — 77067 SCR MAMMO BI INCL CAD: CPT | Performed by: RADIOLOGY

## 2022-08-02 ENCOUNTER — TELEPHONE (OUTPATIENT)
Dept: ONCOLOGY | Facility: CLINIC | Age: 67
End: 2022-08-02

## 2022-08-02 NOTE — TELEPHONE ENCOUNTER
Caller: Kayla Adkins    Relationship: Self    Best call back number: [phone]    What is the best time to reach you: ANYTIME    Who are you requesting to speak with (clinical staff, provider,  specific staff member): DR ROCHA        What was the call regarding:     SAW DOCTOR YESTERDAY AND WAS PUT ON ANTIBIOTIC FOR WHAT THEY THINK IS A SPIDER BITE AND A CBC WAS DONE AND WBC WAS 25,100, AND THE RESULTS ARE BEING FAXED TO OFFICE THE TODAY, AND THEY CONCERNED MAY NEED A SURGICAL PROCEDURE     DIDN'T KNOW IF DR ROCHA WOULD WANT ANOTHER ONE DONE AT HER OFFICE OR WHAT SHE WOULD WANT HER TO DO ON THAT     Do you require a callback: YES TO FOLLOW UP     ADDITIONAL NOTE: I CALLED CLINICAL LINE SPOKE TO VALENTINA SHE ADVISED TO SEND HIGH PRIORITY MESSAGE

## 2022-08-02 NOTE — TELEPHONE ENCOUNTER
Returned call to Kayla. She reports she went to her gynecologist today for routine follow up and was noted to have a spider bit on her bottom.  She said it is red, warm to touch and has a hard center.  She was prescribed bactrim BID x10 days. She was contacted by her gyn office today who reported her WBC was 25,000. She and her  were a bit alarmed by this.  After further discussion, I explained this is likely due to infection from the bite, her neulasta injection last Thursday as well as previous steroids.  Reviewed this with Dr Alvarez who was in agreement.  Advised her to continue her antibiotics as prescribed but to notify us right away should she develop fever.  She v/u. Thankful for the call.

## 2022-08-05 ENCOUNTER — OFFICE VISIT (OUTPATIENT)
Dept: SURGERY | Facility: CLINIC | Age: 67
End: 2022-08-05

## 2022-08-05 VITALS — HEIGHT: 62 IN | BODY MASS INDEX: 30 KG/M2 | WEIGHT: 163 LBS

## 2022-08-05 DIAGNOSIS — L02.31 GLUTEAL ABSCESS: Primary | ICD-10-CM

## 2022-08-05 PROCEDURE — 87205 SMEAR GRAM STAIN: CPT | Performed by: SURGERY

## 2022-08-05 PROCEDURE — 87070 CULTURE OTHR SPECIMN AEROBIC: CPT | Performed by: SURGERY

## 2022-08-05 PROCEDURE — 10061 I&D ABSCESS COMP/MULTIPLE: CPT | Performed by: SURGERY

## 2022-08-05 PROCEDURE — 87147 CULTURE TYPE IMMUNOLOGIC: CPT | Performed by: SURGERY

## 2022-08-05 PROCEDURE — 87186 SC STD MICRODIL/AGAR DIL: CPT | Performed by: SURGERY

## 2022-08-05 RX ORDER — OLANZAPINE 5 MG/1
TABLET ORAL
Qty: 30 TABLET | Refills: 3 | Status: SHIPPED | OUTPATIENT
Start: 2022-08-05 | End: 2022-10-14

## 2022-08-05 RX ORDER — PYRAZINAMIDE 500 MG/1
1 TABLET ORAL EVERY 6 HOURS PRN
Qty: 15 TABLET | Refills: 0 | Status: SHIPPED | OUTPATIENT
Start: 2022-08-05 | End: 2022-08-12

## 2022-08-05 NOTE — PROGRESS NOTES
CC: Left gluteal abscess     HPI: 66-year-old female that is well-known to me from prior right hemicolectomy for colon cancer.  She was noticed to have swelling and inflammation over the left gluteal area over the weekend.  Her gynecologist recommended that she follow-up with me for further evaluation.  Since then she has been started on Bactrim double strength.  She reports persistent pain over the area.  She denies any drainage.  She was found to have elevation of white blood cell count on routine follow-up 10 days ago and continued to be elevated and uptrending on 8/1/2022.  She denies any fevers or chills.  No problems with bowel movements.  She is on chemotherapy and is scheduled to undergo next chemotherapy session next Tuesday     PMH: Lymphocytic colitis, anemia, anxiety, GERD, hypertension, neutropenia, colon cancer     PSH:   -Colonoscopy 2017  -Upper endoscopy and colonoscopy 2022  -Tonsillectomy  -Robotic assisted laparoscopic right hemicolectomy 3/2/2022, Dr. Wellington  -Mediport placement 4/11/2022, Dr. Wellington     MEDS:  Zyprexa, Bactrim DS, Levaquin, metoprolol, atorvastatin, Cymbalta, Prilosec, Compazine, Zofran, Celebrex, ferrous sulfate, Claritin, aspirin, Excedrin     ALL: Penicillins     FH and SH: Family history of hypertension heart disease, no family history of colon cancer. Denies smoking drinking or taking any drugs     ROS:   Constitutional: denies any weight changes, fatigue or weakness.  HEENT: Denies hearing loss and rhinorrhea  Cardiovascular: denies chest pain, palpitations, edemas.  Respiratory: denies cough, sputum, SOB.  Gastrointestinal: denies N&V, abd pain, diarrhea, constipation.  Genitourinary: denies dysuria, frequency.  Endocrine: denies cold intolerance, lethargy and flushing.  Hem: denies excessive bruising and postop bleeding.  Musculoskeletal: denies weakness, joint swelling, pain or stiffness.  Neuro: denies seizures, CVA, paresthesia, or peripheral neuropathy.   Skin: denies  "change in nevi, rashes, masses.     PE:   Vitals:    08/05/22 1202   Weight: 73.9 kg (163 lb)   Height: 157.5 cm (62\")     Body mass index is 29.81 kg/m².   Alert and oriented ×3, no acute distress.  Head is normocephalic and atraumatic.  Neck is supple there is no thyromegaly or lymphadenopathy  Chest is clear bilaterally there is no added sounds  Regular rate and rhythm, no murmurs  Abdomen is soft and nontender, is nondistended, bowel sounds are positive.  No clubbing cyanosis or edema in lower or upper extremities  Left gluteal area with erythema, soft tissue hardening as well as fluctuation over the mid aspect wound there is a small skin lesion may be related to bug bite.  There is no drainage.    Diagnostic studies:   Labs 8/1/2022:   White blood cell count 25,000, hemoglobin 11     Assessment and plan    The patient is a very pleasant 66-year-old female with large left gluteal abscess.  Discussed with the patient about treatment options and I recommend that she undergo incision and drainage of left gluteal abscess under local anesthesia at the procedure room.  Risk and benefits of procedure including bleeding, infection, wound complications discussed in detail with the patient.  Also the risk of developing perirectal fistula if the origin of the abscess is perirectal discussed in detail with the patient that verbalized understanding and agree with the plan    PREOPERATIVE DIAGNOSES:    (1) left gluteal abscess abscess    POSTOPERATIVE DIAGNOSES:    (1) Same    PROCEDURE:    (1) Incisional drainage of complex left gluteal abscess    SURGEON/STAFF:  FELY Wellington   SPECIMEN:  Culture swap  INTRAOPERATIVE COMPLICATIONS:  None.   Andesthesia: Local    OPERATION: Patient was taken to the procedure room where she was placed in the procedure room table in the left lateral decubitus.  Timeout performed and the patient correctly identified.  Consent was signed by the patient.  Left gluteal area was then prepped and draped " in usual sterile fashion with 2% lidocaine with epinephrine.  A circular incision of 2 x 2 cm was performed in the left gluteal area in the point of maximal fluctuation.  Large amount of purulent fluid was drained.  I swabbed the wound for culture.  Then loculations of the abscess cavity were broken bluntly as well as with hemostat.  There were different pockets of purulent collections that tracked all the way to the perianal area.  I did not see any purulent drainage from the perianal area.  The abscess cavity was irrigated and was packed with half inch iodoform gauze.  The wound was covered with 4 x 4 and tape.    Patient tolerated procedure well and was sent home in stable condition    Instructions:  -Patient instructed to remove packing in 24 hours  -Sitz bath 3 times a day  -Continue antibiotics  -I will prescribe her Tylenol 3 for pain  -Patient to follow-up in my office next Monday for recheck and at that time we will decide if she should continue chemotherapy or hold it for another week         Malcolm Wellington MD  General, Minimally Invasive and Endoscopic Surgery  [facility]     [address] [place] [address], [address]  [place], [place], [address]  P: [phone]  F: [phone]

## 2022-08-07 LAB
BACTERIA SPEC AEROBE CULT: ABNORMAL
GRAM STN SPEC: ABNORMAL
GRAM STN SPEC: ABNORMAL

## 2022-08-08 ENCOUNTER — TELEPHONE (OUTPATIENT)
Dept: ONCOLOGY | Facility: CLINIC | Age: 67
End: 2022-08-08

## 2022-08-08 ENCOUNTER — OFFICE VISIT (OUTPATIENT)
Dept: SURGERY | Facility: CLINIC | Age: 67
End: 2022-08-08

## 2022-08-08 DIAGNOSIS — Z51.89 VISIT FOR WOUND CHECK: Primary | ICD-10-CM

## 2022-08-08 DIAGNOSIS — Z09 POSTOP CHECK: ICD-10-CM

## 2022-08-08 PROCEDURE — 99024 POSTOP FOLLOW-UP VISIT: CPT | Performed by: SURGERY

## 2022-08-08 NOTE — PROGRESS NOTES
CC: Follow-up after incision and drainage of left gluteal abscess    S: Patient is here today for follow-up after undergoing incision and drainage of complex left gluteal abscess on 8/5/2022.  Patient continues to have pain in the area but has been stable since the procedure.  He has been taking Tylenol 3 with good control of her symptoms.  She has been having small amount of drainage from the wound.  He has been performing sitz bath 3 times a day.  She denies any fevers or chills.    O: Alert, no acute distress  Breathing comfortable  Regular rate and rhythm  Left gluteal open wound that is approximately 1 by 5 x 1 x 5 cm.  There is inflamed tissue at the base.  There is surrounding soft tissue hardening.  There is very mild surrounding erythema mostly in the inferior aspect surrounding the wound.  There is tenderness.  There is no purulent drainage.    Wound Culture Light growth (2+) Staphylococcus aureus, MRSA Abnormal     Methicillin resistant Staphylococcus aureus, Patient may be an isolation risk.            Gram Stain  Many (4+) WBCs per low power field      Many (4+) Gram positive cocci              Resulting Agency: [facility]       Susceptibility     Staphylococcus aureus, MRSA     ELIJAH     Clindamycin 0.25 ug/ml Susceptible     Erythromycin <=0.25 ug/ml Susceptible     Inducible Clindamycin Resistance NEG ug/ml Negative     Oxacillin >=4 ug/ml Resistant     Rifampin <=0.5 ug/ml Susceptible     Tetracycline <=1 ug/ml Susceptible     Trimethoprim + Sulfamethoxazole <=10 ug/ml Susceptible     Vancomycin 1 ug/ml Susceptible               Assessment and plan    66-year-old female with history of colon cancer on chemotherapy that developed left gluteal abscess.  She underwent incision and drainage on Friday.  She is still having inflammation and pain of the area but there is no signs of ongoing infection.  She does not have any fevers or chills.  Her pain is stable.  Discussed with her that she will probably  continue to have pain and feels soft tissue starts softening up.  I recommend that she cleanse the wound with soap and water and continue performing sitz bath 3 times a day.  She should continue taking antibiotics to complete a 10 days course.  I discussed with the patient about the pathology results are consistent with MRSA.  She asked me about continue chemotherapy.  I do not think that she has residual abscess but just inflammation in the area but it would be probably prudent to hold on chemotherapy until next week just in case there is residual abscess.  I do not think wound  healing will be an issue regardless.  She will discuss this with her oncologist.    Patient to follow-up in my office next Friday to reassess

## 2022-08-08 NOTE — TELEPHONE ENCOUNTER
----- Message from Crystal Flores RN sent at 8/8/2022  2:29 PM EDT -----  She needs NP visit tomorrow with treatment.  Can you please add her on to see Monserrat and notify pt of time    Thank you!

## 2022-08-08 NOTE — TELEPHONE ENCOUNTER
Returned call to Alberto.  She reports she saw her surgeon and he opened up the area in question and it was positive for MRSA.  She has about 3 more days of antibiotics left but was uncertain if it would be ok for her to get her treatment tomorrow. She said they asked the surgeon but he deferred to Dr Alvarez.  Reviewed with Dr Alvarez, she recommends alberto keep her appt for tomorrow and have her added on to see NP for wound evaluation and if not healing well, would recommend deferring her treatment.  I notified Alberto of the same, and message sent to scheduling to add her on for NP tomorrow.

## 2022-08-08 NOTE — PROGRESS NOTES
Subjective   Kayla Adkins is a 66 y.o. female.  Referred by Dr. Rivas for evaluation of leukopenia    History of Present Illness   Ms. Adkins is a 66-year-old postmenopausal  who has been referred by Dr. Rivas for evaluation of leukopenia, neutropenia.  Patient reports that she has had longstanding leukopenia and her white blood cell count normally ranges in the threes.  On her most recent visit with Dr. Rivas white blood cell count was noted to be 2560 on 3/2/2021 and 2660 on 4/5/2021.  This prompted a referral to hematology.  Patient reports that she had a extensive work-up including a bone marrow biopsy for the same condition about 17 years ago.  She thinks that this was performed at [facility].  According to patient no clear etiology was determined after the work-up.  She was recommended to continue with periodic labs.  No treatment was required.    She was seen in December and had continued on oral iron.  She was tolerating that fairly well.  However she had worsening of colitis for about 2 months prior to that visit.  She was scheduled for colonoscopy in January 2022.    She had a routine surveillance colonoscopy performed by Dr. Whtilock on 2/22/2022.  On this there was an infiltrative nonobstructing medium-sized mass in the proximal ascending colon 1 to 2 cm distal to the cecum.  This was partially circumferential involving 1 foot of the lumen.  Biopsies were taken.  There is also mildly congested mucosa in the entire colon.  Random biopsies were taken.  She also had an EGD at the same time which reported normal esophagus, stomach and duodenum.  Pathology evaluation from the biopsy reported superficial fragments of at least intramucosal carcinoma.  Random colon biopsies reported increased lamina propria, chronic inflammation with surface epithelial alteration and increased intraepithelial lymphocytes consistent with lymphocytic colitis.  Biopsies from the stomach reported chronic  inactive gastritis.  Negative for intestinal metaplasia and H. pylori.  Biopsies from the duodenum was negative.    She was seen by Dr. Wellington who performed a laparoscopic right hemicolectomy on 3/2/2022.  Pathology showed a 3.8 cm mass at the cecum invading through the muscularis propria into the pericolorectal adipose tissue.  No lymphovascular or perineural invasion.  All margins were clear.  The radial margin was 3 cm.  3 out of 26 lymph nodes were positive for metastatic disease.  The largest metastatic focus was 0.7 cm with extranodal extension.  Pathological stage PT3N1B.  The tumor was moderately differentiated, grade 2 adenocarcinoma.    No loss of nuclear expression of MMR proteins.  Low probability of microsatellite instability.  MSI status-microsatellite stable.  This is by PCR.    Normal CEA and 0.77 on the day prior to surgery.    She was seen by Dr. Burton as inpatient and recommended adjuvant chemotherapy with FOLFOX.    Patient initiating cycle 1 FOLFOX 4/18/2022.  Neulasta support given due to baseline neutropenia.    Cycle 2 FOLFOX 5/2/2022    Cycle 3 FOLFOX 5/17/2022    Interval history:   Patient returns to the office today for follow-up in anticipation of cycle 9 FOLFOX.  Overall, she is doing well.  She recently underwent drainage of a left gluteal abscess.  At this time, the area is still healing and she is completing a course of antibiotics.  Culture of the wound was found to be MRSA positive.  Patient was seen by her surgeon Dr. Wellington yesterday.  Dr. Wellington recommended patient delay chemotherapy by 1 week to allow additional healing time of this gluteal abscess.  Today, gluteal abscess does have some drainage with mild erythema.  The area of concern is healing, however, still measuring around 1 by 5 x 1 x 5 cm. After discussion with the patient and concerns for delayed wound healing/risk of infection, it is felt best to delay her treatment today.  She will be seen by Dr. Wellington again on Friday  of this week to continue close follow-up of wound healing. No additional concerns today.    The following portions of the patient's history were reviewed and updated as appropriate: allergies, current medications, past family history, past medical history, past social history and problem list.    Past Medical History:   Diagnosis Date   • Anemia    • Anxiety    • Bladder infection    • Colitis    • Colon cancer (HCC)    • GERD (gastroesophageal reflux disease)    • Headache    • Hyperlipidemia    • Hypertension    • Knee injury, initial encounter-left 07/01/2019   • Neutropenia (HCC) 01/03/2017   • Prediabetes 03/04/2019      Past Surgical History:   Procedure Laterality Date   • COLON RESECTION Right 03/02/2022    Procedure: LAPAROSCOPIC RIGHT HEMICOLECTOMY WITH DAVINCI ROBOT;  Surgeon: Malcolm Wellington MD;  Location: [facility];  Service: Robotics - DaVinci;  Laterality: Right;   • COLONOSCOPY     • COLONOSCOPY W/ POLYPECTOMY N/A 02/22/2022    Procedure: COLONOSCOPY INTO CECUM WITH COLD BIOPSIES;  Surgeon: Irvin Clayton MD;  Location: [facility] ENDOSCOPY;  Service: Gastroenterology;  Laterality: N/A;  PRE: DIARRHEA, ANEMIA  POST: ASCENDING COLON MASS   • ENDOSCOPY N/A 02/22/2022    Procedure: ESOPHAGOGASTRODUODENOSCOPY WITH BIOPSY;  Surgeon: Irvin Clayton MD;  Location: [facility] ENDOSCOPY;  Service: Gastroenterology;  Laterality: N/A;  PRE: ANEMIA  POST: NORMAL EGD   • TONSILLECTOMY     • VENOUS ACCESS DEVICE (PORT) INSERTION N/A 4/11/2022    Procedure: INSERTION VENOUS ACCESS DEVICE with subcutaneous port;  Surgeon: Malcolm Wellington MD;  Location: [facility];  Service: General;  Laterality: N/A;        Family History   Problem Relation Age of Onset   • Hypertension Mother    • Heart disease Father    • Heart attack Father 47        first MI age 47; second MI 57   • Hypertension Sister    • No Known Problems Brother    • Malig Hyperthermia Neg Hx       Social History  "    Socioeconomic History   • Marital status:    • Number of children: 2   Tobacco Use   • Smoking status: Never Smoker   • Smokeless tobacco: Never Used   Vaping Use   • Vaping Use: Never used   Substance and Sexual Activity   • Alcohol use: Not Currently     Comment: seldom   • Drug use: Never   • Sexual activity: Defer      OB History    No obstetric history on file.          Allergies   Allergen Reactions   • Penicillins Rash      Review of systems as mentioned in the HPI.    Objective   Blood pressure 134/83, pulse 94, temperature 96.8 °F (36 °C), temperature source Temporal, resp. rate 18, height 157.5 cm (62.01\"), weight 74.8 kg (164 lb 14.4 oz), SpO2 97 %.     Physical Exam  Vitals reviewed.   Constitutional:       Appearance: Normal appearance. She is normal weight.   HENT:      Head: Normocephalic.      Nose: Nose normal.      Mouth/Throat:      Mouth: Mucous membranes are moist.   Eyes:      Pupils: Pupils are equal, round, and reactive to light.   Cardiovascular:      Rate and Rhythm: Normal rate.      Heart sounds: Normal heart sounds. No murmur heard.    No gallop.   Pulmonary:      Effort: Pulmonary effort is normal. No respiratory distress.      Breath sounds: Normal breath sounds. No wheezing.      Comments: Mediport benign  Sutures present at the incision site of the port.  Abdominal:      General: Abdomen is flat. Bowel sounds are normal. There is no distension.      Palpations: Abdomen is soft.      Tenderness: There is no abdominal tenderness.      Comments: Abdominal incisions have healed well.     Musculoskeletal:         General: Normal range of motion.      Cervical back: Normal range of motion.      Right lower leg: No edema.      Left lower leg: No edema.   Skin:     General: Skin is warm.      Findings: Wound present. No bruising or rash.      Comments: 1 x 5 by 1 x 5 cm left gluteal wound   Neurological:      General: No focal deficit present.      Mental Status: She is alert and " oriented to person, place, and time.   Psychiatric:         Behavior: Behavior normal.         DIAGNOSTIC DATA:  Results from last 7 days   Lab Units 08/09/22  0904   WBC 10*3/mm3 4.44   NEUTROS ABS 10*3/mm3 3.20   HEMOGLOBIN g/dL 10.0*   HEMATOCRIT % 30.7*   PLATELETS 10*3/mm3 141     Results from last 7 days   Lab Units 08/09/22  0904   SODIUM mmol/L 140   POTASSIUM mmol/L 4.3   CHLORIDE mmol/L 106   CO2 mmol/L 22.7   BUN mg/dL 9   CREATININE mg/dL 0.69   CALCIUM mg/dL 9.4   ALBUMIN g/dL 3.60   BILIRUBIN mg/dL <0.2   ALK PHOS U/L 292*   ALT (SGPT) U/L 39*   AST (SGOT) U/L 37*   GLUCOSE mg/dL 166*           8/9/2022 CBC and CMP reviewed    No radiology results for the last 30 days.       Assessment & Plan      66-year-old postmenopausal  lady with longstanding leukopenia/neutropenia and anemia and recently diagnosed T3N1A adenocarcinoma of the colon    *[id number] adenocarcinoma of the ascending colon  · The tumor measures 3.8 cm located in the cecum, invades through the muscularis propria into the pericolorectal adipose tissue.  No lymphovascular or perineural invasion.  All the margins are negative.  Tumor comes to within 3 cm of the radial margin.  3 out of 26 lymph nodes positive for metastatic carcinoma with the largest focus measuring 0.7 cm without extranodal extension.  · No tumor deposits identified  · PT3N1B  · MSI low probability on immunohistochemistry and stable on PCR  · No family history of colon cancer  · Explained to her that this is stage IIIb colon cancer however since his T3N1 I would consider this low risk stage III.  There is no other high risk features noted on pathology either.  · Given that the tumor is stage III there is definite benefit from adjuvant chemotherapy.  · We discussed the 2 available regimens including XELOX and FOLFOX.  · Explained to her that based on the IDEA data 3 months of XELOX is noninferior to 6 months of adjuvant chemotherapy however with FOLFOX the  noninferiority has not been established.  Explained to her that 6 months of chemotherapy improves disease-free survival by additional 2 to 3% over 3 months.  · Discussed the adverse effects of XELOX as well as FOLFOX.  · After discussing the pros and cons we have decided to proceed with FOLFOX for total of 6 months but if she is unable to tolerate due to neurotoxicity then we could potentially stop after 3 months of treatment.  · Port placed on 4/11/2022  · Cycle 1 FOLFOX on 4/18/2022  · 7/26/2022-cycle 8 FOLFOX.  Continues to tolerate treatment fairly well.  · 8/9/2022- cycle 9 delayed by 1 week due to left gluteal wound, being followed by Dr. Wellington with general surgery.   · Continue for total of 6 months of treatment    *Leukopenia  · Predominantly neutropenia  · Mild increase in monocyte count  · This is chronic and unchanged  · No increased infections  · Had a previous work-up including a bone marrow biopsy with no clear etiology  · Differential includes nutritional, chronic idiopathic, autoimmune.  · She is currently not on any medications which could account for the leukopenia.  · No splenomegaly on exam  · 5/13/2021-flow cytometry negative  · LDH normal at 174  · Reticulocyte count 1.07%  · Ferritin low at 5.7  · Iron saturation low at 3%  · Folic acid normal at 11.8, B12 415  · Rheumatoid factor negative  · AVIVA negative  · ESR normal at 29  · Serum copper normal at 134  · 12/16/2021, WBC 3.16 today.  Patient denies frequent infections.  · CBC 3/24/2022 reviewed and WBC count 3.13, absolute neutrophil count 1.19, hemoglobin 10.2, platelets 315,000  · Given pre-existing neutropenia she will be receiving Neulasta with each cycle of FOLFOX.  · 4/14/2022-WBC 2.74 with an absolute neutrophil count of 1.16  · 4/25/2022 (1 week out from C1 FOLFOX) ANC 0.8. Neulasta given just 5 days ago. Suspect it has not kicked in yet. She understands to take Claritin for help with bony pain.  · 6/14/2022 WBC 4.53, ANC  2.95.  · 6/28/2022 WBC 4.58, ANC 3.05.  · 7/26/2022 CBC reviewed and noted to have leukocytosis with a WBC count of 13,000  · 8/9/2022- WBC 4.44, ANC 3.20    *Anemia  · Microcytic  · Likely secondary to iron deficiency  · 4/22/2021 iron saturation 3%, TIBC today 508, ferritin 5.70.  The patient was initiated on oral iron every other day.  · 8/26/2021, hemoglobin today 10.1.  The patient has been tolerating oral iron every other day.  We will increase oral iron to daily and recheck iron labs in 4 months.  · 12/16/2021, patient is taking oral iron every day and tolerating this well.  Hemoglobin today 10.0.  Iron saturation 45, TIBC 413, ferritin 10.20.  · 3/4/2022 ferritin 27.5, iron saturation 5%, TIBC 375, iron studies consistent with iron deficiency  · 3/24/2022 hemoglobin 10.2  · Received 3 doses of Venofer.  · 6/14/2022 Hemoglobin 12.1.  · 6/28/2022 hemoglobin 11.8.  · 8/9/2022 hemoglobin stable at 10.0    *Screening-scheduled for mammogram next week    *Headaches  · Improved  · Continue Excedrin as needed    *Colitis  · Lymphocytic colitis noted on the biopsy of the colon  · There is a flare of colitis we may have to consider using steroids  · Currently she does not have any diarrhea  · Explained to her that 5-FU can increase the risk of diarrhea.  · We will have her follow-up with GI  · In the event of diarrhea we will try Imodium first and if no improvement in symptoms then consider steroids.  · No diarrhea    *Thrombocytopenia  · Secondary to chemotherapy.  · Platelet counts stable at around 95,000  · Monitor closely  · May have to dose reduce oxaliplatin if platelet count drops less than 75,000   · Platelets 141,000    PLAN:   · Delay cycle 9 FOLFOX by 1 week due to left gluteal abscess.   · Return in 1 week for cycle 9 and MD follow-up at [facility]  · Monitor platelet counts closely and if there is a further drop with subsequent cycles decrease oxaliplatin dose by 20%  · Continue to monitor abscess site, as  well as monitoring for signs and symptoms of infection    Patient is on treatment requiring close monitoring for toxicity.    Rosemary Ramey, DALE  08/09/22

## 2022-08-09 ENCOUNTER — INFUSION (OUTPATIENT)
Dept: ONCOLOGY | Facility: HOSPITAL | Age: 67
End: 2022-08-09

## 2022-08-09 ENCOUNTER — APPOINTMENT (OUTPATIENT)
Dept: ONCOLOGY | Facility: HOSPITAL | Age: 67
End: 2022-08-09

## 2022-08-09 ENCOUNTER — OFFICE VISIT (OUTPATIENT)
Dept: ONCOLOGY | Facility: CLINIC | Age: 67
End: 2022-08-09

## 2022-08-09 VITALS
HEIGHT: 62 IN | SYSTOLIC BLOOD PRESSURE: 134 MMHG | RESPIRATION RATE: 18 BRPM | HEART RATE: 94 BPM | WEIGHT: 164.9 LBS | OXYGEN SATURATION: 97 % | TEMPERATURE: 96.8 F | DIASTOLIC BLOOD PRESSURE: 83 MMHG | BODY MASS INDEX: 30.35 KG/M2

## 2022-08-09 DIAGNOSIS — Z45.2 ENCOUNTER FOR FITTING AND ADJUSTMENT OF VASCULAR CATHETER: ICD-10-CM

## 2022-08-09 DIAGNOSIS — C18.2 MALIGNANT NEOPLASM OF ASCENDING COLON: Primary | ICD-10-CM

## 2022-08-09 DIAGNOSIS — Z79.899 HIGH RISK MEDICATION USE: ICD-10-CM

## 2022-08-09 LAB
ALBUMIN SERPL-MCNC: 3.6 G/DL (ref 3.5–5.2)
ALBUMIN/GLOB SERPL: 1.3 G/DL
ALP SERPL-CCNC: 292 U/L (ref 39–117)
ALT SERPL W P-5'-P-CCNC: 39 U/L (ref 1–33)
ANION GAP SERPL CALCULATED.3IONS-SCNC: 11.3 MMOL/L (ref 5–15)
AST SERPL-CCNC: 37 U/L (ref 1–32)
BASOPHILS # BLD AUTO: 0.01 10*3/MM3 (ref 0–0.2)
BASOPHILS NFR BLD AUTO: 0.2 % (ref 0–1.5)
BILIRUB SERPL-MCNC: <0.2 MG/DL (ref 0–1.2)
BUN SERPL-MCNC: 9 MG/DL (ref 8–23)
BUN/CREAT SERPL: 13 (ref 7–25)
CALCIUM SPEC-SCNC: 9.4 MG/DL (ref 8.6–10.5)
CHLORIDE SERPL-SCNC: 106 MMOL/L (ref 98–107)
CO2 SERPL-SCNC: 22.7 MMOL/L (ref 22–29)
CREAT SERPL-MCNC: 0.69 MG/DL (ref 0.57–1)
DEPRECATED RDW RBC AUTO: 64.8 FL (ref 37–54)
EGFRCR SERPLBLD CKD-EPI 2021: 96.5 ML/MIN/1.73
EOSINOPHIL # BLD AUTO: 0.04 10*3/MM3 (ref 0–0.4)
EOSINOPHIL NFR BLD AUTO: 0.9 % (ref 0.3–6.2)
ERYTHROCYTE [DISTWIDTH] IN BLOOD BY AUTOMATED COUNT: 17.2 % (ref 12.3–15.4)
GLOBULIN UR ELPH-MCNC: 2.7 GM/DL
GLUCOSE SERPL-MCNC: 166 MG/DL (ref 65–99)
HCT VFR BLD AUTO: 30.7 % (ref 34–46.6)
HGB BLD-MCNC: 10 G/DL (ref 12–15.9)
IMM GRANULOCYTES # BLD AUTO: 0.11 10*3/MM3 (ref 0–0.05)
IMM GRANULOCYTES NFR BLD AUTO: 2.5 % (ref 0–0.5)
LYMPHOCYTES # BLD AUTO: 0.77 10*3/MM3 (ref 0.7–3.1)
LYMPHOCYTES NFR BLD AUTO: 17.3 % (ref 19.6–45.3)
MCH RBC QN AUTO: 33.9 PG (ref 26.6–33)
MCHC RBC AUTO-ENTMCNC: 32.6 G/DL (ref 31.5–35.7)
MCV RBC AUTO: 104.1 FL (ref 79–97)
MONOCYTES # BLD AUTO: 0.31 10*3/MM3 (ref 0.1–0.9)
MONOCYTES NFR BLD AUTO: 7 % (ref 5–12)
NEUTROPHILS NFR BLD AUTO: 3.2 10*3/MM3 (ref 1.7–7)
NEUTROPHILS NFR BLD AUTO: 72.1 % (ref 42.7–76)
NRBC BLD AUTO-RTO: 0.7 /100 WBC (ref 0–0.2)
PLATELET # BLD AUTO: 141 10*3/MM3 (ref 140–450)
PMV BLD AUTO: 10.9 FL (ref 6–12)
POTASSIUM SERPL-SCNC: 4.3 MMOL/L (ref 3.5–5.2)
PROT SERPL-MCNC: 6.3 G/DL (ref 6–8.5)
RBC # BLD AUTO: 2.95 10*6/MM3 (ref 3.77–5.28)
SODIUM SERPL-SCNC: 140 MMOL/L (ref 136–145)
WBC NRBC COR # BLD: 4.44 10*3/MM3 (ref 3.4–10.8)

## 2022-08-09 PROCEDURE — 99214 OFFICE O/P EST MOD 30 MIN: CPT

## 2022-08-09 PROCEDURE — 80053 COMPREHEN METABOLIC PANEL: CPT | Performed by: INTERNAL MEDICINE

## 2022-08-09 PROCEDURE — 36591 DRAW BLOOD OFF VENOUS DEVICE: CPT

## 2022-08-09 PROCEDURE — 85025 COMPLETE CBC W/AUTO DIFF WBC: CPT | Performed by: INTERNAL MEDICINE

## 2022-08-09 PROCEDURE — 25010000002 HEPARIN LOCK FLUSH PER 10 UNITS: Performed by: INTERNAL MEDICINE

## 2022-08-09 RX ORDER — SODIUM CHLORIDE 0.9 % (FLUSH) 0.9 %
10 SYRINGE (ML) INJECTION AS NEEDED
Status: CANCELLED | OUTPATIENT
Start: 2022-08-09

## 2022-08-09 RX ORDER — HEPARIN SODIUM (PORCINE) LOCK FLUSH IV SOLN 100 UNIT/ML 100 UNIT/ML
500 SOLUTION INTRAVENOUS AS NEEDED
Status: DISCONTINUED | OUTPATIENT
Start: 2022-08-09 | End: 2022-08-09 | Stop reason: HOSPADM

## 2022-08-09 RX ORDER — HEPARIN SODIUM (PORCINE) LOCK FLUSH IV SOLN 100 UNIT/ML 100 UNIT/ML
500 SOLUTION INTRAVENOUS AS NEEDED
Status: CANCELLED | OUTPATIENT
Start: 2022-08-09

## 2022-08-09 RX ORDER — SODIUM CHLORIDE 0.9 % (FLUSH) 0.9 %
10 SYRINGE (ML) INJECTION AS NEEDED
Status: DISCONTINUED | OUTPATIENT
Start: 2022-08-09 | End: 2022-08-09 | Stop reason: HOSPADM

## 2022-08-09 RX ADMIN — Medication 10 ML: at 08:55

## 2022-08-09 RX ADMIN — Medication 500 UNITS: at 08:56

## 2022-08-11 ENCOUNTER — APPOINTMENT (OUTPATIENT)
Dept: ONCOLOGY | Facility: HOSPITAL | Age: 67
End: 2022-08-11

## 2022-08-12 ENCOUNTER — OFFICE VISIT (OUTPATIENT)
Dept: SURGERY | Facility: CLINIC | Age: 67
End: 2022-08-12

## 2022-08-12 VITALS — WEIGHT: 164 LBS | BODY MASS INDEX: 30.18 KG/M2 | HEIGHT: 62 IN

## 2022-08-12 DIAGNOSIS — Z51.89 VISIT FOR WOUND CHECK: Primary | ICD-10-CM

## 2022-08-12 PROCEDURE — 99024 POSTOP FOLLOW-UP VISIT: CPT | Performed by: SURGERY

## 2022-08-13 NOTE — PROGRESS NOTES
CC: Follow-up after incision and drainage of left gluteal abscess     S: Patient is here today for follow-up after undergoing incision and drainage of complex left gluteal abscess on 8/5/2022.  Pain has significantly improved.  She has been washing the wound with soap and water 3 times a day.  Reports minimal drainage.  No fevers or chills    O:   Alert, no acute distress  Left gluteal open wound approximately 1.5 x 1.5 cm and 1 cm deep.  There is inflamed tissue at the base there is clean.  There is no drainage or signs of infection.  There is no surrounding erythema or tenderness    Assessment and plan    66-year-old female on chemotherapy for colon cancer that developed left gluteal abscess status post bedside drainage.  She was diagnosed with MRSA.  Discussed with the patient about the need to continue performing sitz bath or washing the wound with soap and water 3 times a day and keep it covered with a dry dressing to prevent soiling of the clothing.  I think the wound will probably heal in the next 2 to 3 weeks.  I do not see any reason why she should not continue with chemotherapy in the meantime.  Discussed with her about the need to get tested for MRSA in the nares after wound is completely healed.  If positive then I recommend that she undergoes decolonization.  The time I will place a referral.  She understood    Follow-up in my office in 3 weeks

## 2022-08-16 ENCOUNTER — INFUSION (OUTPATIENT)
Dept: ONCOLOGY | Facility: HOSPITAL | Age: 67
End: 2022-08-16

## 2022-08-16 VITALS
TEMPERATURE: 97.2 F | WEIGHT: 167.4 LBS | BODY MASS INDEX: 30.8 KG/M2 | DIASTOLIC BLOOD PRESSURE: 86 MMHG | HEIGHT: 62 IN | HEART RATE: 95 BPM | OXYGEN SATURATION: 93 % | SYSTOLIC BLOOD PRESSURE: 136 MMHG | RESPIRATION RATE: 18 BRPM

## 2022-08-16 DIAGNOSIS — C18.2 MALIGNANT NEOPLASM OF ASCENDING COLON: Primary | ICD-10-CM

## 2022-08-16 LAB
ALBUMIN SERPL-MCNC: 3.9 G/DL (ref 3.5–5.2)
ALBUMIN/GLOB SERPL: 1.6 G/DL
ALP SERPL-CCNC: 280 U/L (ref 39–117)
ALT SERPL W P-5'-P-CCNC: 41 U/L (ref 1–33)
ANION GAP SERPL CALCULATED.3IONS-SCNC: 9.7 MMOL/L (ref 5–15)
ANISOCYTOSIS BLD QL: NORMAL
AST SERPL-CCNC: 51 U/L (ref 1–32)
BASOPHILS # BLD AUTO: 0.01 10*3/MM3 (ref 0–0.2)
BASOPHILS NFR BLD AUTO: 0.4 % (ref 0–1.5)
BILIRUB SERPL-MCNC: 0.2 MG/DL (ref 0–1.2)
BUN SERPL-MCNC: 10 MG/DL (ref 8–23)
BUN/CREAT SERPL: 17.5 (ref 7–25)
CALCIUM SPEC-SCNC: 9.1 MG/DL (ref 8.6–10.5)
CHLORIDE SERPL-SCNC: 105 MMOL/L (ref 98–107)
CO2 SERPL-SCNC: 25.3 MMOL/L (ref 22–29)
CREAT SERPL-MCNC: 0.57 MG/DL (ref 0.57–1)
DACRYOCYTES BLD QL SMEAR: NORMAL
DEPRECATED RDW RBC AUTO: 66 FL (ref 37–54)
EGFRCR SERPLBLD CKD-EPI 2021: 101 ML/MIN/1.73
EOSINOPHIL # BLD AUTO: 0.04 10*3/MM3 (ref 0–0.4)
EOSINOPHIL NFR BLD AUTO: 1.4 % (ref 0.3–6.2)
ERYTHROCYTE [DISTWIDTH] IN BLOOD BY AUTOMATED COUNT: 16.7 % (ref 12.3–15.4)
GLOBULIN UR ELPH-MCNC: 2.4 GM/DL
GLUCOSE SERPL-MCNC: 161 MG/DL (ref 65–99)
HCT VFR BLD AUTO: 32.5 % (ref 34–46.6)
HGB BLD-MCNC: 10.5 G/DL (ref 12–15.9)
IMM GRANULOCYTES # BLD AUTO: 0.02 10*3/MM3 (ref 0–0.05)
IMM GRANULOCYTES NFR BLD AUTO: 0.7 % (ref 0–0.5)
LYMPHOCYTES # BLD AUTO: 0.74 10*3/MM3 (ref 0.7–3.1)
LYMPHOCYTES NFR BLD AUTO: 26.8 % (ref 19.6–45.3)
MCH RBC QN AUTO: 34.4 PG (ref 26.6–33)
MCHC RBC AUTO-ENTMCNC: 32.3 G/DL (ref 31.5–35.7)
MCV RBC AUTO: 106.6 FL (ref 79–97)
MONOCYTES # BLD AUTO: 0.29 10*3/MM3 (ref 0.1–0.9)
MONOCYTES NFR BLD AUTO: 10.5 % (ref 5–12)
NEUTROPHILS NFR BLD AUTO: 1.66 10*3/MM3 (ref 1.7–7)
NEUTROPHILS NFR BLD AUTO: 60.2 % (ref 42.7–76)
NRBC BLD AUTO-RTO: 0 /100 WBC (ref 0–0.2)
PLAT MORPH BLD: NORMAL
PLATELET # BLD AUTO: 170 10*3/MM3 (ref 140–450)
PMV BLD AUTO: 10 FL (ref 6–12)
POTASSIUM SERPL-SCNC: 3.7 MMOL/L (ref 3.5–5.2)
PROT SERPL-MCNC: 6.3 G/DL (ref 6–8.5)
RBC # BLD AUTO: 3.05 10*6/MM3 (ref 3.77–5.28)
SODIUM SERPL-SCNC: 140 MMOL/L (ref 136–145)
WBC MORPH BLD: NORMAL
WBC NRBC COR # BLD: 2.76 10*3/MM3 (ref 3.4–10.8)

## 2022-08-16 PROCEDURE — 96368 THER/DIAG CONCURRENT INF: CPT

## 2022-08-16 PROCEDURE — 96375 TX/PRO/DX INJ NEW DRUG ADDON: CPT

## 2022-08-16 PROCEDURE — 96411 CHEMO IV PUSH ADDL DRUG: CPT

## 2022-08-16 PROCEDURE — 25010000002 LEUCOVORIN CALCIUM PER 50 MG: Performed by: NURSE PRACTITIONER

## 2022-08-16 PROCEDURE — 85007 BL SMEAR W/DIFF WBC COUNT: CPT | Performed by: INTERNAL MEDICINE

## 2022-08-16 PROCEDURE — 96415 CHEMO IV INFUSION ADDL HR: CPT

## 2022-08-16 PROCEDURE — 25010000002 DEXAMETHASONE SODIUM PHOSPHATE 100 MG/10ML SOLUTION: Performed by: NURSE PRACTITIONER

## 2022-08-16 PROCEDURE — 96413 CHEMO IV INFUSION 1 HR: CPT

## 2022-08-16 PROCEDURE — 85025 COMPLETE CBC W/AUTO DIFF WBC: CPT | Performed by: INTERNAL MEDICINE

## 2022-08-16 PROCEDURE — 25010000002 PALONOSETRON PER 25 MCG: Performed by: NURSE PRACTITIONER

## 2022-08-16 PROCEDURE — 80053 COMPREHEN METABOLIC PANEL: CPT | Performed by: INTERNAL MEDICINE

## 2022-08-16 PROCEDURE — 25010000002 FOSAPREPITANT PER 1 MG: Performed by: NURSE PRACTITIONER

## 2022-08-16 PROCEDURE — 25010000002 FLUOROURACIL PER 500 MG: Performed by: NURSE PRACTITIONER

## 2022-08-16 PROCEDURE — 96367 TX/PROPH/DG ADDL SEQ IV INF: CPT

## 2022-08-16 PROCEDURE — 25010000002 OXALIPLATIN PER 0.5 MG: Performed by: NURSE PRACTITIONER

## 2022-08-16 PROCEDURE — 96416 CHEMO PROLONG INFUSE W/PUMP: CPT

## 2022-08-16 RX ORDER — DIPHENHYDRAMINE HYDROCHLORIDE 50 MG/ML
50 INJECTION INTRAMUSCULAR; INTRAVENOUS AS NEEDED
Status: CANCELLED | OUTPATIENT
Start: 2022-08-16

## 2022-08-16 RX ORDER — PALONOSETRON 0.05 MG/ML
0.25 INJECTION, SOLUTION INTRAVENOUS ONCE
Status: COMPLETED | OUTPATIENT
Start: 2022-08-16 | End: 2022-08-16

## 2022-08-16 RX ORDER — DEXTROSE MONOHYDRATE 50 MG/ML
250 INJECTION, SOLUTION INTRAVENOUS ONCE
Status: COMPLETED | OUTPATIENT
Start: 2022-08-16 | End: 2022-08-16

## 2022-08-16 RX ORDER — FLUOROURACIL 50 MG/ML
400 INJECTION, SOLUTION INTRAVENOUS ONCE
Status: COMPLETED | OUTPATIENT
Start: 2022-08-16 | End: 2022-08-16

## 2022-08-16 RX ORDER — FAMOTIDINE 10 MG/ML
20 INJECTION, SOLUTION INTRAVENOUS AS NEEDED
Status: CANCELLED | OUTPATIENT
Start: 2022-08-16

## 2022-08-16 RX ADMIN — OXALIPLATIN 150 MG: 5 INJECTION, SOLUTION, CONCENTRATE INTRAVENOUS at 10:50

## 2022-08-16 RX ADMIN — SODIUM CHLORIDE 100 ML: 9 INJECTION, SOLUTION INTRAVENOUS at 09:45

## 2022-08-16 RX ADMIN — DEXAMETHASONE SODIUM PHOSPHATE 12 MG: 10 INJECTION, SOLUTION INTRAMUSCULAR; INTRAVENOUS at 09:29

## 2022-08-16 RX ADMIN — LEUCOVORIN CALCIUM 720 MG: 350 INJECTION, POWDER, LYOPHILIZED, FOR SOLUTION INTRAMUSCULAR; INTRAVENOUS at 10:47

## 2022-08-16 RX ADMIN — DEXTROSE MONOHYDRATE 100 ML: 50 INJECTION, SOLUTION INTRAVENOUS at 09:00

## 2022-08-16 RX ADMIN — PALONOSETRON 0.25 MG: 0.05 INJECTION, SOLUTION INTRAVENOUS at 09:28

## 2022-08-16 RX ADMIN — FLUOROURACIL 4300 MG: 50 INJECTION, SOLUTION INTRAVENOUS at 13:03

## 2022-08-16 RX ADMIN — FLUOROURACIL 720 MG: 50 INJECTION, SOLUTION INTRAVENOUS at 12:57

## 2022-08-18 ENCOUNTER — APPOINTMENT (OUTPATIENT)
Dept: OTHER | Facility: HOSPITAL | Age: 67
End: 2022-08-18

## 2022-08-18 ENCOUNTER — INFUSION (OUTPATIENT)
Dept: ONCOLOGY | Facility: HOSPITAL | Age: 67
End: 2022-08-18

## 2022-08-18 ENCOUNTER — OFFICE VISIT (OUTPATIENT)
Dept: ONCOLOGY | Facility: CLINIC | Age: 67
End: 2022-08-18

## 2022-08-18 VITALS
RESPIRATION RATE: 18 BRPM | WEIGHT: 167.1 LBS | DIASTOLIC BLOOD PRESSURE: 74 MMHG | BODY MASS INDEX: 30.75 KG/M2 | HEART RATE: 83 BPM | SYSTOLIC BLOOD PRESSURE: 114 MMHG | OXYGEN SATURATION: 97 % | TEMPERATURE: 97.1 F | HEIGHT: 62 IN

## 2022-08-18 DIAGNOSIS — T45.1X5A CHEMOTHERAPY INDUCED NAUSEA AND VOMITING: ICD-10-CM

## 2022-08-18 DIAGNOSIS — C18.2 MALIGNANT NEOPLASM OF ASCENDING COLON: Primary | ICD-10-CM

## 2022-08-18 DIAGNOSIS — Z79.899 HIGH RISK MEDICATION USE: ICD-10-CM

## 2022-08-18 DIAGNOSIS — R11.2 CHEMOTHERAPY INDUCED NAUSEA AND VOMITING: ICD-10-CM

## 2022-08-18 DIAGNOSIS — C18.2 MALIGNANT NEOPLASM OF ASCENDING COLON: ICD-10-CM

## 2022-08-18 DIAGNOSIS — Z45.2 ENCOUNTER FOR FITTING AND ADJUSTMENT OF VASCULAR CATHETER: Primary | ICD-10-CM

## 2022-08-18 PROCEDURE — 25010000002 PEGFILGRASTIM 6 MG/0.6ML PREFILLED SYRINGE KIT: Performed by: INTERNAL MEDICINE

## 2022-08-18 PROCEDURE — 96377 APPLICATON ON-BODY INJECTOR: CPT

## 2022-08-18 PROCEDURE — 99214 OFFICE O/P EST MOD 30 MIN: CPT | Performed by: INTERNAL MEDICINE

## 2022-08-18 PROCEDURE — 25010000002 HEPARIN LOCK FLUSH PER 10 UNITS: Performed by: INTERNAL MEDICINE

## 2022-08-18 RX ORDER — HEPARIN SODIUM (PORCINE) LOCK FLUSH IV SOLN 100 UNIT/ML 100 UNIT/ML
500 SOLUTION INTRAVENOUS AS NEEDED
Status: DISCONTINUED | OUTPATIENT
Start: 2022-08-18 | End: 2022-08-18 | Stop reason: HOSPADM

## 2022-08-18 RX ORDER — SODIUM CHLORIDE 0.9 % (FLUSH) 0.9 %
10 SYRINGE (ML) INJECTION AS NEEDED
Status: CANCELLED | OUTPATIENT
Start: 2022-08-18

## 2022-08-18 RX ORDER — SODIUM CHLORIDE 0.9 % (FLUSH) 0.9 %
10 SYRINGE (ML) INJECTION AS NEEDED
Status: DISCONTINUED | OUTPATIENT
Start: 2022-08-18 | End: 2022-08-18 | Stop reason: HOSPADM

## 2022-08-18 RX ORDER — HEPARIN SODIUM (PORCINE) LOCK FLUSH IV SOLN 100 UNIT/ML 100 UNIT/ML
500 SOLUTION INTRAVENOUS AS NEEDED
Status: CANCELLED | OUTPATIENT
Start: 2022-08-18

## 2022-08-18 RX ADMIN — Medication 10 ML: at 09:11

## 2022-08-18 RX ADMIN — PEGFILGRASTIM 6 MG: KIT SUBCUTANEOUS at 09:11

## 2022-08-18 RX ADMIN — Medication 500 UNITS: at 09:11

## 2022-08-18 NOTE — PROGRESS NOTES
Subjective   Kayla Adkins is a 66 y.o. female.  Referred by Dr. Rivas for evaluation of leukopenia    History of Present Illness   Ms. Adkins is a 66-year-old postmenopausal  who has been referred by Dr. Rivas for evaluation of leukopenia, neutropenia.  Patient reports that she has had longstanding leukopenia and her white blood cell count normally ranges in the threes.  On her most recent visit with Dr. Rivas white blood cell count was noted to be 2560 on 3/2/2021 and 2660 on 4/5/2021.  This prompted a referral to hematology.  Patient reports that she had a extensive work-up including a bone marrow biopsy for the same condition about 17 years ago.  She thinks that this was performed at [facility].  According to patient no clear etiology was determined after the work-up.  She was recommended to continue with periodic labs.  No treatment was required.    She was seen in December and had continued on oral iron.  She was tolerating that fairly well.  However she had worsening of colitis for about 2 months prior to that visit.  She was scheduled for colonoscopy in January 2022.    She had a routine surveillance colonoscopy performed by Dr. Whitlock on 2/22/2022.  On this there was an infiltrative nonobstructing medium-sized mass in the proximal ascending colon 1 to 2 cm distal to the cecum.  This was partially circumferential involving 1 foot of the lumen.  Biopsies were taken.  There is also mildly congested mucosa in the entire colon.  Random biopsies were taken.  She also had an EGD at the same time which reported normal esophagus, stomach and duodenum.  Pathology evaluation from the biopsy reported superficial fragments of at least intramucosal carcinoma.  Random colon biopsies reported increased lamina propria, chronic inflammation with surface epithelial alteration and increased intraepithelial lymphocytes consistent with lymphocytic colitis.  Biopsies from the stomach reported chronic  inactive gastritis.  Negative for intestinal metaplasia and H. pylori.  Biopsies from the duodenum was negative.    She was seen by Dr. Wellington who performed a laparoscopic right hemicolectomy on 3/2/2022.  Pathology showed a 3.8 cm mass at the cecum invading through the muscularis propria into the pericolorectal adipose tissue.  No lymphovascular or perineural invasion.  All margins were clear.  The radial margin was 3 cm.  3 out of 26 lymph nodes were positive for metastatic disease.  The largest metastatic focus was 0.7 cm with extranodal extension.  Pathological stage PT3N1B.  The tumor was moderately differentiated, grade 2 adenocarcinoma.    No loss of nuclear expression of MMR proteins.  Low probability of microsatellite instability.  MSI status-microsatellite stable.  This is by PCR.    Normal CEA and 0.77 on the day prior to surgery.    She was seen by Dr. Burton as inpatient and recommended adjuvant chemotherapy with FOLFOX.    Patient initiating cycle 1 FOLFOX 4/18/2022.  Neulasta support given due to baseline neutropenia.      Interval history:  Patient presents today for follow-up.  She has received cycle 8 of FOLFOX on 7/26/2022.  Had some leukocytosis secondary to steroids which were prescribed for her reaction following IPL.  On 8/5/2022 she was seen by Dr. Wellington for swelling and inflammation of the left gluteal area.  She was started on Bactrim and required an I&D performed by Dr. Wellington.   She had a recent follow-up with Dr. Wellington on 8/13/2022 and wound was noted to be healing well.  Cycle 9 of FOLFOX has been delayed by 1 week due to the left gluteal abscess.  She denies any fevers or chills.  WBC count is noted to be low today.  This could be a result of Bactrim.  She did receive FOLFOX cycle 9 on 8/16/2022.  Denies any tingling or numbness of the extremities.  Platelet counts normal.  No new complaints at this time    The following portions of the patient's history were reviewed and updated as  appropriate: allergies, current medications, past family history, past medical history, past social history and problem list.    Past Medical History:   Diagnosis Date   • Anemia    • Anxiety    • Bladder infection    • Colitis    • Colon cancer (HCC)    • GERD (gastroesophageal reflux disease)    • Headache    • Hyperlipidemia    • Hypertension    • Knee injury, initial encounter-left 07/01/2019   • Neutropenia (HCC) 01/03/2017   • Prediabetes 03/04/2019      Past Surgical History:   Procedure Laterality Date   • COLON RESECTION Right 03/02/2022    Procedure: LAPAROSCOPIC RIGHT HEMICOLECTOMY WITH DAVINCI ROBOT;  Surgeon: Malcolm Wellington MD;  Location: [facility] MAIN OR;  Service: Robotics - DaVinci;  Laterality: Right;   • COLONOSCOPY     • COLONOSCOPY W/ POLYPECTOMY N/A 02/22/2022    Procedure: COLONOSCOPY INTO CECUM WITH COLD BIOPSIES;  Surgeon: Irvin Clayton MD;  Location: [facility] ENDOSCOPY;  Service: Gastroenterology;  Laterality: N/A;  PRE: DIARRHEA, ANEMIA  POST: ASCENDING COLON MASS   • ENDOSCOPY N/A 02/22/2022    Procedure: ESOPHAGOGASTRODUODENOSCOPY WITH BIOPSY;  Surgeon: Irvin Clayton MD;  Location: [facility] ENDOSCOPY;  Service: Gastroenterology;  Laterality: N/A;  PRE: ANEMIA  POST: NORMAL EGD   • TONSILLECTOMY     • VENOUS ACCESS DEVICE (PORT) INSERTION N/A 4/11/2022    Procedure: INSERTION VENOUS ACCESS DEVICE with subcutaneous port;  Surgeon: Malcolm Wellington MD;  [place] [facility] [place] [facility] [place]  Service: [place];  Laterality: N/A;        Family History   Problem Relation Age of Onset   • Hypertension Mother    • Heart disease Father    • Heart attack Father 47        first MI age 47; second MI 57   • Hypertension Sister    • No Known Problems Brother    • Malig Hyperthermia Neg Hx       Social History     Socioeconomic History   • Marital status:    • Number of children: 2   Tobacco Use   • Smoking status: Never Smoker   • Smokeless tobacco: Never Used   Vaping Use   •  "Vaping Use: Never used   Substance and Sexual Activity   • Alcohol use: Not Currently     Comment: seldom   • Drug use: Never   • Sexual activity: Defer      OB History    No obstetric history on file.          Allergies   Allergen Reactions   • Penicillins Rash      Review of systems as mentioned in the HPI.    Objective   Blood pressure 114/74, pulse 83, temperature 97.1 °F (36.2 °C), temperature source Temporal, resp. rate 18, height 157.5 cm (62.01\"), weight 75.8 kg (167 lb 1.6 oz), SpO2 97 %.     Physical Exam  Vitals reviewed.   Constitutional:       Appearance: Normal appearance. She is normal weight.   HENT:      Head: Normocephalic and atraumatic.      Nose: Nose normal.      Mouth/Throat:      Mouth: Mucous membranes are moist.   Eyes:      Extraocular Movements: Extraocular movements intact.      Pupils: Pupils are equal, round, and reactive to light.   Cardiovascular:      Rate and Rhythm: Normal rate.      Heart sounds: Normal heart sounds. No murmur heard.    No gallop.   Pulmonary:      Effort: Pulmonary effort is normal. No respiratory distress.      Breath sounds: Normal breath sounds. No wheezing.      Comments: Mediport benign  Sutures present at the incision site of the port.  Abdominal:      General: Abdomen is flat. Bowel sounds are normal. There is no distension.      Palpations: Abdomen is soft.      Tenderness: There is no abdominal tenderness.      Comments: Abdominal incisions have healed well.     Musculoskeletal:         General: Normal range of motion.      Cervical back: Normal range of motion.      Right lower leg: No edema.      Left lower leg: No edema.   Skin:     Findings: No bruising or rash.   Neurological:      General: No focal deficit present.      Mental Status: She is alert and oriented to person, place, and time. Mental status is at baseline.   Psychiatric:         Behavior: Behavior normal.       Left gluteal abscess examined-wound appears clean with granulation tissue.  " Surrounding mild erythema.    I have reexamined the patient and the results are consistent with the previously documented exam. Mago Alvarez MD       DIAGNOSTIC DATA:  Results from last 7 days   Lab Units 08/16/22  0832   WBC 10*3/mm3 2.76*   NEUTROS ABS 10*3/mm3 1.66*   HEMOGLOBIN g/dL 10.5*   HEMATOCRIT % 32.5*   PLATELETS 10*3/mm3 170     Results from last 7 days   Lab Units 08/16/22  0832   SODIUM mmol/L 140   POTASSIUM mmol/L 3.7   CHLORIDE mmol/L 105   CO2 mmol/L 25.3   BUN mg/dL 10   CREATININE mg/dL 0.57   CALCIUM mg/dL 9.1   ALBUMIN g/dL 3.90   BILIRUBIN mg/dL 0.2   ALK PHOS U/L 280*   ALT (SGPT) U/L 41*   AST (SGOT) U/L 51*   GLUCOSE mg/dL 161*           8/16/2022 CMP reviewed and ALT mildly elevated at 41, AST 51, alkaline phosphatase 280  WBC count 2.76, absolute neutrophil count 1.6, hemoglobin 10.5 and platelets normal at 170    No radiology results for the last 30 days.       Assessment & Plan      66-year-old postmenopausal  lady with longstanding leukopenia/neutropenia and anemia and recently diagnosed T3N1A adenocarcinoma of the colon    *[id number] adenocarcinoma of the ascending colon  · The tumor measures 3.8 cm located in the cecum, invades through the muscularis propria into the pericolorectal adipose tissue.  No lymphovascular or perineural invasion.  All the margins are negative.  Tumor comes to within 3 cm of the radial margin.  3 out of 26 lymph nodes positive for metastatic carcinoma with the largest focus measuring 0.7 cm without extranodal extension.  · No tumor deposits identified  · PT3N1B  · MSI low probability on immunohistochemistry and stable on PCR  · No family history of colon cancer  · Explained to her that this is stage IIIb colon cancer however since his T3N1 I would consider this low risk stage III.  There is no other high risk features noted on pathology either.  · Given that the tumor is stage III there is definite benefit from adjuvant chemotherapy.  · We  discussed the 2 available regimens including XELOX and FOLFOX.  · Explained to her that based on the IDEA data 3 months of XELOX is noninferior to 6 months of adjuvant chemotherapy however with FOLFOX the noninferiority has not been established.  Explained to her that 6 months of chemotherapy improves disease-free survival by additional 2 to 3% over 3 months.  · Discussed the adverse effects of XELOX as well as FOLFOX.  · After discussing the pros and cons we have decided to proceed with FOLFOX for total of 6 months but if she is unable to tolerate due to neurotoxicity then we could potentially stop after 3 months of treatment.  · Port placed on 4/11/2022  · Cycle 1 FOLFOX on 4/18/2022  · Received cycle 9 of FOLFOX on 8/16/2022.  This was delayed by 1 week due to a left gluteal abscess.  · Continues to tolerate chemotherapy extremely well.  Continue with planned chemotherapy for total of 6 months.    *Leukopenia  · Predominantly neutropenia  · Mild increase in monocyte count  · This is chronic and unchanged  · No increased infections  · Had a previous work-up including a bone marrow biopsy with no clear etiology  · Differential includes nutritional, chronic idiopathic, autoimmune.  · She is currently not on any medications which could account for the leukopenia.  · No splenomegaly on exam  · 5/13/2021-flow cytometry negative  · LDH normal at 174  · Reticulocyte count 1.07%  · Ferritin low at 5.7  · Iron saturation low at 3%  · Folic acid normal at 11.8, B12 415  · Rheumatoid factor negative  · AVIVA negative  · ESR normal at 29  · Serum copper normal at 134  · WBC count has remained normal for the most part since starting FOLFOX due to Neulasta support.  · WBC count on 8/16/2022 lower likely secondary to infection or Bactrim.  · She will receive Neulasta today, continue to monitor WBC count closely    *Anemia  · Microcytic  · Likely secondary to iron deficiency  · 4/22/2021 iron saturation 3%, TIBC today 508, ferritin  5.70.  The patient was initiated on oral iron every other day.  · 8/26/2021, hemoglobin today 10.1.  The patient has been tolerating oral iron every other day.  We will increase oral iron to daily and recheck iron labs in 4 months.  · 12/16/2021, patient is taking oral iron every day and tolerating this well.  Hemoglobin today 10.0.  Iron saturation 45, TIBC 413, ferritin 10.20.  · 3/4/2022 ferritin 27.5, iron saturation 5%, TIBC 375, iron studies consistent with iron deficiency  · 3/24/2022 hemoglobin 10.2  · Received 3 doses of Venofer.  · 6/14/2022 Hemoglobin 12.1.  · 6/28/2022 hemoglobin 11.8.  · 8/16/2022-hemoglobin remained stable    *[id number]-date on mammogram    *Colitis  · Lymphocytic colitis noted on the biopsy of the colon  · There is a flare of colitis we may have to consider using steroids  · Currently she does not have any diarrhea  · Explained to her that 5-FU can increase the risk of diarrhea.  · We will have her follow-up with GI  · In the event of diarrhea we will try Imodium first and if no improvement in symptoms then consider steroids.  · No diarrhea    *Thrombocytopenia  · Improved and platelet count normal at 170    PLAN:   · Proceed with cycle 10 FOLFOX in 2 weeks, we will have her see a nurse practitioner on the day of chemotherapy to evaluate the gluteal abscess.  · I will plan to see her back in 4 weeks  · Monitor platelet counts closely and if there is a further drop with subsequent cycles decrease oxaliplatin dose by 20%    Patient is on treatment requiring close monitoring for toxicity.    Mago Alvarez MD  08/18/22

## 2022-08-26 NOTE — PROGRESS NOTES
Subjective   Kayla Adkins is a 66 y.o. female.  Referred by Dr. Rivas for evaluation of leukopenia    History of Present Illness   Ms. Adkins is a 66-year-old postmenopausal  who has been referred by Dr. Rivas for evaluation of leukopenia, neutropenia.  Patient reports that she has had longstanding leukopenia and her white blood cell count normally ranges in the threes.  On her most recent visit with Dr. Rivas white blood cell count was noted to be 2560 on 3/2/2021 and 2660 on 4/5/2021.  This prompted a referral to hematology.  Patient reports that she had a extensive work-up including a bone marrow biopsy for the same condition about 17 years ago.  She thinks that this was performed at [facility].  According to patient no clear etiology was determined after the work-up.  She was recommended to continue with periodic labs.  No treatment was required.    She was seen in December and had continued on oral iron.  She was tolerating that fairly well.  However she had worsening of colitis for about 2 months prior to that visit.  She was scheduled for colonoscopy in January 2022.    She had a routine surveillance colonoscopy performed by Dr. Whitlock on 2/22/2022.  On this there was an infiltrative nonobstructing medium-sized mass in the proximal ascending colon 1 to 2 cm distal to the cecum.  This was partially circumferential involving 1 foot of the lumen.  Biopsies were taken.  There is also mildly congested mucosa in the entire colon.  Random biopsies were taken.  She also had an EGD at the same time which reported normal esophagus, stomach and duodenum.  Pathology evaluation from the biopsy reported superficial fragments of at least intramucosal carcinoma.  Random colon biopsies reported increased lamina propria, chronic inflammation with surface epithelial alteration and increased intraepithelial lymphocytes consistent with lymphocytic colitis.  Biopsies from the stomach reported chronic  inactive gastritis.  Negative for intestinal metaplasia and H. pylori.  Biopsies from the duodenum was negative.    She was seen by Dr. Wellington who performed a laparoscopic right hemicolectomy on 3/2/2022.  Pathology showed a 3.8 cm mass at the cecum invading through the muscularis propria into the pericolorectal adipose tissue.  No lymphovascular or perineural invasion.  All margins were clear.  The radial margin was 3 cm.  3 out of 26 lymph nodes were positive for metastatic disease.  The largest metastatic focus was 0.7 cm with extranodal extension.  Pathological stage PT3N1B.  The tumor was moderately differentiated, grade 2 adenocarcinoma.    No loss of nuclear expression of MMR proteins.  Low probability of microsatellite instability.  MSI status-microsatellite stable.  This is by PCR.    Normal CEA and 0.77 on the day prior to surgery.    She was seen by Dr. Burton as inpatient and recommended adjuvant chemotherapy with FOLFOX.    Patient initiating cycle 1 FOLFOX 4/18/2022.  Neulasta support given due to baseline neutropenia.      Interval history:  She is seen via video visit today.  She is located at our [place] office and I am located at home.      Patient returns today for follow-up and evaluation prior to cycle #10 FOLFOX. She is seen with her  present.  She continues to tolerate treatment well.  She is eating and drinking adequately, her weight remains stable.  Bowels moving regularly.  Denies fever or chills.  Denies nausea or vomiting.  Denies signs or symptoms of peripheral neuropathy.  Denies new or worsening pain.    Gluteal abscess continues to heal well, she follows with Dr. Wellington again on Friday for this.    The following portions of the patient's history were reviewed and updated as appropriate: allergies, current medications, past family history, past medical history, past social history and problem list.    Past Medical History:   Diagnosis Date   • Anemia    • Anxiety    • Bladder  infection    • Colitis    • Colon cancer (HCC)    • GERD (gastroesophageal reflux disease)    • Headache    • Hyperlipidemia    • Hypertension    • Knee injury, initial encounter-left 07/01/2019   • Neutropenia (HCC) 01/03/2017   • Prediabetes 03/04/2019      Past Surgical History:   Procedure Laterality Date   • COLON RESECTION Right 03/02/2022    Procedure: LAPAROSCOPIC RIGHT HEMICOLECTOMY WITH DAVINCI ROBOT;  Surgeon: Malcolm Wellington MD;  Location: [facility] MAIN OR;  Service: Robotics - DaVinci;  Laterality: Right;   • COLONOSCOPY     • COLONOSCOPY W/ POLYPECTOMY N/A 02/22/2022    Procedure: COLONOSCOPY INTO CECUM WITH COLD BIOPSIES;  Surgeon: Irvin Clayton MD;  Location[facility] END[facility]COPY;  Service: Gastroenterology;  Laterality: N/A;  PRE: DIARRHEA, ANEMIA  POST: ASCENDING COLON MASS   • ENDOSCOPY N/A 02/22/2022    Procedure: ESOPHAGOGASTRODUODENOSCOPY WITH BIOPSY;  Surgeon: Irvin Clayton MD;  Location: [facility] ENDOSCOPY;  Service: Gastroenterology;  Laterality: N/A;  PRE: ANEMIA  POST: NORMAL EGD   • TONSILLECTOMY     • VENOUS ACCESS DEVICE (PORT) INSERTION N/A 4/11/2022    Procedure: INSERTION VENOUS ACCESS DEVICE with subcutaneous port;  Surgeon: Malcolm Wellington MD;  Location: [facility] OR [facility];  Service: General;  Laterality: N/A;        Family History   Problem Relation Age of Onset   • Hypertension Mother    • Heart disease Father    • Heart attack Father 47        first MI age 47; second MI 57   • Hypertension Sister    • No Known Problems Brother    • Malig Hyperthermia Neg Hx       Social History     Socioeconomic History   • Marital status:    • Number of children: 2   Tobacco Use   • Smoking status: Never Smoker   • Smokeless tobacco: Never Used   Vaping Use   • Vaping Use: Never used   Substance and Sexual Activity   • Alcohol use: Not Currently     Comment: seldom   • Drug use: Never   • Sexual activity: Defer      OB History    No obstetric history on file.    "       Allergies   Allergen Reactions   • Penicillins Rash      Review of systems as mentioned in the HPI.    Objective   Blood pressure 129/82, pulse 91, temperature 96.8 °F (36 °C), temperature source Temporal, resp. rate 18, height 157.5 cm (62.01\"), weight 74.9 kg (165 lb 1.6 oz), SpO2 96 %.   Limited due to video visit.  Physical Exam  Vitals reviewed.   Constitutional:       Appearance: Normal appearance. She is normal weight.   HENT:      Head: Normocephalic and atraumatic.      Nose: Nose normal.      Mouth/Throat:      Mouth: Mucous membranes are moist.   Eyes:      Extraocular Movements: Extraocular movements intact.      Pupils: Pupils are equal, round, and reactive to light.   Pulmonary:      Effort: Pulmonary effort is normal.      Comments: Mediport benign  Sutures present at the incision site of the port.  Abdominal:      Comments: Abdominal incisions have healed well.     Musculoskeletal:         General: Normal range of motion.      Cervical back: Normal range of motion.      Right lower leg: No edema.      Left lower leg: No edema.   Skin:     Findings: No bruising or rash.   Neurological:      General: No focal deficit present.      Mental Status: She is alert and oriented to person, place, and time. Mental status is at baseline.      Motor: No weakness.   Psychiatric:         Mood and Affect: Mood normal.         Behavior: Behavior normal.         Thought Content: Thought content normal.         Judgment: Judgment normal.       Not assessed today due to video visit.  Patient reports this area continues to heal well and will follow up with Dr. Wellington on Friday.  Previous: Left gluteal abscess examined-wound appears clean with granulation tissue.  Surrounding mild erythema.    I have reexamined the patient and the results are consistent with the previously documented exam. DALE Orozco       DIAGNOSTIC DATA:                8/16/2022 CMP reviewed and ALT mildly elevated at 41, AST 51, " alkaline phosphatase 280  WBC count 2.76, absolute neutrophil count 1.6, hemoglobin 10.5 and platelets normal at 170    No radiology results for the last 30 days.       Assessment & Plan      66-year-old postmenopausal  lady with longstanding leukopenia/neutropenia and anemia and recently diagnosed T3N1A adenocarcinoma of the colon    *[id number] adenocarcinoma of the ascending colon  · The tumor measures 3.8 cm located in the cecum, invades through the muscularis propria into the pericolorectal adipose tissue.  No lymphovascular or perineural invasion.  All the margins are negative.  Tumor comes to within 3 cm of the radial margin.  3 out of 26 lymph nodes positive for metastatic carcinoma with the largest focus measuring 0.7 cm without extranodal extension.  · No tumor deposits identified  · PT3N1B  · MSI low probability on immunohistochemistry and stable on PCR  · No family history of colon cancer  · Explained to her that this is stage IIIb colon cancer however since his T3N1 I would consider this low risk stage III.  There is no other high risk features noted on pathology either.  · Given that the tumor is stage III there is definite benefit from adjuvant chemotherapy.  · We discussed the 2 available regimens including XELOX and FOLFOX.  · Explained to her that based on the IDEA data 3 months of XELOX is noninferior to 6 months of adjuvant chemotherapy however with FOLFOX the noninferiority has not been established.  Explained to her that 6 months of chemotherapy improves disease-free survival by additional 2 to 3% over 3 months.  · Discussed the adverse effects of XELOX as well as FOLFOX.  · After discussing the pros and cons we have decided to proceed with FOLFOX for total of 6 months but if she is unable to tolerate due to neurotoxicity then we could potentially stop after 3 months of treatment.  · Port placed on 4/11/2022  · Cycle 1 FOLFOX on 4/18/2022  · Received cycle 9 of FOLFOX on 8/16/2022.  This  was delayed by 1 week due to a left gluteal abscess.  · 8/30/2022 platelets declined to 85,000 today.  We will reduce oxaliplatin by 20% and proceed with cycle #10 FOLFOX today. Gluteal abscess continues to heal well.  Continues to tolerate treatment well.  Continue with planned chemotherapy for total of 6 months.     *Leukopenia  · Predominantly neutropenia  · Mild increase in monocyte count  · This is chronic and unchanged  · No increased infections  · Had a previous work-up including a bone marrow biopsy with no clear etiology  · Differential includes nutritional, chronic idiopathic, autoimmune.  · She is currently not on any medications which could account for the leukopenia.  · No splenomegaly on exam  · 5/13/2021-flow cytometry negative  · LDH normal at 174  · Reticulocyte count 1.07%  · Ferritin low at 5.7  · Iron saturation low at 3%  · Folic acid normal at 11.8, B12 415  · Rheumatoid factor negative  · AVIVA negative  · ESR normal at 29  · Serum copper normal at 134  · WBC count has remained normal for the most part since starting FOLFOX due to Neulasta support.  · WBC count on 8/16/2022 lower likely secondary to infection or Bactrim.  She will receive Neulasta today, continue to monitor WBC count closely  · 8/30/2022 WBC 3.41, ANC 2.0.    *Anemia  · Microcytic  · Likely secondary to iron deficiency  · 4/22/2021 iron saturation 3%, TIBC today 508, ferritin 5.70.  The patient was initiated on oral iron every other day.  · 8/26/2021, hemoglobin today 10.1.  The patient has been tolerating oral iron every other day.  We will increase oral iron to daily and recheck iron labs in 4 months.  · 12/16/2021, patient is taking oral iron every day and tolerating this well.  Hemoglobin today 10.0.  Iron saturation 45, TIBC 413, ferritin 10.20.  · 3/4/2022 ferritin 27.5, iron saturation 5%, TIBC 375, iron studies consistent with iron deficiency  · 3/24/2022 hemoglobin 10.2  · Received 3 doses of Venofer.  · 6/14/2022  Hemoglobin 12.1.  · 6/28/2022 hemoglobin 11.8.  · 8/16/2022-hemoglobin remained stable  · 8/30/2022 hemoglobin 10.3    *Gsgkxmhzo-do-eu-date on mammogram    *Colitis  · Lymphocytic colitis noted on the biopsy of the colon  · There is a flare of colitis we may have to consider using steroids  · Currently she does not have any diarrhea  · Explained to her that 5-FU can increase the risk of diarrhea.  · We will have her follow-up with GI  · In the event of diarrhea we will try Imodium first and if no improvement in symptoms then consider steroids.  · No diarrhea    *Thrombocytopenia  · Improved and platelet count normal at 170  · Platelets today 85,000.  Reduce oxaliplatin by 20%.    *Hypokalemia  · Potassium today 2.7.  40 mEq oral Potassium in clinic today.  Start Potassium 20 mEq twice daily.  Sent to pharmacy.    PLAN:   · Reduca oxaliplatin by 20% due to thrombocytopenia and proceed with cycle 10 FOLFOX today.   · 40 mEq oral potassium in clinic today.  · Start potassium 20 mEq twice daily. Sent to pharmacy.  · Follow up Friday with Dr. Wellington for gluteal abscess, this continues to heal well.  · Return in 2 weeks on day of disconnect for MD follow-up.    Patient is on treatment requiring close monitoring for toxicity.  The patient was discussed with Dr. Alvarez who is in agreement with the above-mentioned plan.    I spent 55 minutes caring for Kayla on this date of service. This time includes time spent by me in the following activities: preparing for the visit, reviewing lab tests, documentation, ordering medications.    DALE Orozco  08/30/22

## 2022-08-30 ENCOUNTER — TELEMEDICINE (OUTPATIENT)
Dept: ONCOLOGY | Facility: CLINIC | Age: 67
End: 2022-08-30

## 2022-08-30 ENCOUNTER — INFUSION (OUTPATIENT)
Dept: ONCOLOGY | Facility: HOSPITAL | Age: 67
End: 2022-08-30

## 2022-08-30 ENCOUNTER — TELEPHONE (OUTPATIENT)
Dept: ONCOLOGY | Facility: CLINIC | Age: 67
End: 2022-08-30

## 2022-08-30 VITALS
HEIGHT: 62 IN | OXYGEN SATURATION: 96 % | RESPIRATION RATE: 18 BRPM | SYSTOLIC BLOOD PRESSURE: 129 MMHG | TEMPERATURE: 96.8 F | HEART RATE: 91 BPM | DIASTOLIC BLOOD PRESSURE: 82 MMHG | BODY MASS INDEX: 30.38 KG/M2 | WEIGHT: 165.1 LBS

## 2022-08-30 DIAGNOSIS — C18.2 MALIGNANT NEOPLASM OF ASCENDING COLON: Primary | ICD-10-CM

## 2022-08-30 DIAGNOSIS — T45.1X5A CHEMOTHERAPY-INDUCED THROMBOCYTOPENIA: ICD-10-CM

## 2022-08-30 DIAGNOSIS — E87.6 HYPOKALEMIA: ICD-10-CM

## 2022-08-30 DIAGNOSIS — Z79.899 HIGH RISK MEDICATION USE: ICD-10-CM

## 2022-08-30 DIAGNOSIS — D69.59 CHEMOTHERAPY-INDUCED THROMBOCYTOPENIA: ICD-10-CM

## 2022-08-30 LAB
ALBUMIN SERPL-MCNC: 3.8 G/DL (ref 3.5–5.2)
ALBUMIN/GLOB SERPL: 1.6 G/DL
ALP SERPL-CCNC: 278 U/L (ref 39–117)
ALT SERPL W P-5'-P-CCNC: 39 U/L (ref 1–33)
ANION GAP SERPL CALCULATED.3IONS-SCNC: 9.5 MMOL/L (ref 5–15)
ANISOCYTOSIS BLD QL: NORMAL
AST SERPL-CCNC: 40 U/L (ref 1–32)
BASOPHILS # BLD AUTO: 0.01 10*3/MM3 (ref 0–0.2)
BASOPHILS NFR BLD AUTO: 0.3 % (ref 0–1.5)
BILIRUB SERPL-MCNC: 0.3 MG/DL (ref 0–1.2)
BUN SERPL-MCNC: 6 MG/DL (ref 8–23)
BUN/CREAT SERPL: 10.9 (ref 7–25)
CALCIUM SPEC-SCNC: 8.9 MG/DL (ref 8.6–10.5)
CHLORIDE SERPL-SCNC: 106 MMOL/L (ref 98–107)
CO2 SERPL-SCNC: 26.5 MMOL/L (ref 22–29)
CREAT SERPL-MCNC: 0.55 MG/DL (ref 0.57–1)
DACRYOCYTES BLD QL SMEAR: NORMAL
DEPRECATED RDW RBC AUTO: 59.6 FL (ref 37–54)
EGFRCR SERPLBLD CKD-EPI 2021: 101.9 ML/MIN/1.73
EOSINOPHIL # BLD AUTO: 0.06 10*3/MM3 (ref 0–0.4)
EOSINOPHIL NFR BLD AUTO: 1.8 % (ref 0.3–6.2)
ERYTHROCYTE [DISTWIDTH] IN BLOOD BY AUTOMATED COUNT: 16 % (ref 12.3–15.4)
GLOBULIN UR ELPH-MCNC: 2.4 GM/DL
GLUCOSE SERPL-MCNC: 146 MG/DL (ref 65–99)
HCT VFR BLD AUTO: 31.3 % (ref 34–46.6)
HGB BLD-MCNC: 10.3 G/DL (ref 12–15.9)
IMM GRANULOCYTES # BLD AUTO: 0.02 10*3/MM3 (ref 0–0.05)
IMM GRANULOCYTES NFR BLD AUTO: 0.6 % (ref 0–0.5)
LYMPHOCYTES # BLD AUTO: 0.98 10*3/MM3 (ref 0.7–3.1)
LYMPHOCYTES NFR BLD AUTO: 28.7 % (ref 19.6–45.3)
MCH RBC QN AUTO: 34.4 PG (ref 26.6–33)
MCHC RBC AUTO-ENTMCNC: 32.9 G/DL (ref 31.5–35.7)
MCV RBC AUTO: 104.7 FL (ref 79–97)
MONOCYTES # BLD AUTO: 0.34 10*3/MM3 (ref 0.1–0.9)
MONOCYTES NFR BLD AUTO: 10 % (ref 5–12)
NEUTROPHILS NFR BLD AUTO: 2 10*3/MM3 (ref 1.7–7)
NEUTROPHILS NFR BLD AUTO: 58.6 % (ref 42.7–76)
NRBC BLD AUTO-RTO: 0 /100 WBC (ref 0–0.2)
PLAT MORPH BLD: NORMAL
PLATELET # BLD AUTO: 85 10*3/MM3 (ref 140–450)
PMV BLD AUTO: 11.2 FL (ref 6–12)
POTASSIUM SERPL-SCNC: 2.7 MMOL/L (ref 3.5–5.2)
PROT SERPL-MCNC: 6.2 G/DL (ref 6–8.5)
RBC # BLD AUTO: 2.99 10*6/MM3 (ref 3.77–5.28)
SODIUM SERPL-SCNC: 142 MMOL/L (ref 136–145)
WBC MORPH BLD: NORMAL
WBC NRBC COR # BLD: 3.41 10*3/MM3 (ref 3.4–10.8)

## 2022-08-30 PROCEDURE — 25010000002 DEXAMETHASONE SODIUM PHOSPHATE 100 MG/10ML SOLUTION: Performed by: NURSE PRACTITIONER

## 2022-08-30 PROCEDURE — 25010000002 OXALIPLATIN PER 0.5 MG: Performed by: NURSE PRACTITIONER

## 2022-08-30 PROCEDURE — 96415 CHEMO IV INFUSION ADDL HR: CPT

## 2022-08-30 PROCEDURE — 85025 COMPLETE CBC W/AUTO DIFF WBC: CPT | Performed by: INTERNAL MEDICINE

## 2022-08-30 PROCEDURE — 80053 COMPREHEN METABOLIC PANEL: CPT | Performed by: INTERNAL MEDICINE

## 2022-08-30 PROCEDURE — 96411 CHEMO IV PUSH ADDL DRUG: CPT

## 2022-08-30 PROCEDURE — 96416 CHEMO PROLONG INFUSE W/PUMP: CPT

## 2022-08-30 PROCEDURE — 96413 CHEMO IV INFUSION 1 HR: CPT

## 2022-08-30 PROCEDURE — 96375 TX/PRO/DX INJ NEW DRUG ADDON: CPT

## 2022-08-30 PROCEDURE — 25010000002 PALONOSETRON PER 25 MCG: Performed by: NURSE PRACTITIONER

## 2022-08-30 PROCEDURE — 85007 BL SMEAR W/DIFF WBC COUNT: CPT | Performed by: INTERNAL MEDICINE

## 2022-08-30 PROCEDURE — 96367 TX/PROPH/DG ADDL SEQ IV INF: CPT

## 2022-08-30 PROCEDURE — 25010000002 FLUOROURACIL PER 500 MG: Performed by: NURSE PRACTITIONER

## 2022-08-30 PROCEDURE — 96368 THER/DIAG CONCURRENT INF: CPT

## 2022-08-30 PROCEDURE — 25010000002 FOSAPREPITANT PER 1 MG: Performed by: NURSE PRACTITIONER

## 2022-08-30 PROCEDURE — 99215 OFFICE O/P EST HI 40 MIN: CPT | Performed by: NURSE PRACTITIONER

## 2022-08-30 PROCEDURE — 25010000002 LEUCOVORIN CALCIUM PER 50 MG: Performed by: NURSE PRACTITIONER

## 2022-08-30 RX ORDER — POTASSIUM CHLORIDE 1500 MG/1
20 TABLET, FILM COATED, EXTENDED RELEASE ORAL 2 TIMES DAILY
Qty: 60 TABLET | Refills: 3 | Status: SHIPPED | OUTPATIENT
Start: 2022-08-30 | End: 2022-08-30 | Stop reason: SDUPTHER

## 2022-08-30 RX ORDER — FLUOROURACIL 50 MG/ML
400 INJECTION, SOLUTION INTRAVENOUS ONCE
Status: CANCELLED | OUTPATIENT
Start: 2022-08-30

## 2022-08-30 RX ORDER — DEXTROSE MONOHYDRATE 50 MG/ML
250 INJECTION, SOLUTION INTRAVENOUS ONCE
Status: COMPLETED | OUTPATIENT
Start: 2022-08-30 | End: 2022-08-30

## 2022-08-30 RX ORDER — POTASSIUM CHLORIDE 1500 MG/1
20 TABLET, FILM COATED, EXTENDED RELEASE ORAL 2 TIMES DAILY
Qty: 60 TABLET | Refills: 3 | Status: SHIPPED | OUTPATIENT
Start: 2022-08-30 | End: 2022-10-14

## 2022-08-30 RX ORDER — PALONOSETRON 0.05 MG/ML
0.25 INJECTION, SOLUTION INTRAVENOUS ONCE
Status: CANCELLED | OUTPATIENT
Start: 2022-08-30

## 2022-08-30 RX ORDER — PALONOSETRON 0.05 MG/ML
0.25 INJECTION, SOLUTION INTRAVENOUS ONCE
Status: COMPLETED | OUTPATIENT
Start: 2022-08-30 | End: 2022-08-30

## 2022-08-30 RX ORDER — DEXTROSE MONOHYDRATE 50 MG/ML
250 INJECTION, SOLUTION INTRAVENOUS ONCE
Status: CANCELLED | OUTPATIENT
Start: 2022-08-30

## 2022-08-30 RX ORDER — POTASSIUM CHLORIDE 20 MEQ/1
40 TABLET, EXTENDED RELEASE ORAL ONCE
Status: COMPLETED | OUTPATIENT
Start: 2022-08-30 | End: 2022-08-30

## 2022-08-30 RX ORDER — FLUOROURACIL 50 MG/ML
400 INJECTION, SOLUTION INTRAVENOUS ONCE
Status: COMPLETED | OUTPATIENT
Start: 2022-08-30 | End: 2022-08-30

## 2022-08-30 RX ADMIN — FLUOROURACIL 4300 MG: 50 INJECTION, SOLUTION INTRAVENOUS at 13:03

## 2022-08-30 RX ADMIN — SODIUM CHLORIDE 100 ML: 9 INJECTION, SOLUTION INTRAVENOUS at 09:53

## 2022-08-30 RX ADMIN — DEXTROSE MONOHYDRATE 100 ML: 50 INJECTION, SOLUTION INTRAVENOUS at 09:51

## 2022-08-30 RX ADMIN — OXALIPLATIN 120 MG: 5 INJECTION, SOLUTION, CONCENTRATE INTRAVENOUS at 10:55

## 2022-08-30 RX ADMIN — LEUCOVORIN CALCIUM 720 MG: 350 INJECTION, POWDER, LYOPHILIZED, FOR SOLUTION INTRAMUSCULAR; INTRAVENOUS at 10:54

## 2022-08-30 RX ADMIN — FLUOROURACIL 720 MG: 50 INJECTION, SOLUTION INTRAVENOUS at 13:03

## 2022-08-30 RX ADMIN — POTASSIUM CHLORIDE 40 MEQ: 1500 TABLET, EXTENDED RELEASE ORAL at 10:51

## 2022-08-30 RX ADMIN — PALONOSETRON 0.25 MG: 0.05 INJECTION, SOLUTION INTRAVENOUS at 09:52

## 2022-08-30 RX ADMIN — DEXAMETHASONE SODIUM PHOSPHATE 12 MG: 10 INJECTION, SOLUTION INTRAMUSCULAR; INTRAVENOUS at 10:25

## 2022-08-30 NOTE — TELEPHONE ENCOUNTER
ReTurned call to patient after contacting her pharmacy with the Potassium order.  The prescription printed twice, so I contacted the pharmacy with the prescription.  Patient and pharmacy v/u.  She can  the script later today.

## 2022-09-01 ENCOUNTER — INFUSION (OUTPATIENT)
Dept: ONCOLOGY | Facility: HOSPITAL | Age: 67
End: 2022-09-01

## 2022-09-01 DIAGNOSIS — C18.2 MALIGNANT NEOPLASM OF ASCENDING COLON: Primary | ICD-10-CM

## 2022-09-01 DIAGNOSIS — Z45.2 ENCOUNTER FOR FITTING AND ADJUSTMENT OF VASCULAR CATHETER: ICD-10-CM

## 2022-09-01 PROCEDURE — 96377 APPLICATON ON-BODY INJECTOR: CPT

## 2022-09-01 PROCEDURE — 25010000002 HEPARIN LOCK FLUSH PER 10 UNITS: Performed by: INTERNAL MEDICINE

## 2022-09-01 PROCEDURE — 25010000002 PEGFILGRASTIM 6 MG/0.6ML PREFILLED SYRINGE KIT: Performed by: NURSE PRACTITIONER

## 2022-09-01 RX ORDER — SODIUM CHLORIDE 0.9 % (FLUSH) 0.9 %
10 SYRINGE (ML) INJECTION AS NEEDED
Status: CANCELLED | OUTPATIENT
Start: 2022-09-01

## 2022-09-01 RX ORDER — SODIUM CHLORIDE 0.9 % (FLUSH) 0.9 %
10 SYRINGE (ML) INJECTION AS NEEDED
Status: DISCONTINUED | OUTPATIENT
Start: 2022-09-01 | End: 2022-09-01 | Stop reason: HOSPADM

## 2022-09-01 RX ORDER — HEPARIN SODIUM (PORCINE) LOCK FLUSH IV SOLN 100 UNIT/ML 100 UNIT/ML
500 SOLUTION INTRAVENOUS AS NEEDED
Status: DISCONTINUED | OUTPATIENT
Start: 2022-09-01 | End: 2022-09-01 | Stop reason: HOSPADM

## 2022-09-01 RX ORDER — HEPARIN SODIUM (PORCINE) LOCK FLUSH IV SOLN 100 UNIT/ML 100 UNIT/ML
500 SOLUTION INTRAVENOUS AS NEEDED
Status: CANCELLED | OUTPATIENT
Start: 2022-09-01

## 2022-09-01 RX ADMIN — Medication 500 UNITS: at 10:46

## 2022-09-01 RX ADMIN — Medication 10 ML: at 10:46

## 2022-09-01 RX ADMIN — PEGFILGRASTIM 6 MG: KIT SUBCUTANEOUS at 10:46

## 2022-09-01 NOTE — PROGRESS NOTES
CC: Follow-up after incision and drainage of left gluteal abscess     S: Patient is here today for follow-up after undergoing incision and drainage of complex left gluteal abscess on 8/5/2022.   The wound is healing great and she denies any complaints    O:   Alert, no acute distress  Left gluteal almost completely healed with a small area of granulation tissue without skin that is less than 1 cm    Assessment and plan     66-year-old female on chemotherapy for colon cancer that developed left gluteal abscess status post bedside drainage.  She was diagnosed with MRSA.   The wound has almost completely healed.  Discussed with her about the need to place antibiotic ointment.  We will send MRSA nares to ensure there is no colonization.  If positive colonization she will be referred to infectious disease for decolonization.    I will call the patient with results, follow-up in my office.

## 2022-09-02 ENCOUNTER — OFFICE VISIT (OUTPATIENT)
Dept: SURGERY | Facility: CLINIC | Age: 67
End: 2022-09-02

## 2022-09-02 ENCOUNTER — LAB (OUTPATIENT)
Dept: LAB | Facility: HOSPITAL | Age: 67
End: 2022-09-02

## 2022-09-02 DIAGNOSIS — A49.02 MRSA (METHICILLIN RESISTANT STAPHYLOCOCCUS AUREUS): ICD-10-CM

## 2022-09-02 DIAGNOSIS — Z51.89 VISIT FOR WOUND CHECK: ICD-10-CM

## 2022-09-02 DIAGNOSIS — Z09 POSTOP CHECK: Primary | ICD-10-CM

## 2022-09-02 PROCEDURE — 87081 CULTURE SCREEN ONLY: CPT

## 2022-09-02 PROCEDURE — 99212 OFFICE O/P EST SF 10 MIN: CPT | Performed by: SURGERY

## 2022-09-03 LAB — MRSA SPEC QL CULT: NORMAL

## 2022-09-06 ENCOUNTER — TELEPHONE (OUTPATIENT)
Dept: SURGERY | Facility: CLINIC | Age: 67
End: 2022-09-06

## 2022-09-13 ENCOUNTER — INFUSION (OUTPATIENT)
Dept: ONCOLOGY | Facility: HOSPITAL | Age: 67
End: 2022-09-13

## 2022-09-13 VITALS
SYSTOLIC BLOOD PRESSURE: 122 MMHG | DIASTOLIC BLOOD PRESSURE: 81 MMHG | WEIGHT: 163 LBS | OXYGEN SATURATION: 97 % | HEART RATE: 96 BPM | BODY MASS INDEX: 30 KG/M2 | HEIGHT: 62 IN | RESPIRATION RATE: 18 BRPM | TEMPERATURE: 96.5 F

## 2022-09-13 DIAGNOSIS — C18.2 MALIGNANT NEOPLASM OF ASCENDING COLON: Primary | ICD-10-CM

## 2022-09-13 LAB
ALBUMIN SERPL-MCNC: 4 G/DL (ref 3.5–5.2)
ALBUMIN/GLOB SERPL: 1.7 G/DL
ALP SERPL-CCNC: 301 U/L (ref 39–117)
ALT SERPL W P-5'-P-CCNC: 46 U/L (ref 1–33)
ANION GAP SERPL CALCULATED.3IONS-SCNC: 10.3 MMOL/L (ref 5–15)
AST SERPL-CCNC: 54 U/L (ref 1–32)
BASOPHILS # BLD AUTO: 0.02 10*3/MM3 (ref 0–0.2)
BASOPHILS NFR BLD AUTO: 0.6 % (ref 0–1.5)
BILIRUB SERPL-MCNC: 0.2 MG/DL (ref 0–1.2)
BUN SERPL-MCNC: 5 MG/DL (ref 8–23)
BUN/CREAT SERPL: 8.8 (ref 7–25)
CALCIUM SPEC-SCNC: 9.2 MG/DL (ref 8.6–10.5)
CHLORIDE SERPL-SCNC: 106 MMOL/L (ref 98–107)
CO2 SERPL-SCNC: 25.7 MMOL/L (ref 22–29)
CREAT SERPL-MCNC: 0.57 MG/DL (ref 0.57–1)
DEPRECATED RDW RBC AUTO: 57.3 FL (ref 37–54)
EGFRCR SERPLBLD CKD-EPI 2021: 100.4 ML/MIN/1.73
EOSINOPHIL # BLD AUTO: 0.04 10*3/MM3 (ref 0–0.4)
EOSINOPHIL NFR BLD AUTO: 1.1 % (ref 0.3–6.2)
ERYTHROCYTE [DISTWIDTH] IN BLOOD BY AUTOMATED COUNT: 15.7 % (ref 12.3–15.4)
GLOBULIN UR ELPH-MCNC: 2.4 GM/DL
GLUCOSE SERPL-MCNC: 178 MG/DL (ref 65–99)
HCT VFR BLD AUTO: 29 % (ref 34–46.6)
HGB BLD-MCNC: 9.7 G/DL (ref 12–15.9)
IMM GRANULOCYTES # BLD AUTO: 0.1 10*3/MM3 (ref 0–0.05)
IMM GRANULOCYTES NFR BLD AUTO: 2.8 % (ref 0–0.5)
LYMPHOCYTES # BLD AUTO: 0.88 10*3/MM3 (ref 0.7–3.1)
LYMPHOCYTES NFR BLD AUTO: 24.4 % (ref 19.6–45.3)
MCH RBC QN AUTO: 34.9 PG (ref 26.6–33)
MCHC RBC AUTO-ENTMCNC: 33.4 G/DL (ref 31.5–35.7)
MCV RBC AUTO: 104.3 FL (ref 79–97)
MONOCYTES # BLD AUTO: 0.41 10*3/MM3 (ref 0.1–0.9)
MONOCYTES NFR BLD AUTO: 11.4 % (ref 5–12)
NEUTROPHILS NFR BLD AUTO: 2.16 10*3/MM3 (ref 1.7–7)
NEUTROPHILS NFR BLD AUTO: 59.7 % (ref 42.7–76)
NRBC BLD AUTO-RTO: 0.8 /100 WBC (ref 0–0.2)
PLATELET # BLD AUTO: 89 10*3/MM3 (ref 140–450)
PMV BLD AUTO: 11.1 FL (ref 6–12)
POTASSIUM SERPL-SCNC: 3.7 MMOL/L (ref 3.5–5.2)
PROT SERPL-MCNC: 6.4 G/DL (ref 6–8.5)
RBC # BLD AUTO: 2.78 10*6/MM3 (ref 3.77–5.28)
SODIUM SERPL-SCNC: 142 MMOL/L (ref 136–145)
WBC NRBC COR # BLD: 3.61 10*3/MM3 (ref 3.4–10.8)

## 2022-09-13 PROCEDURE — 25010000002 PALONOSETRON PER 25 MCG: Performed by: NURSE PRACTITIONER

## 2022-09-13 PROCEDURE — 25010000002 OXALIPLATIN PER 0.5 MG: Performed by: NURSE PRACTITIONER

## 2022-09-13 PROCEDURE — 96375 TX/PRO/DX INJ NEW DRUG ADDON: CPT

## 2022-09-13 PROCEDURE — 25010000002 DEXAMETHASONE SODIUM PHOSPHATE 100 MG/10ML SOLUTION: Performed by: NURSE PRACTITIONER

## 2022-09-13 PROCEDURE — 80053 COMPREHEN METABOLIC PANEL: CPT

## 2022-09-13 PROCEDURE — 96416 CHEMO PROLONG INFUSE W/PUMP: CPT

## 2022-09-13 PROCEDURE — 96415 CHEMO IV INFUSION ADDL HR: CPT

## 2022-09-13 PROCEDURE — 96413 CHEMO IV INFUSION 1 HR: CPT

## 2022-09-13 PROCEDURE — G0498 CHEMO EXTEND IV INFUS W/PUMP: HCPCS

## 2022-09-13 PROCEDURE — 96367 TX/PROPH/DG ADDL SEQ IV INF: CPT

## 2022-09-13 PROCEDURE — 25010000002 FLUOROURACIL PER 500 MG: Performed by: NURSE PRACTITIONER

## 2022-09-13 PROCEDURE — 96368 THER/DIAG CONCURRENT INF: CPT

## 2022-09-13 PROCEDURE — 25010000002 FOSAPREPITANT PER 1 MG: Performed by: NURSE PRACTITIONER

## 2022-09-13 PROCEDURE — 96411 CHEMO IV PUSH ADDL DRUG: CPT

## 2022-09-13 PROCEDURE — 25010000002 LEUCOVORIN CALCIUM PER 50 MG: Performed by: NURSE PRACTITIONER

## 2022-09-13 PROCEDURE — 85025 COMPLETE CBC W/AUTO DIFF WBC: CPT

## 2022-09-13 RX ORDER — DEXTROSE MONOHYDRATE 50 MG/ML
250 INJECTION, SOLUTION INTRAVENOUS ONCE
Status: COMPLETED | OUTPATIENT
Start: 2022-09-13 | End: 2022-09-13

## 2022-09-13 RX ORDER — FAMOTIDINE 10 MG/ML
20 INJECTION, SOLUTION INTRAVENOUS AS NEEDED
Status: CANCELLED | OUTPATIENT
Start: 2022-09-13

## 2022-09-13 RX ORDER — DIPHENHYDRAMINE HYDROCHLORIDE 50 MG/ML
50 INJECTION INTRAMUSCULAR; INTRAVENOUS AS NEEDED
Status: CANCELLED | OUTPATIENT
Start: 2022-09-13

## 2022-09-13 RX ORDER — FLUOROURACIL 50 MG/ML
400 INJECTION, SOLUTION INTRAVENOUS ONCE
Status: COMPLETED | OUTPATIENT
Start: 2022-09-13 | End: 2022-09-13

## 2022-09-13 RX ORDER — PALONOSETRON 0.05 MG/ML
0.25 INJECTION, SOLUTION INTRAVENOUS ONCE
Status: COMPLETED | OUTPATIENT
Start: 2022-09-13 | End: 2022-09-13

## 2022-09-13 RX ADMIN — OXALIPLATIN 120 MG: 5 INJECTION, SOLUTION, CONCENTRATE INTRAVENOUS at 09:59

## 2022-09-13 RX ADMIN — PALONOSETRON 0.25 MG: 0.05 INJECTION, SOLUTION INTRAVENOUS at 09:02

## 2022-09-13 RX ADMIN — DEXTROSE MONOHYDRATE 250 ML: 50 INJECTION, SOLUTION INTRAVENOUS at 09:02

## 2022-09-13 RX ADMIN — LEUCOVORIN CALCIUM 720 MG: 350 INJECTION, POWDER, LYOPHILIZED, FOR SOLUTION INTRAMUSCULAR; INTRAVENOUS at 09:57

## 2022-09-13 RX ADMIN — FLUOROURACIL 4300 MG: 50 INJECTION, SOLUTION INTRAVENOUS at 11:54

## 2022-09-13 RX ADMIN — FLUOROURACIL 720 MG: 50 INJECTION, SOLUTION INTRAVENOUS at 11:53

## 2022-09-13 RX ADMIN — DEXAMETHASONE SODIUM PHOSPHATE 12 MG: 10 INJECTION, SOLUTION INTRAMUSCULAR; INTRAVENOUS at 09:02

## 2022-09-13 RX ADMIN — SODIUM CHLORIDE 100 ML: 9 INJECTION, SOLUTION INTRAVENOUS at 09:22

## 2022-09-13 NOTE — NURSING NOTE
Labs reviewed with Natalya HUNTER and ok to treat today .PLT count is stable at present.  Lab Results   Component Value Date    WBC 3.61 09/13/2022    HGB 9.7 (L) 09/13/2022    HCT 29.0 (L) 09/13/2022    .3 (H) 09/13/2022    PLT 89 (L) 09/13/2022

## 2022-09-15 ENCOUNTER — APPOINTMENT (OUTPATIENT)
Dept: OTHER | Facility: HOSPITAL | Age: 67
End: 2022-09-15

## 2022-09-15 ENCOUNTER — INFUSION (OUTPATIENT)
Dept: ONCOLOGY | Facility: HOSPITAL | Age: 67
End: 2022-09-15

## 2022-09-15 ENCOUNTER — OFFICE VISIT (OUTPATIENT)
Dept: ONCOLOGY | Facility: CLINIC | Age: 67
End: 2022-09-15

## 2022-09-15 VITALS
BODY MASS INDEX: 30.18 KG/M2 | RESPIRATION RATE: 16 BRPM | SYSTOLIC BLOOD PRESSURE: 102 MMHG | HEART RATE: 92 BPM | WEIGHT: 164 LBS | TEMPERATURE: 96.9 F | HEIGHT: 62 IN | DIASTOLIC BLOOD PRESSURE: 54 MMHG | OXYGEN SATURATION: 98 %

## 2022-09-15 DIAGNOSIS — Z45.2 ENCOUNTER FOR FITTING AND ADJUSTMENT OF VASCULAR CATHETER: Primary | ICD-10-CM

## 2022-09-15 DIAGNOSIS — K63.89 COLONIC MASS: ICD-10-CM

## 2022-09-15 DIAGNOSIS — C18.2 MALIGNANT NEOPLASM OF ASCENDING COLON: ICD-10-CM

## 2022-09-15 DIAGNOSIS — C18.2 MALIGNANT NEOPLASM OF ASCENDING COLON: Primary | ICD-10-CM

## 2022-09-15 PROCEDURE — 25010000002 PEGFILGRASTIM 6 MG/0.6ML PREFILLED SYRINGE KIT: Performed by: INTERNAL MEDICINE

## 2022-09-15 PROCEDURE — 96377 APPLICATON ON-BODY INJECTOR: CPT

## 2022-09-15 PROCEDURE — 25010000002 HEPARIN LOCK FLUSH PER 10 UNITS: Performed by: INTERNAL MEDICINE

## 2022-09-15 PROCEDURE — 99215 OFFICE O/P EST HI 40 MIN: CPT | Performed by: INTERNAL MEDICINE

## 2022-09-15 RX ORDER — SODIUM CHLORIDE 0.9 % (FLUSH) 0.9 %
10 SYRINGE (ML) INJECTION AS NEEDED
Status: DISCONTINUED | OUTPATIENT
Start: 2022-09-15 | End: 2022-09-15 | Stop reason: HOSPADM

## 2022-09-15 RX ORDER — SODIUM CHLORIDE 0.9 % (FLUSH) 0.9 %
10 SYRINGE (ML) INJECTION AS NEEDED
Status: CANCELLED | OUTPATIENT
Start: 2022-09-15

## 2022-09-15 RX ORDER — HEPARIN SODIUM (PORCINE) LOCK FLUSH IV SOLN 100 UNIT/ML 100 UNIT/ML
500 SOLUTION INTRAVENOUS AS NEEDED
Status: DISCONTINUED | OUTPATIENT
Start: 2022-09-15 | End: 2022-09-15 | Stop reason: HOSPADM

## 2022-09-15 RX ORDER — HEPARIN SODIUM (PORCINE) LOCK FLUSH IV SOLN 100 UNIT/ML 100 UNIT/ML
500 SOLUTION INTRAVENOUS AS NEEDED
Status: CANCELLED | OUTPATIENT
Start: 2022-09-15

## 2022-09-15 RX ADMIN — Medication 500 UNITS: at 09:54

## 2022-09-15 RX ADMIN — Medication 10 ML: at 09:54

## 2022-09-15 RX ADMIN — PEGFILGRASTIM 6 MG: KIT SUBCUTANEOUS at 10:28

## 2022-09-15 NOTE — PROGRESS NOTES
Subjective   Kayla Adkins is a 67 y.o. female.  Referred by Dr. Rivas for evaluation of leukopenia    History of Present Illness   Ms. Adkins is a 66-year-old postmenopausal  who has been referred by Dr. Rivas for evaluation of leukopenia, neutropenia.  Patient reports that she has had longstanding leukopenia and her white blood cell count normally ranges in the threes.  On her most recent visit with Dr. Rivas white blood cell count was noted to be 2560 on 3/2/2021 and 2660 on 4/5/2021.  This prompted a referral to hematology.  Patient reports that she had a extensive work-up including a bone marrow biopsy for the same condition about 17 years ago.  She thinks that this was performed [facility] [place].  According to patient no clear etiology was determined after the work-up.  She was recommended to continue with periodic labs.  No treatment was required.    She was seen in December and had continued on oral iron.  She was tolerating that fairly well.  However she had worsening of colitis for about 2 months prior to that visit.  She was scheduled for colonoscopy in January 2022.    She had a routine surveillance colonoscopy performed by Dr. Whitlock on 2/22/2022.  On this there was an infiltrative nonobstructing medium-sized mass in the proximal ascending colon 1 to 2 cm distal to the cecum.  This was partially circumferential involving 1 foot of the lumen.  Biopsies were taken.  There is also mildly congested mucosa in the entire colon.  Random biopsies were taken.  She also had an EGD at the same time which reported normal esophagus, stomach and duodenum.  Pathology evaluation from the biopsy reported superficial fragments of at least intramucosal carcinoma.  Random colon biopsies reported increased lamina propria, chronic inflammation with surface epithelial alteration and increased intraepithelial lymphocytes consistent with lymphocytic colitis.  Biopsies from the stomach reported chronic  inactive gastritis.  Negative for intestinal metaplasia and H. pylori.  Biopsies from the duodenum was negative.    She was seen by Dr. Wellington who performed a laparoscopic right hemicolectomy on 3/2/2022.  Pathology showed a 3.8 cm mass at the cecum invading through the muscularis propria into the pericolorectal adipose tissue.  No lymphovascular or perineural invasion.  All margins were clear.  The radial margin was 3 cm.  3 out of 26 lymph nodes were positive for metastatic disease.  The largest metastatic focus was 0.7 cm with extranodal extension.  Pathological stage PT3N1B.  The tumor was moderately differentiated, grade 2 adenocarcinoma.    No loss of nuclear expression of MMR proteins.  Low probability of microsatellite instability.  MSI status-microsatellite stable.  This is by PCR.    Normal CEA and 0.77 on the day prior to surgery.    She was seen by Dr. Burton as inpatient and recommended adjuvant chemotherapy with FOLFOX.    Patient initiating cycle 1 FOLFOX 4/18/2022.  Neulasta support given due to baseline neutropenia.      Interval history:  Patient presents today for follow-up.  She received cycle 11 of FOLFOX on 9/13/2022 with a 20% dose reduction in oxaliplatin due to thrombocytopenia.  She reports tingling in her fingers with started a few days ago.  She also feels a little weak today.  Blood pressure somewhat on the low side.  Occasional dizziness.  No nausea or vomiting.  Otherwise tolerating chemotherapy well.    The following portions of the patient's history were reviewed and updated as appropriate: allergies, current medications, past family history, past medical history, past social history and problem list.    Past Medical History:   Diagnosis Date   • Anemia    • Anxiety    • Bladder infection    • Colitis    • Colon cancer (HCC)    • GERD (gastroesophageal reflux disease)    • Headache    • Hyperlipidemia    • Hypertension    • Knee injury, initial encounter-left 07/01/2019   • Neutropenia  (HCC) 01/03/2017   • Prediabetes 03/04/2019      Past Surgical History:   Procedure Laterality Date   • COLON RESECTION Right 03/02/2022    Procedure: LAPAROSCOPIC RIGHT HEMICOLECTOMY WITH DAVINCI ROBOT;  Surgeon: Malcolm Wellington MD;  Location: [facility] OR;  Service: Robotics - DaVinci;  Laterality: Right;   • COLONOSCOPY     • COLONOSCOPY W/ POLYPECTOMY N/A 02/22/2022    Procedure: COLONOSCOPY INTO CECUM WITH COLD BIOPSIES;  Surgeon: Irvin Clayton MD;  Location: [facility] ENDOSCOPY;  Service: Gastroenterology;  Laterality: N/A;  PRE: DIARRHEA, ANEMIA  POST: ASCENDING COLON MASS   • ENDOSCOPY N/A 02/22/2022    Procedure: ESOPHAGOGASTRODUODENOSCOPY WITH BIOPSY;  Surgeon: Irvin Clayton MD;  Location: [facility] ENDOSCOPY;  Service: Gastroenterology;  Laterality: N/A;  PRE: ANEMIA  POST: NORMAL EGD   • TONSILLECTOMY     • VENOUS ACCESS DEVICE (PORT) INSERTION N/A 4/11/2022    Procedure: INSERTION VENOUS ACCESS DEVICE with subcutaneous port;  Surgeon: Malcolm Wellington MD;  Location: [facility];  Service: General;  Laterality: N/A;        Family History   Problem Relation Age of Onset   • Hypertension Mother    • Heart disease Father    • Heart attack Father 47        first MI age 47; second MI 57   • Hypertension Sister    • No Known Problems Brother    • Malig Hyperthermia Neg Hx       Social History     Socioeconomic History   • Marital status:    • Number of children: 2   Tobacco Use   • Smoking status: Never Smoker   • Smokeless tobacco: Never Used   Vaping Use   • Vaping Use: Never used   Substance and Sexual Activity   • Alcohol use: Not Currently     Comment: seldom   • Drug use: Never   • Sexual activity: Defer      OB History    No obstetric history on file.          Allergies   Allergen Reactions   • Penicillins Rash      Review of systems as mentioned in the HPI    Objective   Blood pressure 102/54, pulse 92, temperature 96.9 °F (36.1 °C), temperature source  "Temporal, resp. rate 16, height 157.5 cm (62.01\"), weight 74.4 kg (164 lb), SpO2 98 %.     Physical Exam  Vitals reviewed.   Constitutional:       Appearance: Normal appearance. She is normal weight.   HENT:      Head: Normocephalic and atraumatic.      Nose: Nose normal.      Mouth/Throat:      Mouth: Mucous membranes are moist.   Eyes:      Extraocular Movements: Extraocular movements intact.      Pupils: Pupils are equal, round, and reactive to light.   Cardiovascular:      Rate and Rhythm: Normal rate.      Heart sounds: Normal heart sounds. No murmur heard.    No gallop.   Pulmonary:      Effort: Pulmonary effort is normal. No respiratory distress.      Breath sounds: Normal breath sounds. No wheezing.      Comments:     Abdominal:      General: Abdomen is flat. Bowel sounds are normal. There is no distension.      Palpations: Abdomen is soft.      Tenderness: There is no abdominal tenderness.      Comments: Abdominal incisions have healed well.     Musculoskeletal:         General: Normal range of motion.      Cervical back: Normal range of motion.      Right lower leg: No edema.      Left lower leg: No edema.   Skin:     Findings: No bruising or rash.   Neurological:      General: No focal deficit present.      Mental Status: She is alert and oriented to person, place, and time. Mental status is at baseline.   Psychiatric:         Behavior: Behavior normal.       Left gluteal abscess examined-wound appears clean with granulation tissue.  Surrounding mild erythema.    I have reexamined the patient and the results are consistent with the previously documented exam. Mago Alvarez MD       DIAGNOSTIC DATA:  Results from last 7 days   Lab Units 09/13/22  0757   WBC 10*3/mm3 3.61   NEUTROS ABS 10*3/mm3 2.16   HEMOGLOBIN g/dL 9.7*   HEMATOCRIT % 29.0*   PLATELETS 10*3/mm3 89*     Results from last 7 days   Lab Units 09/13/22  0757   SODIUM mmol/L 142   POTASSIUM mmol/L 3.7   CHLORIDE mmol/L 106   CO2 mmol/L 25.7 "   BUN mg/dL 5*   CREATININE mg/dL 0.57   CALCIUM mg/dL 9.2   ALBUMIN g/dL 4.00   BILIRUBIN mg/dL 0.2   ALK PHOS U/L 301*   ALT (SGPT) U/L 46*   AST (SGOT) U/L 54*   GLUCOSE mg/dL 178*           8/16/2022 CMP reviewed and ALT mildly elevated at 41, AST 51, alkaline phosphatase 280  WBC count 2.76, absolute neutrophil count 1.6, hemoglobin 10.5 and platelets normal at 170    No radiology results for the last 30 days.       Assessment & Plan      66-year-old postmenopausal  lady with longstanding leukopenia/neutropenia and anemia and recently diagnosed T3N1A adenocarcinoma of the colon    *[id number] adenocarcinoma of the ascending colon  · The tumor measures 3.8 cm located in the cecum, invades through the muscularis propria into the pericolorectal adipose tissue.  No lymphovascular or perineural invasion.  All the margins are negative.  Tumor comes to within 3 cm of the radial margin.  3 out of 26 lymph nodes positive for metastatic carcinoma with the largest focus measuring 0.7 cm without extranodal extension.  · No tumor deposits identified  · PT3N1B  · MSI low probability on immunohistochemistry and stable on PCR  · No family history of colon cancer  · Explained to her that this is stage IIIb colon cancer however since his T3N1 I would consider this low risk stage III.  There is no other high risk features noted on pathology either.  · Given that the tumor is stage III there is definite benefit from adjuvant chemotherapy.  · We discussed the 2 available regimens including XELOX and FOLFOX.  · Explained to her that based on the IDEA data 3 months of XELOX is noninferior to 6 months of adjuvant chemotherapy however with FOLFOX the noninferiority has not been established.  Explained to her that 6 months of chemotherapy improves disease-free survival by additional 2 to 3% over 3 months.  · Discussed the adverse effects of XELOX as well as FOLFOX.  · After discussing the pros and cons we have decided to proceed  with FOLFOX for total of 6 months but if she is unable to tolerate due to neurotoxicity then we could potentially stop after 3 months of treatment.  · Port placed on 4/11/2022  · Cycle 1 FOLFOX on 4/18/2022  · Cycle 11 FOLFOX 9/13/2022 with a 20% dose reduction of oxaliplatin.  · Now experiencing neuropathy, we will discontinue oxaliplatin and just to 5-FU for cycle 12    *Leukopenia  · Predominantly neutropenia  · Mild increase in monocyte count  · This is chronic and unchanged  · No increased infections  · Had a previous work-up including a bone marrow biopsy with no clear etiology  · Differential includes nutritional, chronic idiopathic, autoimmune.  · She is currently not on any medications which could account for the leukopenia.  · No splenomegaly on exam  · 5/13/2021-flow cytometry negative  · LDH normal at 174  · Reticulocyte count 1.07%  · Ferritin low at 5.7  · Iron saturation low at 3%  · Folic acid normal at 11.8, B12 415  · Rheumatoid factor negative  · AVIVA negative  · ESR normal at 29  · Serum copper normal at 134  · WBC count has remained normal for the most part since starting FOLFOX due to Neulasta support.  · 9/15/2022-WBC 3.61, hemoglobin 9.7 and platelets 89,000 with an absolute neutrophil count of 2.16    *Anemia  · Microcytic  · Likely secondary to iron deficiency  · 4/22/2021 iron saturation 3%, TIBC today 508, ferritin 5.70.  The patient was initiated on oral iron every other day.  · 8/26/2021, hemoglobin today 10.1.  The patient has been tolerating oral iron every other day.  We will increase oral iron to daily and recheck iron labs in 4 months.  · 12/16/2021, patient is taking oral iron every day and tolerating this well.  Hemoglobin today 10.0.  Iron saturation 45, TIBC 413, ferritin 10.20.  · 3/4/2022 ferritin 27.5, iron saturation 5%, TIBC 375, iron studies consistent with iron deficiency  · 3/24/2022 hemoglobin 10.2  · Received 3 doses of Venofer.  · 9/15/2022-hemoglobin 9.7, low due to  chemotherapy    *Glimwuczr-vo-nk-date on mammogram    *Colitis  · Lymphocytic colitis noted on the biopsy of the colon  · There is a flare of colitis we may have to consider using steroids  · Currently she does not have any diarrhea  · Explained to her that 5-FU can increase the risk of diarrhea.  · We will have her follow-up with GI  · In the event of diarrhea we will try Imodium first and if no improvement in symptoms then consider steroids.  · No diarrhea    *Thrombocytopenia  · Platelets low at 89,000    PLAN:   · Discontinue oxaliplatin after the cycle  · Follow-up in 2 weeks for 5-FU only, this will be the last cycle of chemotherapy  · Follow-up with me in 5 weeks with restaging scans, CT of the chest abdomen and pelvis    Patient is on treatment requiring close monitoring for toxicity.  Experiencing toxicity secondary to chemotherapy.    Mago Alvarez MD  09/15/22

## 2022-09-27 ENCOUNTER — INFUSION (OUTPATIENT)
Dept: ONCOLOGY | Facility: HOSPITAL | Age: 67
End: 2022-09-27

## 2022-09-27 VITALS
BODY MASS INDEX: 29.63 KG/M2 | SYSTOLIC BLOOD PRESSURE: 119 MMHG | TEMPERATURE: 97.7 F | HEART RATE: 90 BPM | HEIGHT: 62 IN | OXYGEN SATURATION: 96 % | DIASTOLIC BLOOD PRESSURE: 77 MMHG | RESPIRATION RATE: 18 BRPM | WEIGHT: 161 LBS

## 2022-09-27 DIAGNOSIS — C18.2 MALIGNANT NEOPLASM OF ASCENDING COLON: Primary | ICD-10-CM

## 2022-09-27 DIAGNOSIS — R30.0 BURNING WITH URINATION: ICD-10-CM

## 2022-09-27 LAB
ALBUMIN SERPL-MCNC: 4.1 G/DL (ref 3.5–5.2)
ALBUMIN/GLOB SERPL: 1.6 G/DL
ALP SERPL-CCNC: 342 U/L (ref 39–117)
ALT SERPL W P-5'-P-CCNC: 48 U/L (ref 1–33)
ANION GAP SERPL CALCULATED.3IONS-SCNC: 10 MMOL/L (ref 5–15)
ANISOCYTOSIS BLD QL: ABNORMAL
AST SERPL-CCNC: 52 U/L (ref 1–32)
BACTERIA UR QL AUTO: ABNORMAL /HPF
BILIRUB SERPL-MCNC: 0.3 MG/DL (ref 0–1.2)
BILIRUB UR QL STRIP: ABNORMAL
BUN SERPL-MCNC: 6 MG/DL (ref 8–23)
BUN/CREAT SERPL: 9.1 (ref 7–25)
CALCIUM SPEC-SCNC: 9.3 MG/DL (ref 8.6–10.5)
CHLORIDE SERPL-SCNC: 107 MMOL/L (ref 98–107)
CLARITY UR: CLEAR
CO2 SERPL-SCNC: 24 MMOL/L (ref 22–29)
COLOR UR: ABNORMAL
CREAT SERPL-MCNC: 0.66 MG/DL (ref 0.57–1)
DACRYOCYTES BLD QL SMEAR: ABNORMAL
DEPRECATED RDW RBC AUTO: 60 FL (ref 37–54)
EGFRCR SERPLBLD CKD-EPI 2021: 96.9 ML/MIN/1.73
EOSINOPHIL # BLD MANUAL: 0.07 10*3/MM3 (ref 0–0.4)
EOSINOPHIL NFR BLD MANUAL: 1 % (ref 0.3–6.2)
ERYTHROCYTE [DISTWIDTH] IN BLOOD BY AUTOMATED COUNT: 16.2 % (ref 12.3–15.4)
GLOBULIN UR ELPH-MCNC: 2.5 GM/DL
GLUCOSE SERPL-MCNC: 163 MG/DL (ref 65–99)
GLUCOSE UR STRIP-MCNC: ABNORMAL MG/DL
HCT VFR BLD AUTO: 31.1 % (ref 34–46.6)
HGB BLD-MCNC: 10.1 G/DL (ref 12–15.9)
HGB UR QL STRIP.AUTO: ABNORMAL
HYALINE CASTS UR QL AUTO: ABNORMAL /LPF
KETONES UR QL STRIP: ABNORMAL
LEUKOCYTE ESTERASE UR QL STRIP.AUTO: ABNORMAL
LYMPHOCYTES # BLD MANUAL: 0.81 10*3/MM3 (ref 0.7–3.1)
LYMPHOCYTES NFR BLD MANUAL: 5 % (ref 5–12)
MCH RBC QN AUTO: 34 PG (ref 26.6–33)
MCHC RBC AUTO-ENTMCNC: 32.5 G/DL (ref 31.5–35.7)
MCV RBC AUTO: 104.7 FL (ref 79–97)
MONOCYTES # BLD: 0.37 10*3/MM3 (ref 0.1–0.9)
NEUTROPHILS # BLD AUTO: 6.13 10*3/MM3 (ref 1.7–7)
NEUTROPHILS NFR BLD MANUAL: 83 % (ref 42.7–76)
NITRITE UR QL STRIP: POSITIVE
PH UR STRIP.AUTO: <=5 [PH] (ref 5–8)
PLAT MORPH BLD: NORMAL
PLATELET # BLD AUTO: 96 10*3/MM3 (ref 140–450)
PMV BLD AUTO: 10.8 FL (ref 6–12)
POTASSIUM SERPL-SCNC: 4 MMOL/L (ref 3.5–5.2)
PROT SERPL-MCNC: 6.6 G/DL (ref 6–8.5)
PROT UR QL STRIP: ABNORMAL
RBC # BLD AUTO: 2.97 10*6/MM3 (ref 3.77–5.28)
RBC # UR STRIP: ABNORMAL /HPF
REF LAB TEST METHOD: ABNORMAL
SCAN SLIDE: NORMAL
SODIUM SERPL-SCNC: 141 MMOL/L (ref 136–145)
SP GR UR STRIP: 1.01 (ref 1–1.03)
SPHEROCYTES BLD QL SMEAR: ABNORMAL
SQUAMOUS #/AREA URNS HPF: ABNORMAL /HPF
UROBILINOGEN UR QL STRIP: ABNORMAL
VARIANT LYMPHS NFR BLD MANUAL: 11 % (ref 19.6–45.3)
WBC # UR STRIP: ABNORMAL /HPF
WBC MORPH BLD: NORMAL
WBC NRBC COR # BLD: 7.38 10*3/MM3 (ref 3.4–10.8)

## 2022-09-27 PROCEDURE — 85025 COMPLETE CBC W/AUTO DIFF WBC: CPT | Performed by: INTERNAL MEDICINE

## 2022-09-27 PROCEDURE — 85007 BL SMEAR W/DIFF WBC COUNT: CPT | Performed by: INTERNAL MEDICINE

## 2022-09-27 PROCEDURE — 96409 CHEMO IV PUSH SNGL DRUG: CPT

## 2022-09-27 PROCEDURE — 80053 COMPREHEN METABOLIC PANEL: CPT | Performed by: INTERNAL MEDICINE

## 2022-09-27 PROCEDURE — 25010000002 DEXAMETHASONE SODIUM PHOSPHATE 100 MG/10ML SOLUTION: Performed by: NURSE PRACTITIONER

## 2022-09-27 PROCEDURE — G0498 CHEMO EXTEND IV INFUS W/PUMP: HCPCS

## 2022-09-27 PROCEDURE — 81001 URINALYSIS AUTO W/SCOPE: CPT | Performed by: NURSE PRACTITIONER

## 2022-09-27 PROCEDURE — 96375 TX/PRO/DX INJ NEW DRUG ADDON: CPT

## 2022-09-27 PROCEDURE — 25010000002 PALONOSETRON PER 25 MCG: Performed by: NURSE PRACTITIONER

## 2022-09-27 PROCEDURE — 96416 CHEMO PROLONG INFUSE W/PUMP: CPT

## 2022-09-27 PROCEDURE — 25010000002 LEUCOVORIN CALCIUM PER 50 MG: Performed by: NURSE PRACTITIONER

## 2022-09-27 PROCEDURE — 25010000002 FLUOROURACIL PER 500 MG: Performed by: NURSE PRACTITIONER

## 2022-09-27 PROCEDURE — 87077 CULTURE AEROBIC IDENTIFY: CPT

## 2022-09-27 PROCEDURE — 96367 TX/PROPH/DG ADDL SEQ IV INF: CPT

## 2022-09-27 PROCEDURE — 87186 SC STD MICRODIL/AGAR DIL: CPT

## 2022-09-27 PROCEDURE — 87086 URINE CULTURE/COLONY COUNT: CPT

## 2022-09-27 RX ORDER — FAMOTIDINE 10 MG/ML
20 INJECTION, SOLUTION INTRAVENOUS AS NEEDED
Status: CANCELLED | OUTPATIENT
Start: 2022-09-27

## 2022-09-27 RX ORDER — FLUOROURACIL 50 MG/ML
400 INJECTION, SOLUTION INTRAVENOUS ONCE
Status: COMPLETED | OUTPATIENT
Start: 2022-09-27 | End: 2022-09-27

## 2022-09-27 RX ORDER — NITROFURANTOIN 25; 75 MG/1; MG/1
100 CAPSULE ORAL 2 TIMES DAILY
Qty: 10 CAPSULE | Refills: 0 | Status: SHIPPED | OUTPATIENT
Start: 2022-09-27 | End: 2022-10-14

## 2022-09-27 RX ORDER — SODIUM CHLORIDE 9 MG/ML
250 INJECTION, SOLUTION INTRAVENOUS ONCE
Status: COMPLETED | OUTPATIENT
Start: 2022-09-27 | End: 2022-09-27

## 2022-09-27 RX ORDER — DIPHENHYDRAMINE HYDROCHLORIDE 50 MG/ML
50 INJECTION INTRAMUSCULAR; INTRAVENOUS AS NEEDED
Status: CANCELLED | OUTPATIENT
Start: 2022-09-27

## 2022-09-27 RX ORDER — PALONOSETRON 0.05 MG/ML
0.25 INJECTION, SOLUTION INTRAVENOUS ONCE
Status: COMPLETED | OUTPATIENT
Start: 2022-09-27 | End: 2022-09-27

## 2022-09-27 RX ADMIN — FLUOROURACIL 720 MG: 50 INJECTION, SOLUTION INTRAVENOUS at 11:59

## 2022-09-27 RX ADMIN — FLUOROURACIL 4300 MG: 50 INJECTION, SOLUTION INTRAVENOUS at 12:00

## 2022-09-27 RX ADMIN — PALONOSETRON 0.25 MG: 0.25 INJECTION, SOLUTION INTRAVENOUS at 10:43

## 2022-09-27 RX ADMIN — SODIUM CHLORIDE 250 ML: 9 INJECTION, SOLUTION INTRAVENOUS at 10:43

## 2022-09-27 RX ADMIN — DEXAMETHASONE SODIUM PHOSPHATE 12 MG: 10 INJECTION, SOLUTION INTRAMUSCULAR; INTRAVENOUS at 10:43

## 2022-09-27 RX ADMIN — LEUCOVORIN CALCIUM 720 MG: 350 INJECTION, POWDER, LYOPHILIZED, FOR SOLUTION INTRAMUSCULAR; INTRAVENOUS at 11:26

## 2022-09-27 NOTE — NURSING NOTE
Pt arrived for Leucovorin 5fu tx. Pt c/o burning and frequency with urination. Urine shows uti, labs reviewed with Estelle HUNTER. Antbx sent to pharmacy and ok to treat. Signed by Estelle Rea.

## 2022-09-29 ENCOUNTER — INFUSION (OUTPATIENT)
Dept: ONCOLOGY | Facility: HOSPITAL | Age: 67
End: 2022-09-29

## 2022-09-29 DIAGNOSIS — C18.2 MALIGNANT NEOPLASM OF ASCENDING COLON: Primary | ICD-10-CM

## 2022-09-29 DIAGNOSIS — Z45.2 ENCOUNTER FOR FITTING AND ADJUSTMENT OF VASCULAR CATHETER: ICD-10-CM

## 2022-09-29 LAB — BACTERIA SPEC AEROBE CULT: ABNORMAL

## 2022-09-29 PROCEDURE — 25010000002 PEGFILGRASTIM 6 MG/0.6ML PREFILLED SYRINGE KIT: Performed by: NURSE PRACTITIONER

## 2022-09-29 PROCEDURE — 25010000002 HEPARIN LOCK FLUSH PER 10 UNITS: Performed by: INTERNAL MEDICINE

## 2022-09-29 PROCEDURE — 96377 APPLICATON ON-BODY INJECTOR: CPT

## 2022-09-29 RX ORDER — HEPARIN SODIUM (PORCINE) LOCK FLUSH IV SOLN 100 UNIT/ML 100 UNIT/ML
500 SOLUTION INTRAVENOUS AS NEEDED
Status: DISCONTINUED | OUTPATIENT
Start: 2022-09-29 | End: 2022-09-29 | Stop reason: HOSPADM

## 2022-09-29 RX ORDER — SODIUM CHLORIDE 0.9 % (FLUSH) 0.9 %
10 SYRINGE (ML) INJECTION AS NEEDED
Status: CANCELLED | OUTPATIENT
Start: 2022-09-29

## 2022-09-29 RX ORDER — SODIUM CHLORIDE 0.9 % (FLUSH) 0.9 %
10 SYRINGE (ML) INJECTION AS NEEDED
Status: DISCONTINUED | OUTPATIENT
Start: 2022-09-29 | End: 2022-09-29 | Stop reason: HOSPADM

## 2022-09-29 RX ORDER — HEPARIN SODIUM (PORCINE) LOCK FLUSH IV SOLN 100 UNIT/ML 100 UNIT/ML
500 SOLUTION INTRAVENOUS AS NEEDED
Status: CANCELLED | OUTPATIENT
Start: 2022-09-29

## 2022-09-29 RX ADMIN — Medication 10 ML: at 10:01

## 2022-09-29 RX ADMIN — PEGFILGRASTIM 6 MG: KIT SUBCUTANEOUS at 10:04

## 2022-09-29 RX ADMIN — Medication 500 UNITS: at 10:01

## 2022-09-29 NOTE — NURSING NOTE
Per DALE Orozco patient can proceed with coloring her hair on 10/6/2022. Patient called and notified, she verbalized understanding.

## 2022-10-07 ENCOUNTER — TELEPHONE (OUTPATIENT)
Dept: ONCOLOGY | Facility: CLINIC | Age: 67
End: 2022-10-07

## 2022-10-07 NOTE — TELEPHONE ENCOUNTER
Caller: ERMA    Relationship: [facility]    Best call back number: [phone]    What is the best time to reach you: BEFORE 3:00 TODAY      What was the call regarding: PATIENTS AUTH FOR CT SCAN IS STILL PENDING,  WANTED TO SEE IF THEY COULD PUSH IT OUT     Do you require a callback: YES

## 2022-10-10 ENCOUNTER — APPOINTMENT (OUTPATIENT)
Dept: ONCOLOGY | Facility: HOSPITAL | Age: 67
End: 2022-10-10

## 2022-10-10 ENCOUNTER — APPOINTMENT (OUTPATIENT)
Dept: CT IMAGING | Facility: HOSPITAL | Age: 67
End: 2022-10-10

## 2022-10-11 ENCOUNTER — TELEPHONE (OUTPATIENT)
Dept: ONCOLOGY | Facility: CLINIC | Age: 67
End: 2022-10-11

## 2022-10-11 NOTE — TELEPHONE ENCOUNTER
Caller: Kayla Adkins    Relationship: Self    Best call back number: [phone]    What is the best time to reach you: ANYTIME    Who are you requesting to speak with (clinical staff, provider,  specific staff member): DR ROCHA OR NURSE    What was the call regarding: PT HAS AN APPT AT 4 PM FOR HER FLU AND COVID VACCINE - SHE JUST WANTED TO MAKE SURE DR ROCHA WAS OK WITH HER GETTING THESE.    PLEASE CALL PT ASAP TO ADVISE. APPT IS IN 45 MINS.     Do you require a callback: YES

## 2022-10-11 NOTE — TELEPHONE ENCOUNTER
Caller: Alonso Adkins    Relationship to patient: Spouse    Best call back number:  [phone]    Chief complaint: JEREMIE MCCONNELL ACCESS IS FOR EP BUT CT SCAN IS IN [place], PLEASE CORRECT & LET DON KNOW WHAT THEIR ARRIVAL TIME SHOULD BE IN [place].    Type of visit: POST ACCESS    When is the original appointment: 10/12/2022

## 2022-10-11 NOTE — TELEPHONE ENCOUNTER
Returned Kayla's call. Advised her it is ok for her to get her flu shot and covid booster.  Advised her if she had  A lot of side effects with previous covid shots, she could space the out a week apart, but ok to get both of them together if she wants.  She voiced understanding.

## 2022-10-12 ENCOUNTER — HOSPITAL ENCOUNTER (OUTPATIENT)
Dept: CT IMAGING | Facility: HOSPITAL | Age: 67
Discharge: HOME | End: 2022-10-12
Admitting: INTERNAL MEDICINE

## 2022-10-12 ENCOUNTER — APPOINTMENT (OUTPATIENT)
Dept: ONCOLOGY | Facility: HOSPITAL | Age: 67
End: 2022-10-12

## 2022-10-12 DIAGNOSIS — C18.2 MALIGNANT NEOPLASM OF ASCENDING COLON: ICD-10-CM

## 2022-10-12 DIAGNOSIS — K63.89 COLONIC MASS: ICD-10-CM

## 2022-10-12 PROCEDURE — 0 DIATRIZOATE MEGLUMINE & SODIUM PER 1 ML: Performed by: INTERNAL MEDICINE

## 2022-10-12 PROCEDURE — 25010000002 IOPAMIDOL 61 % SOLUTION: Performed by: INTERNAL MEDICINE

## 2022-10-12 PROCEDURE — 71260 CT THORAX DX C+: CPT

## 2022-10-12 PROCEDURE — 74177 CT ABD & PELVIS W/CONTRAST: CPT

## 2022-10-12 RX ADMIN — IOPAMIDOL 85 ML: 612 INJECTION, SOLUTION INTRAVENOUS at 08:13

## 2022-10-12 RX ADMIN — DIATRIZOATE MEGLUMINE AND DIATRIZOATE SODIUM 30 ML: 600; 100 SOLUTION ORAL; RECTAL at 08:11

## 2022-10-13 ENCOUNTER — APPOINTMENT (OUTPATIENT)
Dept: OTHER | Facility: HOSPITAL | Age: 67
End: 2022-10-13

## 2022-10-14 ENCOUNTER — LAB (OUTPATIENT)
Dept: OTHER | Facility: HOSPITAL | Age: 67
End: 2022-10-14

## 2022-10-14 ENCOUNTER — APPOINTMENT (OUTPATIENT)
Dept: ONCOLOGY | Facility: HOSPITAL | Age: 67
End: 2022-10-14

## 2022-10-14 ENCOUNTER — APPOINTMENT (OUTPATIENT)
Dept: OTHER | Facility: HOSPITAL | Age: 67
End: 2022-10-14

## 2022-10-14 ENCOUNTER — INFUSION (OUTPATIENT)
Dept: ONCOLOGY | Facility: HOSPITAL | Age: 67
End: 2022-10-14

## 2022-10-14 ENCOUNTER — OFFICE VISIT (OUTPATIENT)
Dept: ONCOLOGY | Facility: CLINIC | Age: 67
End: 2022-10-14

## 2022-10-14 VITALS
HEIGHT: 62 IN | SYSTOLIC BLOOD PRESSURE: 137 MMHG | TEMPERATURE: 98 F | RESPIRATION RATE: 18 BRPM | WEIGHT: 158.3 LBS | HEART RATE: 101 BPM | OXYGEN SATURATION: 98 % | DIASTOLIC BLOOD PRESSURE: 89 MMHG | BODY MASS INDEX: 29.13 KG/M2

## 2022-10-14 DIAGNOSIS — T45.1X5A CHEMOTHERAPY-INDUCED THROMBOCYTOPENIA: ICD-10-CM

## 2022-10-14 DIAGNOSIS — C18.2 MALIGNANT NEOPLASM OF ASCENDING COLON: Primary | ICD-10-CM

## 2022-10-14 DIAGNOSIS — R91.8 LUNG NODULES: ICD-10-CM

## 2022-10-14 DIAGNOSIS — Z45.2 ENCOUNTER FOR FITTING AND ADJUSTMENT OF VASCULAR CATHETER: Primary | ICD-10-CM

## 2022-10-14 DIAGNOSIS — R16.1 SPLENOMEGALY: ICD-10-CM

## 2022-10-14 DIAGNOSIS — Z79.899 HIGH RISK MEDICATION USE: ICD-10-CM

## 2022-10-14 DIAGNOSIS — R79.89 ELEVATED LFTS: ICD-10-CM

## 2022-10-14 DIAGNOSIS — D69.59 CHEMOTHERAPY-INDUCED THROMBOCYTOPENIA: ICD-10-CM

## 2022-10-14 DIAGNOSIS — R30.0 BURNING WITH URINATION: Primary | ICD-10-CM

## 2022-10-14 LAB
ALBUMIN SERPL-MCNC: 4.2 G/DL (ref 3.5–5.2)
ALBUMIN/GLOB SERPL: 1.6 G/DL
ALP SERPL-CCNC: 269 U/L (ref 39–117)
ALT SERPL W P-5'-P-CCNC: 32 U/L (ref 1–33)
ANION GAP SERPL CALCULATED.3IONS-SCNC: 8 MMOL/L (ref 5–15)
AST SERPL-CCNC: 37 U/L (ref 1–32)
BASOPHILS # BLD AUTO: 0.02 10*3/MM3 (ref 0–0.2)
BASOPHILS NFR BLD AUTO: 0.4 % (ref 0–1.5)
BILIRUB SERPL-MCNC: 0.3 MG/DL (ref 0–1.2)
BUN SERPL-MCNC: 10 MG/DL (ref 8–23)
BUN/CREAT SERPL: 16.4 (ref 7–25)
CALCIUM SPEC-SCNC: 9.8 MG/DL (ref 8.6–10.5)
CEA SERPL-MCNC: 1.94 NG/ML
CHLORIDE SERPL-SCNC: 105 MMOL/L (ref 98–107)
CO2 SERPL-SCNC: 29 MMOL/L (ref 22–29)
CREAT SERPL-MCNC: 0.61 MG/DL (ref 0.57–1)
DEPRECATED RDW RBC AUTO: 60.1 FL (ref 37–54)
EGFRCR SERPLBLD CKD-EPI 2021: 98.7 ML/MIN/1.73
EOSINOPHIL # BLD AUTO: 0.05 10*3/MM3 (ref 0–0.4)
EOSINOPHIL NFR BLD AUTO: 1 % (ref 0.3–6.2)
ERYTHROCYTE [DISTWIDTH] IN BLOOD BY AUTOMATED COUNT: 15.7 % (ref 12.3–15.4)
GLOBULIN UR ELPH-MCNC: 2.6 GM/DL
GLUCOSE SERPL-MCNC: 158 MG/DL (ref 65–99)
HCT VFR BLD AUTO: 34.3 % (ref 34–46.6)
HGB BLD-MCNC: 11.3 G/DL (ref 12–15.9)
IMM GRANULOCYTES # BLD AUTO: 0.04 10*3/MM3 (ref 0–0.05)
IMM GRANULOCYTES NFR BLD AUTO: 0.8 % (ref 0–0.5)
LYMPHOCYTES # BLD AUTO: 0.97 10*3/MM3 (ref 0.7–3.1)
LYMPHOCYTES NFR BLD AUTO: 19.4 % (ref 19.6–45.3)
MCH RBC QN AUTO: 34.3 PG (ref 26.6–33)
MCHC RBC AUTO-ENTMCNC: 32.9 G/DL (ref 31.5–35.7)
MCV RBC AUTO: 104.3 FL (ref 79–97)
MONOCYTES # BLD AUTO: 0.49 10*3/MM3 (ref 0.1–0.9)
MONOCYTES NFR BLD AUTO: 9.8 % (ref 5–12)
NEUTROPHILS NFR BLD AUTO: 3.43 10*3/MM3 (ref 1.7–7)
NEUTROPHILS NFR BLD AUTO: 68.6 % (ref 42.7–76)
NRBC BLD AUTO-RTO: 0 /100 WBC (ref 0–0.2)
PLATELET # BLD AUTO: 162 10*3/MM3 (ref 140–450)
PMV BLD AUTO: 10.5 FL (ref 6–12)
POTASSIUM SERPL-SCNC: 3.3 MMOL/L (ref 3.5–5.2)
PROT SERPL-MCNC: 6.8 G/DL (ref 6–8.5)
RBC # BLD AUTO: 3.29 10*6/MM3 (ref 3.77–5.28)
SODIUM SERPL-SCNC: 142 MMOL/L (ref 136–145)
T4 FREE SERPL-MCNC: 1.1 NG/DL (ref 0.93–1.7)
TSH SERPL DL<=0.05 MIU/L-ACNC: 1.49 UIU/ML (ref 0.27–4.2)
WBC NRBC COR # BLD: 5 10*3/MM3 (ref 3.4–10.8)

## 2022-10-14 PROCEDURE — 96523 IRRIG DRUG DELIVERY DEVICE: CPT

## 2022-10-14 PROCEDURE — 87186 SC STD MICRODIL/AGAR DIL: CPT

## 2022-10-14 PROCEDURE — 85025 COMPLETE CBC W/AUTO DIFF WBC: CPT | Performed by: NURSE PRACTITIONER

## 2022-10-14 PROCEDURE — 84443 ASSAY THYROID STIM HORMONE: CPT | Performed by: NURSE PRACTITIONER

## 2022-10-14 PROCEDURE — 99215 OFFICE O/P EST HI 40 MIN: CPT | Performed by: NURSE PRACTITIONER

## 2022-10-14 PROCEDURE — 84439 ASSAY OF FREE THYROXINE: CPT | Performed by: INTERNAL MEDICINE

## 2022-10-14 PROCEDURE — 87147 CULTURE TYPE IMMUNOLOGIC: CPT

## 2022-10-14 PROCEDURE — 87086 URINE CULTURE/COLONY COUNT: CPT

## 2022-10-14 PROCEDURE — 80053 COMPREHEN METABOLIC PANEL: CPT | Performed by: NURSE PRACTITIONER

## 2022-10-14 PROCEDURE — 36415 COLL VENOUS BLD VENIPUNCTURE: CPT | Performed by: NURSE PRACTITIONER

## 2022-10-14 PROCEDURE — 25010000002 HEPARIN LOCK FLUSH PER 10 UNITS: Performed by: INTERNAL MEDICINE

## 2022-10-14 PROCEDURE — 82378 CARCINOEMBRYONIC ANTIGEN: CPT | Performed by: INTERNAL MEDICINE

## 2022-10-14 RX ORDER — HEPARIN SODIUM (PORCINE) LOCK FLUSH IV SOLN 100 UNIT/ML 100 UNIT/ML
500 SOLUTION INTRAVENOUS AS NEEDED
Status: DISCONTINUED | OUTPATIENT
Start: 2022-10-14 | End: 2022-10-14 | Stop reason: HOSPADM

## 2022-10-14 RX ORDER — SODIUM CHLORIDE 0.9 % (FLUSH) 0.9 %
10 SYRINGE (ML) INJECTION AS NEEDED
Status: DISCONTINUED | OUTPATIENT
Start: 2022-10-14 | End: 2022-10-14 | Stop reason: HOSPADM

## 2022-10-14 RX ORDER — SODIUM CHLORIDE 0.9 % (FLUSH) 0.9 %
10 SYRINGE (ML) INJECTION AS NEEDED
Status: CANCELLED | OUTPATIENT
Start: 2022-10-14

## 2022-10-14 RX ORDER — HEPARIN SODIUM (PORCINE) LOCK FLUSH IV SOLN 100 UNIT/ML 100 UNIT/ML
500 SOLUTION INTRAVENOUS AS NEEDED
Status: CANCELLED | OUTPATIENT
Start: 2022-10-14

## 2022-10-14 RX ADMIN — Medication 10 ML: at 15:15

## 2022-10-14 RX ADMIN — Medication 500 UNITS: at 15:15

## 2022-10-14 NOTE — PROGRESS NOTES
Subjective   Kayla Adkins is a 67 y.o. female.  Referred by Dr. Rivas for evaluation of leukopenia    History of Present Illness   Ms. Adkins is a 66-year-old postmenopausal  who has been referred by Dr. Rivas for evaluation of leukopenia, neutropenia.  Patient reports that she has had longstanding leukopenia and her white blood cell count normally ranges in the threes.  On her most recent visit with Dr. Rivas white blood cell count was noted to be 2560 on 3/2/2021 and 2660 on 4/5/2021.  This prompted a referral to hematology.  Patient reports that she had a extensive work-up including a bone marrow biopsy for the same condition about 17 years ago.  She thinks that this was performed at [facility].  According to patient no clear etiology was determined after the work-up.  She was recommended to continue with periodic labs.  No treatment was required.    She was seen in December and had continued on oral iron.  She was tolerating that fairly well.  However she had worsening of colitis for about 2 months prior to that visit.  She was scheduled for colonoscopy in January 2022.    She had a routine surveillance colonoscopy performed by Dr. Whitlock on 2/22/2022.  On this there was an infiltrative nonobstructing medium-sized mass in the proximal ascending colon 1 to 2 cm distal to the cecum.  This was partially circumferential involving 1 foot of the lumen.  Biopsies were taken.  There is also mildly congested mucosa in the entire colon.  Random biopsies were taken.  She also had an EGD at the same time which reported normal esophagus, stomach and duodenum.  Pathology evaluation from the biopsy reported superficial fragments of at least intramucosal carcinoma.  Random colon biopsies reported increased lamina propria, chronic inflammation with surface epithelial alteration and increased intraepithelial lymphocytes consistent with lymphocytic colitis.  Biopsies from the stomach reported chronic  inactive gastritis.  Negative for intestinal metaplasia and H. pylori.  Biopsies from the duodenum was negative.    She was seen by Dr. Wellington who performed a laparoscopic right hemicolectomy on 3/2/2022.  Pathology showed a 3.8 cm mass at the cecum invading through the muscularis propria into the pericolorectal adipose tissue.  No lymphovascular or perineural invasion.  All margins were clear.  The radial margin was 3 cm.  3 out of 26 lymph nodes were positive for metastatic disease.  The largest metastatic focus was 0.7 cm with extranodal extension.  Pathological stage PT3N1B.  The tumor was moderately differentiated, grade 2 adenocarcinoma.    No loss of nuclear expression of MMR proteins.  Low probability of microsatellite instability.  MSI status-microsatellite stable.  This is by PCR.    Normal CEA and 0.77 on the day prior to surgery.    She was seen by Dr. Burton as inpatient and recommended adjuvant chemotherapy with FOLFOX.    Patient initiating cycle 1 FOLFOX 4/18/2022.  Neulasta support given due to baseline neutropenia.    9/27/2020 to cycle 12 FOLFOX (oxaliplatin omitted due to neuropathy).    Interval history:  Patient is a 67-year-old female with the above-mentioned history who is here today for lab review and evaluation as well as scan review.  She completed her 12th cycle of adjuvant FOLFOX on 9/27/2022 (her last cycle was given without the oxaliplatin due to neuropathy).    Patient does complain today of worsening neuropathy since she completed treatment.  She states that she has constant numbness/tingling.  It is not painful, although it does interfere with function with tasks such as buttoning buttons.  She also notices it in her feet.    Patient also notices intermittent issues of feeling shaky in her hands.  Upon further questioning she has been trying to lose weight and decreasing the amount that she is eating in half.  These episodes of shakiness have probably occur more often in the past  month that she is trying to lose weight.    She denies any issues with recent illness.  She sometimes has a persistent postnasal drip and clears her throat however no issues with persistent shortness of breath or cough.  No recent upper respiratory tract infections.    The following portions of the patient's history were reviewed and updated as appropriate: allergies, current medications, past family history, past medical history, past social history and problem list.    Past Medical History:   Diagnosis Date   • Anemia    • Anxiety    • Bladder infection    • Colitis    • Colon cancer (HCC)    • GERD (gastroesophageal reflux disease)    • Headache    • Hyperlipidemia    • Hypertension    • Knee injury, initial encounter-left 07/01/2019   • Neutropenia (HCC) 01/03/2017   • Prediabetes 03/04/2019      Past Surgical History:   Procedure Laterality Date   • COLON RESECTION Right 03/02/2022    Procedure: LAPAROSCOPIC RIGHT HEMICOLECTOMY WITH DAVINCI ROBOT;  Surgeon: Malcolm Wellington MD;  Location: [facility];  Service: Robotics - DaVinci;  Laterality: Right;   • COLONOSCOPY     • COLONOSCOPY W/ POLYPECTOMY N/A 02/22/2022    Procedure: COLONOSCOPY INTO CECUM WITH COLD BIOPSIES;  Surgeon: Irvin Clayton MD;  Location: [facility] ENDOSCOPY;  Service: Gastroenterology;  Laterality: N/A;  PRE: DIARRHEA, ANEMIA  POST: ASCENDING COLON MASS   • ENDOSCOPY N/A 02/22/2022    Procedure: ESOPHAGOGASTRODUODENOSCOPY WITH BIOPSY;  Surgeon: Irvin Clayton MD;  Location: [facility] ENDOSCOPY;  Service: Gastroenterology;  Laterality: N/A;  PRE: ANEMIA  POST: NORMAL EGD   • TONSILLECTOMY     • VENOUS ACCESS DEVICE (PORT) INSERTION N/A 4/11/2022    Procedure: INSERTION VENOUS ACCESS DEVICE with subcutaneous port;  Surgeon: Malcolm Wellington MD;  Location: [facility];  Service: General;  Laterality: N/A;        Family History   Problem Relation Age of Onset   • Hypertension Mother    • Heart disease Father   "  • Heart attack Father 47        first MI age 47; second MI 57   • Hypertension Sister    • No Known Problems Brother    • Malig Hyperthermia Neg Hx       Social History     Socioeconomic History   • Marital status:    • Number of children: 2   Tobacco Use   • Smoking status: Never   • Smokeless tobacco: Never   Vaping Use   • Vaping Use: Never used   Substance and Sexual Activity   • Alcohol use: Not Currently     Comment: seldom   • Drug use: Never   • Sexual activity: Defer      OB History    No obstetric history on file.          Allergies   Allergen Reactions   • Penicillins Rash      Review of systems as mentioned in the HPI    Objective   Blood pressure 137/89, pulse 101, temperature 98 °F (36.7 °C), temperature source Temporal, resp. rate 18, height 157 cm (61.81\"), weight 71.8 kg (158 lb 4.8 oz), SpO2 98 %.     Physical Exam  Constitutional:       General: She is not in acute distress.     Appearance: She is well-developed.   Pulmonary:      Effort: Pulmonary effort is normal. No respiratory distress.   Skin:     General: Skin is warm and dry.   Neurological:      Mental Status: She is alert and oriented to person, place, and time.         DIAGNOSTIC DATA:  Results from last 7 days   Lab Units 10/14/22  1404   WBC 10*3/mm3 5.00   NEUTROS ABS 10*3/mm3 3.43   HEMOGLOBIN g/dL 11.3*   HEMATOCRIT % 34.3   PLATELETS 10*3/mm3 162     Results from last 7 days   Lab Units 10/14/22  1404   SODIUM mmol/L 142   POTASSIUM mmol/L 3.3*   CHLORIDE mmol/L 105   CO2 mmol/L 29.0   BUN mg/dL 10   CREATININE mg/dL 0.61   CALCIUM mg/dL 9.8   ALBUMIN g/dL 4.20   BILIRUBIN mg/dL 0.3   ALK PHOS U/L 269*   ALT (SGPT) U/L 32   AST (SGOT) U/L 37*   GLUCOSE mg/dL 158*           8/16/2022 CMP reviewed and ALT mildly elevated at 41, AST 51, alkaline phosphatase 280  WBC count 2.76, absolute neutrophil count 1.6, hemoglobin 10.5 and platelets normal at 170    CT Chest With Contrast Diagnostic    Result Date: 10/13/2022    1. A few " tiny new lung nodules, indeterminate, possibility of metastatic disease not excluded, advise CT follow-up in 2-3 months. 2. Interval enlargement of the spleen. Evidence of hepatic steatosis.  This report was finalized on 10/13/2022 2:41 PM by Dr. Linwood Choi M.D.      CT Abdomen Pelvis With Contrast    Result Date: 10/13/2022    1. A few tiny new lung nodules, indeterminate, possibility of metastatic disease not excluded, advise CT follow-up in 2-3 months. 2. Interval enlargement of the spleen. Evidence of hepatic steatosis.  This report was finalized on 10/13/2022 2:41 PM by Dr. Linwood Choi M.D.           Assessment & Plan      66-year-old postmenopausal  lady with longstanding leukopenia/neutropenia and anemia and recently diagnosed T3N1A adenocarcinoma of the colon    *[id number] adenocarcinoma of the ascending colon  · The tumor measures 3.8 cm located in the cecum, invades through the muscularis propria into the pericolorectal adipose tissue.  No lymphovascular or perineural invasion.  All the margins are negative.  Tumor comes to within 3 cm of the radial margin.  3 out of 26 lymph nodes positive for metastatic carcinoma with the largest focus measuring 0.7 cm without extranodal extension.  · No tumor deposits identified  · PT3N1B  · MSI low probability on immunohistochemistry and stable on PCR  · No family history of colon cancer  · Explained to her that this is stage IIIb colon cancer however since his T3N1 I would consider this low risk stage III.  There is no other high risk features noted on pathology either.  · Given that the tumor is stage III there is definite benefit from adjuvant chemotherapy.  · We discussed the 2 available regimens including XELOX and FOLFOX.  · Explained to her that based on the IDEA data 3 months of XELOX is noninferior to 6 months of adjuvant chemotherapy however with FOLFOX the noninferiority has not been established.  Explained to her that 6 months of  chemotherapy improves disease-free survival by additional 2 to 3% over 3 months.  · Discussed the adverse effects of XELOX as well as FOLFOX.  · After discussing the pros and cons we have decided to proceed with FOLFOX for total of 6 months but if she is unable to tolerate due to neurotoxicity then we could potentially stop after 3 months of treatment.  · Port placed on 4/11/2022  · Cycle 1 FOLFOX on 4/18/2022  · Cycle 11 FOLFOX 9/13/2022 with a 20% dose reduction of oxaliplatin.  · Now experiencing neuropathy, we will discontinue oxaliplatin and just to 5-FU for cycle 12  · Seen 10/14/2022 in follow-up and scan review.  CT scan of the chest, abdomen, and pelvis done 10/12/2022 showing a few tiny new lung nodules indeterminant, measuring about 3 to 4 mm in maximum size.  Patient also has enlarging spleen previously 11.3 cm, now measuring 13.6 cm.  Patient denies any recent infectious symptoms or cough.  Reviewed scans with Dr. Alvarez who recommend repeat CT of the chest and abdomen in about 2 to 3 months is recommended for close interval follow-up.  Discussed the scans with the patient and her  to remember hearing about prior lung nodules on CT scan.  Reviewing CT scan from 2/25/2022 indeterminate nodules were mentioned at that time in her chest as well.  We will also check a CEA today.  Patient will return about a week after her scans to review them with Dr. Alvarez.    *Leukopenia  · Predominantly neutropenia  · Mild increase in monocyte count  · This is chronic and unchanged  · No increased infections  · Had a previous work-up including a bone marrow biopsy with no clear etiology  · Differential includes nutritional, chronic idiopathic, autoimmune.  · She is currently not on any medications which could account for the leukopenia.  · No splenomegaly on exam  · 5/13/2021-flow cytometry negative  · LDH normal at 174  · Reticulocyte count 1.07%  · Ferritin low at 5.7  · Iron saturation low at 3%  · Folic acid  normal at 11.8, B12 415  · Rheumatoid factor negative  · AVIVA negative  · ESR normal at 29  · Serum copper normal at 134  · WBC count has remained normal for the most part since starting FOLFOX due to Neulasta support.  · 9/15/2022-WBC 3.61, hemoglobin 9.7 and platelets 89,000 with an absolute neutrophil count of 2.16  · 10/14/2022 WBC improving at 5.0, ANC 3.43.    *Anemia  · Microcytic  · Likely secondary to iron deficiency  · 4/22/2021 iron saturation 3%, TIBC today 508, ferritin 5.70.  The patient was initiated on oral iron every other day.  · 8/26/2021, hemoglobin today 10.1.  The patient has been tolerating oral iron every other day.  We will increase oral iron to daily and recheck iron labs in 4 months.  · 12/16/2021, patient is taking oral iron every day and tolerating this well.  Hemoglobin today 10.0.  Iron saturation 45, TIBC 413, ferritin 10.20.  · 3/4/2022 ferritin 27.5, iron saturation 5%, TIBC 375, iron studies consistent with iron deficiency  · 3/24/2022 hemoglobin 10.2  · Received 3 doses of Venofer.  · 9/15/2022-hemoglobin 9.7, low due to chemotherapy  · As of 10/14/2022 hemoglobin improving now up to 11.3.    *Sthlfgrri-yl-jq-date on mammogram    *Colitis  · Lymphocytic colitis noted on the biopsy of the colon  · There is a flare of colitis we may have to consider using steroids  · Currently she does not have any diarrhea  · Explained to her that 5-FU can increase the risk of diarrhea.  · We will have her follow-up with GI  · In the event of diarrhea we will try Imodium first and if no improvement in symptoms then consider steroids.  · No diarrhea    *Thrombocytopenia  · Due to chemotherapy  · Resolved platelets 10/14/2022 162,000    *Patient reports of shakiness intermittently especially in her hands: Upon thinking back about patient states that this may have been present over the past month as she has been trying to lose weight.  We discussed eating small frequent meals.  We will also check  thyroid levels today.    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  Patient states it is not painful but does cause difficulty with buttoning her buttons.  Present in both her hands and her feet.  Recommended starting a B complex with at least 1000 mcg of B12 and 50 mg of B6.    PLAN:   · Port flush today.  · Patient to return for port flush in about 4 to 6 weeks.  · In about 2 to 3 months patient to have CT scan of the chest and abdomen for reevaluation of pulmonary nodules as well as splenomegaly.  · Patient to return for follow-up visit 1 week after CT scans for follow-up with Dr. Alvarez to review the scans with repeat CBC and CMP.    · Check TSH and free T4 today.  · Start B complex for neuropathy symptoms        Mariza Logan, APRN  10/14/22    I spent 55 minutes caring for Kayla on this date of service. This time includes time spent by me in the following activities: preparing for the visit, reviewing tests, obtaining and/or reviewing a separately obtained history, performing a medically appropriate examination and/or evaluation, counseling and educating the patient/family/caregiver, ordering medications, tests, or procedures, referring and communicating with other health care professionals, documenting information in the medical record, independently interpreting results and communicating that information with the patient/family/caregiver and care coordination

## 2022-10-19 ENCOUNTER — TELEPHONE (OUTPATIENT)
Dept: ONCOLOGY | Facility: CLINIC | Age: 67
End: 2022-10-19

## 2022-10-19 LAB — BACTERIA SPEC AEROBE CULT: ABNORMAL

## 2022-10-19 NOTE — TELEPHONE ENCOUNTER
Returned call to patient who is wanting to know if MUMTAZ Logan NP had a chance to speak with Dr. Alvarez for her recommendation when her next Colonsocopy would be due and if the patient should schedule herself or if our office would set up the appointment for her.  Please advise.

## 2022-10-19 NOTE — TELEPHONE ENCOUNTER
Caller: Lucille Charan S    Relationship: Self    Best call back number:[phone]    What was the call regarding: CHARAN CALLED TO SPEAK TO DALE SMALL AGAIN. SHE HAS A COUPLE MORE QUESTIONS.    Do you require a callback: YES

## 2022-10-19 NOTE — TELEPHONE ENCOUNTER
Called and reviewed urine culture results with the patient.  She is asymptomatic.  She denies any issues with dysuria or blood in your urine.  We discussed this is likely colonization and given that she is asymptomatic we will not treat it.  Patient verbalized understanding    DALE Fischer

## 2022-10-20 ENCOUNTER — TELEPHONE (OUTPATIENT)
Dept: ONCOLOGY | Facility: CLINIC | Age: 67
End: 2022-10-20

## 2022-10-20 NOTE — TELEPHONE ENCOUNTER
Returned call to Kayla, advised her per Dr Alvarez, she should have her colonoscopy one year from the last one.  Explained she can contact Dr Whitlock's office for this and asked her to call back if they need a new referral back.  She voiced understanding, thankful for the call.

## 2022-10-24 ENCOUNTER — TELEPHONE (OUTPATIENT)
Dept: GASTROENTEROLOGY | Facility: CLINIC | Age: 67
End: 2022-10-24

## 2022-10-24 NOTE — TELEPHONE ENCOUNTER
Provider: ALIDA HOYOS    Caller: DARRON BYERS    Relationship to Patient:     Phone Number: [phone]    Reason for Call:  WANTS TO KNOW WHEN PT SHOULD HAVE NEXT COLONOSCOPY. PT HAD CANCER SURGERY AND JUST COMPLETED CHEMO. LAST COLONOSCOPY WAS  2/22/2022. PT AND SPOUSE THOUGHT  HEARD IN HOSPITAL WAS AFTER 6 MONTHS AND ONCOLOGIST SAID AFTER 1 YEAR.     PLEASE CALL AND ADVISE

## 2022-11-10 ENCOUNTER — APPOINTMENT (OUTPATIENT)
Dept: ONCOLOGY | Facility: HOSPITAL | Age: 67
End: 2022-11-10

## 2022-11-17 ENCOUNTER — INFUSION (OUTPATIENT)
Dept: ONCOLOGY | Facility: HOSPITAL | Age: 67
End: 2022-11-17

## 2022-11-17 DIAGNOSIS — Z45.2 ENCOUNTER FOR FITTING AND ADJUSTMENT OF VASCULAR CATHETER: Primary | ICD-10-CM

## 2022-11-17 PROCEDURE — 25010000002 HEPARIN LOCK FLUSH PER 10 UNITS: Performed by: INTERNAL MEDICINE

## 2022-11-17 PROCEDURE — 96523 IRRIG DRUG DELIVERY DEVICE: CPT

## 2022-11-17 RX ORDER — SODIUM CHLORIDE 0.9 % (FLUSH) 0.9 %
10 SYRINGE (ML) INJECTION AS NEEDED
Status: CANCELLED | OUTPATIENT
Start: 2022-11-17

## 2022-11-17 RX ORDER — HEPARIN SODIUM (PORCINE) LOCK FLUSH IV SOLN 100 UNIT/ML 100 UNIT/ML
500 SOLUTION INTRAVENOUS AS NEEDED
Status: DISCONTINUED | OUTPATIENT
Start: 2022-11-17 | End: 2022-11-17 | Stop reason: HOSPADM

## 2022-11-17 RX ORDER — HEPARIN SODIUM (PORCINE) LOCK FLUSH IV SOLN 100 UNIT/ML 100 UNIT/ML
500 SOLUTION INTRAVENOUS AS NEEDED
Status: CANCELLED | OUTPATIENT
Start: 2022-11-17

## 2022-11-17 RX ORDER — SODIUM CHLORIDE 0.9 % (FLUSH) 0.9 %
10 SYRINGE (ML) INJECTION AS NEEDED
Status: DISCONTINUED | OUTPATIENT
Start: 2022-11-17 | End: 2022-11-17 | Stop reason: HOSPADM

## 2022-11-17 RX ADMIN — Medication 500 UNITS: at 09:33

## 2022-11-17 RX ADMIN — Medication 10 ML: at 09:32

## 2022-11-28 ENCOUNTER — TELEPHONE (OUTPATIENT)
Dept: FAMILY MEDICINE CLINIC | Facility: CLINIC | Age: 67
End: 2022-11-28

## 2022-11-28 DIAGNOSIS — E78.49 OTHER HYPERLIPIDEMIA: ICD-10-CM

## 2022-11-28 DIAGNOSIS — I10 ESSENTIAL HYPERTENSION: Primary | ICD-10-CM

## 2022-11-28 DIAGNOSIS — D64.9 LOW HEMOGLOBIN: ICD-10-CM

## 2022-11-28 DIAGNOSIS — F41.9 ANXIETY: ICD-10-CM

## 2022-11-28 NOTE — TELEPHONE ENCOUNTER
Caller: Kayla Adkins    Relationship: Self    Best call back number:  [phone] (Mobile)    What was the call regarding:  PATIENT IS WANTING TO GET LABS DONE TOMORROW, AND NEEDING AN ORDER PUT IN Epic     PATIENT HAS UPCOMING APPT  12-5-22    Do you require a callback:  PLEASE CONFIRM

## 2022-11-30 LAB
ALBUMIN SERPL-MCNC: 4.7 G/DL (ref 3.5–5.2)
ALBUMIN/GLOB SERPL: 2.9 G/DL
ALP SERPL-CCNC: 242 U/L (ref 39–117)
ALT SERPL-CCNC: 44 U/L (ref 1–33)
AST SERPL-CCNC: 45 U/L (ref 1–32)
BASOPHILS # BLD AUTO: 0 10*3/MM3 (ref 0–0.2)
BASOPHILS NFR BLD AUTO: 0 % (ref 0–1.5)
BILIRUB SERPL-MCNC: 0.3 MG/DL (ref 0–1.2)
BUN SERPL-MCNC: 11 MG/DL (ref 8–23)
BUN/CREAT SERPL: 14.1 (ref 7–25)
CALCIUM SERPL-MCNC: 9.7 MG/DL (ref 8.6–10.5)
CHLORIDE SERPL-SCNC: 107 MMOL/L (ref 98–107)
CHOLEST SERPL-MCNC: 163 MG/DL (ref 0–200)
CK SERPL-CCNC: 50 U/L (ref 20–180)
CO2 SERPL-SCNC: 28.7 MMOL/L (ref 22–29)
CREAT SERPL-MCNC: 0.78 MG/DL (ref 0.57–1)
EGFRCR SERPLBLD CKD-EPI 2021: 83.9 ML/MIN/1.73
EOSINOPHIL # BLD AUTO: 0.05 10*3/MM3 (ref 0–0.4)
EOSINOPHIL NFR BLD AUTO: 1.6 % (ref 0.3–6.2)
ERYTHROCYTE [DISTWIDTH] IN BLOOD BY AUTOMATED COUNT: 11.9 % (ref 12.3–15.4)
GLOBULIN SER CALC-MCNC: 1.6 GM/DL
GLUCOSE SERPL-MCNC: 113 MG/DL (ref 65–99)
HCT VFR BLD AUTO: 40.4 % (ref 34–46.6)
HDLC SERPL-MCNC: 70 MG/DL (ref 40–60)
HGB BLD-MCNC: 13.9 G/DL (ref 12–15.9)
IMM GRANULOCYTES # BLD AUTO: 0.01 10*3/MM3 (ref 0–0.05)
IMM GRANULOCYTES NFR BLD AUTO: 0.3 % (ref 0–0.5)
LDLC SERPL CALC-MCNC: 73 MG/DL (ref 0–100)
LYMPHOCYTES # BLD AUTO: 0.87 10*3/MM3 (ref 0.7–3.1)
LYMPHOCYTES NFR BLD AUTO: 28 % (ref 19.6–45.3)
MCH RBC QN AUTO: 32.8 PG (ref 26.6–33)
MCHC RBC AUTO-ENTMCNC: 34.4 G/DL (ref 31.5–35.7)
MCV RBC AUTO: 95.3 FL (ref 79–97)
MONOCYTES # BLD AUTO: 0.25 10*3/MM3 (ref 0.1–0.9)
MONOCYTES NFR BLD AUTO: 8 % (ref 5–12)
NEUTROPHILS # BLD AUTO: 1.93 10*3/MM3 (ref 1.7–7)
NEUTROPHILS NFR BLD AUTO: 62.1 % (ref 42.7–76)
NRBC BLD AUTO-RTO: 0 /100 WBC (ref 0–0.2)
PLATELET # BLD AUTO: 169 10*3/MM3 (ref 140–450)
POTASSIUM SERPL-SCNC: 4.6 MMOL/L (ref 3.5–5.2)
PROT SERPL-MCNC: 6.3 G/DL (ref 6–8.5)
RBC # BLD AUTO: 4.24 10*6/MM3 (ref 3.77–5.28)
SODIUM SERPL-SCNC: 143 MMOL/L (ref 136–145)
TRIGL SERPL-MCNC: 112 MG/DL (ref 0–150)
TSH SERPL DL<=0.005 MIU/L-ACNC: 0.79 UIU/ML (ref 0.27–4.2)
VLDLC SERPL CALC-MCNC: 20 MG/DL (ref 5–40)
WBC # BLD AUTO: 3.11 10*3/MM3 (ref 3.4–10.8)

## 2022-12-05 ENCOUNTER — OFFICE VISIT (OUTPATIENT)
Dept: FAMILY MEDICINE CLINIC | Facility: CLINIC | Age: 67
End: 2022-12-05

## 2022-12-05 VITALS
SYSTOLIC BLOOD PRESSURE: 124 MMHG | OXYGEN SATURATION: 96 % | HEART RATE: 117 BPM | RESPIRATION RATE: 18 BRPM | BODY MASS INDEX: 28.16 KG/M2 | WEIGHT: 153 LBS | TEMPERATURE: 98.1 F | DIASTOLIC BLOOD PRESSURE: 76 MMHG | HEIGHT: 62 IN

## 2022-12-05 DIAGNOSIS — D70.8 OTHER NEUTROPENIA: ICD-10-CM

## 2022-12-05 DIAGNOSIS — R79.89 ELEVATED LFTS: ICD-10-CM

## 2022-12-05 DIAGNOSIS — R74.8 ELEVATED ALKALINE PHOSPHATASE LEVEL: ICD-10-CM

## 2022-12-05 DIAGNOSIS — I10 ESSENTIAL HYPERTENSION: Primary | ICD-10-CM

## 2022-12-05 DIAGNOSIS — F41.9 ANXIETY: ICD-10-CM

## 2022-12-05 DIAGNOSIS — K21.9 GASTROESOPHAGEAL REFLUX DISEASE WITHOUT ESOPHAGITIS: ICD-10-CM

## 2022-12-05 DIAGNOSIS — E78.49 OTHER HYPERLIPIDEMIA: ICD-10-CM

## 2022-12-05 PROBLEM — D64.9 LOW HEMOGLOBIN: Status: RESOLVED | Noted: 2021-03-08 | Resolved: 2022-12-05

## 2022-12-05 PROCEDURE — 99214 OFFICE O/P EST MOD 30 MIN: CPT | Performed by: FAMILY MEDICINE

## 2022-12-05 RX ORDER — DULOXETIN HYDROCHLORIDE 60 MG/1
60 CAPSULE, DELAYED RELEASE ORAL NIGHTLY
Qty: 90 CAPSULE | Refills: 2 | Status: SHIPPED | OUTPATIENT
Start: 2022-12-05 | End: 2023-09-01

## 2022-12-05 RX ORDER — OMEPRAZOLE 20 MG/1
20 CAPSULE, DELAYED RELEASE ORAL NIGHTLY
Qty: 90 CAPSULE | Refills: 2 | Status: SHIPPED | OUTPATIENT
Start: 2022-12-05 | End: 2023-09-01

## 2022-12-05 RX ORDER — METOPROLOL SUCCINATE 50 MG/1
50 TABLET, EXTENDED RELEASE ORAL NIGHTLY
Qty: 90 TABLET | Refills: 2 | Status: SHIPPED | OUTPATIENT
Start: 2022-12-05 | End: 2023-09-01

## 2022-12-05 RX ORDER — ATORVASTATIN CALCIUM 10 MG/1
10 TABLET, FILM COATED ORAL NIGHTLY
Qty: 90 TABLET | Refills: 2 | Status: SHIPPED | OUTPATIENT
Start: 2022-12-05 | End: 2023-09-01

## 2022-12-05 NOTE — PROGRESS NOTES
"Chief Complaint  Hypertension (Med check )    Aiden FARRAR presents to [facility]   to refill medications and review labs.  Overall she feels well. She has completed chemotherapy treatment for colon cancer.  No medication side effects are reported. Labs show better blood sugar control and continued elevation of LFT, but these are trending lower.              Objective   Vital Signs:   Vitals:    12/05/22 0849   BP: 124/76   Pulse: 117   Resp: 18   Temp: 98.1 °F (36.7 °C)   SpO2: 96%   Weight: 69.4 kg (153 lb)   Height: 157 cm (61.81\")                Physical Exam  Vitals reviewed.   Constitutional:       General: She is not in acute distress.  Eyes:      General: Lids are normal.      Conjunctiva/sclera: Conjunctivae normal.   Neck:      Vascular: No carotid bruit.      Trachea: No tracheal deviation.   Cardiovascular:      Rate and Rhythm: Normal rate and regular rhythm.      Heart sounds: Normal heart sounds. No murmur heard.  Pulmonary:      Effort: Pulmonary effort is normal.      Breath sounds: Normal breath sounds.   Skin:     General: Skin is warm and dry.   Neurological:      Mental Status: She is alert. She is not disoriented.   Psychiatric:         Speech: Speech normal.         Behavior: Behavior normal. Behavior is cooperative.          Result Review :     The following data was reviewed by: Emmett Rivas MD on 12/05/2022:  CK (11/29/2022 08:35)  TSH (11/29/2022 08:35)  CBC and Differential (11/29/2022 08:35)  Lipid panel (11/29/2022 08:35)  Comprehensive metabolic panel (11/29/2022 08:35)  CT Abdomen Pelvis With Contrast (10/12/2022 08:16)-steatosis           Assessment and Plan    Diagnoses and all orders for this visit:    1. Essential hypertension (Primary)  Assessment & Plan:  The current medical regimen is effective;  continue present plan and medications.      Orders:  -     metoprolol succinate XL (TOPROL-XL) 50 MG 24 hr tablet; Take 1 tablet by mouth " Every Night for 270 days.  Dispense: 90 tablet; Refill: 2  -     Comprehensive Metabolic Panel; Future  -     CBC & Differential; Future  -     TSH; Future    2. Other hyperlipidemia  Assessment & Plan:  The current medical regimen is effective;  continue present plan and medications.      Orders:  -     atorvastatin (LIPITOR) 10 MG tablet; Take 1 tablet by mouth Every Night for 270 days.  Dispense: 90 tablet; Refill: 2  -     CK; Future  -     Lipid Panel With / Chol / HDL Ratio; Future    3. Anxiety  Assessment & Plan:  The current medical regimen is effective;  continue present plan and medications.      Orders:  -     DULoxetine (CYMBALTA) 60 MG capsule; Take 1 capsule by mouth Every Night for 270 days.  Dispense: 90 capsule; Refill: 2    4. Gastroesophageal reflux disease without esophagitis  Assessment & Plan:  The current medical regimen is effective;  continue present plan and medications.      Orders:  -     omeprazole (priLOSEC) 20 MG capsule; Take 1 capsule by mouth Every Night for 270 days.  Dispense: 90 capsule; Refill: 2    5. Other neutropenia (HCC)  Assessment & Plan:  Continue to monitor with serial labs      6. Elevated alkaline phosphatase level  Assessment & Plan:  Trending lower.      7. Elevated LFTs  Assessment & Plan:  Trending lower        Follow Up   Return in about 6 months (around 6/5/2023) for Medicare Wellness & regular visit, [id number] min.  Patient was given instructions and counseling regarding her condition or for health maintenance advice. Please see specific information pulled into the AVS if appropriate.

## 2022-12-14 ENCOUNTER — INFUSION (OUTPATIENT)
Dept: ONCOLOGY | Facility: HOSPITAL | Age: 67
End: 2022-12-14

## 2022-12-14 ENCOUNTER — HOSPITAL ENCOUNTER (OUTPATIENT)
Dept: CT IMAGING | Facility: HOSPITAL | Age: 67
Discharge: HOME | End: 2022-12-14
Admitting: INTERNAL MEDICINE

## 2022-12-14 DIAGNOSIS — C18.2 MALIGNANT NEOPLASM OF ASCENDING COLON: ICD-10-CM

## 2022-12-14 DIAGNOSIS — R91.8 LUNG NODULES: ICD-10-CM

## 2022-12-14 DIAGNOSIS — Z79.899 HIGH RISK MEDICATION USE: ICD-10-CM

## 2022-12-14 DIAGNOSIS — R16.1 SPLENOMEGALY: ICD-10-CM

## 2022-12-14 DIAGNOSIS — R79.89 ELEVATED LFTS: ICD-10-CM

## 2022-12-14 PROCEDURE — 25010000002 IOPAMIDOL 61 % SOLUTION: Performed by: INTERNAL MEDICINE

## 2022-12-14 PROCEDURE — 71260 CT THORAX DX C+: CPT

## 2022-12-14 PROCEDURE — 0 DIATRIZOATE MEGLUMINE & SODIUM PER 1 ML: Performed by: INTERNAL MEDICINE

## 2022-12-14 PROCEDURE — 74160 CT ABDOMEN W/CONTRAST: CPT

## 2022-12-14 RX ADMIN — IOPAMIDOL 90 ML: 612 INJECTION, SOLUTION INTRAVENOUS at 09:50

## 2022-12-14 RX ADMIN — DIATRIZOATE MEGLUMINE AND DIATRIZOATE SODIUM 30 ML: 660; 100 LIQUID ORAL; RECTAL at 09:10

## 2022-12-21 ENCOUNTER — TELEPHONE (OUTPATIENT)
Dept: GASTROENTEROLOGY | Facility: CLINIC | Age: 67
End: 2022-12-21

## 2022-12-21 ENCOUNTER — OFFICE VISIT (OUTPATIENT)
Dept: GASTROENTEROLOGY | Facility: CLINIC | Age: 67
End: 2022-12-21

## 2022-12-21 VITALS
BODY MASS INDEX: 27.55 KG/M2 | HEART RATE: 91 BPM | WEIGHT: 149.7 LBS | DIASTOLIC BLOOD PRESSURE: 84 MMHG | SYSTOLIC BLOOD PRESSURE: 129 MMHG | TEMPERATURE: 96.3 F | HEIGHT: 62 IN

## 2022-12-21 DIAGNOSIS — K63.89 COLONIC MASS: Primary | ICD-10-CM

## 2022-12-21 PROCEDURE — 99214 OFFICE O/P EST MOD 30 MIN: CPT | Performed by: INTERNAL MEDICINE

## 2022-12-21 RX ORDER — SODIUM CHLORIDE, SODIUM LACTATE, POTASSIUM CHLORIDE, CALCIUM CHLORIDE 600; 310; 30; 20 MG/100ML; MG/100ML; MG/100ML; MG/100ML
30 INJECTION, SOLUTION INTRAVENOUS CONTINUOUS
Status: CANCELLED | OUTPATIENT
Start: 2023-03-09

## 2022-12-21 NOTE — TELEPHONE ENCOUNTER
GILDA patient via telephone for COLONOSCOPY. Scheduled 03/09/2023 with arrival time of 0900AM. Prep paperwork mailed to verified address on file. Patient advised arrival time may change based on [facility] guidelines. GILDA RAY

## 2022-12-21 NOTE — PROGRESS NOTES
Chief Complaint   Patient presents with   • Diarrhea   • colonic mass     Subjective     HPI  Kayla Adkins is a 67 y.o. female who presents today for office follow up.  She has a history of stage IIIb colon cancer of the proximal ascending colon diagnosed February of this year.  She underwent right hemicolectomy with Dr. Wellington shortly after diagnosis followed by adjuvant chemotherapy administered under the care of Dr. Davies  She is overall doing well, she has finished chemotherapy and surveillace CT scan done recently shows no obvious residual disease.      Her colonoscopy was initially done for the indication of diarrhea she did have evidence of microscopic colitis on her random colon biopsies but has really not had any issues with diarrhea since she had her hemicolectomy.    Objective   Vitals:    12/21/22 0949   BP: 129/84   Pulse: 91   Temp: 96.3 °F (35.7 °C)       Physical Exam  Vitals reviewed.   Constitutional:       Appearance: She is well-developed.   HENT:      Head: Normocephalic and atraumatic.   Neurological:      Mental Status: She is alert and oriented to person, place, and time.   Psychiatric:         Behavior: Behavior normal.         Thought Content: Thought content normal.         Judgment: Judgment normal.                Assessment & Plan   Assessment:     1.  Colon cancer - stage IIIB, s/p R hemicolecomty with adjuvent chemotherapy  2.  Microscopic colitis    Plan:   We will plan for surveillance colonoscopy 2/2023  No primary treatment for her microscopic colitis at this time, can use imodium PRN             Irvin Clayton M.D.  [facility]  [address] [place] [address]  Office: [phone]

## 2022-12-22 ENCOUNTER — INFUSION (OUTPATIENT)
Dept: ONCOLOGY | Facility: HOSPITAL | Age: 67
End: 2022-12-22

## 2022-12-22 ENCOUNTER — OFFICE VISIT (OUTPATIENT)
Dept: ONCOLOGY | Facility: CLINIC | Age: 67
End: 2022-12-22

## 2022-12-22 VITALS
TEMPERATURE: 97.5 F | HEART RATE: 86 BPM | WEIGHT: 149.5 LBS | RESPIRATION RATE: 16 BRPM | BODY MASS INDEX: 27.51 KG/M2 | DIASTOLIC BLOOD PRESSURE: 79 MMHG | OXYGEN SATURATION: 98 % | SYSTOLIC BLOOD PRESSURE: 125 MMHG | HEIGHT: 62 IN

## 2022-12-22 DIAGNOSIS — C18.2 MALIGNANT NEOPLASM OF ASCENDING COLON: Primary | ICD-10-CM

## 2022-12-22 DIAGNOSIS — Z45.2 ENCOUNTER FOR FITTING AND ADJUSTMENT OF VASCULAR CATHETER: ICD-10-CM

## 2022-12-22 LAB
ALBUMIN SERPL-MCNC: 4.3 G/DL (ref 3.5–5.2)
ALBUMIN/GLOB SERPL: 1.9 G/DL
ALP SERPL-CCNC: 200 U/L (ref 39–117)
ALT SERPL W P-5'-P-CCNC: 24 U/L (ref 1–33)
ANION GAP SERPL CALCULATED.3IONS-SCNC: 8.8 MMOL/L (ref 5–15)
AST SERPL-CCNC: 25 U/L (ref 1–32)
BASOPHILS # BLD AUTO: 0.01 10*3/MM3 (ref 0–0.2)
BASOPHILS NFR BLD AUTO: 0.3 % (ref 0–1.5)
BILIRUB SERPL-MCNC: 0.3 MG/DL (ref 0–1.2)
BUN SERPL-MCNC: 11 MG/DL (ref 8–23)
BUN/CREAT SERPL: 17.5 (ref 7–25)
CALCIUM SPEC-SCNC: 9.7 MG/DL (ref 8.6–10.5)
CHLORIDE SERPL-SCNC: 104 MMOL/L (ref 98–107)
CO2 SERPL-SCNC: 27.2 MMOL/L (ref 22–29)
CREAT SERPL-MCNC: 0.63 MG/DL (ref 0.57–1)
DEPRECATED RDW RBC AUTO: 40.3 FL (ref 37–54)
EGFRCR SERPLBLD CKD-EPI 2021: 98 ML/MIN/1.73
EOSINOPHIL # BLD AUTO: 0.04 10*3/MM3 (ref 0–0.4)
EOSINOPHIL NFR BLD AUTO: 1.4 % (ref 0.3–6.2)
ERYTHROCYTE [DISTWIDTH] IN BLOOD BY AUTOMATED COUNT: 11.5 % (ref 12.3–15.4)
GLOBULIN UR ELPH-MCNC: 2.3 GM/DL
GLUCOSE SERPL-MCNC: 148 MG/DL (ref 65–99)
HCT VFR BLD AUTO: 40.5 % (ref 34–46.6)
HGB BLD-MCNC: 13.6 G/DL (ref 12–15.9)
IMM GRANULOCYTES # BLD AUTO: 0 10*3/MM3 (ref 0–0.05)
IMM GRANULOCYTES NFR BLD AUTO: 0 % (ref 0–0.5)
LYMPHOCYTES # BLD AUTO: 1.03 10*3/MM3 (ref 0.7–3.1)
LYMPHOCYTES NFR BLD AUTO: 36 % (ref 19.6–45.3)
MCH RBC QN AUTO: 32.4 PG (ref 26.6–33)
MCHC RBC AUTO-ENTMCNC: 33.6 G/DL (ref 31.5–35.7)
MCV RBC AUTO: 96.4 FL (ref 79–97)
MONOCYTES # BLD AUTO: 0.21 10*3/MM3 (ref 0.1–0.9)
MONOCYTES NFR BLD AUTO: 7.3 % (ref 5–12)
NEUTROPHILS NFR BLD AUTO: 1.57 10*3/MM3 (ref 1.7–7)
NEUTROPHILS NFR BLD AUTO: 55 % (ref 42.7–76)
NRBC BLD AUTO-RTO: 0 /100 WBC (ref 0–0.2)
PLATELET # BLD AUTO: 188 10*3/MM3 (ref 140–450)
PMV BLD AUTO: 9.8 FL (ref 6–12)
POTASSIUM SERPL-SCNC: 4.1 MMOL/L (ref 3.5–5.2)
PROT SERPL-MCNC: 6.6 G/DL (ref 6–8.5)
RBC # BLD AUTO: 4.2 10*6/MM3 (ref 3.77–5.28)
SODIUM SERPL-SCNC: 140 MMOL/L (ref 136–145)
WBC NRBC COR # BLD: 2.86 10*3/MM3 (ref 3.4–10.8)

## 2022-12-22 PROCEDURE — 80053 COMPREHEN METABOLIC PANEL: CPT | Performed by: INTERNAL MEDICINE

## 2022-12-22 PROCEDURE — 25010000002 HEPARIN LOCK FLUSH PER 10 UNITS: Performed by: INTERNAL MEDICINE

## 2022-12-22 PROCEDURE — 85025 COMPLETE CBC W/AUTO DIFF WBC: CPT | Performed by: INTERNAL MEDICINE

## 2022-12-22 PROCEDURE — 36591 DRAW BLOOD OFF VENOUS DEVICE: CPT

## 2022-12-22 PROCEDURE — 99215 OFFICE O/P EST HI 40 MIN: CPT | Performed by: INTERNAL MEDICINE

## 2022-12-22 RX ORDER — HEPARIN SODIUM (PORCINE) LOCK FLUSH IV SOLN 100 UNIT/ML 100 UNIT/ML
500 SOLUTION INTRAVENOUS AS NEEDED
Status: CANCELLED | OUTPATIENT
Start: 2022-12-22

## 2022-12-22 RX ORDER — SODIUM CHLORIDE 0.9 % (FLUSH) 0.9 %
10 SYRINGE (ML) INJECTION AS NEEDED
Status: DISCONTINUED | OUTPATIENT
Start: 2022-12-22 | End: 2022-12-22 | Stop reason: HOSPADM

## 2022-12-22 RX ORDER — SODIUM CHLORIDE 0.9 % (FLUSH) 0.9 %
10 SYRINGE (ML) INJECTION AS NEEDED
Status: CANCELLED | OUTPATIENT
Start: 2022-12-22

## 2022-12-22 RX ORDER — HEPARIN SODIUM (PORCINE) LOCK FLUSH IV SOLN 100 UNIT/ML 100 UNIT/ML
500 SOLUTION INTRAVENOUS AS NEEDED
Status: DISCONTINUED | OUTPATIENT
Start: 2022-12-22 | End: 2022-12-22 | Stop reason: HOSPADM

## 2022-12-22 RX ORDER — GABAPENTIN 300 MG/1
300 CAPSULE ORAL 3 TIMES DAILY
Qty: 90 CAPSULE | Refills: 0 | Status: SHIPPED | OUTPATIENT
Start: 2022-12-22 | End: 2023-02-27

## 2022-12-22 RX ADMIN — Medication 500 UNITS: at 09:36

## 2022-12-22 RX ADMIN — Medication 10 ML: at 09:36

## 2022-12-22 NOTE — PROGRESS NOTES
Subjective   Kayla Adkins is a 67 y.o. female.  Referred by Dr. Rivas for evaluation of leukopenia    History of Present Illness   Ms. Adkins is a 66-year-old postmenopausal  who has been referred by Dr. Rivas for evaluation of leukopenia, neutropenia.  Patient reports that she has had longstanding leukopenia and her white blood cell count normally ranges in the threes.  On her most recent visit with Dr. Rivas white blood cell count was noted to be 2560 on 3/2/2021 and 2660 on 4/5/2021.  This prompted a referral to hematology.  Patient reports that she had a extensive work-up including a bone marrow biopsy for the same condition about 17 years ago.  She thinks that this was performed at [facility].  According to patient no clear etiology was determined after the work-up.  She was recommended to continue with periodic labs.  No treatment was required.    She was seen in December and had continued on oral iron.  She was tolerating that fairly well.  However she had worsening of colitis for about 2 months prior to that visit.  She was scheduled for colonoscopy in January 2022.    She had a routine surveillance colonoscopy performed by Dr. Whitlock on 2/22/2022.  On this there was an infiltrative nonobstructing medium-sized mass in the proximal ascending colon 1 to 2 cm distal to the cecum.  This was partially circumferential involving 1 foot of the lumen.  Biopsies were taken.  There is also mildly congested mucosa in the entire colon.  Random biopsies were taken.  She also had an EGD at the same time which reported normal esophagus, stomach and duodenum.  Pathology evaluation from the biopsy reported superficial fragments of at least intramucosal carcinoma.  Random colon biopsies reported increased lamina propria, chronic inflammation with surface epithelial alteration and increased intraepithelial lymphocytes consistent with lymphocytic colitis.  Biopsies from the stomach reported chronic  inactive gastritis.  Negative for intestinal metaplasia and H. pylori.  Biopsies from the duodenum was negative.    She was seen by Dr. Wellington who performed a laparoscopic right hemicolectomy on 3/2/2022.  Pathology showed a 3.8 cm mass at the cecum invading through the muscularis propria into the pericolorectal adipose tissue.  No lymphovascular or perineural invasion.  All margins were clear.  The radial margin was 3 cm.  3 out of 26 lymph nodes were positive for metastatic disease.  The largest metastatic focus was 0.7 cm with extranodal extension.  Pathological stage PT3N1B.  The tumor was moderately differentiated, grade 2 adenocarcinoma.    No loss of nuclear expression of MMR proteins.  Low probability of microsatellite instability.  MSI status-microsatellite stable.  This is by PCR.    Normal CEA and 0.77 on the day prior to surgery.    She was seen by Dr. Burton as inpatient and recommended adjuvant chemotherapy with FOLFOX.    Patient initiating cycle 1 FOLFOX 4/18/2022.  Neulasta support given due to baseline neutropenia.    9/27/2020 to cycle 12 FOLFOX (oxaliplatin omitted due to neuropathy).    Interval history:  Patient presents today for follow-up accompanied by her .  She reports feeling well.  She has made some dietary changes and has lost some weight.  Continues to experience neuropathy in her fingers and toes.  12/14/2022-CT of the chest abdomen and pelvis showed stable sub-6 mm pulmonary nodules again.  The splenic size is normal.  Diffuse fatty infiltration of the liver noted.  No evidence of metastatic disease noted.  Denies any fevers or chills.      The following portions of the patient's history were reviewed and updated as appropriate: allergies, current medications, past family history, past medical history, past social history and problem list.    Past Medical History:   Diagnosis Date   • Anemia    • Anxiety    • Bladder infection    • Colitis    • Colon cancer (HCC)    • GERD  (gastroesophageal reflux disease)    • Headache    • Hyperlipidemia    • Hypertension    • Knee injury, initial encounter-left 07/01/2019   • Neutropenia (HCC) 01/03/2017   • Prediabetes 03/04/2019      Past Surgical History:   Procedure Laterality Date   • COLON RESECTION Right 03/02/2022    Procedure: LAPAROSCOPIC RIGHT HEMICOLECTOMY WITH DAVINCI ROBOT;  Surgeon: Malcolm Wellington MD;  Location: [facility] MAIN OR;  Service: Robotics - DaVinci;  Laterality: Right;   • COLONOSCOPY     • COLONOSCOPY W/ POLYPECTOMY N/A 02/22/2022    Procedure: COLONOSCOPY INTO CECUM WITH COLD BIOPSIES;  Surgeon: Irvin Clayton MD;  Location: [facility] ENDOSCOPY;  Service: Gastroenterology;  Laterality: N/A;  PRE: DIARRHEA, ANEMIA  POST: ASCENDING COLON MASS   • ENDOSCOPY N/A 02/22/2022    Procedure: ESOPHAGOGASTRODUODENOSCOPY WITH BIOPSY;  Surgeon: Irvin Clayton MD;  Location: [facility] ENDOSCOPY;  Service: Gastroenterology;  Laterality: N/A;  PRE: ANEMIA  POST: NORMAL EGD   • TONSILLECTOMY     • VENOUS ACCESS DEVICE (PORT) INSERTION N/A 04/11/2022    Procedure: INSERTION VENOUS ACCESS DEVICE with subcutaneous port;  Surgeon: Malcolm Wellington MD;  Location: [facility] OR [facility];  Service: General;  Laterality: N/A;        Family History   Problem Relation Age of Onset   • Hypertension Mother    • Heart disease Father    • Heart attack Father 47        first MI age 47; second MI 57   • Hypertension Sister    • No Known Problems Brother    • Malig Hyperthermia Neg Hx       Social History     Socioeconomic History   • Marital status:    • Number of children: 2   Tobacco Use   • Smoking status: Never   • Smokeless tobacco: Never   Vaping Use   • Vaping Use: Never used   Substance and Sexual Activity   • Alcohol use: Not Currently     Comment: Very rarely   • Drug use: Never   • Sexual activity: Yes     Partners: Male      OB History    No obstetric history on file.          Allergies   Allergen Reactions   •  "Penicillins Rash      Review of systems as mentioned in the HPI    Objective   Blood pressure 125/79, pulse 86, temperature 97.5 °F (36.4 °C), temperature source Temporal, resp. rate 16, height 157 cm (61.81\"), weight 67.8 kg (149 lb 8 oz), SpO2 98 %.     Physical Exam  Constitutional:       General: She is not in acute distress.     Appearance: She is well-developed.   Pulmonary:      Effort: Pulmonary effort is normal. No respiratory distress.   Skin:     General: Skin is warm and dry.   Neurological:      Mental Status: She is alert and oriented to person, place, and time.       I have reexamined the patient and the results are consistent with the previously documented exam. Mago Alvarez MD     DIAGNOSTIC DATA:  Results from last 7 days   Lab Units 12/22/22  0937   WBC 10*3/mm3 2.86*   NEUTROS ABS 10*3/mm3 1.57*   HEMOGLOBIN g/dL 13.6   HEMATOCRIT % 40.5   PLATELETS 10*3/mm3 188     Results from last 7 days   Lab Units 12/22/22  0937   SODIUM mmol/L 140   POTASSIUM mmol/L 4.1   CHLORIDE mmol/L 104   CO2 mmol/L 27.2   BUN mg/dL 11   CREATININE mg/dL 0.63   CALCIUM mg/dL 9.7   ALBUMIN g/dL 4.30   BILIRUBIN mg/dL 0.3   ALK PHOS U/L 200*   ALT (SGPT) U/L 24   AST (SGOT) U/L 25   GLUCOSE mg/dL 148*           CBC with a WBC count of 2.86 and absolute neutrophil count 1.57 otherwise within normal limits  CMP with normal LFTs, blood sugar 148    No radiology results for the last 30 days.       Assessment & Plan      66-year-old postmenopausal  lady with longstanding leukopenia/neutropenia and anemia and recently diagnosed T3N1A adenocarcinoma of the colon    *[id number] adenocarcinoma of the ascending colon  · The tumor measures 3.8 cm located in the cecum, invades through the muscularis propria into the pericolorectal adipose tissue.  No lymphovascular or perineural invasion.  All the margins are negative.  Tumor comes to within 3 cm of the radial margin.  3 out of 26 lymph nodes positive for metastatic " carcinoma with the largest focus measuring 0.7 cm without extranodal extension.  · No tumor deposits identified  · PT3N1B  · MSI low probability on immunohistochemistry and stable on PCR  · No family history of colon cancer  · Explained to her that this is stage IIIb colon cancer however since his T3N1 I would consider this low risk stage III.  There is no other high risk features noted on pathology either.  · Given that the tumor is stage III there is definite benefit from adjuvant chemotherapy.  · We discussed the 2 available regimens including XELOX and FOLFOX.  · Explained to her that based on the IDEA data 3 months of XELOX is noninferior to 6 months of adjuvant chemotherapy however with FOLFOX the noninferiority has not been established.  Explained to her that 6 months of chemotherapy improves disease-free survival by additional 2 to 3% over 3 months.  · Discussed the adverse effects of XELOX as well as FOLFOX.  · After discussing the pros and cons we have decided to proceed with FOLFOX for total of 6 months but if she is unable to tolerate due to neurotoxicity then we could potentially stop after 3 months of treatment.  · Port placed on 4/11/2022  · Cycle 1 FOLFOX on 4/18/2022  · Cycle 11 FOLFOX 9/13/2022 with a 20% dose reduction of oxaliplatin.  · Now experiencing neuropathy, we will discontinue oxaliplatin and just to 5-FU for cycle 12  · Seen 10/14/2022 in follow-up and scan review.  CT scan of the chest, abdomen, and pelvis done 10/12/2022 showing a few tiny new lung nodules indeterminant, measuring about 3 to 4 mm in maximum size.  Patient also has enlarging spleen previously 11.3 cm, now measuring 13.6 cm.    · 12/14/2022-CT of the chest abdomen pelvis shows stable pulmonary nodules.  · Repeat scans will be performed in 6 months.  · Otherwise no evidence of metastatic disease.  · Scheduled for screening colonoscopy in March 2023    *Leukopenia  · Predominantly neutropenia  · Mild increase in monocyte  count  · This is chronic and unchanged  · No increased infections  · Had a previous work-up including a bone marrow biopsy with no clear etiology  · Differential includes nutritional, chronic idiopathic, autoimmune.  · She is currently not on any medications which could account for the leukopenia.  · No splenomegaly on exam  · 5/13/2021-flow cytometry negative  · LDH normal at 174  · Reticulocyte count 1.07%  · Ferritin low at 5.7  · Iron saturation low at 3%  · Folic acid normal at 11.8, B12 415  · Rheumatoid factor negative  · AVIVA negative  · ESR normal at 29  · Serum copper normal at 134  · WBC count has remained normal for the most part since starting FOLFOX due to Neulasta support.  · 9/15/2022-WBC 3.61, hemoglobin 9.7 and platelets 89,000 with an absolute neutrophil count of 2.16  · 12/22/2022-neutropenia, with an absolute neutrophil count of 1.57  · Patient is asymptomatic, monitor    *Anemia  · Microcytic  · Likely secondary to iron deficiency  · 4/22/2021 iron saturation 3%, TIBC today 508, ferritin 5.70.  The patient was initiated on oral iron every other day.  · 8/26/2021, hemoglobin today 10.1.  The patient has been tolerating oral iron every other day.  We will increase oral iron to daily and recheck iron labs in 4 months.  · 12/16/2021, patient is taking oral iron every day and tolerating this well.  Hemoglobin today 10.0.  Iron saturation 45, TIBC 413, ferritin 10.20.  · 3/4/2022 ferritin 27.5, iron saturation 5%, TIBC 375, iron studies consistent with iron deficiency  · 3/24/2022 hemoglobin 10.2  · Received 3 doses of Venofer.  · 9/15/2022-hemoglobin 9.7, low due to chemotherapy  · As of 10/14/2022 hemoglobin improving now up to 11.3.    *Zvmxfwrjb-es-tn-date on mammogram    *Colitis  · Lymphocytic colitis noted on the biopsy of the colon  · There is a flare of colitis we may have to consider using steroids  · Currently she does not have any diarrhea  · Explained to her that 5-FU can increase the  risk of diarrhea.  · We will have her follow-up with GI  · In the event of diarrhea we will try Imodium first and if no improvement in symptoms then consider steroids.  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  · Gabapentin to help with neuropathy      CT of the chest abdomen and pelvis-images independently reviewed and interpreted by me.  Detail summarized above.      Mago Alvarez MD  12/22/22    I spent 42 minutes caring for Kayla on this date of service. This time includes time spent by me in the following activities: preparing for the visit, reviewing tests, obtaining and/or reviewing a separately obtained history, performing a medically appropriate examination and/or evaluation, counseling and educating the patient/family/caregiver, ordering medications, tests, or procedures, referring and communicating with other health care professionals, documenting information in the medical record, independently interpreting results and communicating that information with the patient/family/caregiver and care coordination

## 2022-12-29 NOTE — PROGRESS NOTES
called tonight stating she has COVID, mild symptoms developed this morning.    Questions whether she should have Paxlovid.    She meets criteria.    He was advised she needs to hold atorvastatin while taking the Paxlovid.    Rx for Paxlovid sent to [facility].

## 2022-12-30 ENCOUNTER — SPECIALTY PHARMACY (OUTPATIENT)
Dept: PHARMACY | Facility: HOSPITAL | Age: 67
End: 2022-12-30

## 2022-12-30 NOTE — PROGRESS NOTES
[facility] telephone encounter- Paxlovid DDI    Called  and explained that she should hold atorvastatin during Paxlovid treatment and for an additional 3 days after. She v/u.       Everardo Duggan MD [place], Janna [facility]; Mago Alvarez MD  Thank you! Can you let her know about the atorvastatin? Thanks! CECI           Previous Messages       ----- Message -----   From: Genesis Velasquez RP   Sent: 12/30/2022   8:20 AM EST   To: Everardo Duggan MD, Mago Alvarez MD   Subject: RE: COVID                                         Agree, typically wait three extra days after last paxlovid dose to re-start atorvastatin. I don't see any other drug interactions based on her current med list.     Genesis Ramesh   ----- Message -----   From: Everardo Duggan MD   Sent: 12/29/2022   5:37 PM EST   To: Mago Alvarez MD, *   Subject: COVID                                             Patient with COVID, mild sx developed this AM.  was dx last Sunday.     Seems she meets criteria for Paxlovid and I sent an electronic rx to her pharmacy     I advised him to have her hold the atorvastatin while she's on the Paxlovid.     Any other thoughts please let them know.     CECI

## 2023-02-03 ENCOUNTER — TELEPHONE (OUTPATIENT)
Dept: FAMILY MEDICINE CLINIC | Facility: CLINIC | Age: 68
End: 2023-02-03

## 2023-02-03 NOTE — TELEPHONE ENCOUNTER
Caller: Kayla Adkins    Relationship to patient: Self    Best call back number: [phone]    Patient is needing: PATIENT'S  IS CALLING TO ASK WHEN SHE CAN GET A WHOOPING COUGH BOOSTER.     PLEASE ADVISE.    UNABLE TO WARM TRANSFER

## 2023-02-16 ENCOUNTER — INFUSION (OUTPATIENT)
Dept: ONCOLOGY | Facility: HOSPITAL | Age: 68
End: 2023-02-16
Payer: MEDICARE

## 2023-02-16 DIAGNOSIS — Z45.2 ENCOUNTER FOR FITTING AND ADJUSTMENT OF VASCULAR CATHETER: Primary | ICD-10-CM

## 2023-02-16 PROCEDURE — 96523 IRRIG DRUG DELIVERY DEVICE: CPT

## 2023-02-16 PROCEDURE — 25010000002 HEPARIN LOCK FLUSH PER 10 UNITS: Performed by: INTERNAL MEDICINE

## 2023-02-16 RX ORDER — SODIUM CHLORIDE 0.9 % (FLUSH) 0.9 %
10 SYRINGE (ML) INJECTION AS NEEDED
Status: CANCELLED | OUTPATIENT
Start: 2023-02-16

## 2023-02-16 RX ORDER — HEPARIN SODIUM (PORCINE) LOCK FLUSH IV SOLN 100 UNIT/ML 100 UNIT/ML
500 SOLUTION INTRAVENOUS AS NEEDED
Status: DISCONTINUED | OUTPATIENT
Start: 2023-02-16 | End: 2023-02-16 | Stop reason: HOSPADM

## 2023-02-16 RX ORDER — SODIUM CHLORIDE 0.9 % (FLUSH) 0.9 %
10 SYRINGE (ML) INJECTION AS NEEDED
Status: DISCONTINUED | OUTPATIENT
Start: 2023-02-16 | End: 2023-02-16 | Stop reason: HOSPADM

## 2023-02-16 RX ORDER — HEPARIN SODIUM (PORCINE) LOCK FLUSH IV SOLN 100 UNIT/ML 100 UNIT/ML
500 SOLUTION INTRAVENOUS AS NEEDED
Status: CANCELLED | OUTPATIENT
Start: 2023-02-16

## 2023-02-16 RX ADMIN — Medication 500 UNITS: at 11:05

## 2023-02-16 RX ADMIN — Medication 10 ML: at 11:05

## 2023-02-20 ENCOUNTER — TELEPHONE (OUTPATIENT)
Dept: FAMILY MEDICINE CLINIC | Facility: CLINIC | Age: 68
End: 2023-02-20

## 2023-02-20 NOTE — TELEPHONE ENCOUNTER
Caller: LucilleAlonso    Relationship: Emergency Contact    Best call back number: [phone]  What is the medical concern/diagnosis: SPOT ON CHEST    What specialty or service is being requested: DERMATOLOGY     What is the provider, practice or medical service name: [facility]     What is the office location: PATIENT'S  STATES THAT SHE WILL BE GOING TO THE ONE ON [facility], [place] DIDN'T HAVE AN OPENING UNTIL APRIL    What is the office phone number: [phone]    Any additional details: PATIENT HAS AN APPOINTMENT SET UP ON Thursday (2/23). PATIENT'S  WAS NOT SURE IF SHE WOULD NEED A REFERRAL FOR THIS, PLEASE ADVISE.

## 2023-02-26 DIAGNOSIS — C18.2 MALIGNANT NEOPLASM OF ASCENDING COLON: ICD-10-CM

## 2023-02-27 RX ORDER — GABAPENTIN 300 MG/1
CAPSULE ORAL
Qty: 90 CAPSULE | Refills: 0 | Status: SHIPPED | OUTPATIENT
Start: 2023-02-27

## 2023-03-09 ENCOUNTER — HOSPITAL ENCOUNTER (OUTPATIENT)
Facility: HOSPITAL | Age: 68
Setting detail: HOSPITAL OUTPATIENT SURGERY
Discharge: HOME | End: 2023-03-09
Attending: INTERNAL MEDICINE | Admitting: INTERNAL MEDICINE
Payer: MEDICARE

## 2023-03-09 ENCOUNTER — ANESTHESIA (OUTPATIENT)
Dept: GASTROENTEROLOGY | Facility: HOSPITAL | Age: 68
End: 2023-03-09
Payer: MEDICARE

## 2023-03-09 ENCOUNTER — ANESTHESIA EVENT (OUTPATIENT)
Dept: GASTROENTEROLOGY | Facility: HOSPITAL | Age: 68
End: 2023-03-09
Payer: MEDICARE

## 2023-03-09 VITALS
BODY MASS INDEX: 26.15 KG/M2 | HEIGHT: 62 IN | RESPIRATION RATE: 16 BRPM | OXYGEN SATURATION: 98 % | HEART RATE: 68 BPM | WEIGHT: 142.1 LBS | DIASTOLIC BLOOD PRESSURE: 68 MMHG | SYSTOLIC BLOOD PRESSURE: 113 MMHG

## 2023-03-09 DIAGNOSIS — K63.89 COLONIC MASS: ICD-10-CM

## 2023-03-09 PROCEDURE — 88305 TISSUE EXAM BY PATHOLOGIST: CPT | Performed by: INTERNAL MEDICINE

## 2023-03-09 PROCEDURE — 25010000002 PROPOFOL 10 MG/ML EMULSION: Performed by: STUDENT IN AN ORGANIZED HEALTH CARE EDUCATION/TRAINING PROGRAM

## 2023-03-09 PROCEDURE — 0 LIDOCAINE 1 % SOLUTION: Performed by: STUDENT IN AN ORGANIZED HEALTH CARE EDUCATION/TRAINING PROGRAM

## 2023-03-09 PROCEDURE — 45380 COLONOSCOPY AND BIOPSY: CPT | Performed by: INTERNAL MEDICINE

## 2023-03-09 RX ORDER — PROPOFOL 10 MG/ML
VIAL (ML) INTRAVENOUS AS NEEDED
Status: DISCONTINUED | OUTPATIENT
Start: 2023-03-09 | End: 2023-03-09 | Stop reason: SURG

## 2023-03-09 RX ORDER — SODIUM CHLORIDE, SODIUM LACTATE, POTASSIUM CHLORIDE, CALCIUM CHLORIDE 600; 310; 30; 20 MG/100ML; MG/100ML; MG/100ML; MG/100ML
30 INJECTION, SOLUTION INTRAVENOUS CONTINUOUS
Status: DISCONTINUED | OUTPATIENT
Start: 2023-03-09 | End: 2023-03-09 | Stop reason: HOSPADM

## 2023-03-09 RX ORDER — LIDOCAINE HYDROCHLORIDE 10 MG/ML
INJECTION, SOLUTION INFILTRATION; PERINEURAL AS NEEDED
Status: DISCONTINUED | OUTPATIENT
Start: 2023-03-09 | End: 2023-03-09 | Stop reason: SURG

## 2023-03-09 RX ADMIN — SODIUM CHLORIDE, POTASSIUM CHLORIDE, SODIUM LACTATE AND CALCIUM CHLORIDE 30 ML/HR: 600; 310; 30; 20 INJECTION, SOLUTION INTRAVENOUS at 07:32

## 2023-03-09 RX ADMIN — PROPOFOL 40 MG: 10 INJECTION, EMULSION INTRAVENOUS at 08:08

## 2023-03-09 RX ADMIN — PROPOFOL 200 MCG/KG/MIN: 10 INJECTION, EMULSION INTRAVENOUS at 08:08

## 2023-03-09 RX ADMIN — LIDOCAINE HYDROCHLORIDE 35 MG: 10 INJECTION, SOLUTION INFILTRATION; PERINEURAL at 08:08

## 2023-03-09 NOTE — DISCHARGE INSTRUCTIONS
For the next 24 hours patient needs to be with a responsible adult.    For 24 hours DO NOT drive, operate machinery, appliances, drink alcohol, make important decisions or sign legal documents.    Start with a light or bland diet if you are feeling sick to your stomach otherwise advance to regular diet as tolerated.    Follow recommendations on procedure report if provided by your doctor.    Call Dr. Egan for problems [phone]    Problems may include but not limited to: large amounts of bleeding, trouble breathing, repeated vomiting, severe unrelieved pain, fever or chills.

## 2023-03-09 NOTE — H&P
[facility]  Pre Procedure History & Physical    Chief Complaint:   Time for my colonoscopy    Subjective     HPI:   67 y.o. female presenting to endoscopy unit today for surveillance colonoscopy.  Personal hx of colon cancer s/p R hemicolectomy.    Past Medical History:   Past Medical History:   Diagnosis Date   • Anemia    • Anxiety    • Bladder infection    • Colitis    • Colon cancer (HCC)    • GERD (gastroesophageal reflux disease)    • Headache    • Hyperlipidemia    • Hypertension    • Knee injury, initial encounter-left 07/01/2019   • Neutropenia (HCC) 01/03/2017   • Prediabetes 03/04/2019       Family History:  Family History   Problem Relation Age of Onset   • Hypertension Mother    • Heart disease Father    • Heart attack Father 47        first MI age 47; second MI 57   • Hypertension Sister    • No Known Problems Brother    • Malig Hyperthermia Neg Hx        Social History:   reports that she has never smoked. She has never used smokeless tobacco. She reports that she does not currently use alcohol. She reports that she does not use drugs.    Medications:   Medications Prior to Admission   Medication Sig Dispense Refill Last Dose   • aspirin-acetaminophen-caffeine (EXCEDRIN MIGRAINE) 250-250-65 MG per tablet Take 1 tablet by mouth Every 6 (Six) Hours As Needed for Headache.   Past Month   • atorvastatin (LIPITOR) 10 MG tablet Take 1 tablet by mouth Every Night for 270 days. 90 tablet 2 3/8/2023   • DULoxetine (CYMBALTA) 60 MG capsule Take 1 capsule by mouth Every Night for 270 days. 90 capsule 2 3/8/2023   • Ferrous Sulfate ER (Slow Fe) 142 (45 Fe) MG tablet controlled-release Take 142 mg by mouth Daily. 30 tablet 3 3/8/2023   • gabapentin (NEURONTIN) 300 MG capsule TAKE ONE CAPSULE BY MOUTH THREE TIMES A DAY AS TOLERATED 90 capsule 0 3/8/2023   • metoprolol succinate XL (TOPROL-XL) 50 MG 24 hr tablet Take 1 tablet by mouth Every Night for 270 days. 90 tablet 2 3/8/2023   •  "omeprazole (priLOSEC) 20 MG capsule Take 1 capsule by mouth Every Night for 270 days. 90 capsule 2 3/8/2023   • aspirin 81 MG EC tablet Take 1 tablet by mouth Daily.   3/4/2023       Allergies:  Penicillins    Objective     Blood pressure 128/69, pulse 86, resp. rate 16, height 157.5 cm (62\"), weight 64.5 kg (142 lb 1.6 oz), SpO2 98 %.  Physical Exam:   General: patient awake, alert and cooperative    Assessment & Plan     Diagnosis:  Personal hx of colon cancer    Anticipated Surgical Procedure:  Colonoscopy    The risks, benefits, and alternatives of this procedure have been discussed with the patient or the responsible party- the patient understands and agrees to proceed.                                                                "

## 2023-03-09 NOTE — ANESTHESIA POSTPROCEDURE EVALUATION
"Patient: Kayla THOMAS Lucille    Procedure Summary     Date: 03/09/23 Room / Location:  [facility]    Anesthesia Start: 0805 Anesthesia Stop: 0825    Procedure: COLONOSCOPY TO ANASTAMOSIS WITH COLD BIOPSIES Diagnosis:       Colonic mass      (Colonic mass [K63.89])    Surgeons: Irvin Clayton MD Provider: Landon Umaña MD    Anesthesia Type: MAC ASA Status: 2          Anesthesia Type: MAC    Vitals  Vitals Value Taken Time   /68 03/09/23 0844   Temp     Pulse 68 03/09/23 0844   Resp 16 03/09/23 0844   SpO2 98 % 03/09/23 0844           Post Anesthesia Care and Evaluation    Patient location during evaluation: bedside  Patient participation: complete - patient participated  Level of consciousness: awake and alert  Pain management: adequate    Airway patency: patent  Anesthetic complications: No anesthetic complications    Cardiovascular status: acceptable  Respiratory status: acceptable  Hydration status: acceptable    Comments: /68 (BP Location: Left arm, Patient Position: Sitting)   Pulse 68   Resp 16   Ht 157.5 cm (62\")   Wt 64.5 kg (142 lb 1.6 oz)   LMP  (LMP Unknown)   SpO2 98%   BMI 25.99 kg/m²       "

## 2023-03-09 NOTE — ANESTHESIA PREPROCEDURE EVALUATION
Anesthesia Evaluation     Patient summary reviewed and Nursing notes reviewed   no history of anesthetic complications:  NPO Solid Status: > 8 hours  NPO Liquid Status: > 4 hours           Airway   Mallampati: II  Dental      Pulmonary - negative pulmonary ROS and normal exam   Cardiovascular - normal exam    (+) hypertension less than 2 medications, hyperlipidemia,       Neuro/Psych  (+) headaches, psychiatric history Anxiety,    GI/Hepatic/Renal/Endo    (+)  GERD,      Musculoskeletal     Abdominal    Substance History      OB/GYN          Other   arthritis,    history of cancer remission                    Anesthesia Plan    ASA 2     MAC     intravenous induction     Anesthetic plan, risks, benefits, and alternatives have been provided, discussed and informed consent has been obtained with: patient.        CODE STATUS:

## 2023-03-10 LAB
LAB AP CASE REPORT: NORMAL
PATH REPORT.FINAL DX SPEC: NORMAL
PATH REPORT.GROSS SPEC: NORMAL

## 2023-03-14 NOTE — PROGRESS NOTES
Subjective   Kayla Adkins is a 67 y.o. female.  Referred by Dr. Rivas for evaluation of leukopenia    History of Present Illness   Ms. Adkins is a 66-year-old postmenopausal  who has been referred by Dr. Rivas for evaluation of leukopenia, neutropenia.  Patient reports that she has had longstanding leukopenia and her white blood cell count normally ranges in the threes.  On her most recent visit with Dr. Rivas white blood cell count was noted to be 2560 on 3/2/2021 and 2660 on 4/5/2021.  This prompted a referral to hematology.  Patient reports that she had a extensive work-up including a bone marrow biopsy for the same condition about 17 years ago.  She thinks that this was performed at [facility].  According to patient no clear etiology was determined after the work-up.  She was recommended to continue with periodic labs.  No treatment was required.    She was seen in December and had continued on oral iron.  She was tolerating that fairly well.  However she had worsening of colitis for about 2 months prior to that visit.  She was scheduled for colonoscopy in January 2022.    She had a routine surveillance colonoscopy performed by Dr. Whitlock on 2/22/2022.  On this there was an infiltrative nonobstructing medium-sized mass in the proximal ascending colon 1 to 2 cm distal to the cecum.  This was partially circumferential involving 1 foot of the lumen.  Biopsies were taken.  There is also mildly congested mucosa in the entire colon.  Random biopsies were taken.  She also had an EGD at the same time which reported normal esophagus, stomach and duodenum.  Pathology evaluation from the biopsy reported superficial fragments of at least intramucosal carcinoma.  Random colon biopsies reported increased lamina propria, chronic inflammation with surface epithelial alteration and increased intraepithelial lymphocytes consistent with lymphocytic colitis.  Biopsies from the stomach reported chronic  inactive gastritis.  Negative for intestinal metaplasia and H. pylori.  Biopsies from the duodenum was negative.    She was seen by Dr. Wellington who performed a laparoscopic right hemicolectomy on 3/2/2022.  Pathology showed a 3.8 cm mass at the cecum invading through the muscularis propria into the pericolorectal adipose tissue.  No lymphovascular or perineural invasion.  All margins were clear.  The radial margin was 3 cm.  3 out of 26 lymph nodes were positive for metastatic disease.  The largest metastatic focus was 0.7 cm with extranodal extension.  Pathological stage PT3N1B.  The tumor was moderately differentiated, grade 2 adenocarcinoma.    No loss of nuclear expression of MMR proteins.  Low probability of microsatellite instability.  MSI status-microsatellite stable.  This is by PCR.    Normal CEA and 0.77 on the day prior to surgery.    She was seen by Dr. Burton as inpatient and recommended adjuvant chemotherapy with FOLFOX.    Patient initiating cycle 1 FOLFOX 4/18/2022.  Neulasta support given due to baseline neutropenia.    9/27/2020 to cycle 12 FOLFOX (oxaliplatin omitted due to neuropathy).    Interval history:  Kayla returns today for 3-month follow-up.  She had a colonoscopy performed 3/9/2023 which was normal.  She is feeling very well.  She has been working to improve her diet.  She continues to experience peripheral neuropathy.  She is taking gabapentin 300 mg nightly.  We also discussed starting a B complex vitamin.  Neuropathy is not interfering with her daily functions.  She has been feeling very well without any new concerns.  No changes to her bowel habits.  No evidence of metastatic disease noted on exam today.      The following portions of the patient's history were reviewed and updated as appropriate: allergies, current medications, past family history, past medical history, past social history and problem list.    Past Medical History:   Diagnosis Date   • Anemia    • Anxiety    • Bladder  infection    • Colitis    • Colon cancer (HCC)    • GERD (gastroesophageal reflux disease)    • Headache    • Hyperlipidemia    • Hypertension    • Knee injury, initial encounter-left 07/01/2019   • Neutropenia (HCC) 01/03/2017   • Prediabetes 03/04/2019      Past Surgical History:   Procedure Laterality Date   • COLON RESECTION Right 03/02/2022    Procedure: LAPAROSCOPIC RIGHT HEMICOLECTOMY WITH DAVINCI ROBOT;  Surgeon: Malcolm Wellington MD;  Location: [facility] MAIN OR;  Service: Robotics - DaVinci;  Laterality: Right;   • COLONOSCOPY     • COLONOSCOPY N/A 3/9/2023    Procedure: COLONOSCOPY TO ANASTAMOSIS WITH COLD BIOPSIES;  Surgeon: Irvin Clayton MD;  Location: [facility] ENDOSCOPY;  Service: Gastroenterology;  Laterality: N/A;  PRE- HISTORY OF COLON CANCER  POST- NORMAL   • COLONOSCOPY W/ POLYPECTOMY N/A 02/22/2022    Procedure: COLONOSCOPY INTO CECUM WITH COLD BIOPSIES;  Surgeon: Irvin Clayton MD;  Location: [facility] ENDOSCOPY;  Service: Gastroenterology;  Laterality: N/A;  PRE: DIARRHEA, ANEMIA  POST: ASCENDING COLON MASS   • ENDOSCOPY N/A 02/22/2022    Procedure: ESOPHAGOGASTRODUODENOSCOPY WITH BIOPSY;  Surgeon: Irvin Clayton MD;  Location: [facility] ENDOSCOPY;  Service: Gastroenterology;  Laterality: N/A;  PRE: ANEMIA  POST: NORMAL EGD   • TONSILLECTOMY     • VENOUS ACCESS DEVICE (PORT) INSERTION N/A 04/11/2022    Procedure: INSERTION VENOUS ACCESS DEVICE with subcutaneous port;  Surgeon: Malcolm Wellington MD;  Location: [facility] OR [facility];  Service: General;  Laterality: N/A;        Family History   Problem Relation Age of Onset   • Hypertension Mother    • Heart disease Father    • Heart attack Father 47        first MI age 47; second MI 57   • Hypertension Sister    • No Known Problems Brother    • Malig Hyperthermia Neg Hx       Social History     Socioeconomic History   • Marital status:    • Number of children: 2   Tobacco Use   • Smoking status: Never   • Smokeless  "tobacco: Never   Vaping Use   • Vaping Use: Never used   Substance and Sexual Activity   • Alcohol use: Not Currently     Comment: Very rarely   • Drug use: Never   • Sexual activity: Yes     Partners: Male      OB History    No obstetric history on file.          Allergies   Allergen Reactions   • Penicillins Rash      Review of systems as mentioned in the HPI    Objective   Blood pressure 132/72, pulse 80, temperature 98 °F (36.7 °C), temperature source Temporal, resp. rate 16, height 157 cm (61.81\"), weight 65.2 kg (143 lb 12.8 oz), SpO2 97 %.     Physical Exam  Vitals reviewed.   Constitutional:       General: She is not in acute distress.     Appearance: She is well-developed.   HENT:      Nose: Nose normal.      Mouth/Throat:      Mouth: Mucous membranes are moist.      Pharynx: Oropharynx is clear.   Eyes:      Conjunctiva/sclera: Conjunctivae normal.      Pupils: Pupils are equal, round, and reactive to light.   Cardiovascular:      Rate and Rhythm: Normal rate.   Pulmonary:      Effort: Pulmonary effort is normal. No respiratory distress.   Abdominal:      General: Bowel sounds are normal.   Skin:     General: Skin is warm and dry.      Findings: No rash.   Neurological:      General: No focal deficit present.      Mental Status: She is alert and oriented to person, place, and time. Mental status is at baseline.      Motor: No weakness.   Psychiatric:         Mood and Affect: Mood normal.         Behavior: Behavior normal.         Thought Content: Thought content normal.         Judgment: Judgment normal.       I have reexamined the patient and the results are consistent with the previously documented exam. DALE Orozco     DIAGNOSTIC DATA:  Results from last 7 days   Lab Units 03/16/23  1022   WBC 10*3/mm3 3.30*   NEUTROS ABS 10*3/mm3 1.90   HEMOGLOBIN g/dL 13.1   HEMATOCRIT % 37.3   PLATELETS 10*3/mm3 193     Results from last 7 days   Lab Units 03/16/23  1022   SODIUM mmol/L 139   POTASSIUM " mmol/L 3.6   CHLORIDE mmol/L 104   CO2 mmol/L 25.9   BUN mg/dL 9   CREATININE mg/dL 0.65   CALCIUM mg/dL 9.2   ALBUMIN g/dL 4.1   BILIRUBIN mg/dL 0.4   ALK PHOS U/L 199*   ALT (SGPT) U/L 22   AST (SGOT) U/L 22   GLUCOSE mg/dL 174*           3/16/2023:  CBC- WBC 3.30, ANC 1.90, hemoglobin 13.1, platelets 193,000.    No radiology results for the last 30 days.       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  No lymphovascular or perineural invasion.  All the margins are negative.  Tumor comes to within 3 cm of the radial margin.  3 out of 26 lymph nodes positive for metastatic carcinoma with the largest focus measuring 0.7 cm without extranodal extension.  · No tumor deposits identified  · PT3N1B  · MSI low probability on immunohistochemistry and stable on PCR  · No family history of colon cancer  · Explained to her that this is stage IIIb colon cancer however since his T3N1 I would consider this low risk stage III.  There is no other high risk features noted on pathology either.  · Given that the tumor is stage III there is definite benefit from adjuvant chemotherapy.  · We discussed the 2 available regimens including XELOX and FOLFOX.  · Explained to her that based on the IDEA data 3 months of XELOX is noninferior to 6 months of adjuvant chemotherapy however with FOLFOX the noninferiority has not been established.  Explained to her that 6 months of chemotherapy improves disease-free survival by additional 2 to 3% over 3 months.  · Discussed the adverse effects of XELOX as well as FOLFOX.  · After discussing the pros and cons we have decided to proceed with FOLFOX for total of 6 months but if she is unable to  tolerate due to neurotoxicity then we could potentially stop after 3 months of treatment.  · Port placed on 4/11/2022  · Cycle 1 FOLFOX on 4/18/2022  · Cycle 11 FOLFOX 9/13/2022 with a 20% dose reduction of oxaliplatin.  · Now experiencing neuropathy, we will discontinue oxaliplatin and just to 5-FU for cycle 12  · Seen 10/14/2022 in follow-up and scan review.  CT scan of the chest, abdomen, and pelvis done 10/12/2022 showing a few tiny new lung nodules indeterminant, measuring about 3 to 4 mm in maximum size.  Patient also has enlarging spleen previously 11.3 cm, now measuring 13.6 cm.    · 12/14/2022-CT of the chest abdomen pelvis shows stable pulmonary nodules.  · Screening colonoscopy 3/9/2023-negative.  · 3/16/2023: Continues to feel well without any new concerns today.  Repeat CT scan scheduled 6/1/2023.    *Leukopenia  · Predominantly neutropenia  · Mild increase in monocyte count  · This is chronic and unchanged  · No increased infections  · Had a previous work-up including a bone marrow biopsy with no clear etiology  · Differential includes nutritional, chronic idiopathic, autoimmune.  · She is currently not on any medications which could account for the leukopenia.  · No splenomegaly on exam  · 5/13/2021-flow cytometry negative  · LDH normal at 174  · Reticulocyte count 1.07%  · Ferritin low at 5.7  · Iron saturation low at 3%  · Folic acid normal at 11.8, B12 415  · Rheumatoid factor negative  · AVIVA negative  · ESR normal at 29  · Serum copper normal at 134  · WBC count has remained normal for the most part since starting FOLFOX due to Neulasta support.  · 9/15/2022-WBC 3.61, hemoglobin 9.7 and platelets 89,000 with an absolute neutrophil count of 2.16  · 12/22/2022-neutropenia, with an absolute neutrophil count of 1.57  · Patient is asymptomatic, monitor  · 3/16/2023: WBC 3.30.    *Anemia  · Microcytic  · Likely secondary to iron deficiency  · 4/22/2021 iron saturation 3%, TIBC today 508, ferritin 5.70.   The patient was initiated on oral iron every other day.  · 8/26/2021, hemoglobin today 10.1.  The patient has been tolerating oral iron every other day.  We will increase oral iron to daily and recheck iron labs in 4 months.  · 12/16/2021, patient is taking oral iron every day and tolerating this well.  Hemoglobin today 10.0.  Iron saturation 45, TIBC 413, ferritin 10.20.  · 3/4/2022 ferritin 27.5, iron saturation 5%, TIBC 375, iron studies consistent with iron deficiency  · 3/24/2022 hemoglobin 10.2  · Received 3 doses of Venofer.  · 9/15/2022-hemoglobin 9.7, low due to chemotherapy  · As of 10/14/2022 hemoglobin improving now up to 11.3.  · 3/16/2023: 13.1.  Iron saturation 32%, ferritin 115, TIBC 313.  Patient is going to reduce oral iron to 3 times weekly.  We will recheck iron studies at next follow-up, and consider discontinuing oral iron.    *[id number]-date on mammogram    *Colitis  · Lymphocytic colitis noted on the biopsy of the colon  · There is a flare of colitis we may have to consider using steroids  · Currently she does not have any diarrhea  · Explained to her that 5-FU can increase the risk of diarrhea.  · We will have her follow-up with GI  · In the event of diarrhea we will try Imodium first and if no improvement in symptoms then consider steroids.  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  She is unsure how helpful this has been with her neuropathy, however sleeping great since starting gabapentin.  She is not having any daytime drowsiness.  We discussed increasing daytime doses of gabapentin, however will hold off at this time.  Let her know we can increase at any time if she feels this would be helpful.  Also recommended starting B complex vitamin.    PLAN:   · Add iron profile and ferritin today.   · Reduce oral iron to 3 times weekly.  · Port flush  in 6 weeks.  · Repeat scans 6/1/2023.  · MD follow-up 1 week after CT scans.  Repeat iron studies at this time.  · We discussed dietary changes and regular exercise to lymphoproliferation daily.  · Continue gabapentin to help with neuropathy      DALE Orozco  03/16/23

## 2023-03-16 ENCOUNTER — OFFICE VISIT (OUTPATIENT)
Dept: ONCOLOGY | Facility: CLINIC | Age: 68
End: 2023-03-16
Payer: MEDICARE

## 2023-03-16 ENCOUNTER — INFUSION (OUTPATIENT)
Dept: ONCOLOGY | Facility: HOSPITAL | Age: 68
End: 2023-03-16
Payer: MEDICARE

## 2023-03-16 VITALS
OXYGEN SATURATION: 97 % | HEIGHT: 62 IN | WEIGHT: 143.8 LBS | RESPIRATION RATE: 16 BRPM | DIASTOLIC BLOOD PRESSURE: 72 MMHG | SYSTOLIC BLOOD PRESSURE: 132 MMHG | TEMPERATURE: 98 F | BODY MASS INDEX: 26.46 KG/M2 | HEART RATE: 80 BPM

## 2023-03-16 DIAGNOSIS — C18.2 MALIGNANT NEOPLASM OF ASCENDING COLON: Primary | ICD-10-CM

## 2023-03-16 DIAGNOSIS — D50.9 IRON DEFICIENCY ANEMIA, UNSPECIFIED IRON DEFICIENCY ANEMIA TYPE: ICD-10-CM

## 2023-03-16 DIAGNOSIS — Z45.2 ENCOUNTER FOR FITTING AND ADJUSTMENT OF VASCULAR CATHETER: ICD-10-CM

## 2023-03-16 LAB
ALBUMIN SERPL-MCNC: 4.1 G/DL (ref 3.5–5.2)
ALBUMIN/GLOB SERPL: 1.6 G/DL
ALP SERPL-CCNC: 199 U/L (ref 39–117)
ALT SERPL W P-5'-P-CCNC: 22 U/L (ref 1–33)
ANION GAP SERPL CALCULATED.3IONS-SCNC: 9.1 MMOL/L (ref 5–15)
AST SERPL-CCNC: 22 U/L (ref 1–32)
BASOPHILS # BLD AUTO: 0.01 10*3/MM3 (ref 0–0.2)
BASOPHILS NFR BLD AUTO: 0.3 % (ref 0–1.5)
BILIRUB SERPL-MCNC: 0.4 MG/DL (ref 0–1.2)
BUN SERPL-MCNC: 9 MG/DL (ref 8–23)
BUN/CREAT SERPL: 13.8 (ref 7–25)
CALCIUM SPEC-SCNC: 9.2 MG/DL (ref 8.6–10.5)
CEA SERPL-MCNC: 1.43 NG/ML
CHLORIDE SERPL-SCNC: 104 MMOL/L (ref 98–107)
CO2 SERPL-SCNC: 25.9 MMOL/L (ref 22–29)
CREAT SERPL-MCNC: 0.65 MG/DL (ref 0.57–1)
DEPRECATED RDW RBC AUTO: 42.7 FL (ref 37–54)
EGFRCR SERPLBLD CKD-EPI 2021: 97.2 ML/MIN/1.73
EOSINOPHIL # BLD AUTO: 0.07 10*3/MM3 (ref 0–0.4)
EOSINOPHIL NFR BLD AUTO: 2.1 % (ref 0.3–6.2)
ERYTHROCYTE [DISTWIDTH] IN BLOOD BY AUTOMATED COUNT: 12.7 % (ref 12.3–15.4)
FERRITIN SERPL-MCNC: 115.5 NG/ML (ref 13–150)
GLOBULIN UR ELPH-MCNC: 2.5 GM/DL
GLUCOSE SERPL-MCNC: 174 MG/DL (ref 65–99)
HCT VFR BLD AUTO: 37.3 % (ref 34–46.6)
HGB BLD-MCNC: 13.1 G/DL (ref 12–15.9)
IMM GRANULOCYTES # BLD AUTO: 0 10*3/MM3 (ref 0–0.05)
IMM GRANULOCYTES NFR BLD AUTO: 0 % (ref 0–0.5)
IRON 24H UR-MRATE: 101 MCG/DL (ref 37–145)
IRON SATN MFR SERPL: 32 % (ref 20–50)
LYMPHOCYTES # BLD AUTO: 1.11 10*3/MM3 (ref 0.7–3.1)
LYMPHOCYTES NFR BLD AUTO: 33.6 % (ref 19.6–45.3)
MCH RBC QN AUTO: 32.3 PG (ref 26.6–33)
MCHC RBC AUTO-ENTMCNC: 35.1 G/DL (ref 31.5–35.7)
MCV RBC AUTO: 91.9 FL (ref 79–97)
MONOCYTES # BLD AUTO: 0.21 10*3/MM3 (ref 0.1–0.9)
MONOCYTES NFR BLD AUTO: 6.4 % (ref 5–12)
NEUTROPHILS NFR BLD AUTO: 1.9 10*3/MM3 (ref 1.7–7)
NEUTROPHILS NFR BLD AUTO: 57.6 % (ref 42.7–76)
NRBC BLD AUTO-RTO: 0 /100 WBC (ref 0–0.2)
PLATELET # BLD AUTO: 193 10*3/MM3 (ref 140–450)
PMV BLD AUTO: 9.5 FL (ref 6–12)
POTASSIUM SERPL-SCNC: 3.6 MMOL/L (ref 3.5–5.2)
PROT SERPL-MCNC: 6.6 G/DL (ref 6–8.5)
RBC # BLD AUTO: 4.06 10*6/MM3 (ref 3.77–5.28)
SODIUM SERPL-SCNC: 139 MMOL/L (ref 136–145)
TIBC SERPL-MCNC: 313 MCG/DL (ref 298–536)
TRANSFERRIN SERPL-MCNC: 210 MG/DL (ref 200–360)
WBC NRBC COR # BLD: 3.3 10*3/MM3 (ref 3.4–10.8)

## 2023-03-16 PROCEDURE — 3078F DIAST BP <80 MM HG: CPT | Performed by: NURSE PRACTITIONER

## 2023-03-16 PROCEDURE — 85025 COMPLETE CBC W/AUTO DIFF WBC: CPT | Performed by: INTERNAL MEDICINE

## 2023-03-16 PROCEDURE — 25010000002 HEPARIN LOCK FLUSH PER 10 UNITS: Performed by: INTERNAL MEDICINE

## 2023-03-16 PROCEDURE — 1126F AMNT PAIN NOTED NONE PRSNT: CPT | Performed by: NURSE PRACTITIONER

## 2023-03-16 PROCEDURE — 84466 ASSAY OF TRANSFERRIN: CPT | Performed by: INTERNAL MEDICINE

## 2023-03-16 PROCEDURE — 99214 OFFICE O/P EST MOD 30 MIN: CPT | Performed by: NURSE PRACTITIONER

## 2023-03-16 PROCEDURE — 1160F RVW MEDS BY RX/DR IN RCRD: CPT | Performed by: NURSE PRACTITIONER

## 2023-03-16 PROCEDURE — 3075F SYST BP GE 130 - 139MM HG: CPT | Performed by: NURSE PRACTITIONER

## 2023-03-16 PROCEDURE — 82728 ASSAY OF FERRITIN: CPT | Performed by: INTERNAL MEDICINE

## 2023-03-16 PROCEDURE — 83540 ASSAY OF IRON: CPT | Performed by: INTERNAL MEDICINE

## 2023-03-16 PROCEDURE — 80053 COMPREHEN METABOLIC PANEL: CPT | Performed by: INTERNAL MEDICINE

## 2023-03-16 PROCEDURE — 82378 CARCINOEMBRYONIC ANTIGEN: CPT | Performed by: INTERNAL MEDICINE

## 2023-03-16 PROCEDURE — 36591 DRAW BLOOD OFF VENOUS DEVICE: CPT

## 2023-03-16 PROCEDURE — 1159F MED LIST DOCD IN RCRD: CPT | Performed by: NURSE PRACTITIONER

## 2023-03-16 RX ORDER — HEPARIN SODIUM (PORCINE) LOCK FLUSH IV SOLN 100 UNIT/ML 100 UNIT/ML
500 SOLUTION INTRAVENOUS AS NEEDED
OUTPATIENT
Start: 2023-03-16

## 2023-03-16 RX ORDER — SODIUM CHLORIDE 0.9 % (FLUSH) 0.9 %
10 SYRINGE (ML) INJECTION AS NEEDED
Status: DISCONTINUED | OUTPATIENT
Start: 2023-03-16 | End: 2023-03-16 | Stop reason: HOSPADM

## 2023-03-16 RX ORDER — HEPARIN SODIUM (PORCINE) LOCK FLUSH IV SOLN 100 UNIT/ML 100 UNIT/ML
500 SOLUTION INTRAVENOUS AS NEEDED
Status: DISCONTINUED | OUTPATIENT
Start: 2023-03-16 | End: 2023-03-16 | Stop reason: HOSPADM

## 2023-03-16 RX ORDER — SODIUM CHLORIDE 0.9 % (FLUSH) 0.9 %
10 SYRINGE (ML) INJECTION AS NEEDED
OUTPATIENT
Start: 2023-03-16

## 2023-03-16 RX ADMIN — Medication 500 UNITS: at 10:19

## 2023-03-16 RX ADMIN — Medication 10 ML: at 10:19

## 2023-03-20 ENCOUNTER — CLINICAL SUPPORT (OUTPATIENT)
Dept: FAMILY MEDICINE CLINIC | Facility: CLINIC | Age: 68
End: 2023-03-20
Payer: MEDICARE

## 2023-03-20 DIAGNOSIS — Z23 IMMUNIZATION DUE: Primary | ICD-10-CM

## 2023-03-20 PROCEDURE — 90715 TDAP VACCINE 7 YRS/> IM: CPT | Performed by: FAMILY MEDICINE

## 2023-03-20 PROCEDURE — 90471 IMMUNIZATION ADMIN: CPT | Performed by: FAMILY MEDICINE

## 2023-03-20 NOTE — PROGRESS NOTES
Injection  Injection performed in LD by Allison Mishra MA. Patient tolerated the procedure well without complications. Pt signed medicare waiver form. Thanks   03/20/23   Allison Mishra MA

## 2023-03-27 ENCOUNTER — TELEPHONE (OUTPATIENT)
Dept: GASTROENTEROLOGY | Facility: CLINIC | Age: 68
End: 2023-03-27

## 2023-03-27 NOTE — TELEPHONE ENCOUNTER
Called pt and advised of Dr Clayton's note.  Pt verbalized understanding.     Colonoscopy placed in recall for 3/9/2026.

## 2023-03-27 NOTE — TELEPHONE ENCOUNTER
Caller: Kayla Adkins    Relationship to patient: Self    Best call back number: [phone]  CAN CALL ANY TIME AFTER 10AM.    Patient is needing: PATIENT REQUESTING BIOPSY RESULTS FROM 3-9-2023

## 2023-03-27 NOTE — TELEPHONE ENCOUNTER
----- Message from Irvin Clayton MD sent at 3/22/2023 11:13 AM EDT -----  Benign   Negative random bx  Recall colonoscopy 3 years

## 2023-04-13 ENCOUNTER — INFUSION (OUTPATIENT)
Dept: ONCOLOGY | Facility: HOSPITAL | Age: 68
End: 2023-04-13
Payer: MEDICARE

## 2023-04-13 DIAGNOSIS — Z45.2 ENCOUNTER FOR FITTING AND ADJUSTMENT OF VASCULAR CATHETER: Primary | ICD-10-CM

## 2023-04-13 PROCEDURE — 25010000002 HEPARIN LOCK FLUSH PER 10 UNITS: Performed by: NURSE PRACTITIONER

## 2023-04-13 PROCEDURE — 96523 IRRIG DRUG DELIVERY DEVICE: CPT

## 2023-04-13 RX ORDER — HEPARIN SODIUM (PORCINE) LOCK FLUSH IV SOLN 100 UNIT/ML 100 UNIT/ML
500 SOLUTION INTRAVENOUS AS NEEDED
Status: DISCONTINUED | OUTPATIENT
Start: 2023-04-13 | End: 2023-04-13 | Stop reason: HOSPADM

## 2023-04-13 RX ORDER — SODIUM CHLORIDE 0.9 % (FLUSH) 0.9 %
10 SYRINGE (ML) INJECTION AS NEEDED
Status: DISCONTINUED | OUTPATIENT
Start: 2023-04-13 | End: 2023-04-13 | Stop reason: HOSPADM

## 2023-04-13 RX ADMIN — Medication 500 UNITS: at 11:20

## 2023-04-13 RX ADMIN — Medication 10 ML: at 11:20

## 2023-05-17 ENCOUNTER — INFUSION (OUTPATIENT)
Dept: ONCOLOGY | Facility: HOSPITAL | Age: 68
End: 2023-05-17
Payer: MEDICARE

## 2023-05-17 DIAGNOSIS — Z45.2 ENCOUNTER FOR FITTING AND ADJUSTMENT OF VASCULAR CATHETER: Primary | ICD-10-CM

## 2023-05-17 PROCEDURE — 25010000002 HEPARIN LOCK FLUSH PER 10 UNITS: Performed by: NURSE PRACTITIONER

## 2023-05-17 PROCEDURE — 96523 IRRIG DRUG DELIVERY DEVICE: CPT

## 2023-05-17 RX ORDER — SODIUM CHLORIDE 0.9 % (FLUSH) 0.9 %
10 SYRINGE (ML) INJECTION AS NEEDED
Status: DISCONTINUED | OUTPATIENT
Start: 2023-05-17 | End: 2023-05-17 | Stop reason: HOSPADM

## 2023-05-17 RX ORDER — HEPARIN SODIUM (PORCINE) LOCK FLUSH IV SOLN 100 UNIT/ML 100 UNIT/ML
500 SOLUTION INTRAVENOUS AS NEEDED
Status: DISCONTINUED | OUTPATIENT
Start: 2023-05-17 | End: 2023-05-17 | Stop reason: HOSPADM

## 2023-05-17 RX ADMIN — Medication 500 UNITS: at 09:52

## 2023-05-17 RX ADMIN — Medication 10 ML: at 09:52

## 2023-05-30 ENCOUNTER — TELEPHONE (OUTPATIENT)
Dept: ONCOLOGY | Facility: CLINIC | Age: 68
End: 2023-05-30

## 2023-05-30 NOTE — TELEPHONE ENCOUNTER
Caller: Alonso Adkins    Relationship to patient: Emergency Contact    Best call back number: [phone]    Patient is needing: TO CHECK ON COMING IN FOR 6-1-23 PORT FLUSH AND CT SCAN AND F/U APPT O 6-8-23 WITH DR. ROCHA. PT HAS BEEN OUT OF THE COUNTRY AND GOT BACK LAST NIGHT. THE PATIENT'S SISTER TRAVELED WITH THE PATIENT AND  AND IS COVID-19 +. PT DOES NOT HAVE SYMPTOMS NOR DOES HER . THEY ARE WONDERING WHAT THE PROTOCOL IS TO COME INTO THE OFFICE.

## 2023-05-30 NOTE — TELEPHONE ENCOUNTER
Advised Kayla to keep her appts as is so long as she remains without symptoms.  Asked her to call however if that should change.  She voiced understanding.

## 2023-06-01 ENCOUNTER — HOSPITAL ENCOUNTER (OUTPATIENT)
Dept: CT IMAGING | Facility: HOSPITAL | Age: 68
Discharge: HOME | End: 2023-06-01
Payer: MEDICARE

## 2023-06-01 ENCOUNTER — INFUSION (OUTPATIENT)
Dept: ONCOLOGY | Facility: HOSPITAL | Age: 68
End: 2023-06-01

## 2023-06-01 DIAGNOSIS — C18.2 MALIGNANT NEOPLASM OF ASCENDING COLON: ICD-10-CM

## 2023-06-01 DIAGNOSIS — D50.9 IRON DEFICIENCY ANEMIA, UNSPECIFIED IRON DEFICIENCY ANEMIA TYPE: ICD-10-CM

## 2023-06-01 LAB
ALBUMIN SERPL-MCNC: 3.9 G/DL (ref 3.5–5.2)
ALBUMIN/GLOB SERPL: 1.4 G/DL
ALP SERPL-CCNC: 149 U/L (ref 39–117)
ALT SERPL W P-5'-P-CCNC: 20 U/L (ref 1–33)
ANION GAP SERPL CALCULATED.3IONS-SCNC: 8.2 MMOL/L (ref 5–15)
AST SERPL-CCNC: 21 U/L (ref 1–32)
BASOPHILS # BLD AUTO: 0.01 10*3/MM3 (ref 0–0.2)
BASOPHILS NFR BLD AUTO: 0.3 % (ref 0–1.5)
BILIRUB SERPL-MCNC: 0.3 MG/DL (ref 0–1.2)
BUN SERPL-MCNC: 10 MG/DL (ref 8–23)
BUN/CREAT SERPL: 13.7 (ref 7–25)
CALCIUM SPEC-SCNC: 9.5 MG/DL (ref 8.6–10.5)
CEA SERPL-MCNC: 1.49 NG/ML
CHLORIDE SERPL-SCNC: 104 MMOL/L (ref 98–107)
CO2 SERPL-SCNC: 25.8 MMOL/L (ref 22–29)
CREAT SERPL-MCNC: 0.73 MG/DL (ref 0.57–1)
DEPRECATED RDW RBC AUTO: 40.1 FL (ref 37–54)
EGFRCR SERPLBLD CKD-EPI 2021: 90.8 ML/MIN/1.73
EOSINOPHIL # BLD AUTO: 0.07 10*3/MM3 (ref 0–0.4)
EOSINOPHIL NFR BLD AUTO: 2 % (ref 0.3–6.2)
ERYTHROCYTE [DISTWIDTH] IN BLOOD BY AUTOMATED COUNT: 11.7 % (ref 12.3–15.4)
FERRITIN SERPL-MCNC: 73 NG/ML (ref 13–150)
GLOBULIN UR ELPH-MCNC: 2.8 GM/DL
GLUCOSE SERPL-MCNC: 126 MG/DL (ref 65–99)
HCT VFR BLD AUTO: 38.8 % (ref 34–46.6)
HGB BLD-MCNC: 13.4 G/DL (ref 12–15.9)
IMM GRANULOCYTES # BLD AUTO: 0 10*3/MM3 (ref 0–0.05)
IMM GRANULOCYTES NFR BLD AUTO: 0 % (ref 0–0.5)
IRON 24H UR-MRATE: 97 MCG/DL (ref 37–145)
IRON SATN MFR SERPL: 30 % (ref 20–50)
LYMPHOCYTES # BLD AUTO: 1.62 10*3/MM3 (ref 0.7–3.1)
LYMPHOCYTES NFR BLD AUTO: 47.2 % (ref 19.6–45.3)
MCH RBC QN AUTO: 32.1 PG (ref 26.6–33)
MCHC RBC AUTO-ENTMCNC: 34.5 G/DL (ref 31.5–35.7)
MCV RBC AUTO: 93 FL (ref 79–97)
MONOCYTES # BLD AUTO: 0.33 10*3/MM3 (ref 0.1–0.9)
MONOCYTES NFR BLD AUTO: 9.6 % (ref 5–12)
NEUTROPHILS NFR BLD AUTO: 1.4 10*3/MM3 (ref 1.7–7)
NEUTROPHILS NFR BLD AUTO: 40.9 % (ref 42.7–76)
NRBC BLD AUTO-RTO: 0 /100 WBC (ref 0–0.2)
PLATELET # BLD AUTO: 279 10*3/MM3 (ref 140–450)
PMV BLD AUTO: 9.7 FL (ref 6–12)
POTASSIUM SERPL-SCNC: 3.9 MMOL/L (ref 3.5–5.2)
PROT SERPL-MCNC: 6.7 G/DL (ref 6–8.5)
RBC # BLD AUTO: 4.17 10*6/MM3 (ref 3.77–5.28)
SODIUM SERPL-SCNC: 138 MMOL/L (ref 136–145)
TIBC SERPL-MCNC: 325 MCG/DL (ref 298–536)
TRANSFERRIN SERPL-MCNC: 218 MG/DL (ref 200–360)
WBC NRBC COR # BLD: 3.43 10*3/MM3 (ref 3.4–10.8)

## 2023-06-01 PROCEDURE — 82728 ASSAY OF FERRITIN: CPT | Performed by: NURSE PRACTITIONER

## 2023-06-01 PROCEDURE — 74177 CT ABD & PELVIS W/CONTRAST: CPT

## 2023-06-01 PROCEDURE — 85025 COMPLETE CBC W/AUTO DIFF WBC: CPT | Performed by: INTERNAL MEDICINE

## 2023-06-01 PROCEDURE — 71260 CT THORAX DX C+: CPT

## 2023-06-01 PROCEDURE — 82378 CARCINOEMBRYONIC ANTIGEN: CPT | Performed by: INTERNAL MEDICINE

## 2023-06-01 PROCEDURE — 84466 ASSAY OF TRANSFERRIN: CPT | Performed by: NURSE PRACTITIONER

## 2023-06-01 PROCEDURE — 80053 COMPREHEN METABOLIC PANEL: CPT | Performed by: NURSE PRACTITIONER

## 2023-06-01 PROCEDURE — 25510000001 IOPAMIDOL 61 % SOLUTION: Performed by: INTERNAL MEDICINE

## 2023-06-01 PROCEDURE — 83540 ASSAY OF IRON: CPT | Performed by: NURSE PRACTITIONER

## 2023-06-01 PROCEDURE — 36591 DRAW BLOOD OFF VENOUS DEVICE: CPT

## 2023-06-01 PROCEDURE — 0 DIATRIZOATE MEGLUMINE & SODIUM PER 1 ML: Performed by: INTERNAL MEDICINE

## 2023-06-01 RX ADMIN — DIATRIZOATE MEGLUMINE AND DIATRIZOATE SODIUM 30 ML: 660; 100 LIQUID ORAL; RECTAL at 09:45

## 2023-06-01 RX ADMIN — IOPAMIDOL 90 ML: 612 INJECTION, SOLUTION INTRAVENOUS at 10:43

## 2023-06-07 ENCOUNTER — OFFICE VISIT (OUTPATIENT)
Dept: FAMILY MEDICINE CLINIC | Facility: CLINIC | Age: 68
End: 2023-06-07
Payer: MEDICARE

## 2023-06-07 VITALS
SYSTOLIC BLOOD PRESSURE: 134 MMHG | RESPIRATION RATE: 16 BRPM | TEMPERATURE: 98.3 F | HEIGHT: 62 IN | OXYGEN SATURATION: 98 % | BODY MASS INDEX: 26.17 KG/M2 | HEART RATE: 83 BPM | WEIGHT: 142.2 LBS | DIASTOLIC BLOOD PRESSURE: 85 MMHG

## 2023-06-07 DIAGNOSIS — D70.8 OTHER NEUTROPENIA: ICD-10-CM

## 2023-06-07 DIAGNOSIS — E78.49 OTHER HYPERLIPIDEMIA: ICD-10-CM

## 2023-06-07 DIAGNOSIS — Z00.00 MEDICARE ANNUAL WELLNESS VISIT, SUBSEQUENT: Primary | ICD-10-CM

## 2023-06-07 DIAGNOSIS — Z78.0 MENOPAUSE: ICD-10-CM

## 2023-06-07 DIAGNOSIS — K21.9 GASTROESOPHAGEAL REFLUX DISEASE WITHOUT ESOPHAGITIS: ICD-10-CM

## 2023-06-07 DIAGNOSIS — F41.9 ANXIETY: ICD-10-CM

## 2023-06-07 DIAGNOSIS — R74.8 ELEVATED ALKALINE PHOSPHATASE LEVEL: ICD-10-CM

## 2023-06-07 DIAGNOSIS — Z12.31 ENCOUNTER FOR SCREENING MAMMOGRAM FOR BREAST CANCER: ICD-10-CM

## 2023-06-07 DIAGNOSIS — I10 ESSENTIAL HYPERTENSION: ICD-10-CM

## 2023-06-07 DIAGNOSIS — Z13.820 ENCOUNTER FOR SCREENING FOR OSTEOPOROSIS: ICD-10-CM

## 2023-06-07 PROBLEM — R79.89 ELEVATED LFTS: Status: RESOLVED | Noted: 2022-12-05 | Resolved: 2023-06-07

## 2023-06-07 PROCEDURE — 1160F RVW MEDS BY RX/DR IN RCRD: CPT | Performed by: FAMILY MEDICINE

## 2023-06-07 PROCEDURE — G0439 PPPS, SUBSEQ VISIT: HCPCS | Performed by: FAMILY MEDICINE

## 2023-06-07 PROCEDURE — 3079F DIAST BP 80-89 MM HG: CPT | Performed by: FAMILY MEDICINE

## 2023-06-07 PROCEDURE — 1159F MED LIST DOCD IN RCRD: CPT | Performed by: FAMILY MEDICINE

## 2023-06-07 PROCEDURE — 3075F SYST BP GE 130 - 139MM HG: CPT | Performed by: FAMILY MEDICINE

## 2023-06-07 PROCEDURE — 1170F FXNL STATUS ASSESSED: CPT | Performed by: FAMILY MEDICINE

## 2023-06-07 PROCEDURE — 99397 PER PM REEVAL EST PAT 65+ YR: CPT | Performed by: FAMILY MEDICINE

## 2023-06-07 RX ORDER — ATORVASTATIN CALCIUM 10 MG/1
10 TABLET, FILM COATED ORAL NIGHTLY
Qty: 90 TABLET | Refills: 2 | Status: SHIPPED | OUTPATIENT
Start: 2023-06-07 | End: 2024-03-03

## 2023-06-07 RX ORDER — OMEPRAZOLE 20 MG/1
20 CAPSULE, DELAYED RELEASE ORAL NIGHTLY
Qty: 90 CAPSULE | Refills: 2 | Status: SHIPPED | OUTPATIENT
Start: 2023-06-07 | End: 2024-03-03

## 2023-06-07 RX ORDER — DULOXETIN HYDROCHLORIDE 60 MG/1
60 CAPSULE, DELAYED RELEASE ORAL NIGHTLY
Qty: 90 CAPSULE | Refills: 2 | Status: SHIPPED | OUTPATIENT
Start: 2023-06-07 | End: 2024-03-03

## 2023-06-07 RX ORDER — METOPROLOL SUCCINATE 50 MG/1
50 TABLET, EXTENDED RELEASE ORAL NIGHTLY
Qty: 90 TABLET | Refills: 2 | Status: SHIPPED | OUTPATIENT
Start: 2023-06-07 | End: 2024-03-03

## 2023-06-07 NOTE — ASSESSMENT & PLAN NOTE
Continue proton pump inhibitor.  Discussed pros and cons of medication.  Currently feel benefits outweigh potential risks.

## 2023-06-07 NOTE — ASSESSMENT & PLAN NOTE
Mild elevation in alkaline phosphatase noted.  Patient will discuss with oncology.  In 6 months we will do alkaline phosphatase isoenzymes.

## 2023-06-07 NOTE — PATIENT INSTRUCTIONS
Exercising to Lose Weight  Getting regular exercise is important for everyone. It is especially important if you are overweight. Being overweight increases your risk of heart disease, stroke, diabetes, high blood pressure, and several types of cancer. Exercising, and reducing the calories you consume, can help you lose weight and improve fitness and health.  Exercise can be moderate or vigorous intensity. To lose weight, most people need to do a certain amount of moderate or vigorous-intensity exercise each week.  How can exercise affect me?  You lose weight when you exercise enough to burn more calories than you eat. Exercise also reduces body fat and builds muscle. The more muscle you have, the more calories you burn. Exercise also:  Improves mood.  Reduces stress and tension.  Improves your overall fitness, flexibility, and endurance.  Increases bone strength.  Moderate-intensity exercise  Moderate-intensity exercise is any activity that gets you moving enough to burn at least three times more energy (calories) than if you were sitting.  Examples of moderate exercise include:  Walking a mile in 15 minutes.  Doing light yard work.  Biking at an easy pace.  Most people should get at least 150 minutes of moderate-intensity exercise a week to maintain their body weight.  Vigorous-intensity exercise  Vigorous-intensity exercise is any activity that gets you moving enough to burn at least six times more calories than if you were sitting. When you exercise at this intensity, you should be working hard enough that you are not able to carry on a conversation.  Examples of vigorous exercise include:  Running.  Playing a team sport, such as football, basketball, and soccer.  Jumping rope.  Most people should get at least 75 minutes a week of vigorous exercise to maintain their body weight.  What actions can I take to lose weight?  The amount of exercise you need to lose weight depends on:  Your age.  The type of  exercise.  Any health conditions you have.  Your overall physical ability.  Talk to your health care provider about how much exercise you need and what types of activities are safe for you.  Nutrition    Make changes to your diet as told by your health care provider or diet and nutrition specialist (dietitian). This may include:  Eating fewer calories.  Eating more protein.  Eating less unhealthy fats.  Eating a diet that includes fresh fruits and vegetables, whole grains, low-fat dairy products, and lean protein.  Avoiding foods with added fat, salt, and sugar.  Drink plenty of water while you exercise to prevent dehydration or heat stroke.  Activity  Choose an activity that you enjoy and set realistic goals. Your health care provider can help you make an exercise plan that works for you.  Exercise at a moderate or vigorous intensity most days of the week.  The intensity of exercise may vary from person to person. You can tell how intense a workout is for you by paying attention to your breathing and heartbeat. Most people will notice their breathing and heartbeat get faster with more intense exercise.  Do resistance training twice each week, such as:  Push-ups.  Sit-ups.  Lifting weights.  Using resistance bands.  Getting short amounts of exercise can be just as helpful as long, structured periods of exercise. If you have trouble finding time to exercise, try doing these things as part of your daily routine:  Get up, stretch, and walk around every 30 minutes throughout the day.  Go for a walk during your lunch break.  Park your car farther away from your destination.  If you take public transportation, get off one stop early and walk the rest of the way.  Make phone calls while standing up and walking around.  Take the stairs instead of elevators or escalators.  Wear comfortable clothes and shoes with good support.  Do not exercise so much that you hurt yourself, feel dizzy, or get very short of breath.  Where to  find more information  [place] Department of Health and Human Services: www.hhs.gov  Centers for Disease Control and Prevention: www.cdc.gov  Contact a health care provider:  Before starting a new exercise program.  If you have questions or concerns about your weight.  If you have a medical problem that keeps you from exercising.  Get help right away if:  You have any of the following while exercising:  Injury.  Dizziness.  Difficulty breathing or shortness of breath that does not go away when you stop exercising.  Chest pain.  Rapid heartbeat.  These symptoms may represent a serious problem that is an emergency. Do not wait to see if the symptoms will go away. Get medical help right away. Call your local emergency services (911 in the [place] Do not drive yourself to the hospital.  Summary  Getting regular exercise is especially important if you are overweight.  Being overweight increases your risk of heart disease, stroke, diabetes, high blood pressure, and several types of cancer.  Losing weight happens when you burn more calories than you eat.  Reducing the amount of calories you eat, and getting regular moderate or vigorous exercise each week, helps you lose weight.  This information is not intended to replace advice given to you by your health care provider. Make sure you discuss any questions you have with your health care provider.  Document Revised: 02/13/2022 Document Reviewed: 02/13/2022  [employer] Patient Education © 2022 [employer]  Calorie Counting for Weight Loss  Calories are units of energy. Your body needs a certain number of calories from food to keep going throughout the day. When you eat or drink more calories than your body needs, your body stores the extra calories mostly as fat. When you eat or drink fewer calories than your body needs, your body burns fat to get the energy it needs.  Calorie counting means keeping track of how many calories you eat and drink each day. Calorie counting can be  helpful if you need to lose weight. If you eat fewer calories than your body needs, you should lose weight. Ask your health care provider what a healthy weight is for you.  For calorie counting to work, you will need to eat the right number of calories each day to lose a healthy amount of weight per week. A dietitian can help you figure out how many calories you need in a day and will suggest ways to reach your calorie goal.  A healthy amount of weight to lose each week is usually 1-2 lb (0.5-0.9 kg). This usually means that your daily calorie intake should be reduced by 500-750 calories.  Eating 1,200-1,500 calories a day can help most women lose weight.  Eating 1,500-1,800 calories a day can help most men lose weight.  What do I need to know about calorie counting?  Work with your health care provider or dietitian to determine how many calories you should get each day. To meet your daily calorie goal, you will need to:  Find out how many calories are in each food that you would like to eat. Try to do this before you eat.  Decide how much of the food you plan to eat.  Keep a food log. Do this by writing down what you ate and how many calories it had.  To successfully lose weight, it is important to balance calorie counting with a healthy lifestyle that includes regular activity.  Where do I find calorie information?  The number of calories in a food can be found on a Nutrition Facts label. If a food does not have a Nutrition Facts label, try to look up the calories online or ask your dietitian for help.  Remember that calories are listed per serving. If you choose to have more than one serving of a food, you will have to multiply the calories per serving by the number of servings you plan to eat. For example, the label on a package of bread might say that a serving size is 1 slice and that there are 90 calories in a serving. If you eat 1 slice, you will have eaten 90 calories. If you eat 2 slices, you will have  eaten 180 calories.  How do I keep a food log?  After each time that you eat, record the following in your food log as soon as possible:  What you ate. Be sure to include toppings, sauces, and other extras on the food.  How much you ate. This can be measured in cups, ounces, or number of items.  How many calories were in each food and drink.  The total number of calories in the food you ate.  Keep your food log near you, such as in a pocket-sized notebook or on an andre or website on your mobile phone. Some programs will calculate calories for you and show you how many calories you have left to meet your daily goal.  What are some portion-control tips?  Know how many calories are in a serving. This will help you know how many servings you can have of a certain food.  Use a measuring cup to measure serving sizes. You could also try weighing out portions on a kitchen scale. With time, you will be able to estimate serving sizes for some foods.  Take time to put servings of different foods on your favorite plates or in your favorite bowls and cups so you know what a serving looks like.  Try not to eat straight from a food's packaging, such as from a bag or box. Eating straight from the package makes it hard to see how much you are eating and can lead to overeating. Put the amount you would like to eat in a cup or on a plate to make sure you are eating the right portion.  Use smaller plates, glasses, and bowls for smaller portions and to prevent overeating.  Try not to multitask. For example, avoid watching TV or using your computer while eating. If it is time to eat, sit down at a table and enjoy your food. This will help you recognize when you are full. It will also help you be more mindful of what and how much you are eating.  What are tips for following this plan?  Reading food labels  Check the calorie count compared with the serving size. The serving size may be smaller than what you are used to eating.  Check the  source of the calories. Try to choose foods that are high in protein, fiber, and vitamins, and low in saturated fat, trans fat, and sodium.  Shopping  Read nutrition labels while you shop. This will help you make healthy decisions about which foods to buy.  Pay attention to nutrition labels for low-fat or fat-free foods. These foods sometimes have the same number of calories or more calories than the full-fat versions. They also often have added sugar, starch, or salt to make up for flavor that was removed with the fat.  Make a grocery list of lower-calorie foods and stick to it.  Cooking  Try to cook your favorite foods in a healthier way. For example, try baking instead of frying.  Use low-fat dairy products.  Meal planning  Use more fruits and vegetables. One-half of your plate should be fruits and vegetables.  Include lean proteins, such as chicken, turkey, and fish.  Lifestyle  Each week, aim to do one of the followin minutes of moderate exercise, such as walking.  75 minutes of vigorous exercise, such as running.  General information  Know how many calories are in the foods you eat most often. This will help you calculate calorie counts faster.  Find a way of tracking calories that works for you. Get creative. Try different apps or programs if writing down calories does not work for you.  What foods should I eat?    Eat nutritious foods. It is better to have a nutritious, high-calorie food, such as an avocado, than a food with few nutrients, such as a bag of potato chips.  Use your calories on foods and drinks that will fill you up and will not leave you hungry soon after eating.  Examples of foods that fill you up are nuts and nut butters, vegetables, lean proteins, and high-fiber foods such as whole grains. High-fiber foods are foods with more than 5 g of fiber per serving.  Pay attention to calories in drinks. Low-calorie drinks include water and unsweetened drinks.  The items listed above may not be  a complete list of foods and beverages you can eat. Contact a dietitian for more information.  What foods should I limit?  Limit foods or drinks that are not good sources of vitamins, minerals, or protein or that are high in unhealthy fats. These include:  Candy.  Other sweets.  Sodas, specialty coffee drinks, alcohol, and juice.  The items listed above may not be a complete list of foods and beverages you should avoid. Contact a dietitian for more information.  How do I count calories when eating out?  Pay attention to portions. Often, portions are much larger when eating out. Try these tips to keep portions smaller:  Consider sharing a meal instead of getting your own.  If you get your own meal, eat only half of it. Before you start eating, ask for a container and put half of your meal into it.  When available, consider ordering smaller portions from the menu instead of full portions.  Pay attention to your food and drink choices. Knowing the way food is cooked and what is included with the meal can help you eat fewer calories.  If calories are listed on the menu, choose the lower-calorie options.  Choose dishes that include vegetables, fruits, whole grains, low-fat dairy products, and lean proteins.  Choose items that are boiled, broiled, grilled, or steamed. Avoid items that are buttered, battered, fried, or served with cream sauce. Items labeled as crispy are usually fried, unless stated otherwise.  Choose water, low-fat milk, unsweetened iced tea, or other drinks without added sugar. If you want an alcoholic beverage, choose a lower-calorie option, such as a glass of wine or light beer.  Ask for dressings, sauces, and syrups on the side. These are usually high in calories, so you should limit the amount you eat.  If you want a salad, choose a garden salad and ask for grilled meats. Avoid extra toppings such as richey, cheese, or fried items. Ask for the dressing on the side, or ask for olive oil and vinegar or  lemon to use as dressing.  Estimate how many servings of a food you are given. Knowing serving sizes will help you be aware of how much food you are eating at restaurants.  Where to find more information  Centers for Disease Control and Prevention: www.cdc.gov  [place] Department of Agriculture: myplate.gov  Summary  Calorie counting means keeping track of how many calories you eat and drink each day. If you eat fewer calories than your body needs, you should lose weight.  A healthy amount of weight to lose per week is usually 1-2 lb (0.5-0.9 kg). This usually means reducing your daily calorie intake by 500-750 calories.  The number of calories in a food can be found on a Nutrition Facts label. If a food does not have a Nutrition Facts label, try to look up the calories online or ask your dietitian for help.  Use smaller plates, glasses, and bowls for smaller portions and to prevent overeating.  Use your calories on foods and drinks that will fill you up and not leave you hungry shortly after a meal.  This information is not intended to replace advice given to you by your health care provider. Make sure you discuss any questions you have with your health care provider.  Document Revised: 2021 Document Reviewed: 2021  [employer] Patient Education ©  [employer]    Medicare Wellness  Personal Prevention Plan of Service     Date of Office Visit:    Encounter Provider:  Emmett Rivas MD  Place of Service:  [facility]  Patient Name: Kayla McleodLucille  :  1955    As part of the Medicare Wellness portion of your visit today, we are providing you with this personalized preventive plan of services (PPPS). This plan is based upon recommendations of the [place] Preventive Services Task Force (USPSTF) and the Advisory Committee on Immunization Practices (ACIP).    This lists the preventive care services that should be considered, and provides dates of when you are due.  Items listed as completed are up-to-date and do not require any further intervention.    Health Maintenance   Topic Date Due    Pneumococcal Vaccine 65+ (1 - PCV) Never done    COVID-19 Vaccine (6 - Pfizer series) 02/11/2023    INFLUENZA VACCINE  08/01/2023    MAMMOGRAM  08/01/2023    DXA SCAN  08/01/2023    LIPID PANEL  06/02/2024    ANNUAL WELLNESS VISIT  06/07/2024    PAP SMEAR  08/01/2025    COLONOSCOPY  03/09/2026    TDAP/TD VACCINES (3 - Td or Tdap) 03/20/2033    HEPATITIS C SCREENING  Completed    ZOSTER VACCINE  Completed       Orders Placed This Encounter   Procedures    Mammo screening digital tomosynthesis bilateral w CAD     Standing Status:   Future     Standing Expiration Date:   6/7/2024     Order Specific Question:   Reason for Exam:     Answer:   screen for breast cancer     Order Specific Question:   Release to patient     Answer:   Routine Release    DEXA Bone Density, Axial (Hospital)     Standing Status:   Future     Standing Expiration Date:   6/7/2024     Order Specific Question:   Is patient taking or have taken long term Glucocorticoid (steroids)?     Answer:   No     Order Specific Question:   Does the patient have rheumatoid arthritis?     Answer:   No     Order Specific Question:   Does the patient have secondary osteoporosis?     Answer:   No     Order Specific Question:   Reason for Exam:     Answer:   screen for osteoporosis     Order Specific Question:   Release to patient     Answer:   Routine Release    Comprehensive Metabolic Panel     Standing Status:   Future     Standing Expiration Date:   6/7/2024     Order Specific Question:   Release to patient     Answer:   Immediate    CK     Standing Status:   Future     Standing Expiration Date:   6/7/2024    Lipid Panel With / Chol / HDL Ratio     Standing Status:   Future     Standing Expiration Date:   6/7/2024    TSH     Standing Status:   Future     Standing Expiration Date:   6/7/2024    Alkaline Phosphatase, Isoenzymes     Standing  Status:   Future     Standing Expiration Date:   6/7/2024    CBC & Differential     Standing Status:   Future     Standing Expiration Date:   6/7/2024     Order Specific Question:   Manual Differential     Answer:   No       No follow-ups on file.

## 2023-06-07 NOTE — PROGRESS NOTES
The ABCs of the Annual Wellness Visit  Subsequent Medicare Wellness Visit    Subjective    Kayla Adkins is a 67 y.o. female who presents for a Subsequent Medicare Wellness Visit.    The following portions of the patient's history were reviewed and   updated as appropriate: allergies, current medications, past family history, past medical history, past social history, past surgical history, and problem list.    Compared to one year ago, the patient feels her physical   health is better.    Compared to one year ago, the patient feels her mental   health is better.    Recent Hospitalizations:  She was not admitted to the hospital during the last year.       Current Medical Providers:  Patient Care Team:  Emmett Rivas MD as PCP - General (Family Medicine)  Jody Erickson MD as Consulting Physician (Obstetrics and Gynecology)  Jovan Stinson MD as Consulting Physician (Gastroenterology)  Emmett Rivas MD as Referring Physician (Family Medicine)  Malcolm Wellington MD as Consulting Physician (General Surgery)    Outpatient Medications Prior to Visit   Medication Sig Dispense Refill    aspirin 81 MG EC tablet Take 1 tablet by mouth Daily.      aspirin-acetaminophen-caffeine (EXCEDRIN MIGRAINE) 250-250-65 MG per tablet Take 1 tablet by mouth Every 6 (Six) Hours As Needed for Headache.      Ferrous Sulfate ER (Slow Fe) 142 (45 Fe) MG tablet controlled-release Take 142 mg by mouth Daily. 30 tablet 3    gabapentin (NEURONTIN) 300 MG capsule TAKE ONE CAPSULE BY MOUTH THREE TIMES A DAY AS TOLERATED 90 capsule 0    atorvastatin (LIPITOR) 10 MG tablet Take 1 tablet by mouth Every Night for 270 days. 90 tablet 2    DULoxetine (CYMBALTA) 60 MG capsule Take 1 capsule by mouth Every Night for 270 days. 90 capsule 2    metoprolol succinate XL (TOPROL-XL) 50 MG 24 hr tablet Take 1 tablet by mouth Every Night for 270 days. 90 tablet 2    omeprazole (priLOSEC) 20 MG capsule Take 1 capsule by mouth Every Night for 270 days.  "90 capsule 2     No facility-administered medications prior to visit.       No opioid medication identified on active medication list. I have reviewed chart for other potential  high risk medication/s and harmful drug interactions in the elderly.        Aspirin is on active medication list. Aspirin use is not indicated based on review of current medical condition/s. Risk of harm outweighs potential benefits. Patient instructed to discontinue this medication.  .      Patient Active Problem List   Diagnosis    Essential hypertension    Other hyperlipidemia    Anxiety    Gastroesophageal reflux disease without esophagitis    Lymphocytic colitis of colon    Fear of flying    Chondromalacia of left knee    Tear of medial meniscus of left knee, current    Other neutropenia    Diarrhea    Iron deficiency anemia    Colonic mass    Malignant neoplasm of ascending colon    Iron malabsorption    Encounter for fitting and adjustment of vascular catheter    Chemotherapy induced nausea and vomiting    Elevated alkaline phosphatase level     Advance Care Planning   Advance Care Planning     Advance Directive is on file.  ACP discussion was held with the patient during this visit. Patient has an advance directive in EMR which is still valid.      Objective    Vitals:    06/07/23 1010   BP: 134/85   BP Location: Left arm   Patient Position: Sitting   Pulse: 83   Resp: 16   Temp: 98.3 °F (36.8 °C)   TempSrc: Oral   SpO2: 98%   Weight: 64.5 kg (142 lb 3.2 oz)   Height: 157.5 cm (62\")     Estimated body mass index is 26.01 kg/m² as calculated from the following:    Height as of this encounter: 157.5 cm (62\").    Weight as of this encounter: 64.5 kg (142 lb 3.2 oz).    BMI is >= 25 and <30. (Overweight) The following options were offered after discussion;: weight loss educational material (shared in after visit summary), exercise counseling/recommendations, and nutrition counseling/recommendations      Does the patient have evidence of " cognitive impairment? No    Lab Results   Component Value Date    CHLPL 144 2023    TRIG 110 2023    HDL 53 2023    LDL 71 2023    VLDL 20 2023        HEALTH RISK ASSESSMENT    Smoking Status:  Social History     Tobacco Use   Smoking Status Never   Smokeless Tobacco Never     Alcohol Consumption:  Social History     Substance and Sexual Activity   Alcohol Use Not Currently    Comment: Very rarely     Fall Risk Screen:    DIAMONDBESS Fall Risk Assessment has not been completed.    Depression Screenin/7/2023    10:14 AM   PHQ-2/PHQ-9 Depression Screening   Little Interest or Pleasure in Doing Things 0-->not at all   Feeling Down, Depressed or Hopeless 0-->not at all   PHQ-9: Brief Depression Severity Measure Score 0       Health Habits and Functional and Cognitive Screenin/7/2023    10:00 AM   Functional & Cognitive Status   Do you have difficulty preparing food and eating? No   Do you have difficulty bathing yourself, getting dressed or grooming yourself? No   Do you have difficulty using the toilet? No   Do you have difficulty moving around from place to place? No   Do you have trouble with steps or getting out of a bed or a chair? No   Current Diet Well Balanced Diet   Dental Exam Up to date   Eye Exam Up to date   Exercise (times per week) 0 times per week   Current Exercises Include No Regular Exercise   Do you need help using the phone?  No   Are you deaf or do you have serious difficulty hearing?  No   Do you need help with transportation? No   Do you need help shopping? No   Do you need help preparing meals?  No   Do you need help with housework?  No   Do you need help with laundry? No   Do you need help taking your medications? No   Do you need help managing money? No   Do you ever drive or ride in a car without wearing a seat belt? No   Have you felt unusual stress, anger or loneliness in the last month? No   Who do you live with? Spouse   If you need help, do you  have trouble finding someone available to you? No   Have you been bothered in the last four weeks by sexual problems? No   Do you have difficulty concentrating, remembering or making decisions? No       Age-appropriate Screening Schedule:  Refer to the list below for future screening recommendations based on patient's age, sex and/or medical conditions. Orders for these recommended tests are listed in the plan section. The patient has been provided with a written plan.    Health Maintenance   Topic Date Due    Pneumococcal Vaccine 65+ (1 - PCV) Never done    COVID-19 Vaccine (6 - Pfizer series) 02/11/2023    INFLUENZA VACCINE  08/01/2023    MAMMOGRAM  08/01/2023    DXA SCAN  08/01/2023    LIPID PANEL  06/02/2024    ANNUAL WELLNESS VISIT  06/07/2024    PAP SMEAR  08/01/2025    COLONOSCOPY  03/09/2026    TDAP/TD VACCINES (3 - Td or Tdap) 03/20/2033    HEPATITIS C SCREENING  Completed    ZOSTER VACCINE  Completed                  CMS Preventative Services Quick Reference  Risk Factors Identified During Encounter  Immunizations Discussed/Encouraged: Prevnar 20 (Pneumococcal 20-valent conjugate) and COVID19  The above risks/problems have been discussed with the patient.  Pertinent information has been shared with the patient in the After Visit Summary.  An After Visit Summary and PPPS were made available to the patient.    Follow Up:   Next Medicare Wellness visit to be scheduled in 1 year.       Additional E&M Note during same encounter follows:  Patient has multiple medical problems which are significant and separately identifiable that require additional work above and beyond the Medicare Wellness Visit.      Chief Complaint  Hypertension, Med Refill, Anxiety, Follow-up, and Medicare Wellness-subsequent    Subjective        HPI  Kayla Adkins is also being seen today for lab review and to refill current medications. Overall she feels well. No medication side effects are reported.     Review of Systems  "  Constitutional:  Negative for fatigue.   HENT: Negative.     Eyes: Negative.    Respiratory:  Negative for cough and shortness of breath.    Cardiovascular:  Negative for chest pain and palpitations.   Gastrointestinal: Negative.    Genitourinary:  Negative for difficulty urinating.   Musculoskeletal:  Negative for arthralgias and myalgias.   Allergic/Immunologic: Negative for environmental allergies.   Neurological:  Positive for numbness. Negative for weakness.   Hematological:  Bruises/bleeds easily (bruise easily).   Psychiatric/Behavioral:  Negative for dysphoric mood. The patient is not nervous/anxious.    All other systems reviewed and are negative.    Objective   Vital Signs:  /85 (BP Location: Left arm, Patient Position: Sitting)   Pulse 83   Temp 98.3 °F (36.8 °C) (Oral)   Resp 16   Ht 157.5 cm (62\")   Wt 64.5 kg (142 lb 3.2 oz)   SpO2 98%   BMI 26.01 kg/m²     Physical Exam  Constitutional:       Appearance: Normal appearance. She is well-developed.   HENT:      Head: Normocephalic.      Right Ear: Hearing, tympanic membrane and external ear normal.      Left Ear: Hearing, tympanic membrane and external ear normal.   Eyes:      General: Lids are normal.      Conjunctiva/sclera: Conjunctivae normal.      Pupils: Pupils are equal, round, and reactive to light.      Funduscopic exam:     Right eye: No AV nicking or papilledema.         Left eye: No AV nicking or papilledema.   Neck:      Thyroid: No thyroid mass or thyromegaly.      Vascular: No carotid bruit or JVD.      Trachea: Trachea normal.   Cardiovascular:      Rate and Rhythm: Normal rate and regular rhythm.      Pulses: Normal pulses.      Heart sounds: Normal heart sounds. No murmur heard.  Pulmonary:      Effort: Pulmonary effort is normal.      Breath sounds: Normal breath sounds.   Abdominal:      General: Bowel sounds are normal.      Palpations: Abdomen is soft.      Tenderness: There is no abdominal tenderness. "   Musculoskeletal:         General: Normal range of motion.      Cervical back: Normal range of motion and neck supple.      Lumbar back: No bony tenderness.   Lymphadenopathy:      Cervical: No cervical adenopathy.      Upper Body:      Right upper body: No supraclavicular adenopathy.      Left upper body: No supraclavicular adenopathy.   Skin:     General: Skin is warm and dry.      Findings: No rash.      Nails: There is no clubbing.   Neurological:      Mental Status: She is alert and oriented to person, place, and time.      Cranial Nerves: No cranial nerve deficit.      Deep Tendon Reflexes:      Reflex Scores:       Patellar reflexes are 2+ on the right side and 2+ on the left side.  Psychiatric:         Speech: Speech normal.         Behavior: Behavior normal.         Thought Content: Thought content normal.         Judgment: Judgment normal.        The following data was reviewed by: Emmett Rivas MD on 06/07/2023:        TSH (06/02/2023 08:17)  Lipid Panel With / Chol / HDL Ratio (06/02/2023 08:17)  CK (06/02/2023 08:17)  CBC & Differential (06/02/2023 08:17)  Comprehensive Metabolic Panel (06/02/2023 08:17)       Assessment and Plan   Diagnoses and all orders for this visit:    1. Medicare annual wellness visit, subsequent (Primary)    2. Other hyperlipidemia  Assessment & Plan:  Condition is stable. The current medical regimen is effective;  continue present plan and medications.    Orders:  -     atorvastatin (LIPITOR) 10 MG tablet; Take 1 tablet by mouth Every Night for 270 days.  Dispense: 90 tablet; Refill: 2  -     CK; Future  -     Lipid Panel With / Chol / HDL Ratio; Future    3. Anxiety  Assessment & Plan:  Condition is stable. The current medical regimen is effective;  continue present plan and medications.    Orders:  -     DULoxetine (CYMBALTA) 60 MG capsule; Take 1 capsule by mouth Every Night for 270 days.  Dispense: 90 capsule; Refill: 2    4. Essential hypertension  Assessment &  Plan:  Condition is stable. The current medical regimen is effective;  continue present plan and medications.    Orders:  -     metoprolol succinate XL (TOPROL-XL) 50 MG 24 hr tablet; Take 1 tablet by mouth Every Night for 270 days.  Dispense: 90 tablet; Refill: 2  -     Comprehensive Metabolic Panel; Future  -     CBC & Differential; Future  -     TSH; Future    5. Gastroesophageal reflux disease without esophagitis  Assessment & Plan:  Continue proton pump inhibitor.  Discussed pros and cons of medication.  Currently feel benefits outweigh potential risks.    Orders:  -     omeprazole (priLOSEC) 20 MG capsule; Take 1 capsule by mouth Every Night for 270 days.  Dispense: 90 capsule; Refill: 2    6. Elevated alkaline phosphatase level  Assessment & Plan:  Mild elevation in alkaline phosphatase noted.  Patient will discuss with oncology.  In 6 months we will do alkaline phosphatase isoenzymes.    Orders:  -     Comprehensive Metabolic Panel; Future  -     Alkaline Phosphatase, Isoenzymes; Future    7. Encounter for screening mammogram for breast cancer  -     Mammo screening digital tomosynthesis bilateral w CAD; Future    8. Encounter for screening for osteoporosis  -     DEXA Bone Density, Axial (Hospital); Future    9. Menopause  -     DEXA Bone Density, Axial (Hospital); Future    10. Other neutropenia  Assessment & Plan:  Continue to monitor neutropenia with serial labs.               Follow Up   Return in about 6 months (around 12/7/2023) for recheck/refill medication.  Patient was given instructions and counseling regarding her condition or for health maintenance advice. Please see specific information pulled into the AVS if appropriate.

## 2023-06-08 ENCOUNTER — INFUSION (OUTPATIENT)
Dept: ONCOLOGY | Facility: HOSPITAL | Age: 68
End: 2023-06-08
Payer: MEDICARE

## 2023-06-08 ENCOUNTER — OFFICE VISIT (OUTPATIENT)
Dept: ONCOLOGY | Facility: CLINIC | Age: 68
End: 2023-06-08
Payer: MEDICARE

## 2023-06-08 VITALS
HEIGHT: 62 IN | TEMPERATURE: 98.6 F | SYSTOLIC BLOOD PRESSURE: 118 MMHG | BODY MASS INDEX: 25.98 KG/M2 | DIASTOLIC BLOOD PRESSURE: 77 MMHG | WEIGHT: 141.2 LBS | OXYGEN SATURATION: 97 % | HEART RATE: 88 BPM

## 2023-06-08 DIAGNOSIS — C18.2 MALIGNANT NEOPLASM OF ASCENDING COLON: Primary | ICD-10-CM

## 2023-06-08 DIAGNOSIS — Z45.2 ENCOUNTER FOR FITTING AND ADJUSTMENT OF VASCULAR CATHETER: Primary | ICD-10-CM

## 2023-06-08 DIAGNOSIS — C18.2 MALIGNANT NEOPLASM OF ASCENDING COLON: ICD-10-CM

## 2023-06-08 PROCEDURE — 96372 THER/PROPH/DIAG INJ SC/IM: CPT

## 2023-06-08 PROCEDURE — 25010000002 PNEUMOCOCCAL 20-VAL CONJ VACC 0.5 ML SUSPENSION PREFILLED SYRINGE: Performed by: INTERNAL MEDICINE

## 2023-06-08 PROCEDURE — G0009 ADMIN PNEUMOCOCCAL VACCINE: HCPCS | Performed by: INTERNAL MEDICINE

## 2023-06-08 PROCEDURE — 90677 PCV20 VACCINE IM: CPT | Performed by: INTERNAL MEDICINE

## 2023-06-08 RX ORDER — HEPARIN SODIUM (PORCINE) LOCK FLUSH IV SOLN 100 UNIT/ML 100 UNIT/ML
500 SOLUTION INTRAVENOUS AS NEEDED
Status: DISCONTINUED | OUTPATIENT
Start: 2023-06-08 | End: 2023-06-08 | Stop reason: HOSPADM

## 2023-06-08 RX ORDER — SODIUM CHLORIDE 0.9 % (FLUSH) 0.9 %
10 SYRINGE (ML) INJECTION AS NEEDED
Status: DISCONTINUED | OUTPATIENT
Start: 2023-06-08 | End: 2023-06-08 | Stop reason: HOSPADM

## 2023-06-08 RX ADMIN — PNEUMOCOCCAL 20-VALENT CONJUGATE VACCINE 0.5 ML
2.2; 2.2; 2.2; 2.2; 2.2; 2.2; 2.2; 2.2; 2.2; 2.2; 2.2; 2.2; 2.2; 2.2; 2.2; 2.2; 4.4; 2.2; 2.2; 2.2 INJECTION, SUSPENSION INTRAMUSCULAR at 11:25

## 2023-06-08 NOTE — PROGRESS NOTES
Subjective   Kayla Adkins is a 67 y.o. female.  Referred by Dr. Rivas for evaluation of leukopenia    History of Present Illness   Ms. Adkins is a 66-year-old postmenopausal  who has been referred by Dr. Rivas for evaluation of leukopenia, neutropenia.  Patient reports that she has had longstanding leukopenia and her white blood cell count normally ranges in the threes.  On her most recent visit with Dr. Rivas white blood cell count was noted to be 2560 on 3/2/2021 and 2660 on 4/5/2021.  This prompted a referral to hematology.  Patient reports that she had a extensive work-up including a bone marrow biopsy for the same condition about 17 years ago.  She thinks that this was performed at [facility].  According to patient no clear etiology was determined after the work-up.  She was recommended to continue with periodic labs.  No treatment was required.    She was seen in December and had continued on oral iron.  She was tolerating that fairly well.  However she had worsening of colitis for about 2 months prior to that visit.  She was scheduled for colonoscopy in January 2022.    She had a routine surveillance colonoscopy performed by Dr. Whitlock on 2/22/2022.  On this there was an infiltrative nonobstructing medium-sized mass in the proximal ascending colon 1 to 2 cm distal to the cecum.  This was partially circumferential involving 1 foot of the lumen.  Biopsies were taken.  There is also mildly congested mucosa in the entire colon.  Random biopsies were taken.  She also had an EGD at the same time which reported normal esophagus, stomach and duodenum.  Pathology evaluation from the biopsy reported superficial fragments of at least intramucosal carcinoma.  Random colon biopsies reported increased lamina propria, chronic inflammation with surface epithelial alteration and increased intraepithelial lymphocytes consistent with lymphocytic colitis.  Biopsies from the stomach reported chronic  inactive gastritis.  Negative for intestinal metaplasia and H. pylori.  Biopsies from the duodenum was negative.    She was seen by Dr. Wellington who performed a laparoscopic right hemicolectomy on 3/2/2022.  Pathology showed a 3.8 cm mass at the cecum invading through the muscularis propria into the pericolorectal adipose tissue.  No lymphovascular or perineural invasion.  All margins were clear.  The radial margin was 3 cm.  3 out of 26 lymph nodes were positive for metastatic disease.  The largest metastatic focus was 0.7 cm with extranodal extension.  Pathological stage PT3N1B.  The tumor was moderately differentiated, grade 2 adenocarcinoma.    No loss of nuclear expression of MMR proteins.  Low probability of microsatellite instability.  MSI status-microsatellite stable.  This is by PCR.    Normal CEA and 0.77 on the day prior to surgery.    She was seen by Dr. Burton as inpatient and recommended adjuvant chemotherapy with FOLFOX.    Patient initiating cycle 1 FOLFOX 4/18/2022.  Neulasta support given due to baseline neutropenia.    9/27/2020 to cycle 12 FOLFOX (oxaliplatin omitted due to neuropathy).    Interval history:  Kayla returns today for 3-month follow-up.  She has lost about 30 pounds since completing chemotherapy.  She feels much better today.  She had restaging CT of the chest abdomen pelvis and presents to discuss the results of the same.  She does have several questions including questions regarding her alkaline phosphatase being elevated.  She also wanted to know if the pulmonary nodules which were previously seen on the CT chest were stable.  She had questions regarding circulating tumor DNA and early detection of cancer reviewed with the of the test.  She denies any new bone pains, cough, abdominal pain nausea vomiting.  Denies any frequent infections.    Requesting administration of pneumococcal vaccine today.      The following portions of the patient's history were reviewed and updated as  appropriate: allergies, current medications, past family history, past medical history, past social history and problem list.    Past Medical History:   Diagnosis Date    Anemia     Anxiety     Bladder infection     Colitis     Colon cancer     Elevated LFTs 12/05/2022    GERD (gastroesophageal reflux disease)     Headache     Hyperlipidemia     Hypertension     Knee injury, initial encounter-left 07/01/2019    Neutropenia 01/03/2017    Prediabetes 03/04/2019      Past Surgical History:   Procedure Laterality Date    COLON RESECTION Right 03/02/2022    Procedure: LAPAROSCOPIC RIGHT HEMICOLECTOMY WITH DAVINCI ROBOT;  Surgeon: Malcolm Wellington MD;  Location: [facility] MAIN OR;  Service: Robotics - DaVinci;  Laterality: Right;    COLONOSCOPY      COLONOSCOPY N/A 3/9/2023    Procedure: COLONOSCOPY TO ANASTAMOSIS WITH COLD BIOPSIES;  Surgeon: Irvin Clayton MD;  Location: [facility] ENDOSCOPY;  Service: Gastroenterology;  Laterality: N/A;  PRE- HISTORY OF COLON CANCER  POST- NORMAL    COLONOSCOPY W/ POLYPECTOMY N/A 02/22/2022    Procedure: COLONOSCOPY INTO CECUM WITH COLD BIOPSIES;  Surgeon: Irvin Clayton MD;  Location: [facility] ENDOSCOPY;  Service: Gastroenterology;  Laterality: N/A;  PRE: DIARRHEA, ANEMIA  POST: ASCENDING COLON MASS    ENDOSCOPY N/A 02/22/2022    Procedure: ESOPHAGOGASTRODUODENOSCOPY WITH BIOPSY;  Surgeon: Irvin Clayton MD;  Location: [facility] ENDOSCOPY;  Service: Gastroenterology;  Laterality: N/A;  PRE: ANEMIA  POST: NORMAL EGD    TONSILLECTOMY      VENOUS ACCESS DEVICE (PORT) INSERTION N/A 04/11/2022    Procedure: INSERTION VENOUS ACCESS DEVICE with subcutaneous port;  Surgeon: Malcolm Wellington MD;  Location: [facility] OR [facility];  Service: General;  Laterality: N/A;        Family History   Problem Relation Age of Onset    Hypertension Mother     Heart disease Father     Heart attack Father 47        first MI age 47; second MI 57    Hypertension Sister     No Known Problems  "Brother     Gerhard Hyperthermia Neg Hx       Social History     Socioeconomic History    Marital status:     Number of children: 2   Tobacco Use    Smoking status: Never    Smokeless tobacco: Never   Vaping Use    Vaping Use: Never used   Substance and Sexual Activity    Alcohol use: Not Currently     Comment: Very rarely    Drug use: Never    Sexual activity: Yes     Partners: Male      OB History    No obstetric history on file.          Allergies   Allergen Reactions    Penicillins Rash      Review of systems as mentioned in the HPI    Objective   Blood pressure 118/77, pulse 88, temperature 98.6 °F (37 °C), temperature source Temporal, height 157.5 cm (62.01\"), weight 64 kg (141 lb 3.2 oz), SpO2 97 %.     Physical Exam  Vitals reviewed.   Constitutional:       General: She is not in acute distress.     Appearance: She is well-developed.   HENT:      Nose: Nose normal.      Mouth/Throat:      Mouth: Mucous membranes are moist.      Pharynx: Oropharynx is clear.   Eyes:      Conjunctiva/sclera: Conjunctivae normal.      Pupils: Pupils are equal, round, and reactive to light.   Cardiovascular:      Rate and Rhythm: Normal rate.   Pulmonary:      Effort: Pulmonary effort is normal. No respiratory distress.   Abdominal:      General: Bowel sounds are normal.   Skin:     General: Skin is warm and dry.      Findings: No rash.   Neurological:      General: No focal deficit present.      Mental Status: She is alert and oriented to person, place, and time. Mental status is at baseline.      Motor: No weakness.   Psychiatric:         Mood and Affect: Mood normal.         Behavior: Behavior normal.         Thought Content: Thought content normal.         Judgment: Judgment normal.     I have reexamined the patient and the results are consistent with the previously documented exam. Mago Alvarez MD      DIAGNOSTIC DATA:  Results from last 7 days   Lab Units 06/02/23  0817   WBC 10*3/mm3 3.09*   NEUTROS ABS 10*3/mm3 " 1.60*   HEMOGLOBIN g/dL 14.2   HEMATOCRIT % 41.9   PLATELETS 10*3/mm3 211       Results from last 7 days   Lab Units 06/02/23  0817   SODIUM mmol/L 145   POTASSIUM mmol/L 4.8   CHLORIDE mmol/L 108*   CO2 mmol/L 30.3*   BUN mg/dL 10   CREATININE mg/dL 0.77   CALCIUM mg/dL 9.9   ALBUMIN g/dL 4.6   BILIRUBIN mg/dL 0.3   ALK PHOS U/L 143*   ALT (SGPT) U/L 18   AST (SGOT) U/L 18   GLUCOSE mg/dL 98             3/16/2023:  CBC- WBC 3.30, ANC 1.90, hemoglobin 13.1, platelets 193,000.    CT Chest With Contrast Diagnostic    Result Date: 6/1/2023  1. Stable punctate pulmonary nodules, almost certainly benign. 2. Normal size spleen 3. Right hemicolectomy changes, without evidence of residual or recurrent mass.  This report was finalized on 6/1/2023 12:34 PM by Dr. Amarjit Andrade M.D.      CT Abdomen Pelvis With Contrast    Result Date: 6/1/2023  1. Stable punctate pulmonary nodules, almost certainly benign. 2. Normal size spleen 3. Right hemicolectomy changes, without evidence of residual or recurrent mass.  This report was finalized on 6/1/2023 12:34 PM by Dr. Amarjit Andrade M.D.        CT of the chest abdomen and pelvis-images independently reviewed and interpreted by me as well as reviewed with the patient.  The pulmonary nodule in the lingula is stable.  Other pulmonary nodules stable.  There is no clear evidence of metastatic disease at this time.    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  No lymphovascular or perineural invasion.  All the margins are negative.  Tumor comes to within 3 cm of the radial margin.  3 out of 26 lymph nodes positive for metastatic carcinoma with the largest focus measuring 0.7 cm without  extranodal extension.  No tumor deposits identified  PT3N1B  MSI low probability on immunohistochemistry and stable on PCR  No family history of colon cancer  Explained to her that this is stage IIIb colon cancer however since his T3N1 I would consider this low risk stage III.  There is no other high risk features noted on pathology either.  Given that the tumor is stage III there is definite benefit from adjuvant chemotherapy.  We discussed the 2 available regimens including XELOX and FOLFOX.  Explained to her that based on the IDEA data 3 months of XELOX is noninferior to 6 months of adjuvant chemotherapy however with FOLFOX the noninferiority has not been established.  Explained to her that 6 months of chemotherapy improves disease-free survival by additional 2 to 3% over 3 months.  Discussed the adverse effects of XELOX as well as FOLFOX.  After discussing the pros and cons we have decided to proceed with FOLFOX for total of 6 months but if she is unable to tolerate due to neurotoxicity then we could potentially stop after 3 months of treatment.  Port placed on 4/11/2022  Cycle 1 FOLFOX on 4/18/2022  Cycle 11 FOLFOX 9/13/2022 with a 20% dose reduction of oxaliplatin.  Now experiencing neuropathy, we will discontinue oxaliplatin and just to 5-FU for cycle 12  Seen 10/14/2022 in follow-up and scan review.  CT scan of the chest, abdomen, and pelvis done 10/12/2022 showing a few tiny new lung nodules indeterminant, measuring about 3 to 4 mm in maximum size.  Patient also has enlarging spleen previously 11.3 cm, now measuring 13.6 cm.    12/14/2022-CT of the chest abdomen pelvis shows stable pulmonary nodules.  Screening colonoscopy 3/9/2023-negative.  6/1/2023-CT of the chest abdomen and pelvis-no evidence of metastatic disease.  She will continue with every 3 monthly tumor marker and lab surveillance along with every 6 months scans.    *Leukopenia  Predominantly neutropenia  Mild increase in monocyte count  This is  chronic and unchanged  No increased infections  Had a previous work-up including a bone marrow biopsy with no clear etiology  Differential includes nutritional, chronic idiopathic, autoimmune.  She is currently not on any medications which could account for the leukopenia.  No splenomegaly on exam  5/13/2021-flow cytometry negative  LDH normal at 174  Reticulocyte count 1.07%  Ferritin low at 5.7  Iron saturation low at 3%  Folic acid normal at 11.8, B12 415  Rheumatoid factor negative  AVIVA negative  ESR normal at 29  Serum copper normal at 134  WBC count has remained normal for the most part since starting FOLFOX due to Neulasta support.  9/15/2022-WBC 3.61, hemoglobin 9.7 and platelets 89,000 with an absolute neutrophil count of 2.16  12/22/2022-neutropenia, with an absolute neutrophil count of 1.57  6/1/2023-neutropenia with absolute neutrophil count of 1.40.  Patient remains asymptomatic.    *Anemia  Microcytic  Likely secondary to iron deficiency  4/22/2021 iron saturation 3%, TIBC today 508, ferritin 5.70.  The patient was initiated on oral iron every other day.  8/26/2021, hemoglobin today 10.1.  The patient has been tolerating oral iron every other day.  We will increase oral iron to daily and recheck iron labs in 4 months.  12/16/2021, patient is taking oral iron every day and tolerating this well.  Hemoglobin today 10.0.  Iron saturation 45, TIBC 413, ferritin 10.20.  3/4/2022 ferritin 27.5, iron saturation 5%, TIBC 375, iron studies consistent with iron deficiency  3/24/2022 hemoglobin 10.2  Received 3 doses of Venofer.  9/15/2022-hemoglobin 9.7, low due to chemotherapy  As of 10/14/2022 hemoglobin improving now up to 11.3.  6/1/2023 Labs reviewed and hemoglobin normal at 13.4.  Iron studies reviewed and iron saturation 30%, TIBC 325, ferritin normal at 73  Patient is currently taking iron tablets twice a week.  She can discontinue this and continue with healthy diet including iron rich  food.    *Ywgymdhkj-lv-ni-date on mammogram  Scheduled for screening mammogram and DEXA.    *Colitis  Lymphocytic colitis noted on the biopsy of the colon  Diarrhea has resolved.    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  She has been on gabapentin 300 mg nightly at bedtime however her symptoms have not improved.  Recommend that she start gabapentin continue with B complex only.  Neuropathy is likely permanent at this point.    *Elevated alkaline phosphatase  Alkaline phosphatase has been elevated into the 200s and 300s in the past.  Labs from 6/1/2023 with improved alkaline phosphatase 143.  This improvement is likely secondary to weight loss and improvement in fatty liver changes.    *Utility of circulating tumor DNA  Explained the role of circulating tumor DNA and assessing the risk of relapse since you have been treated with adjuvant chemotherapy.  Explained to her that currently we do not have any evidence that detecting CT DNA early would improve outcomes.  Currently not recommended by any of the guidelines.  However if patient wishes to proceed with obtaining CT DNA we would be able to use [employer] to do so.  After discussing the pros and cons patient and her  decided not to proceed with CT DNA testing today.  Continue surveillance with CT scans and colonoscopy.    PLAN:   CBC, CMP, CEA and 3 months with APRN visit  Port flush in 6 weeks.  Repeat scans in 6 months with MD visit.  Continue oral iron  Continue dietary modifications to help with fatty liver changes  Pneumococcal vaccine today    42 minutes have been spent on the encounter including reviewing the medical records, reviewing the scans  including the images independently and interpreting them independently.  Reviewing the guidelines and literature regarding the CT DNA, face-to-face time, preventative treatment, documentation of the same day      Mago Alvarez MD  06/08/23

## 2023-06-14 DIAGNOSIS — C18.2 MALIGNANT NEOPLASM OF ASCENDING COLON: ICD-10-CM

## 2023-06-14 RX ORDER — GABAPENTIN 300 MG/1
CAPSULE ORAL
Qty: 90 CAPSULE | Refills: 0 | OUTPATIENT
Start: 2023-06-14

## 2023-06-14 NOTE — TELEPHONE ENCOUNTER
Caller: Lucille Kayla THOMAS    Relationship: Self    Best call back number: [phone]     Requested Prescriptions:   Requested Prescriptions     Pending Prescriptions Disp Refills    gabapentin (NEURONTIN) 300 MG capsule 90 capsule 0        Pharmacy where request should be sent: [facility] [id number] [facility] [address] [facility] AT [facility] & [place] - [phone] [facility] [phone] FX     Last office visit with prescribing clinician: 6/8/2023   Last telemedicine visit with prescribing clinician: Visit date not found   Next office visit with prescribing clinician: 12/21/2023     Additional details provided by patient: SINCE STOPPING THE MEDICATION THE PT CAN NOW TELL THAT IT WAS HELPING AND WOULD LIKE TO GO BACK ON THE MEDICATION. PT IS CURRENTLY OUT OF THE MEDICATION.     Does the patient have less than a 3 day supply:  [x] Yes  [] No    Would you like a call back once the refill request has been completed: [] Yes [x] No    If the office needs to give you a call back, can they leave a voicemail: [] Yes [x] No

## 2023-08-28 DIAGNOSIS — C18.2 MALIGNANT NEOPLASM OF ASCENDING COLON: ICD-10-CM

## 2023-08-29 RX ORDER — GABAPENTIN 300 MG/1
CAPSULE ORAL
Qty: 90 CAPSULE | Refills: 0 | Status: SHIPPED | OUTPATIENT
Start: 2023-08-29

## 2023-09-06 NOTE — PROGRESS NOTES
Subjective   Kayla Adkins is a 68 y.o. female.  Referred by Dr. Rivas for evaluation of leukopenia    History of Present Illness   Ms. Adkins is a 66-year-old postmenopausal  who has been referred by Dr. Rivas for evaluation of leukopenia, neutropenia.  Patient reports that she has had longstanding leukopenia and her white blood cell count normally ranges in the threes.  On her most recent visit with Dr. Rivas white blood cell count was noted to be 2560 on 3/2/2021 and 2660 on 4/5/2021.  This prompted a referral to hematology.  Patient reports that she had a extensive work-up including a bone marrow biopsy for the same condition about 17 years ago.  She thinks that this was performed at [facility].  According to patient no clear etiology was determined after the work-up.  She was recommended to continue with periodic labs.  No treatment was required.    She was seen in December and had continued on oral iron.  She was tolerating that fairly well.  However she had worsening of colitis for about 2 months prior to that visit.  She was scheduled for colonoscopy in January 2022.    She had a routine surveillance colonoscopy performed by Dr. Whitlock on 2/22/2022.  On this there was an infiltrative nonobstructing medium-sized mass in the proximal ascending colon 1 to 2 cm distal to the cecum.  This was partially circumferential involving 1 foot of the lumen.  Biopsies were taken.  There is also mildly congested mucosa in the entire colon.  Random biopsies were taken.  She also had an EGD at the same time which reported normal esophagus, stomach and duodenum.  Pathology evaluation from the biopsy reported superficial fragments of at least intramucosal carcinoma.  Random colon biopsies reported increased lamina propria, chronic inflammation with surface epithelial alteration and increased intraepithelial lymphocytes consistent with lymphocytic colitis.  Biopsies from the stomach reported chronic  inactive gastritis.  Negative for intestinal metaplasia and H. pylori.  Biopsies from the duodenum was negative.    She was seen by Dr. Wellington who performed a laparoscopic right hemicolectomy on 3/2/2022.  Pathology showed a 3.8 cm mass at the cecum invading through the muscularis propria into the pericolorectal adipose tissue.  No lymphovascular or perineural invasion.  All margins were clear.  The radial margin was 3 cm.  3 out of 26 lymph nodes were positive for metastatic disease.  The largest metastatic focus was 0.7 cm with extranodal extension.  Pathological stage PT3N1B.  The tumor was moderately differentiated, grade 2 adenocarcinoma.    No loss of nuclear expression of MMR proteins.  Low probability of microsatellite instability.  MSI status-microsatellite stable.  This is by PCR.    Normal CEA and 0.77 on the day prior to surgery.    She was seen by Dr. Burton as inpatient and recommended adjuvant chemotherapy with FOLFOX.    Patient initiating cycle 1 FOLFOX 4/18/2022.  Neulasta support given due to baseline neutropenia.    9/27/2020 to cycle 12 FOLFOX (oxaliplatin omitted due to neuropathy).    Interval history:  Kayla returns today for 3-month follow-up.  Patient continues close surveillance with imaging completed every 6 months and CEA level checked every 3 months.  Overall patient reports she is doing well.  She denies any new symptoms or concerns at this time.  She continues to remain active and her weight has remained stable.  She denies any abdominal pain cough, shortness of breath or night sweats.  No other concerns noted at this time.        The following portions of the patient's history were reviewed and updated as appropriate: allergies, current medications, past family history, past medical history, past social history and problem list.    Past Medical History:   Diagnosis Date    Anemia     Anxiety     Bladder infection     Colitis     Colon cancer     Elevated LFTs 12/05/2022    GERD  (gastroesophageal reflux disease)     Headache     Hyperlipidemia     Hypertension     Knee injury, initial encounter-left 07/01/2019    Neutropenia 01/03/2017    Prediabetes 03/04/2019      Past Surgical History:   Procedure Laterality Date    COLON RESECTION Right 03/02/2022    Procedure: LAPAROSCOPIC RIGHT HEMICOLECTOMY WITH DAVINCI ROBOT;  Surgeon: Malcolm Wellington MD;  Location: [facility] MAIN OR;  Service: Robotics - DaVinci;  Laterality: Right;    COLONOSCOPY      COLONOSCOPY N/A 3/9/2023    Procedure: COLONOSCOPY TO ANASTAMOSIS WITH COLD BIOPSIES;  Surgeon: Irvin Clayton MD;  Location: [facility] ENDOSCOPY;  Service: Gastroenterology;  Laterality: N/A;  PRE- HISTORY OF COLON CANCER  POST- NORMAL    COLONOSCOPY W/ POLYPECTOMY N/A 02/22/2022    Procedure: COLONOSCOPY INTO CECUM WITH COLD BIOPSIES;  Surgeon: Irvin Clayton MD;  Location: [facility] ENDOSCOPY;  Service: Gastroenterology;  Laterality: N/A;  PRE: DIARRHEA, ANEMIA  POST: ASCENDING COLON MASS    ENDOSCOPY N/A 02/22/2022    Procedure: ESOPHAGOGASTRODUODENOSCOPY WITH BIOPSY;  Surgeon: Irvin Clayton MD;  Location: [facility] ENDOSCOPY;  Service: Gastroenterology;  Laterality: N/A;  PRE: ANEMIA  POST: NORMAL EGD    TONSILLECTOMY      VENOUS ACCESS DEVICE (PORT) INSERTION N/A 04/11/2022    Procedure: INSERTION VENOUS ACCESS DEVICE with subcutaneous port;  Surgeon: Malcolm Wellington MD;  Location: [facility] OR [facility];  Service: General;  Laterality: N/A;        Family History   Problem Relation Age of Onset    Hypertension Mother     Heart disease Father     Heart attack Father 47        first MI age 47; second MI 57    Hypertension Sister     No Known Problems Brother     Malig Hyperthermia Neg Hx       Social History     Socioeconomic History    Marital status:     Number of children: 2   Tobacco Use    Smoking status: Never    Smokeless tobacco: Never   Vaping Use    Vaping Use: Never used   Substance and Sexual Activity     "Alcohol use: Not Currently     Comment: Very rarely    Drug use: Never    Sexual activity: Yes     Partners: Male      OB History    No obstetric history on file.          Allergies   Allergen Reactions    Penicillins Rash      Review of systems as mentioned in the HPI    Objective   Blood pressure 110/74, pulse 77, temperature 96.8 °F (36 °C), temperature source Temporal, resp. rate 16, height 157.5 cm (62.01\"), weight 69.2 kg (152 lb 8 oz), SpO2 98 %.     Physical Exam  Vitals reviewed.   Constitutional:       General: She is not in acute distress.     Appearance: She is well-developed.   HENT:      Nose: Nose normal.      Mouth/Throat:      Mouth: Mucous membranes are moist.      Pharynx: Oropharynx is clear.   Eyes:      Conjunctiva/sclera: Conjunctivae normal.      Pupils: Pupils are equal, round, and reactive to light.   Cardiovascular:      Rate and Rhythm: Normal rate.   Pulmonary:      Effort: Pulmonary effort is normal. No respiratory distress.   Abdominal:      General: Bowel sounds are normal.   Skin:     General: Skin is warm and dry.      Findings: No rash.   Neurological:      General: No focal deficit present.      Mental Status: She is alert and oriented to person, place, and time. Mental status is at baseline.      Motor: No weakness.   Psychiatric:         Mood and Affect: Mood normal.         Behavior: Behavior normal.         Thought Content: Thought content normal.         Judgment: Judgment normal.     I have reexamined the patient and the results are consistent with the previously documented exam. Guy De La Cruz, APRN      DIAGNOSTIC DATA:  Results from last 7 days   Lab Units 09/07/23  1324   WBC 10*3/mm3 3.41   NEUTROS ABS 10*3/mm3 1.49*   HEMOGLOBIN g/dL 12.6   HEMATOCRIT % 37.8   PLATELETS 10*3/mm3 193     Results from last 7 days   Lab Units 09/07/23  1324   SODIUM mmol/L 140   POTASSIUM mmol/L 3.9   CHLORIDE mmol/L 103   CO2 mmol/L 26.0   BUN mg/dL 9   CREATININE mg/dL 0.66 "   CALCIUM mg/dL 9.3   ALBUMIN g/dL 4.4   BILIRUBIN mg/dL 0.2   ALK PHOS U/L 114   ALT (SGPT) U/L 20   AST (SGOT) U/L 17   GLUCOSE mg/dL 128*           3/16/2023:  CBC- WBC 3.30, ANC 1.90, hemoglobin 13.1, platelets 193,000.    9/7/2023: WBC 3.41, ANC 1.49, hemoglobin 12.6, and platelets 193,000.  CEA level is normal at 1.64.  CMP was normal except for slightly elevated glucose that she is nonfasting.    No radiology results for the last 30 days.     CT of the chest abdomen and pelvis-images independently reviewed and interpreted by me as well as reviewed with the patient.  The pulmonary nodule in the lingula is stable.  Other pulmonary nodules stable.  There is no clear evidence of metastatic disease at this time.    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  No lymphovascular or perineural invasion.  All the margins are negative.  Tumor comes to within 3 cm of the radial margin.  3 out of 26 lymph nodes positive for metastatic carcinoma with the largest focus measuring 0.7 cm without extranodal extension.  No tumor deposits identified  PT3N1B  MSI low probability on immunohistochemistry and stable on PCR  No family history of colon cancer  Explained to her that this is stage IIIb colon cancer however since his T3N1 I would consider this low risk stage III.  There is no other high risk features noted on pathology either.  Given that the tumor is stage III there is definite benefit from adjuvant chemotherapy.  We discussed the 2 available regimens including XELOX and FOLFOX.  Explained to her that based on the IDEA data 3 months of XELOX is noninferior to 6 months of adjuvant chemotherapy however with FOLFOX the  noninferiority has not been established.  Explained to her that 6 months of chemotherapy improves disease-free survival by additional 2 to 3% over 3 months.  Discussed the adverse effects of XELOX as well as FOLFOX.  After discussing the pros and cons we have decided to proceed with FOLFOX for total of 6 months but if she is unable to tolerate due to neurotoxicity then we could potentially stop after 3 months of treatment.  Port placed on 4/11/2022  Cycle 1 FOLFOX on 4/18/2022  Cycle 11 FOLFOX 9/13/2022 with a 20% dose reduction of oxaliplatin.  Now experiencing neuropathy, we will discontinue oxaliplatin and just to 5-FU for cycle 12  Seen 10/14/2022 in follow-up and scan review.  CT scan of the chest, abdomen, and pelvis done 10/12/2022 showing a few tiny new lung nodules indeterminant, measuring about 3 to 4 mm in maximum size.  Patient also has enlarging spleen previously 11.3 cm, now measuring 13.6 cm.    12/14/2022-CT of the chest abdomen pelvis shows stable pulmonary nodules.  Screening colonoscopy 3/9/2023-negative.  6/1/2023-CT of the chest abdomen and pelvis-no evidence of metastatic disease.  She will continue with every 3 monthly tumor marker and lab surveillance along with every 6 months scans.  9/7/2023: Patient continues to remain free of any recurrent symptoms.  Tumor markers today is normal at 1.64.  She is already scheduled for CT scans and 3 months for continued surveillance.    *Leukopenia  Predominantly neutropenia  Mild increase in monocyte count  This is chronic and unchanged  No increased infections  Had a previous work-up including a bone marrow biopsy with no clear etiology  Differential includes nutritional, chronic idiopathic, autoimmune.  She is currently not on any medications which could account for the leukopenia.  No splenomegaly on exam  5/13/2021-flow cytometry negative  LDH normal at 174  Reticulocyte count 1.07%  Ferritin low at 5.7  Iron saturation low at 3%  Folic acid normal  at 11.8, B12 415  Rheumatoid factor negative  AVIVA negative  ESR normal at 29  Serum copper normal at 134  WBC count has remained normal for the most part since starting FOLFOX due to Neulasta support.  9/15/2022-WBC 3.61, hemoglobin 9.7 and platelets 89,000 with an absolute neutrophil count of 2.16  12/22/2022-neutropenia, with an absolute neutrophil count of 1.57  6/1/2023-neutropenia with absolute neutrophil count of 1.40.  Patient remains asymptomatic.  9/7/2023: Neutropenia with ANC of 1.49.  Patient remains asymptomatic.    *Anemia  Microcytic  Likely secondary to iron deficiency  4/22/2021 iron saturation 3%, TIBC today 508, ferritin 5.70.  The patient was initiated on oral iron every other day.  8/26/2021, hemoglobin today 10.1.  The patient has been tolerating oral iron every other day.  We will increase oral iron to daily and recheck iron labs in 4 months.  12/16/2021, patient is taking oral iron every day and tolerating this well.  Hemoglobin today 10.0.  Iron saturation 45, TIBC 413, ferritin 10.20.  3/4/2022 ferritin 27.5, iron saturation 5%, TIBC 375, iron studies consistent with iron deficiency  3/24/2022 hemoglobin 10.2  Received 3 doses of Venofer.  9/15/2022-hemoglobin 9.7, low due to chemotherapy  As of 10/14/2022 hemoglobin improving now up to 11.3.  6/1/2023 Labs reviewed and hemoglobin normal at 13.4.  Iron studies reviewed and iron saturation 30%, TIBC 325, ferritin normal at 73  Patient is currently taking iron tablets twice a week.  She can discontinue this and continue with healthy diet including iron rich food.  9/7/2023: Hemoglobin is normal at 12.6.  Patient is no longer taking oral iron and focusing on healthy food options itching iron    *[id number]-date on mammogram  Scheduled 9/15/2023  Scheduled for screening mammogram and DEXA.  Scheduled 9/15/2023    *Colitis  Lymphocytic colitis noted on the biopsy of the colon  Diarrhea has resolved.    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  She has been on gabapentin 300 mg nightly at bedtime however her symptoms have not improved.  Recommend that she start gabapentin continue with B complex only.  Neuropathy is likely permanent at this point.    *Elevated alkaline phosphatase  Alkaline phosphatase has been elevated into the 200s and 300s in the past.  Labs from 6/1/2023 with improved alkaline phosphatase 143.  This improvement is likely secondary to weight loss and improvement in fatty liver changes.    *Utility of circulating tumor DNA  Explained the role of circulating tumor DNA and assessing the risk of relapse since you have been treated with adjuvant chemotherapy.  Explained to her that currently we do not have any evidence that detecting CT DNA early would improve outcomes.  Currently not recommended by any of the guidelines.  However if patient wishes to proceed with obtaining CT DNA we would be able to use [employer] to do so.  After discussing the pros and cons patient and her  decided not to proceed with CT DNA testing today.  Continue surveillance with CT scans and colonoscopy.  9/7/2023: CEA is normal at 1.64    PLAN:   Port flush in 6 weeks.  CT scans in 3 months.  Follow-up with Dr. Alvarez in 3 months      Guy De La Cruz, APRN  09/07/23

## 2023-09-07 ENCOUNTER — INFUSION (OUTPATIENT)
Dept: ONCOLOGY | Facility: HOSPITAL | Age: 68
End: 2023-09-07
Payer: MEDICARE

## 2023-09-07 ENCOUNTER — OFFICE VISIT (OUTPATIENT)
Dept: ONCOLOGY | Facility: CLINIC | Age: 68
End: 2023-09-07
Payer: MEDICARE

## 2023-09-07 VITALS
WEIGHT: 152.5 LBS | DIASTOLIC BLOOD PRESSURE: 74 MMHG | OXYGEN SATURATION: 98 % | TEMPERATURE: 96.8 F | RESPIRATION RATE: 16 BRPM | BODY MASS INDEX: 28.06 KG/M2 | HEART RATE: 77 BPM | HEIGHT: 62 IN | SYSTOLIC BLOOD PRESSURE: 110 MMHG

## 2023-09-07 DIAGNOSIS — Z45.2 ENCOUNTER FOR FITTING AND ADJUSTMENT OF VASCULAR CATHETER: Primary | ICD-10-CM

## 2023-09-07 DIAGNOSIS — C18.2 MALIGNANT NEOPLASM OF ASCENDING COLON: Primary | ICD-10-CM

## 2023-09-07 DIAGNOSIS — C18.2 MALIGNANT NEOPLASM OF ASCENDING COLON: ICD-10-CM

## 2023-09-07 LAB
ALBUMIN SERPL-MCNC: 4.4 G/DL (ref 3.5–5.2)
ALBUMIN/GLOB SERPL: 1.9 G/DL
ALP SERPL-CCNC: 114 U/L (ref 39–117)
ALT SERPL W P-5'-P-CCNC: 20 U/L (ref 1–33)
ANION GAP SERPL CALCULATED.3IONS-SCNC: 11 MMOL/L (ref 5–15)
AST SERPL-CCNC: 17 U/L (ref 1–32)
BASOPHILS # BLD AUTO: 0.01 10*3/MM3 (ref 0–0.2)
BASOPHILS NFR BLD AUTO: 0.3 % (ref 0–1.5)
BILIRUB SERPL-MCNC: 0.2 MG/DL (ref 0–1.2)
BUN SERPL-MCNC: 9 MG/DL (ref 8–23)
BUN/CREAT SERPL: 13.6 (ref 7–25)
CALCIUM SPEC-SCNC: 9.3 MG/DL (ref 8.6–10.5)
CEA SERPL-MCNC: 1.64 NG/ML
CHLORIDE SERPL-SCNC: 103 MMOL/L (ref 98–107)
CO2 SERPL-SCNC: 26 MMOL/L (ref 22–29)
CREAT SERPL-MCNC: 0.66 MG/DL (ref 0.57–1)
DEPRECATED RDW RBC AUTO: 42.3 FL (ref 37–54)
EGFRCR SERPLBLD CKD-EPI 2021: 96.3 ML/MIN/1.73
EOSINOPHIL # BLD AUTO: 0.09 10*3/MM3 (ref 0–0.4)
EOSINOPHIL NFR BLD AUTO: 2.6 % (ref 0.3–6.2)
ERYTHROCYTE [DISTWIDTH] IN BLOOD BY AUTOMATED COUNT: 12.2 % (ref 12.3–15.4)
GLOBULIN UR ELPH-MCNC: 2.3 GM/DL
GLUCOSE SERPL-MCNC: 128 MG/DL (ref 65–99)
HCT VFR BLD AUTO: 37.8 % (ref 34–46.6)
HGB BLD-MCNC: 12.6 G/DL (ref 12–15.9)
IMM GRANULOCYTES # BLD AUTO: 0.01 10*3/MM3 (ref 0–0.05)
IMM GRANULOCYTES NFR BLD AUTO: 0.3 % (ref 0–0.5)
LYMPHOCYTES # BLD AUTO: 1.45 10*3/MM3 (ref 0.7–3.1)
LYMPHOCYTES NFR BLD AUTO: 42.5 % (ref 19.6–45.3)
MCH RBC QN AUTO: 31.2 PG (ref 26.6–33)
MCHC RBC AUTO-ENTMCNC: 33.3 G/DL (ref 31.5–35.7)
MCV RBC AUTO: 93.6 FL (ref 79–97)
MONOCYTES # BLD AUTO: 0.36 10*3/MM3 (ref 0.1–0.9)
MONOCYTES NFR BLD AUTO: 10.6 % (ref 5–12)
NEUTROPHILS NFR BLD AUTO: 1.49 10*3/MM3 (ref 1.7–7)
NEUTROPHILS NFR BLD AUTO: 43.7 % (ref 42.7–76)
NRBC BLD AUTO-RTO: 0 /100 WBC (ref 0–0.2)
PLATELET # BLD AUTO: 193 10*3/MM3 (ref 140–450)
PMV BLD AUTO: 9.7 FL (ref 6–12)
POTASSIUM SERPL-SCNC: 3.9 MMOL/L (ref 3.5–5.2)
PROT SERPL-MCNC: 6.7 G/DL (ref 6–8.5)
RBC # BLD AUTO: 4.04 10*6/MM3 (ref 3.77–5.28)
SODIUM SERPL-SCNC: 140 MMOL/L (ref 136–145)
WBC NRBC COR # BLD: 3.41 10*3/MM3 (ref 3.4–10.8)

## 2023-09-07 PROCEDURE — 25010000002 HEPARIN LOCK FLUSH PER 10 UNITS: Performed by: NURSE PRACTITIONER

## 2023-09-07 PROCEDURE — 82378 CARCINOEMBRYONIC ANTIGEN: CPT | Performed by: INTERNAL MEDICINE

## 2023-09-07 PROCEDURE — 80053 COMPREHEN METABOLIC PANEL: CPT | Performed by: INTERNAL MEDICINE

## 2023-09-07 PROCEDURE — 36591 DRAW BLOOD OFF VENOUS DEVICE: CPT

## 2023-09-07 PROCEDURE — 85025 COMPLETE CBC W/AUTO DIFF WBC: CPT | Performed by: INTERNAL MEDICINE

## 2023-09-07 RX ORDER — SODIUM CHLORIDE 0.9 % (FLUSH) 0.9 %
10 SYRINGE (ML) INJECTION AS NEEDED
Status: DISCONTINUED | OUTPATIENT
Start: 2023-09-07 | End: 2023-09-07 | Stop reason: HOSPADM

## 2023-09-07 RX ORDER — HEPARIN SODIUM (PORCINE) LOCK FLUSH IV SOLN 100 UNIT/ML 100 UNIT/ML
500 SOLUTION INTRAVENOUS AS NEEDED
Status: DISCONTINUED | OUTPATIENT
Start: 2023-09-07 | End: 2023-09-07 | Stop reason: HOSPADM

## 2023-09-07 RX ADMIN — Medication 10 ML: at 13:19

## 2023-09-07 RX ADMIN — Medication 500 UNITS: at 13:19

## 2023-09-15 ENCOUNTER — HOSPITAL ENCOUNTER (OUTPATIENT)
Dept: BONE DENSITY | Facility: HOSPITAL | Age: 68
Discharge: HOME | End: 2023-09-15
Payer: MEDICARE

## 2023-09-15 ENCOUNTER — HOSPITAL ENCOUNTER (OUTPATIENT)
Dept: MAMMOGRAPHY | Facility: HOSPITAL | Age: 68
Discharge: HOME | End: 2023-09-15
Payer: MEDICARE

## 2023-09-15 DIAGNOSIS — Z13.820 ENCOUNTER FOR SCREENING FOR OSTEOPOROSIS: ICD-10-CM

## 2023-09-15 DIAGNOSIS — Z12.31 ENCOUNTER FOR SCREENING MAMMOGRAM FOR BREAST CANCER: ICD-10-CM

## 2023-09-15 DIAGNOSIS — Z78.0 MENOPAUSE: ICD-10-CM

## 2023-09-15 PROCEDURE — 77080 DXA BONE DENSITY AXIAL: CPT

## 2023-09-15 PROCEDURE — 77063 BREAST TOMOSYNTHESIS BI: CPT

## 2023-09-15 PROCEDURE — 77067 SCR MAMMO BI INCL CAD: CPT

## 2023-09-17 NOTE — PROGRESS NOTES
Send a copy of the report to the patient. Have her make an appointment to discuss treatment options. Remind patient to walk 5-7 days a week for 30 minutes as tolerated. Remind patient to avoid caffeine as much as possible.  Reminder to take 600 mg of calcium and 400 IU of vitamin D3 daily ( example caltrate d 600/400 daily with supper)-if not contraindicated by her oncology specialty.   Thanks, Dr. Rivas

## 2023-09-28 ENCOUNTER — OFFICE VISIT (OUTPATIENT)
Dept: FAMILY MEDICINE CLINIC | Facility: CLINIC | Age: 68
End: 2023-09-28
Payer: MEDICARE

## 2023-09-28 VITALS
HEART RATE: 76 BPM | OXYGEN SATURATION: 99 % | DIASTOLIC BLOOD PRESSURE: 76 MMHG | BODY MASS INDEX: 28.45 KG/M2 | SYSTOLIC BLOOD PRESSURE: 135 MMHG | HEIGHT: 62 IN | RESPIRATION RATE: 16 BRPM | WEIGHT: 154.6 LBS | TEMPERATURE: 97.8 F

## 2023-09-28 DIAGNOSIS — M81.0 AGE-RELATED OSTEOPOROSIS WITHOUT CURRENT PATHOLOGICAL FRACTURE: Primary | ICD-10-CM

## 2023-09-28 PROCEDURE — 3075F SYST BP GE 130 - 139MM HG: CPT | Performed by: FAMILY MEDICINE

## 2023-09-28 PROCEDURE — 3078F DIAST BP <80 MM HG: CPT | Performed by: FAMILY MEDICINE

## 2023-09-28 PROCEDURE — 99213 OFFICE O/P EST LOW 20 MIN: CPT | Performed by: FAMILY MEDICINE

## 2023-09-28 RX ORDER — RALOXIFENE HYDROCHLORIDE 60 MG/1
60 TABLET, FILM COATED ORAL DAILY
Qty: 90 TABLET | Refills: 3 | Status: SHIPPED | OUTPATIENT
Start: 2023-09-28 | End: 2024-09-27

## 2023-09-28 NOTE — ASSESSMENT & PLAN NOTE
Pros and cons of each of the classes of medications discussed in detail with patient.  Bisphosphonates are not indicated due to the high risk of jaw osteonecrosis.  Similarly Prolia was not considered to be the first choice.  Even though Evista is not a perfect medicine, it should be her best option to start.  We gave information about medication and generalized osteoporosis diagnosis and after visit summary.  Next DEXA scan due in 2 years

## 2023-09-28 NOTE — PROGRESS NOTES
"Chief Complaint  Follow-up (Bone density result discuss ) and Hypertension    Subjective        Hypertension     CHARAN presents to [facility] for review of recent bone density test.  It shows some osteoporosis.  Patient states last DEXA scan was within normal limits approximately 2 years ago.  She has had some issues with dental health and has had grafting in the past and may need that again in the future.  That is still to be determined.          Objective   Vital Signs:   Vitals:    09/28/23 1310   BP: 135/76   BP Location: Left arm   Patient Position: Sitting   Pulse: 76   Resp: 16   Temp: 97.8 °F (36.6 °C)   TempSrc: Oral   SpO2: 99%   Weight: 70.1 kg (154 lb 9.6 oz)   Height: 157.5 cm (62\")                  Physical Exam  Constitutional:       General: She is not in acute distress.  Neurological:      Mental Status: She is alert.        Result Review :                Assessment and Plan    Diagnoses and all orders for this visit:    1. Age-related osteoporosis without current pathological fracture (Primary)  Assessment & Plan:  Pros and cons of each of the classes of medications discussed in detail with patient.  Bisphosphonates are not indicated due to the high risk of jaw osteonecrosis.  Similarly Prolia was not considered to be the first choice.  Even though Evista is not a perfect medicine, it should be her best option to start.  We gave information about medication and generalized osteoporosis diagnosis and after visit summary.  Next DEXA scan due in 2 years    Orders:  -     raloxifene (EVISTA) 60 MG tablet; Take 1 tablet by mouth Daily.  Dispense: 90 tablet; Refill: 3        Follow Up   Return in 10 weeks (on 12/7/2023) for next scheduled follow up.  Patient was given instructions and counseling regarding her condition or for health maintenance advice. Please see specific information pulled into the AVS if appropriate.     "

## 2023-10-26 ENCOUNTER — INFUSION (OUTPATIENT)
Dept: ONCOLOGY | Facility: HOSPITAL | Age: 68
End: 2023-10-26
Payer: MEDICARE

## 2023-10-26 DIAGNOSIS — R35.0 URINARY FREQUENCY: ICD-10-CM

## 2023-10-26 DIAGNOSIS — Z45.2 ENCOUNTER FOR FITTING AND ADJUSTMENT OF VASCULAR CATHETER: Primary | ICD-10-CM

## 2023-10-26 LAB
BACTERIA UR QL AUTO: ABNORMAL /HPF
BILIRUB UR QL STRIP: ABNORMAL
CLARITY UR: CLEAR
COLOR UR: ABNORMAL
GLUCOSE UR STRIP-MCNC: ABNORMAL MG/DL
HGB UR QL STRIP.AUTO: NEGATIVE
HOLD SPECIMEN: NORMAL
HYALINE CASTS UR QL AUTO: ABNORMAL /LPF
KETONES UR QL STRIP: ABNORMAL
LEUKOCYTE ESTERASE UR QL STRIP.AUTO: ABNORMAL
NITRITE UR QL STRIP: POSITIVE
PH UR STRIP.AUTO: <=5 [PH] (ref 5–8)
PROT UR QL STRIP: ABNORMAL
RBC # UR STRIP: ABNORMAL /HPF
REF LAB TEST METHOD: ABNORMAL
SP GR UR STRIP: <=1.005 (ref 1–1.03)
SQUAMOUS #/AREA URNS HPF: ABNORMAL /HPF
UROBILINOGEN UR QL STRIP: ABNORMAL
WBC # UR STRIP: ABNORMAL /HPF

## 2023-10-26 PROCEDURE — 25010000002 HEPARIN LOCK FLUSH PER 10 UNITS: Performed by: NURSE PRACTITIONER

## 2023-10-26 PROCEDURE — 87077 CULTURE AEROBIC IDENTIFY: CPT

## 2023-10-26 PROCEDURE — 96523 IRRIG DRUG DELIVERY DEVICE: CPT

## 2023-10-26 PROCEDURE — 81001 URINALYSIS AUTO W/SCOPE: CPT

## 2023-10-26 PROCEDURE — 87186 SC STD MICRODIL/AGAR DIL: CPT

## 2023-10-26 PROCEDURE — 87086 URINE CULTURE/COLONY COUNT: CPT

## 2023-10-26 RX ORDER — SODIUM CHLORIDE 0.9 % (FLUSH) 0.9 %
10 SYRINGE (ML) INJECTION AS NEEDED
Status: DISCONTINUED | OUTPATIENT
Start: 2023-10-26 | End: 2023-10-26 | Stop reason: HOSPADM

## 2023-10-26 RX ORDER — HEPARIN SODIUM (PORCINE) LOCK FLUSH IV SOLN 100 UNIT/ML 100 UNIT/ML
500 SOLUTION INTRAVENOUS AS NEEDED
Status: DISCONTINUED | OUTPATIENT
Start: 2023-10-26 | End: 2023-10-26 | Stop reason: HOSPADM

## 2023-10-26 RX ORDER — NITROFURANTOIN 25; 75 MG/1; MG/1
100 CAPSULE ORAL 2 TIMES DAILY
Qty: 10 CAPSULE | Refills: 0 | Status: CANCELLED | OUTPATIENT
Start: 2023-10-26 | End: 2023-10-31

## 2023-10-26 RX ORDER — NITROFURANTOIN 25; 75 MG/1; MG/1
100 CAPSULE ORAL 2 TIMES DAILY
Qty: 10 CAPSULE | Refills: 0 | Status: SHIPPED | OUTPATIENT
Start: 2023-10-26 | End: 2023-10-30

## 2023-10-26 RX ADMIN — Medication 10 ML: at 13:09

## 2023-10-26 RX ADMIN — Medication 500 UNITS: at 13:09

## 2023-10-26 NOTE — NURSING NOTE
Pt arrived for port flush. Pt c/o burning/frequency with urination. Per Natalya lincoln to do urine culture. Pt v/u.

## 2023-10-28 LAB — BACTERIA SPEC AEROBE CULT: ABNORMAL

## 2023-10-30 ENCOUNTER — TELEPHONE (OUTPATIENT)
Dept: ONCOLOGY | Facility: CLINIC | Age: 68
End: 2023-10-30
Payer: MEDICARE

## 2023-10-30 RX ORDER — SULFAMETHOXAZOLE AND TRIMETHOPRIM 800; 160 MG/1; MG/1
1 TABLET ORAL 2 TIMES DAILY
Qty: 6 TABLET | Refills: 0 | Status: SHIPPED | OUTPATIENT
Start: 2023-10-30 | End: 2023-11-02

## 2023-10-30 NOTE — TELEPHONE ENCOUNTER
----- Message from DALE Orozco sent at 10/30/2023  9:57 AM EDT -----  Would you mind to let her know that her culture came back and showed an intermediate response to macrobid so I'd like her to d/c macrobid and start Bactrim DS 1 tab twice daily x 3 days.    Thank you so much!  ----- Message -----  From: Lab, Background User  Sent: 10/26/2023   1:27 PM EDT  To: DALE Orozco

## 2023-10-30 NOTE — TELEPHONE ENCOUNTER
Reviewed Natalya's note with Kayla, questions answered- voiced understanding.  New prescription escribed to pt pharmacy.

## 2023-11-20 DIAGNOSIS — C18.2 MALIGNANT NEOPLASM OF ASCENDING COLON: ICD-10-CM

## 2023-11-20 RX ORDER — GABAPENTIN 300 MG/1
CAPSULE ORAL
Qty: 90 CAPSULE | Refills: 0 | Status: SHIPPED | OUTPATIENT
Start: 2023-11-20

## 2023-12-04 ENCOUNTER — TELEPHONE (OUTPATIENT)
Dept: ONCOLOGY | Facility: CLINIC | Age: 68
End: 2023-12-04
Payer: MEDICARE

## 2023-12-11 RX ORDER — HEPARIN SODIUM (PORCINE) LOCK FLUSH IV SOLN 100 UNIT/ML 100 UNIT/ML
500 SOLUTION INTRAVENOUS AS NEEDED
OUTPATIENT
Start: 2023-12-11

## 2023-12-11 RX ORDER — SODIUM CHLORIDE 0.9 % (FLUSH) 0.9 %
10 SYRINGE (ML) INJECTION AS NEEDED
OUTPATIENT
Start: 2023-12-11

## 2023-12-12 ENCOUNTER — HOSPITAL ENCOUNTER (OUTPATIENT)
Dept: CT IMAGING | Facility: HOSPITAL | Age: 68
Discharge: HOME | End: 2023-12-12
Admitting: INTERNAL MEDICINE
Payer: MEDICARE

## 2023-12-12 ENCOUNTER — INFUSION (OUTPATIENT)
Dept: ONCOLOGY | Facility: HOSPITAL | Age: 68
End: 2023-12-12
Payer: MEDICARE

## 2023-12-12 DIAGNOSIS — C18.2 MALIGNANT NEOPLASM OF ASCENDING COLON: ICD-10-CM

## 2023-12-12 LAB
ALBUMIN SERPL-MCNC: 4.3 G/DL (ref 3.5–5.2)
ALBUMIN/GLOB SERPL: 1.6 G/DL
ALP SERPL-CCNC: 103 U/L (ref 39–117)
ALT SERPL W P-5'-P-CCNC: 18 U/L (ref 1–33)
ANION GAP SERPL CALCULATED.3IONS-SCNC: 9.8 MMOL/L (ref 5–15)
AST SERPL-CCNC: 18 U/L (ref 1–32)
BASOPHILS # BLD AUTO: 0.01 10*3/MM3 (ref 0–0.2)
BASOPHILS NFR BLD AUTO: 0.3 % (ref 0–1.5)
BILIRUB SERPL-MCNC: 0.4 MG/DL (ref 0–1.2)
BUN SERPL-MCNC: 13 MG/DL (ref 8–23)
BUN/CREAT SERPL: 19.1 (ref 7–25)
CALCIUM SPEC-SCNC: 9.4 MG/DL (ref 8.6–10.5)
CEA SERPL-MCNC: 1.42 NG/ML
CHLORIDE SERPL-SCNC: 101 MMOL/L (ref 98–107)
CO2 SERPL-SCNC: 26.2 MMOL/L (ref 22–29)
CREAT SERPL-MCNC: 0.68 MG/DL (ref 0.57–1)
DEPRECATED RDW RBC AUTO: 39.9 FL (ref 37–54)
EGFRCR SERPLBLD CKD-EPI 2021: 95.6 ML/MIN/1.73
EOSINOPHIL # BLD AUTO: 0.03 10*3/MM3 (ref 0–0.4)
EOSINOPHIL NFR BLD AUTO: 1 % (ref 0.3–6.2)
ERYTHROCYTE [DISTWIDTH] IN BLOOD BY AUTOMATED COUNT: 12 % (ref 12.3–15.4)
GLOBULIN UR ELPH-MCNC: 2.7 GM/DL
GLUCOSE SERPL-MCNC: 103 MG/DL (ref 65–99)
HCT VFR BLD AUTO: 40.6 % (ref 34–46.6)
HGB BLD-MCNC: 13.3 G/DL (ref 12–15.9)
IMM GRANULOCYTES # BLD AUTO: 0.02 10*3/MM3 (ref 0–0.05)
IMM GRANULOCYTES NFR BLD AUTO: 0.6 % (ref 0–0.5)
LYMPHOCYTES # BLD AUTO: 1.21 10*3/MM3 (ref 0.7–3.1)
LYMPHOCYTES NFR BLD AUTO: 38.7 % (ref 19.6–45.3)
MCH RBC QN AUTO: 29.9 PG (ref 26.6–33)
MCHC RBC AUTO-ENTMCNC: 32.8 G/DL (ref 31.5–35.7)
MCV RBC AUTO: 91.2 FL (ref 79–97)
MONOCYTES # BLD AUTO: 0.39 10*3/MM3 (ref 0.1–0.9)
MONOCYTES NFR BLD AUTO: 12.5 % (ref 5–12)
NEUTROPHILS NFR BLD AUTO: 1.47 10*3/MM3 (ref 1.7–7)
NEUTROPHILS NFR BLD AUTO: 46.9 % (ref 42.7–76)
NRBC BLD AUTO-RTO: 0 /100 WBC (ref 0–0.2)
PLATELET # BLD AUTO: 213 10*3/MM3 (ref 140–450)
PMV BLD AUTO: 9.4 FL (ref 6–12)
POTASSIUM SERPL-SCNC: 4.1 MMOL/L (ref 3.5–5.2)
PROT SERPL-MCNC: 7 G/DL (ref 6–8.5)
RBC # BLD AUTO: 4.45 10*6/MM3 (ref 3.77–5.28)
SODIUM SERPL-SCNC: 137 MMOL/L (ref 136–145)
WBC NRBC COR # BLD AUTO: 3.13 10*3/MM3 (ref 3.4–10.8)

## 2023-12-12 PROCEDURE — 85025 COMPLETE CBC W/AUTO DIFF WBC: CPT | Performed by: INTERNAL MEDICINE

## 2023-12-12 PROCEDURE — 74177 CT ABD & PELVIS W/CONTRAST: CPT

## 2023-12-12 PROCEDURE — 0 DIATRIZOATE MEGLUMINE & SODIUM PER 1 ML: Performed by: INTERNAL MEDICINE

## 2023-12-12 PROCEDURE — 80053 COMPREHEN METABOLIC PANEL: CPT | Performed by: INTERNAL MEDICINE

## 2023-12-12 PROCEDURE — 71260 CT THORAX DX C+: CPT

## 2023-12-12 PROCEDURE — 82378 CARCINOEMBRYONIC ANTIGEN: CPT | Performed by: INTERNAL MEDICINE

## 2023-12-12 PROCEDURE — 25510000001 IOPAMIDOL 61 % SOLUTION: Performed by: INTERNAL MEDICINE

## 2023-12-12 PROCEDURE — 36591 DRAW BLOOD OFF VENOUS DEVICE: CPT

## 2023-12-12 RX ADMIN — DIATRIZOATE MEGLUMINE AND DIATRIZOATE SODIUM 30 ML: 660; 100 LIQUID ORAL; RECTAL at 09:45

## 2023-12-12 RX ADMIN — IOPAMIDOL 90 ML: 612 INJECTION, SOLUTION INTRAVENOUS at 10:56

## 2023-12-21 ENCOUNTER — OFFICE VISIT (OUTPATIENT)
Dept: ONCOLOGY | Facility: CLINIC | Age: 68
End: 2023-12-21
Payer: MEDICARE

## 2023-12-21 VITALS
WEIGHT: 160.1 LBS | SYSTOLIC BLOOD PRESSURE: 135 MMHG | HEIGHT: 62 IN | BODY MASS INDEX: 29.46 KG/M2 | OXYGEN SATURATION: 98 % | TEMPERATURE: 97.8 F | RESPIRATION RATE: 16 BRPM | HEART RATE: 79 BPM | DIASTOLIC BLOOD PRESSURE: 80 MMHG

## 2023-12-21 DIAGNOSIS — C18.2 MALIGNANT NEOPLASM OF ASCENDING COLON: Primary | ICD-10-CM

## 2023-12-21 PROCEDURE — 3079F DIAST BP 80-89 MM HG: CPT | Performed by: INTERNAL MEDICINE

## 2023-12-21 PROCEDURE — 1126F AMNT PAIN NOTED NONE PRSNT: CPT | Performed by: INTERNAL MEDICINE

## 2023-12-21 PROCEDURE — 1159F MED LIST DOCD IN RCRD: CPT | Performed by: INTERNAL MEDICINE

## 2023-12-21 PROCEDURE — 3075F SYST BP GE 130 - 139MM HG: CPT | Performed by: INTERNAL MEDICINE

## 2023-12-21 PROCEDURE — 1160F RVW MEDS BY RX/DR IN RCRD: CPT | Performed by: INTERNAL MEDICINE

## 2023-12-21 PROCEDURE — 99214 OFFICE O/P EST MOD 30 MIN: CPT | Performed by: INTERNAL MEDICINE

## 2023-12-21 NOTE — PROGRESS NOTES
Subjective   Kayla Adkins is a 68 y.o. female.  Referred by Dr. Rivas for evaluation of leukopenia    History of Present Illness   Ms. Adkins is a 66-year-old postmenopausal  who has been referred by Dr. Rivas for evaluation of leukopenia, neutropenia.  Patient reports that she has had longstanding leukopenia and her white blood cell count normally ranges in the threes.  On her most recent visit with Dr. Rivas white blood cell count was noted to be 2560 on 3/2/2021 and 2660 on 4/5/2021.  This prompted a referral to hematology.  Patient reports that she had a extensive work-up including a bone marrow biopsy for the same condition about 17 years ago.  She thinks that this was performed at [facility].  According to patient no clear etiology was determined after the work-up.  She was recommended to continue with periodic labs.  No treatment was required.    She was seen in December and had continued on oral iron.  She was tolerating that fairly well.  However she had worsening of colitis for about 2 months prior to that visit.  She was scheduled for colonoscopy in January 2022.    She had a routine surveillance colonoscopy performed by Dr. Whitlock on 2/22/2022.  On this there was an infiltrative nonobstructing medium-sized mass in the proximal ascending colon 1 to 2 cm distal to the cecum.  This was partially circumferential involving 1 foot of the lumen.  Biopsies were taken.  There is also mildly congested mucosa in the entire colon.  Random biopsies were taken.  She also had an EGD at the same time which reported normal esophagus, stomach and duodenum.  Pathology evaluation from the biopsy reported superficial fragments of at least intramucosal carcinoma.  Random colon biopsies reported increased lamina propria, chronic inflammation with surface epithelial alteration and increased intraepithelial lymphocytes consistent with lymphocytic colitis.  Biopsies from the stomach reported chronic  inactive gastritis.  Negative for intestinal metaplasia and H. pylori.  Biopsies from the duodenum was negative.    She was seen by Dr. Wellington who performed a laparoscopic right hemicolectomy on 3/2/2022.  Pathology showed a 3.8 cm mass at the cecum invading through the muscularis propria into the pericolorectal adipose tissue.  No lymphovascular or perineural invasion.  All margins were clear.  The radial margin was 3 cm.  3 out of 26 lymph nodes were positive for metastatic disease.  The largest metastatic focus was 0.7 cm with extranodal extension.  Pathological stage PT3N1B.  The tumor was moderately differentiated, grade 2 adenocarcinoma.    No loss of nuclear expression of MMR proteins.  Low probability of microsatellite instability.  MSI status-microsatellite stable.  This is by PCR.    Normal CEA and 0.77 on the day prior to surgery.    She was seen by Dr. Burton as inpatient and recommended adjuvant chemotherapy with FOLFOX.    Patient initiating cycle 1 FOLFOX 4/18/2022.  Neulasta support given due to baseline neutropenia.    9/27/2020 to cycle 12 FOLFOX (oxaliplatin omitted due to neuropathy).    12/12/2023-CT of the chest abdomen and pelvis-no evidence of metastatic disease.        Interval history:  Kayla returns today for 3-month follow-up.  Neuropathy is persistent.  CEA level within normal limits.  She reports no other new complaints.  ANC slightly low today but this is baseline.        The following portions of the patient's history were reviewed and updated as appropriate: allergies, current medications, past family history, past medical history, past social history and problem list.    Past Medical History:   Diagnosis Date    Anemia     Anxiety     Bladder infection     Colitis     Colon cancer     Drug therapy     Elevated LFTs 12/05/2022    GERD (gastroesophageal reflux disease)     Headache     Hyperlipidemia     Hypertension     Knee injury, initial encounter-left 07/01/2019    Neutropenia  2017    Prediabetes 2019      Past Surgical History:   Procedure Laterality Date    COLON RESECTION Right 2022    Procedure: LAPAROSCOPIC RIGHT HEMICOLECTOMY WITH DAVINCI ROBOT;  Surgeon: Malcolm Wellington MD;  Location: [facility] MAIN OR;  Service: Robotics - DaVinci;  Laterality: Right;    COLONOSCOPY      COLONOSCOPY N/A 3/9/2023    Procedure: COLONOSCOPY TO ANASTAMOSIS WITH COLD BIOPSIES;  Surgeon: Irvin Clayton MD;  Location: [facility] ENDOSCOPY;  Service: Gastroenterology;  Laterality: N/A;  PRE- HISTORY OF COLON CANCER  POST- NORMAL    COLONOSCOPY W/ POLYPECTOMY N/A 2022    Procedure: COLONOSCOPY INTO CECUM WITH COLD BIOPSIES;  Surgeon: Irvin Clayton MD;  Location: [facility] ENDOSCOPY;  Service: Gastroenterology;  Laterality: N/A;  PRE: DIARRHEA, ANEMIA  POST: ASCENDING COLON MASS    ENDOSCOPY N/A 2022    Procedure: ESOPHAGOGASTRODUODENOSCOPY WITH BIOPSY;  Surgeon: Irvin Clayton MD;  Location: [facility] ENDOSCOPY;  Service: Gastroenterology;  Laterality: N/A;  PRE: ANEMIA  POST: NORMAL EGD    TONSILLECTOMY      VENOUS ACCESS DEVICE (PORT) INSERTION N/A 2022    Procedure: INSERTION VENOUS ACCESS DEVICE with subcutaneous port;  Surgeon: Malcolm Wellington MD;  [place] [facility] [place] [facility] [place]  Service: General;  Laterality: N/A;        Family History   Problem Relation Age of Onset    Hypertension Mother     Heart disease Father     Heart attack Father 47        first MI age 47; second MI 57    Hypertension Sister     No Known Problems Brother     Malig Hyperthermia Neg Hx       Social History     Socioeconomic History    Marital status:     Number of children: 2   Tobacco Use    Smoking status: Never    Smokeless tobacco: Never   Vaping Use    Vaping Use: Never used   Substance and Sexual Activity    Alcohol use: Not Currently     Comment: Very rarely    Drug use: Never    Sexual activity: Yes     Partners: Male      OB History          2  "   Para   2    Term   2            AB        Living             SAB        IAB        Ectopic        Molar        Multiple        Live Births   2                 Allergies   Allergen Reactions    Penicillins Rash      Review of systems as mentioned HPI otherwise negative    Objective   Blood pressure 135/80, pulse 79, temperature 97.8 °F (36.6 °C), temperature source Temporal, resp. rate 16, height 157.5 cm (62.01\"), weight 72.6 kg (160 lb 1.6 oz), SpO2 98%.     Physical Exam  Vitals reviewed.   Constitutional:       General: She is not in acute distress.     Appearance: She is well-developed.   HENT:      Nose: Nose normal.      Mouth/Throat:      Mouth: Mucous membranes are moist.      Pharynx: Oropharynx is clear.   Eyes:      Conjunctiva/sclera: Conjunctivae normal.      Pupils: Pupils are equal, round, and reactive to light.   Cardiovascular:      Rate and Rhythm: Normal rate.   Pulmonary:      Effort: Pulmonary effort is normal. No respiratory distress.   Abdominal:      General: Bowel sounds are normal.   Skin:     General: Skin is warm and dry.      Findings: No rash.   Neurological:      General: No focal deficit present.      Mental Status: She is alert and oriented to person, place, and time. Mental status is at baseline.      Motor: No weakness.   Psychiatric:         Mood and Affect: Mood normal.         Behavior: Behavior normal.         Thought Content: Thought content normal.         Judgment: Judgment normal.       I have reexamined the patient and the results are consistent with the previously documented exam. Mago Alvarez MD      DIAGNOSTIC DATA:                    3/16/2023:  CBC- WBC 3.30, ANC 1.90, hemoglobin 13.1, platelets 193,000.    2023: WBC 3.41, ANC 1.49, hemoglobin 12.6, and platelets 193,000.  CEA level is normal at 1.64.  CMP was normal except for slightly elevated glucose that she is nonfasting.    CT Chest With Contrast Diagnostic    Result Date: 2023  Stable " incidental findings as above, without evidence of acute or suspicious process seen in the chest, abdomen, or pelvis. Normal size spleen.   This report was finalized on 12/12/2023 4:49 PM by Dr. Amarijt Andrade M.D on Workstation: [id number]      CT Abdomen Pelvis With Contrast    Result Date: 12/12/2023  Stable incidental findings as above, without evidence of acute or suspicious process seen in the chest, abdomen, or pelvis. Normal size spleen.   This report was finalized on 12/12/2023 4:49 PM by Dr. Amarjit Andrade M.D on Workstation: [id number]        CT of the chest abdomen and pelvis 12/12/2023-images independently reviewed and interpreted by me in detail summarized in the HPI.    Assessment & Plan      66-year-old postmenopausal  lady with longstanding leukopenia/neutropenia and anemia and recently diagnosed T3N1A adenocarcinoma of the colon    *[id number] adenocarcinoma of the ascending colon  The tumor measures 3.8 cm located in the cecum, invades through the muscularis propria into the pericolorectal adipose tissue.  No lymphovascular or perineural invasion.  All the margins are negative.  Tumor comes to within 3 cm of the radial margin.  3 out of 26 lymph nodes positive for metastatic carcinoma with the largest focus measuring 0.7 cm without extranodal extension.  No tumor deposits identified  PT3N1B  MSI low probability on immunohistochemistry and stable on PCR  No family history of colon cancer  Explained to her that this is stage IIIb colon cancer however since his T3N1 I would consider this low risk stage III.  There is no other high risk features noted on pathology either.  Given that the tumor is stage III there is definite benefit from adjuvant chemotherapy.  We discussed the 2 available regimens including XELOX and FOLFOX.  Explained to her that based on the IDEA data 3 months of XELOX is noninferior to 6 months of adjuvant chemotherapy however with FOLFOX the noninferiority has not been  established.  Explained to her that 6 months of chemotherapy improves disease-free survival by additional 2 to 3% over 3 months.  Discussed the adverse effects of XELOX as well as FOLFOX.  After discussing the pros and cons we have decided to proceed with FOLFOX for total of 6 months but if she is unable to tolerate due to neurotoxicity then we could potentially stop after 3 months of treatment.  Port placed on 4/11/2022  Cycle 1 FOLFOX on 4/18/2022  Cycle 11 FOLFOX 9/13/2022 with a 20% dose reduction of oxaliplatin.  Now experiencing neuropathy, we will discontinue oxaliplatin and just to 5-FU for cycle 12  Seen 10/14/2022 in follow-up and scan review.  CT scan of the chest, abdomen, and pelvis done 10/12/2022 showing a few tiny new lung nodules indeterminant, measuring about 3 to 4 mm in maximum size.  Patient also has enlarging spleen previously 11.3 cm, now measuring 13.6 cm.    12/14/2022-CT of the chest abdomen pelvis shows stable pulmonary nodules.  Screening colonoscopy 3/9/2023-negative.  6/1/2023-CT of the chest abdomen and pelvis-no evidence of metastatic disease.  She will continue with every 3 monthly tumor marker and lab surveillance along with every 6 months scans.  9/7/2023: Patient continues to remain free of any recurrent symptoms.  Tumor markers today is normal at 1.64.  She is already scheduled for CT scans and 3 months for continued surveillance.  12/12/2023-CT scans without any evidence of metastatic disease, CEA normal  Continue surveillance    *Leukopenia  Predominantly neutropenia  Mild increase in monocyte count  This is chronic and unchanged  No increased infections  Had a previous work-up including a bone marrow biopsy with no clear etiology  Differential includes nutritional, chronic idiopathic, autoimmune.  She is currently not on any medications which could account for the leukopenia.  No splenomegaly on exam  5/13/2021-flow cytometry negative  LDH normal at 174  Reticulocyte count  1.07%  Ferritin low at 5.7  Iron saturation low at 3%  Folic acid normal at 11.8, B12 415  Rheumatoid factor negative  AVIVA negative  ESR normal at 29  Serum copper normal at 134  12/12/2023-CBC reviewed and WBC 3.13, hemoglobin 13.3 and platelets 213,000, absolute neutrophil count 1.47    *Anemia  Microcytic  Likely secondary to iron deficiency  4/22/2021 iron saturation 3%, TIBC today 508, ferritin 5.70.  The patient was initiated on oral iron every other day.  8/26/2021, hemoglobin today 10.1.  The patient has been tolerating oral iron every other day.  We will increase oral iron to daily and recheck iron labs in 4 months.  12/16/2021, patient is taking oral iron every day and tolerating this well.  Hemoglobin today 10.0.  Iron saturation 45, TIBC 413, ferritin 10.20.  3/4/2022 ferritin 27.5, iron saturation 5%, TIBC 375, iron studies consistent with iron deficiency  3/24/2022 hemoglobin 10.2  Received 3 doses of Venofer.  9/15/2022-hemoglobin 9.7, low due to chemotherapy  As of 10/14/2022 hemoglobin improving now up to 11.3.  6/1/2023 Labs reviewed and hemoglobin normal at 13.4.  Iron studies reviewed and iron saturation 30%, TIBC 325, ferritin normal at 73  Patient is currently taking iron tablets twice a week.  She can discontinue this and continue with healthy diet including iron rich food.  9/7/2023: Hemoglobin is normal at 12.6.  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  She has been on gabapentin 300 mg nightly at bedtime however her symptoms have not improved.  Recommend that she start gabapentin continue with B complex only.  Neuropathy is likely permanent at this point.  No  changes    *Elevated alkaline phosphatase  Alkaline phosphatase has been elevated into the 200s and 300s in the past.  Labs from 6/1/2023 with improved alkaline phosphatase 143.  This improvement is likely secondary to weight loss and improvement in fatty liver changes.    *Utility of circulating tumor DNA  Explained the role of circulating tumor DNA and assessing the risk of relapse since you have been treated with adjuvant chemotherapy.  Explained to her that currently we do not have any evidence that detecting CT DNA early would improve outcomes.  Currently not recommended by any of the guidelines.  However if patient wishes to proceed with obtaining CT DNA we would be able to use [employer] to do so.  After discussing the pros and cons patient and her  decided not to proceed with CT DNA testing today.  Continue surveillance with CT scans and colonoscopy.  12/12/2023-CEA is normal    PLAN:   Port flush in 6 weeks.  APRN with labs in 3 months  CT scans in 6 months  MD follow-up in 6 months      Mago Alvarez MD  12/21/23

## 2024-02-01 ENCOUNTER — INFUSION (OUTPATIENT)
Dept: ONCOLOGY | Facility: HOSPITAL | Age: 69
End: 2024-02-01
Payer: COMMERCIAL

## 2024-02-01 DIAGNOSIS — Z45.2 ENCOUNTER FOR FITTING AND ADJUSTMENT OF VASCULAR CATHETER: Primary | ICD-10-CM

## 2024-02-01 PROCEDURE — 96523 IRRIG DRUG DELIVERY DEVICE: CPT

## 2024-02-01 PROCEDURE — 25010000002 HEPARIN LOCK FLUSH PER 10 UNITS: Performed by: INTERNAL MEDICINE

## 2024-02-01 RX ORDER — SODIUM CHLORIDE 0.9 % (FLUSH) 0.9 %
10 SYRINGE (ML) INJECTION AS NEEDED
Status: DISCONTINUED | OUTPATIENT
Start: 2024-02-01 | End: 2024-02-01 | Stop reason: HOSPADM

## 2024-02-01 RX ORDER — SODIUM CHLORIDE 0.9 % (FLUSH) 0.9 %
10 SYRINGE (ML) INJECTION AS NEEDED
OUTPATIENT
Start: 2024-02-01

## 2024-02-01 RX ORDER — HEPARIN SODIUM (PORCINE) LOCK FLUSH IV SOLN 100 UNIT/ML 100 UNIT/ML
500 SOLUTION INTRAVENOUS AS NEEDED
Status: DISCONTINUED | OUTPATIENT
Start: 2024-02-01 | End: 2024-02-01 | Stop reason: HOSPADM

## 2024-02-01 RX ORDER — HEPARIN SODIUM (PORCINE) LOCK FLUSH IV SOLN 100 UNIT/ML 100 UNIT/ML
500 SOLUTION INTRAVENOUS AS NEEDED
OUTPATIENT
Start: 2024-02-01

## 2024-02-01 RX ADMIN — Medication 500 UNITS: at 12:37

## 2024-02-01 RX ADMIN — Medication 10 ML: at 12:37

## 2024-02-08 ENCOUNTER — OFFICE VISIT (OUTPATIENT)
Dept: FAMILY MEDICINE CLINIC | Facility: CLINIC | Age: 69
End: 2024-02-08
Payer: COMMERCIAL

## 2024-02-08 VITALS
SYSTOLIC BLOOD PRESSURE: 120 MMHG | HEART RATE: 91 BPM | BODY MASS INDEX: 30.66 KG/M2 | OXYGEN SATURATION: 97 % | DIASTOLIC BLOOD PRESSURE: 68 MMHG | WEIGHT: 166.6 LBS | HEIGHT: 62 IN | RESPIRATION RATE: 16 BRPM | TEMPERATURE: 98.1 F

## 2024-02-08 DIAGNOSIS — R05.8 PRODUCTIVE COUGH: ICD-10-CM

## 2024-02-08 DIAGNOSIS — J01.40 ACUTE PANSINUSITIS, RECURRENCE NOT SPECIFIED: Primary | ICD-10-CM

## 2024-02-08 LAB
EXPIRATION DATE: NORMAL
FLUAV AG UPPER RESP QL IA.RAPID: NOT DETECTED
FLUBV AG UPPER RESP QL IA.RAPID: NOT DETECTED
INTERNAL CONTROL: NORMAL
Lab: NORMAL
SARS-COV-2 AG UPPER RESP QL IA.RAPID: NOT DETECTED

## 2024-02-08 PROCEDURE — 87428 SARSCOV & INF VIR A&B AG IA: CPT

## 2024-02-08 PROCEDURE — 99213 OFFICE O/P EST LOW 20 MIN: CPT

## 2024-02-08 PROCEDURE — 3074F SYST BP LT 130 MM HG: CPT

## 2024-02-08 PROCEDURE — 1159F MED LIST DOCD IN RCRD: CPT

## 2024-02-08 PROCEDURE — 3078F DIAST BP <80 MM HG: CPT

## 2024-02-08 PROCEDURE — 1160F RVW MEDS BY RX/DR IN RCRD: CPT

## 2024-02-08 RX ORDER — LEVOFLOXACIN 500 MG/1
500 TABLET, FILM COATED ORAL DAILY
Qty: 7 TABLET | Refills: 0 | Status: SHIPPED | OUTPATIENT
Start: 2024-02-08

## 2024-02-08 RX ORDER — BENZONATATE 100 MG/1
100 CAPSULE ORAL 3 TIMES DAILY PRN
Qty: 30 CAPSULE | Refills: 1 | Status: SHIPPED | OUTPATIENT
Start: 2024-02-08

## 2024-02-08 NOTE — PROGRESS NOTES
Chief Complaint  Cough, Sinusitis, and Hoarse    Subjective          Cough  Associated symptoms include headaches, postnasal drip, rhinorrhea and a sore throat. Pertinent negatives include no chest pain, chills, ear pain, fever, rash, shortness of breath or wheezing.   Sinusitis  Associated symptoms include congestion, coughing (productive, yellow sputum), headaches, a hoarse voice, sinus pressure and a sore throat. Pertinent negatives include no chills, ear pain or shortness of breath.   Hoarse  Associated symptoms include congestion, coughing (productive, yellow sputum), headaches and a sore throat. Pertinent negatives include no abdominal pain, chest pain, chills, fatigue, fever, nausea, rash or vomiting.       Kayla THOMAS Hollister 68 y.o. female presents for evaluation of sinus pressure and congestion, sore throat, cough. Symptoms include congestion, sneezing, sore throat, post nasal drip, cough described as productive of yellow sputum, headache, sinus pain, runny nose, and hoarseness.  Onset of symptoms was 4 days ago, gradually worsening since that time. Patient denies shortness of breath, wheezing, pleuritic chest pain, fever, dyspnea on exertion, ear drainage, eye discharge, diarrhea, vomiting, vertigo, high fever.  Evaluation to date: none.  Treatment to date: Mucinex.       Review of Systems   Constitutional:  Negative for chills, fatigue and fever.   HENT:  Positive for congestion, hoarse voice, postnasal drip, rhinorrhea, sinus pressure, sore throat and voice change (hoarseness). Negative for ear pain and trouble swallowing.    Eyes:  Negative for visual disturbance.   Respiratory:  Positive for cough (productive, yellow sputum). Negative for chest tightness, shortness of breath and wheezing.    Cardiovascular:  Negative for chest pain and palpitations.   Gastrointestinal:  Negative for abdominal pain, constipation, diarrhea, nausea and vomiting.   Genitourinary:  Negative for dysuria, frequency and  "urgency.   Musculoskeletal:  Negative for back pain.   Skin:  Negative for rash.   Neurological:  Negative for dizziness, syncope and light-headedness.   Psychiatric/Behavioral:  Negative for behavioral problems and sleep disturbance.         Objective   Vital Signs:   /68   Pulse 91   Temp 98.1 °F (36.7 °C) (Temporal)   Resp 16   Ht 157.5 cm (62\")   Wt 75.6 kg (166 lb 9.6 oz)   SpO2 97%   BMI 30.47 kg/m²      BMI is >= 30 and <35. (Class 1 Obesity). The following options were offered after discussion;: exercise counseling/recommendations and nutrition counseling/recommendations       Physical Exam  Vitals and nursing note reviewed.   Constitutional:       General: She is not in acute distress.     Appearance: She is well-developed. She is obese. She is not toxic-appearing or diaphoretic.   HENT:      Head: Normocephalic and atraumatic. Hair is normal.      Right Ear: External ear normal. No drainage, swelling or tenderness.      Left Ear: External ear normal. No drainage, swelling or tenderness.      Nose: Congestion and rhinorrhea present. No mucosal edema.      Right Turbinates: Not enlarged or swollen.      Left Turbinates: Not enlarged or swollen.      Right Sinus: Maxillary sinus tenderness and frontal sinus tenderness present.      Left Sinus: Maxillary sinus tenderness and frontal sinus tenderness present.      Mouth/Throat:      Mouth: Mucous membranes are moist. No oral lesions.      Pharynx: Uvula midline. Posterior oropharyngeal erythema present. No pharyngeal swelling, oropharyngeal exudate or uvula swelling.      Tonsils: No tonsillar exudate or tonsillar abscesses.   Eyes:      General: No scleral icterus.        Right eye: No discharge.         Left eye: No discharge.      Conjunctiva/sclera: Conjunctivae normal.      Pupils: Pupils are equal, round, and reactive to light.   Cardiovascular:      Rate and Rhythm: Normal rate and regular rhythm.      Heart sounds: Normal heart sounds. No " murmur heard.     No gallop.   Pulmonary:      Effort: No respiratory distress.      Breath sounds: Normal breath sounds. No stridor or decreased air movement. No decreased breath sounds, wheezing, rhonchi or rales.   Chest:      Chest wall: No tenderness.   Abdominal:      Palpations: Abdomen is soft.      Tenderness: There is no abdominal tenderness.   Musculoskeletal:      Cervical back: Normal range of motion and neck supple.   Lymphadenopathy:      Cervical: No cervical adenopathy.   Skin:     General: Skin is warm and dry.      Findings: No rash.   Neurological:      Mental Status: She is alert and oriented to person, place, and time.      Motor: No abnormal muscle tone.   Psychiatric:         Behavior: Behavior normal.         Thought Content: Thought content normal.         Judgment: Judgment normal.          The following data was reviewed by: DALE De Jesus on 02/08/2024:  POCT SARS-CoV-2 Antigen BAIRON + Flu (02/08/2024 11:40)              Assessment and Plan      Diagnoses and all orders for this visit:    1. Acute pansinusitis, recurrence not specified (Primary)  -     levoFLOXacin (Levaquin) 500 MG tablet; Take 1 tablet by mouth Daily.  Dispense: 7 tablet; Refill: 0  -     benzonatate (Tessalon Perles) 100 MG capsule; Take 1 capsule by mouth 3 (Three) Times a Day As Needed for Cough.  Dispense: 30 capsule; Refill: 1    2. Productive cough  -     POCT SARS-CoV-2 Antigen BAIRON + Flu  -     benzonatate (Tessalon Perles) 100 MG capsule; Take 1 capsule by mouth 3 (Three) Times a Day As Needed for Cough.  Dispense: 30 capsule; Refill: 1            Follow Up     Return if symptoms worsen or fail to improve.    Patient was given instructions and counseling regarding her condition or for health maintenance advice. Please see specific information pulled into the AVS if appropriate.     -Take medication as prescribed.  -Covid and Influenza testing are negative today.  -Monitor for fever and take Tylenol as  needed.  Drink plenty of fluids and get plenty of rest.  -Use cool-mist humidifier as needed.  -Seek immediate medical attention for fever unrelieved by Tylenol, chest pain, shortness of breath, decreased oxygen saturations, sharp pain with breathing, or any other worsening signs or symptoms.  -Return to the office is symptoms worsen or do not improve.

## 2024-02-22 DIAGNOSIS — F41.9 ANXIETY: ICD-10-CM

## 2024-02-22 DIAGNOSIS — E78.49 OTHER HYPERLIPIDEMIA: ICD-10-CM

## 2024-02-22 DIAGNOSIS — C18.2 MALIGNANT NEOPLASM OF ASCENDING COLON: ICD-10-CM

## 2024-02-22 DIAGNOSIS — I10 ESSENTIAL HYPERTENSION: ICD-10-CM

## 2024-02-22 DIAGNOSIS — K21.9 GASTROESOPHAGEAL REFLUX DISEASE WITHOUT ESOPHAGITIS: ICD-10-CM

## 2024-02-22 DIAGNOSIS — M81.0 AGE-RELATED OSTEOPOROSIS WITHOUT CURRENT PATHOLOGICAL FRACTURE: ICD-10-CM

## 2024-02-22 NOTE — TELEPHONE ENCOUNTER
Caller: Alonso Adkins    Relationship: Emergency Contact    Best call back number: [phone]     Requested Prescriptions:   Requested Prescriptions     Pending Prescriptions Disp Refills    omeprazole (priLOSEC) 20 MG capsule 90 capsule 2     Sig: Take 1 capsule by mouth Every Night for 270 days.    raloxifene (EVISTA) 60 MG tablet 90 tablet 3     Sig: Take 1 tablet by mouth Daily.    DULoxetine (CYMBALTA) 60 MG capsule 90 capsule 2     Sig: Take 1 capsule by mouth Every Night for 270 days.    gabapentin (NEURONTIN) 300 MG capsule 90 capsule 0     Sig: TAKE ONE CAPSULE BY MOUTH THREE TIMES A DAY    metoprolol succinate XL (TOPROL-XL) 50 MG 24 hr tablet 90 tablet 2     Sig: Take 1 tablet by mouth Every Night for 270 days.    atorvastatin (LIPITOR) 10 MG tablet 90 tablet 2     Sig: Take 1 tablet by mouth Every Night for 270 days.        Pharmacy where request should be sent: [facility]/PHARMACY #[id number] [facility] [id number] [facility]. AT [facility] [phone] [facility] [phone] FX     Last office visit with prescribing clinician: 9/28/2023   Last telemedicine visit with prescribing clinician: Visit date not found   Next office visit with prescribing clinician: 5/6/2024     Additional details provided by patient: PATIENT HAS MORE THAN A 3 DAY SUPPLY OF MEDICATION    Does the patient have less than a 3 day supply:  [] Yes  [x] No    Would you like a call back once the refill request has been completed: [] Yes [x] No    If the office needs to give you a call back, can they leave a voicemail: [] Yes [x] No    Herve Parkinson Rep   02/22/24 14:18 EST

## 2024-02-29 RX ORDER — OMEPRAZOLE 20 MG/1
20 CAPSULE, DELAYED RELEASE ORAL NIGHTLY
Qty: 90 CAPSULE | Refills: 0 | Status: SHIPPED | OUTPATIENT
Start: 2024-02-29 | End: 2024-11-25

## 2024-02-29 RX ORDER — ATORVASTATIN CALCIUM 10 MG/1
10 TABLET, FILM COATED ORAL NIGHTLY
Qty: 90 TABLET | Refills: 0 | Status: SHIPPED | OUTPATIENT
Start: 2024-02-29 | End: 2024-11-25

## 2024-02-29 RX ORDER — DULOXETIN HYDROCHLORIDE 60 MG/1
60 CAPSULE, DELAYED RELEASE ORAL NIGHTLY
Qty: 90 CAPSULE | Refills: 0 | Status: SHIPPED | OUTPATIENT
Start: 2024-02-29 | End: 2024-11-25

## 2024-02-29 RX ORDER — RALOXIFENE HYDROCHLORIDE 60 MG/1
60 TABLET, FILM COATED ORAL DAILY
Qty: 90 TABLET | Refills: 0 | Status: SHIPPED | OUTPATIENT
Start: 2024-02-29 | End: 2025-02-28

## 2024-02-29 RX ORDER — GABAPENTIN 300 MG/1
CAPSULE ORAL
Qty: 90 CAPSULE | Refills: 0 | Status: SHIPPED | OUTPATIENT
Start: 2024-02-29

## 2024-02-29 RX ORDER — METOPROLOL SUCCINATE 50 MG/1
50 TABLET, EXTENDED RELEASE ORAL NIGHTLY
Qty: 90 TABLET | Refills: 0 | Status: SHIPPED | OUTPATIENT
Start: 2024-02-29 | End: 2024-11-25

## 2024-02-29 NOTE — TELEPHONE ENCOUNTER
Caller: Alonso Adkins    Relationship: Emergency Contact    Best call back number: [phone]     What was the call regarding: PATIENT'S  CALLED TO CHECK STATUS OF REFILL REQUESTS AND TO MAKE SURE THEY WERE SENT TO NEW PHARMACY AT [facility]. PLEASE CALL TO FOLLOW UP AND CONFIRM

## 2024-03-12 NOTE — PROGRESS NOTES
Subjective   Kayla Adkins is a 68 y.o. female.  Referred by Dr. Rivas for evaluation of leukopenia    History of Present Illness   Ms. Adkins is a 66-year-old postmenopausal  who has been referred by Dr. Rivas for evaluation of leukopenia, neutropenia.  Patient reports that she has had longstanding leukopenia and her white blood cell count normally ranges in the threes.  On her most recent visit with Dr. Rivas white blood cell count was noted to be 2560 on 3/2/2021 and 2660 on 4/5/2021.  This prompted a referral to hematology.  Patient reports that she had a extensive work-up including a bone marrow biopsy for the same condition about 17 years ago.  She thinks that this was performed at [facility].  According to patient no clear etiology was determined after the work-up.  She was recommended to continue with periodic labs.  No treatment was required.    She was seen in December and had continued on oral iron.  She was tolerating that fairly well.  However she had worsening of colitis for about 2 months prior to that visit.  She was scheduled for colonoscopy in January 2022.    She had a routine surveillance colonoscopy performed by Dr. Whitlock on 2/22/2022.  On this there was an infiltrative nonobstructing medium-sized mass in the proximal ascending colon 1 to 2 cm distal to the cecum.  This was partially circumferential involving 1 foot of the lumen.  Biopsies were taken.  There is also mildly congested mucosa in the entire colon.  Random biopsies were taken.  She also had an EGD at the same time which reported normal esophagus, stomach and duodenum.  Pathology evaluation from the biopsy reported superficial fragments of at least intramucosal carcinoma.  Random colon biopsies reported increased lamina propria, chronic inflammation with surface epithelial alteration and increased intraepithelial lymphocytes consistent with lymphocytic colitis.  Biopsies from the stomach reported chronic  inactive gastritis.  Negative for intestinal metaplasia and H. pylori.  Biopsies from the duodenum was negative.    She was seen by Dr. Wellington who performed a laparoscopic right hemicolectomy on 3/2/2022.  Pathology showed a 3.8 cm mass at the cecum invading through the muscularis propria into the pericolorectal adipose tissue.  No lymphovascular or perineural invasion.  All margins were clear.  The radial margin was 3 cm.  3 out of 26 lymph nodes were positive for metastatic disease.  The largest metastatic focus was 0.7 cm with extranodal extension.  Pathological stage PT3N1B.  The tumor was moderately differentiated, grade 2 adenocarcinoma.    No loss of nuclear expression of MMR proteins.  Low probability of microsatellite instability.  MSI status-microsatellite stable.  This is by PCR.    Normal CEA and 0.77 on the day prior to surgery.    She was seen by Dr. Burton as inpatient and recommended adjuvant chemotherapy with FOLFOX.    Patient initiating cycle 1 FOLFOX 4/18/2022.  Neulasta support given due to baseline neutropenia.    9/27/2020 to cycle 12 FOLFOX (oxaliplatin omitted due to neuropathy).    12/12/2023-CT of the chest abdomen and pelvis-no evidence of metastatic disease.        Interval history:  Kayla returns today for 3-month follow-up.  She is seen today with her  present.  Does report having a viral respiratory illness about 1 month ago.  Otherwise she has been feeling well without any new concerns.  Denies fever, chills, or signs of infection.    The following portions of the patient's history were reviewed and updated as appropriate: allergies, current medications, past family history, past medical history, past social history and problem list.    Past Medical History:   Diagnosis Date    Anemia     Anxiety     Bladder infection     Chemotherapy induced nausea and vomiting 04/25/2022    Colitis     Colon cancer     Colonic mass 02/24/2022    Drug therapy     Elevated LFTs 12/05/2022    GERD  (gastroesophageal reflux disease)     Headache     Hyperlipidemia     Hypertension     Knee injury, initial encounter-left 07/01/2019    Neutropenia 01/03/2017    Prediabetes 03/04/2019      Past Surgical History:   Procedure Laterality Date    COLON RESECTION Right 03/02/2022    Procedure: LAPAROSCOPIC RIGHT HEMICOLECTOMY WITH DAVINCI ROBOT;  Surgeon: Malcolm Wellington MD;  Location: [facility] MAIN OR;  Service: Robotics - DaVinci;  Laterality: Right;    COLONOSCOPY      COLONOSCOPY N/A 3/9/2023    Procedure: COLONOSCOPY TO ANASTAMOSIS WITH COLD BIOPSIES;  Surgeon: Irvin Clayton MD;  Location: [facility] ENDOSCOPY;  Service: Gastroenterology;  Laterality: N/A;  PRE- HISTORY OF COLON CANCER  POST- NORMAL    COLONOSCOPY W/ POLYPECTOMY N/A 02/22/2022    Procedure: COLONOSCOPY INTO CECUM WITH COLD BIOPSIES;  Surgeon: Irvin Clayton MD;  Location: [facility] ENDOSCOPY;  Service: Gastroenterology;  Laterality: N/A;  PRE: DIARRHEA, ANEMIA  POST: ASCENDING COLON MASS    ENDOSCOPY N/A 02/22/2022    Procedure: ESOPHAGOGASTRODUODENOSCOPY WITH BIOPSY;  Surgeon: Irvin Clayton MD;  Location: [facility] ENDOSCOPY;  Service: Gastroenterology;  Laterality: N/A;  PRE: ANEMIA  POST: NORMAL EGD    TONSILLECTOMY      VENOUS ACCESS DEVICE (PORT) INSERTION N/A 04/11/2022    Procedure: INSERTION VENOUS ACCESS DEVICE with subcutaneous port;  Surgeon: Malcolm Wellington MD;  Location: [facility] OR [facility];  Service: General;  Laterality: N/A;        Family History   Problem Relation Age of Onset    Hypertension Mother     Heart disease Father     Heart attack Father 47        first MI age 47; second MI 57    Hypertension Sister     No Known Problems Brother     Malig Hyperthermia Neg Hx       Social History     Socioeconomic History    Marital status:     Number of children: 2   Tobacco Use    Smoking status: Never    Smokeless tobacco: Never   Vaping Use    Vaping status: Never Used   Substance and Sexual Activity  "   Alcohol use: Not Currently     Comment: Very rarely    Drug use: Never    Sexual activity: Yes     Partners: Male      OB History          2    Para   2    Term   2            AB        Living             SAB        IAB        Ectopic        Molar        Multiple        Live Births   2                 Allergies   Allergen Reactions    Penicillins Rash      Review of systems as mentioned HPI otherwise negative    Objective   Blood pressure 121/78, pulse 92, temperature 98 °F (36.7 °C), temperature source Temporal, resp. rate 16, height 157 cm (61.81\"), weight 75.6 kg (166 lb 9.6 oz), SpO2 96%.     Physical Exam  Vitals reviewed.   Constitutional:       General: She is not in acute distress.     Appearance: She is well-developed.   HENT:      Nose: Nose normal.      Mouth/Throat:      Mouth: Mucous membranes are moist.      Pharynx: Oropharynx is clear.   Eyes:      Conjunctiva/sclera: Conjunctivae normal.      Pupils: Pupils are equal, round, and reactive to light.   Cardiovascular:      Rate and Rhythm: Normal rate.   Pulmonary:      Effort: Pulmonary effort is normal. No respiratory distress.   Abdominal:      General: Bowel sounds are normal.   Skin:     General: Skin is warm and dry.      Findings: No rash.   Neurological:      General: No focal deficit present.      Mental Status: She is alert and oriented to person, place, and time. Mental status is at baseline.      Motor: No weakness.   Psychiatric:         Mood and Affect: Mood normal.         Behavior: Behavior normal.         Thought Content: Thought content normal.         Judgment: Judgment normal.       I have reexamined the patient and the results are consistent with the previously documented exam. DALE Orozco      DIAGNOSTIC DATA:  Results from last 7 days   Lab Units 24  1312   WBC 10*3/mm3 2.77*   NEUTROS ABS 10*3/mm3 0.85*   HEMOGLOBIN g/dL 13.2   HEMATOCRIT % 40.1   PLATELETS 10*3/mm3 249       Results from last  " days   Lab Units 03/14/24  1312   SODIUM mmol/L 141   POTASSIUM mmol/L 3.8   CHLORIDE mmol/L 106   CO2 mmol/L 26.1   BUN mg/dL 12   CREATININE mg/dL 0.78   CALCIUM mg/dL 9.4   ALBUMIN g/dL 4.3   BILIRUBIN mg/dL 0.2   ALK PHOS U/L 98   ALT (SGPT) U/L 14   AST (SGOT) U/L 15   GLUCOSE mg/dL 111*               No radiology results for the last 30 days.     CT of the chest abdomen and pelvis 12/12/2023-images independently reviewed and interpreted by Dr. Alvarez in detail summarized in the HPI.    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  No lymphovascular or perineural invasion.  All the margins are negative.  Tumor comes to within 3 cm of the radial margin.  3 out of 26 lymph nodes positive for metastatic carcinoma with the largest focus measuring 0.7 cm without extranodal extension.  No tumor deposits identified  PT3N1B  MSI low probability on immunohistochemistry and stable on PCR  No family history of colon cancer  Explained to her that this is stage IIIb colon cancer however since his T3N1 I would consider this low risk stage III.  There is no other high risk features noted on pathology either.  Given that the tumor is stage III there is definite benefit from adjuvant chemotherapy.  We discussed the 2 available regimens including XELOX and FOLFOX.  Explained to her that based on the IDEA data 3 months of XELOX is noninferior to 6 months of adjuvant chemotherapy however with FOLFOX the noninferiority has not been established.  Explained to her that 6 months of chemotherapy improves disease-free survival by additional 2 to 3% over 3 months.  Discussed the adverse effects of XELOX as well as FOLFOX.  After  discussing the pros and cons we have decided to proceed with FOLFOX for total of 6 months but if she is unable to tolerate due to neurotoxicity then we could potentially stop after 3 months of treatment.  Port placed on 4/11/2022  Cycle 1 FOLFOX on 4/18/2022  Cycle 11 FOLFOX 9/13/2022 with a 20% dose reduction of oxaliplatin.  Now experiencing neuropathy, we will discontinue oxaliplatin and just to 5-FU for cycle 12  Seen 10/14/2022 in follow-up and scan review.  CT scan of the chest, abdomen, and pelvis done 10/12/2022 showing a few tiny new lung nodules indeterminant, measuring about 3 to 4 mm in maximum size.  Patient also has enlarging spleen previously 11.3 cm, now measuring 13.6 cm.    12/14/2022-CT of the chest abdomen pelvis shows stable pulmonary nodules.  Screening colonoscopy 3/9/2023-negative.  6/1/2023-CT of the chest abdomen and pelvis-no evidence of metastatic disease.  She will continue with every 3 monthly tumor marker and lab surveillance along with every 6 months scans.  9/7/2023: Patient continues to remain free of any recurrent symptoms.  Tumor markers today is normal at 1.64.  She is already scheduled for CT scans and 3 months for continued surveillance.  12/12/2023-CT scans without any evidence of metastatic disease, CEA normal  3/14/2024: Feeling well without any symptoms of recurrence.  CEA normal at 1.52.  Continue surveillance    *Leukopenia  Predominantly neutropenia  Mild increase in monocyte count  This is chronic and unchanged  No increased infections  Had a previous work-up including a bone marrow biopsy with no clear etiology  Differential includes nutritional, chronic idiopathic, autoimmune.  She is currently not on any medications which could account for the leukopenia.  No splenomegaly on exam  5/13/2021-flow cytometry negative  LDH normal at 174  Reticulocyte count 1.07%  Ferritin low at 5.7  Iron saturation low at 3%  Folic acid normal at 11.8, B12 415  Rheumatoid factor  negative  AVIVA negative  ESR normal at 29  Serum copper normal at 134  12/12/2023-CBC reviewed and WBC 3.13, hemoglobin 13.3 and platelets 213,000, absolute neutrophil count 1.47  3/14/2024-WBC 2.77, ANC 0.85.  She does report having viral respiratory illness about 1 month ago.  Will have her return in 6 weeks for CBC with RN review to monitor.  Reviewed with Dr. Alvarez who is in agreement.    *Anemia  Microcytic  Likely secondary to iron deficiency  4/22/2021 iron saturation 3%, TIBC today 508, ferritin 5.70.  The patient was initiated on oral iron every other day.  8/26/2021, hemoglobin today 10.1.  The patient has been tolerating oral iron every other day.  We will increase oral iron to daily and recheck iron labs in 4 months.  12/16/2021, patient is taking oral iron every day and tolerating this well.  Hemoglobin today 10.0.  Iron saturation 45, TIBC 413, ferritin 10.20.  3/4/2022 ferritin 27.5, iron saturation 5%, TIBC 375, iron studies consistent with iron deficiency  3/24/2022 hemoglobin 10.2  Received 3 doses of Venofer.  9/15/2022-hemoglobin 9.7, low due to chemotherapy  As of 10/14/2022 hemoglobin improving now up to 11.3.  6/1/2023 Labs reviewed and hemoglobin normal at 13.4.  Iron studies reviewed and iron saturation 30%, TIBC 325, ferritin normal at 73  Patient is currently taking iron tablets twice a week.  She can discontinue this and continue with healthy diet including iron rich food.  9/7/2023: Hemoglobin is normal at 12.6.  Patient is no longer taking oral iron and focusing on healthy food options itching iron  Hemoglobin 13.2.    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  She has been on gabapentin 300 mg nightly at bedtime however her symptoms have not improved.  Recommend that she start  gabapentin continue with B complex only.  Neuropathy is likely permanent at this point.  No changes    *Elevated alkaline phosphatase  Alkaline phosphatase has been elevated into the 200s and 300s in the past.  Labs from 6/1/2023 with improved alkaline phosphatase 143.  This improvement is likely secondary to weight loss and improvement in fatty liver changes.    *Utility of circulating tumor DNA  Explained the role of circulating tumor DNA and assessing the risk of relapse since you have been treated with adjuvant chemotherapy.  Explained to her that currently we do not have any evidence that detecting CT DNA early would improve outcomes.  Currently not recommended by any of the guidelines.  However if patient wishes to proceed with obtaining CT DNA we would be able to use [employer] to do so.  After discussing the pros and cons patient and her  decided not to proceed with CT DNA testing today.  Continue surveillance with CT scans and colonoscopy.  12/12/2023-CEA is normal  3/14/2024-CEA normal 1.52    PLAN:   Port flush in 6 weeks.  Will recheck CBC with RN review at this visit to monitor WBC/ANC.  CT scans in 3 months  MD follow-up in 3 months    The patient was reviewed with Dr. Alvarez who is in agreement with today's plan of care.    DALE Orozco  03/14/24

## 2024-03-14 ENCOUNTER — OFFICE VISIT (OUTPATIENT)
Dept: ONCOLOGY | Facility: CLINIC | Age: 69
End: 2024-03-14
Payer: COMMERCIAL

## 2024-03-14 ENCOUNTER — INFUSION (OUTPATIENT)
Dept: ONCOLOGY | Facility: HOSPITAL | Age: 69
End: 2024-03-14
Payer: COMMERCIAL

## 2024-03-14 VITALS
DIASTOLIC BLOOD PRESSURE: 78 MMHG | RESPIRATION RATE: 16 BRPM | WEIGHT: 166.6 LBS | TEMPERATURE: 98 F | HEART RATE: 92 BPM | BODY MASS INDEX: 30.66 KG/M2 | HEIGHT: 62 IN | OXYGEN SATURATION: 96 % | SYSTOLIC BLOOD PRESSURE: 121 MMHG

## 2024-03-14 DIAGNOSIS — C18.2 MALIGNANT NEOPLASM OF ASCENDING COLON: ICD-10-CM

## 2024-03-14 DIAGNOSIS — C18.2 MALIGNANT NEOPLASM OF ASCENDING COLON: Primary | ICD-10-CM

## 2024-03-14 DIAGNOSIS — D70.9 NEUTROPENIA, UNSPECIFIED TYPE: ICD-10-CM

## 2024-03-14 DIAGNOSIS — Z45.2 ENCOUNTER FOR FITTING AND ADJUSTMENT OF VASCULAR CATHETER: Primary | ICD-10-CM

## 2024-03-14 LAB
ALBUMIN SERPL-MCNC: 4.3 G/DL (ref 3.5–5.2)
ALBUMIN/GLOB SERPL: 1.6 G/DL
ALP SERPL-CCNC: 98 U/L (ref 39–117)
ALT SERPL W P-5'-P-CCNC: 14 U/L (ref 1–33)
ANION GAP SERPL CALCULATED.3IONS-SCNC: 8.9 MMOL/L (ref 5–15)
AST SERPL-CCNC: 15 U/L (ref 1–32)
BASOPHILS # BLD AUTO: 0.02 10*3/MM3 (ref 0–0.2)
BASOPHILS NFR BLD AUTO: 0.7 % (ref 0–1.5)
BILIRUB SERPL-MCNC: 0.2 MG/DL (ref 0–1.2)
BUN SERPL-MCNC: 12 MG/DL (ref 8–23)
BUN/CREAT SERPL: 15.4 (ref 7–25)
CALCIUM SPEC-SCNC: 9.4 MG/DL (ref 8.6–10.5)
CEA SERPL-MCNC: 1.52 NG/ML
CHLORIDE SERPL-SCNC: 106 MMOL/L (ref 98–107)
CO2 SERPL-SCNC: 26.1 MMOL/L (ref 22–29)
CREAT SERPL-MCNC: 0.78 MG/DL (ref 0.57–1)
DEPRECATED RDW RBC AUTO: 40.3 FL (ref 37–54)
EGFRCR SERPLBLD CKD-EPI 2021: 83.4 ML/MIN/1.73
EOSINOPHIL # BLD AUTO: 0.07 10*3/MM3 (ref 0–0.4)
EOSINOPHIL NFR BLD AUTO: 2.5 % (ref 0.3–6.2)
ERYTHROCYTE [DISTWIDTH] IN BLOOD BY AUTOMATED COUNT: 12 % (ref 12.3–15.4)
GLOBULIN UR ELPH-MCNC: 2.7 GM/DL
GLUCOSE SERPL-MCNC: 111 MG/DL (ref 65–99)
HCT VFR BLD AUTO: 40.1 % (ref 34–46.6)
HGB BLD-MCNC: 13.2 G/DL (ref 12–15.9)
IMM GRANULOCYTES # BLD AUTO: 0 10*3/MM3 (ref 0–0.05)
IMM GRANULOCYTES NFR BLD AUTO: 0 % (ref 0–0.5)
LYMPHOCYTES # BLD AUTO: 1.56 10*3/MM3 (ref 0.7–3.1)
LYMPHOCYTES NFR BLD AUTO: 56.3 % (ref 19.6–45.3)
MCH RBC QN AUTO: 30.1 PG (ref 26.6–33)
MCHC RBC AUTO-ENTMCNC: 32.9 G/DL (ref 31.5–35.7)
MCV RBC AUTO: 91.6 FL (ref 79–97)
MONOCYTES # BLD AUTO: 0.27 10*3/MM3 (ref 0.1–0.9)
MONOCYTES NFR BLD AUTO: 9.7 % (ref 5–12)
NEUTROPHILS NFR BLD AUTO: 0.85 10*3/MM3 (ref 1.7–7)
NEUTROPHILS NFR BLD AUTO: 30.8 % (ref 42.7–76)
NRBC BLD AUTO-RTO: 0 /100 WBC (ref 0–0.2)
PLAT MORPH BLD: NORMAL
PLATELET # BLD AUTO: 249 10*3/MM3 (ref 140–450)
PMV BLD AUTO: 9.6 FL (ref 6–12)
POTASSIUM SERPL-SCNC: 3.8 MMOL/L (ref 3.5–5.2)
PROT SERPL-MCNC: 7 G/DL (ref 6–8.5)
RBC # BLD AUTO: 4.38 10*6/MM3 (ref 3.77–5.28)
RBC MORPH BLD: NORMAL
SODIUM SERPL-SCNC: 141 MMOL/L (ref 136–145)
WBC MORPH BLD: NORMAL
WBC NRBC COR # BLD AUTO: 2.77 10*3/MM3 (ref 3.4–10.8)

## 2024-03-14 PROCEDURE — 25010000002 HEPARIN LOCK FLUSH PER 10 UNITS: Performed by: INTERNAL MEDICINE

## 2024-03-14 PROCEDURE — 82378 CARCINOEMBRYONIC ANTIGEN: CPT | Performed by: INTERNAL MEDICINE

## 2024-03-14 PROCEDURE — 85007 BL SMEAR W/DIFF WBC COUNT: CPT | Performed by: INTERNAL MEDICINE

## 2024-03-14 PROCEDURE — 85025 COMPLETE CBC W/AUTO DIFF WBC: CPT | Performed by: INTERNAL MEDICINE

## 2024-03-14 PROCEDURE — 80053 COMPREHEN METABOLIC PANEL: CPT | Performed by: INTERNAL MEDICINE

## 2024-03-14 PROCEDURE — 36591 DRAW BLOOD OFF VENOUS DEVICE: CPT

## 2024-03-14 RX ORDER — SODIUM CHLORIDE 0.9 % (FLUSH) 0.9 %
10 SYRINGE (ML) INJECTION AS NEEDED
Status: DISCONTINUED | OUTPATIENT
Start: 2024-03-14 | End: 2024-03-14 | Stop reason: HOSPADM

## 2024-03-14 RX ORDER — HEPARIN SODIUM (PORCINE) LOCK FLUSH IV SOLN 100 UNIT/ML 100 UNIT/ML
500 SOLUTION INTRAVENOUS AS NEEDED
Status: DISCONTINUED | OUTPATIENT
Start: 2024-03-14 | End: 2024-03-14 | Stop reason: HOSPADM

## 2024-03-14 RX ORDER — HEPARIN SODIUM (PORCINE) LOCK FLUSH IV SOLN 100 UNIT/ML 100 UNIT/ML
500 SOLUTION INTRAVENOUS AS NEEDED
OUTPATIENT
Start: 2024-03-14

## 2024-03-14 RX ORDER — SODIUM CHLORIDE 0.9 % (FLUSH) 0.9 %
10 SYRINGE (ML) INJECTION AS NEEDED
OUTPATIENT
Start: 2024-03-14

## 2024-03-14 RX ADMIN — Medication 10 ML: at 13:14

## 2024-03-14 RX ADMIN — Medication 500 UNITS: at 13:14

## 2024-03-19 ENCOUNTER — TELEPHONE (OUTPATIENT)
Dept: ONCOLOGY | Facility: CLINIC | Age: 69
End: 2024-03-19
Payer: COMMERCIAL

## 2024-03-19 NOTE — TELEPHONE ENCOUNTER
Caller: Kayla Adkins    Relationship: Self    Best call back number: [phone]    What is the best time to reach you: ANYTIME    Who are you requesting to speak with (clinical staff, provider,  specific staff member): CLINICAL    What was the call regarding: PT IS REQUESTING A CALL BACK FROM DOREEN, SHE HAS BEEN HAVING PAINFUL GAS FOR ABOUT 2 WEEKS AND WANTS TO DISCUSS SYMPTOMS.

## 2024-03-21 RX ORDER — SIMETHICONE 80 MG
80 TABLET,CHEWABLE ORAL EVERY 6 HOURS PRN
Qty: 60 TABLET | Refills: 0 | Status: SHIPPED | OUTPATIENT
Start: 2024-03-21 | End: 2024-03-22

## 2024-03-21 NOTE — TELEPHONE ENCOUNTER
Dr. Alvarez,    Patient is calling in and she states she has been having bad gas for 2 weeks and that she has tried gasx and nothing is helping. She has seen Dr. Whitlock and she states she is going to call them to get in and be seen by GI.     I talked with Dr. Alvarez and she agreed with GI seeing her and wanted to see if she had tried simethicone. She had not and wanted us to send it in for her and Dr. Alvarez was agreeable. RX sent. She v/u.

## 2024-03-22 ENCOUNTER — TELEPHONE (OUTPATIENT)
Dept: ONCOLOGY | Facility: CLINIC | Age: 69
End: 2024-03-22
Payer: COMMERCIAL

## 2024-03-22 NOTE — TELEPHONE ENCOUNTER
Caller: Kyala Adkins    Relationship: Self    Best call back number: [phone]    Which medication are you concerned about: MYLICON    Who prescribed you this medication: DR. ROCHA    When did you start taking this medication: PT NEVER PICKED UP PRESCRIPTION THAT WAS CALLED IN YESTERDAY, AS THE PHARMACIST TOLD PT THAT WHAT SHE WAS TAKING OTC WAS STRONGER THAN THE PRESCRIPTION CALLED IN.  PT IS INQUIRING IF WE CAN PRESCRIBE SOMETHING STRONG THAN THE OTC SHE IS TAKING & MYLICON?    What are your concerns: SENDING URGENT AS PATIENT WOULD LIKE SOMETHING BEFORE THE WEEKEND.      [id number] - [place], [place] - [address] AT [facility] [phone] [facility] [phone] FX

## 2024-03-22 NOTE — TELEPHONE ENCOUNTER
Called the patient to let her know that we would send in simethicone 125mg. She states she is already taking this OTC and would continue to until she sees the GI MD. She v/u.

## 2024-03-29 ENCOUNTER — OFFICE VISIT (OUTPATIENT)
Dept: GASTROENTEROLOGY | Facility: CLINIC | Age: 69
End: 2024-03-29
Payer: COMMERCIAL

## 2024-03-29 VITALS
TEMPERATURE: 97.5 F | HEIGHT: 62 IN | HEART RATE: 99 BPM | BODY MASS INDEX: 30.91 KG/M2 | DIASTOLIC BLOOD PRESSURE: 78 MMHG | OXYGEN SATURATION: 95 % | WEIGHT: 168 LBS | SYSTOLIC BLOOD PRESSURE: 152 MMHG

## 2024-03-29 DIAGNOSIS — Z85.038 HISTORY OF COLON CANCER, STAGE III: ICD-10-CM

## 2024-03-29 DIAGNOSIS — R14.3 EXCESSIVE GAS: Primary | ICD-10-CM

## 2024-03-29 NOTE — PROGRESS NOTES
"Chief Complaint  Gas and Constipation    Subjective          History of Present Illness    Kayla Adkins is a  68 y.o. female presents for complaints of gas and incomplete BMs.  She has history of serrated stage IIIb colon cancer and microscopic colitis.  She is a patient Dr. Clayton last seen for colonoscopy at 3/9/2023.  She is new to me.    Today she reports excessive gas ongoing for awhile. She feels she has occasional incomplete Bms, has daily BM. Noticed this after she takes the gas-x. She is on simethicone 125 mg as needed for gas. +Gas pain, better with passing gas. Feels she is lactose intolerant--can't eat ice cream.  Overall avoiding dairy with no change in excessive gas.    Denies melena or hematochezia, diarrhea, abdominal pain UGI symptoms.    She has a history of stage IIIb colon cancer of the proximal ascending colon diagnosed February of this 2022.  She underwent right hemicolectomy with Dr. Wellington shortly after diagnosis followed by adjuvant chemotherapy administered under the care of Dr. Davies      She takes omeprazole 20 mg nightly for history of GERD. Feels like controls her well.     3/9/2023 colonoscopy for surveillance showed prior end-to-side ileocolonic anastomosis, otherwise unremarkable.  Negative random colon biopsies recall 3 years.    Objective   Vital Signs:   /78   Pulse 99   Temp 97.5 °F (36.4 °C) (Temporal)   Ht 157.5 cm (62\")   Wt 76.2 kg (168 lb)   SpO2 95%   BMI 30.73 kg/m²       Physical Exam  Vitals reviewed.   Constitutional:       General: She is awake. She is not in acute distress.     Appearance: Normal appearance. She is well-developed and well-groomed.   HENT:      Head: Normocephalic.   Pulmonary:      Effort: Pulmonary effort is normal. No respiratory distress.   Skin:     Coloration: Skin is not pale.   Neurological:      Mental Status: She is alert and oriented to person, place, and time.      Gait: Gait is intact.   Psychiatric:         Mood and " Affect: Mood and affect normal.         Speech: Speech normal.         Behavior: Behavior is cooperative.         Judgment: Judgment normal.          Result Review :             Assessment and Plan    Diagnoses and all orders for this visit:    1. Excessive gas (Primary)  -     Celiac Comprehensive Panel  -     Food Allergy Profile    2. History of colon cancer, stage III  -     Celiac Comprehensive Panel  -     Food Allergy Profile    Unclear source of excessive gas.  Will check celiac panel and food allergy profile.  Possibly small intestinal bacterial overgrowth so we will go ahead and send her the breath test.  Treat if positive.  Adair okay to continue simethicone up to 3 times daily if needed.    Follow Up   Return for Follow-up based on test results.    Dragon dictation used throughout this note.     Crystal Coburn PA-C

## 2024-04-01 ENCOUNTER — OFFICE VISIT (OUTPATIENT)
Dept: FAMILY MEDICINE CLINIC | Facility: CLINIC | Age: 69
End: 2024-04-01
Payer: COMMERCIAL

## 2024-04-01 VITALS
DIASTOLIC BLOOD PRESSURE: 70 MMHG | OXYGEN SATURATION: 96 % | WEIGHT: 168 LBS | BODY MASS INDEX: 30.91 KG/M2 | HEIGHT: 62 IN | TEMPERATURE: 98.1 F | SYSTOLIC BLOOD PRESSURE: 134 MMHG | HEART RATE: 101 BPM

## 2024-04-01 DIAGNOSIS — J06.9 UPPER RESPIRATORY TRACT INFECTION, UNSPECIFIED TYPE: Primary | ICD-10-CM

## 2024-04-01 DIAGNOSIS — R05.8 PRODUCTIVE COUGH: ICD-10-CM

## 2024-04-01 DIAGNOSIS — R50.9 FEVER, UNSPECIFIED FEVER CAUSE: ICD-10-CM

## 2024-04-01 LAB
ENDOMYSIUM IGA SER QL: NEGATIVE
EXPIRATION DATE: NORMAL
EXPIRATION DATE: NORMAL
FLUAV AG NPH QL: NEGATIVE
FLUBV AG NPH QL: NEGATIVE
GLIADIN PEPTIDE IGA SER-ACNC: 6 UNITS (ref 0–19)
GLIADIN PEPTIDE IGG SER-ACNC: 3 UNITS (ref 0–19)
IGA SERPL-MCNC: 37 MG/DL (ref 87–352)
INTERNAL CONTROL: NORMAL
INTERNAL CONTROL: NORMAL
Lab: NORMAL
Lab: NORMAL
SARS-COV-2 AG UPPER RESP QL IA.RAPID: NOT DETECTED
TTG IGA SER-ACNC: <2 U/ML (ref 0–3)
TTG IGG SER-ACNC: <2 U/ML (ref 0–5)

## 2024-04-01 RX ORDER — BENZONATATE 100 MG/1
100 CAPSULE ORAL 3 TIMES DAILY PRN
Qty: 30 CAPSULE | Refills: 1 | Status: SHIPPED | OUTPATIENT
Start: 2024-04-01

## 2024-04-01 RX ORDER — LEVOFLOXACIN 500 MG/1
500 TABLET, FILM COATED ORAL DAILY
Qty: 7 TABLET | Refills: 0 | Status: SHIPPED | OUTPATIENT
Start: 2024-04-01

## 2024-04-01 NOTE — PROGRESS NOTES
Chief Complaint  Fever, Headache, URI, Laryngitis, and Cough (Productive at times; green)    Subjective          Fever   Associated symptoms include congestion, coughing and headaches. Pertinent negatives include no abdominal pain, chest pain, diarrhea, ear pain, nausea, rash, sore throat, urinary pain, vomiting or wheezing.   Headache  URI   Associated symptoms include congestion, coughing and headaches. Pertinent negatives include no abdominal pain, chest pain, diarrhea, dysuria, ear pain, nausea, rash, sinus pain, sore throat, vomiting or wheezing.   Laryngitis  Associated symptoms include congestion, coughing, a fever and headaches. Pertinent negatives include no abdominal pain, chest pain, chills, fatigue, nausea, rash, sore throat or vomiting.   Cough  Associated symptoms include a fever and headaches. Pertinent negatives include no chest pain, chills, ear pain, rash, sore throat, shortness of breath or wheezing.       Kayla THOMAS Branford 68 y.o. female presents for evaluation of upper respiratory congestion, cough. Symptoms include congestion, productive cough, headache, fever, chills, and hoarseness.  Onset of symptoms was 2 days ago, gradually worsening with cough and fever since that time.  Patient denies shortness of breath, wheezing, pleuritic chest pain, dyspnea on exertion, ear drainage, eye discharge, diarrhea, vomiting, sinus pain, joint pain.  Evaluation to date: none. Treatment to date: Mucinex and Tylenol.     TMAX was 101.5 and reduced well with Tylenol.  She is drinking plenty of fluids and is staying hydrated.      Review of Systems   Constitutional:  Positive for fever. Negative for chills and fatigue.   HENT:  Positive for congestion. Negative for ear pain, sinus pressure, sore throat and trouble swallowing.    Eyes:  Negative for visual disturbance.   Respiratory:  Positive for cough. Negative for chest tightness, shortness of breath and wheezing.    Cardiovascular:  Negative for chest pain  "and palpitations.   Gastrointestinal:  Negative for abdominal pain, constipation, diarrhea, nausea and vomiting.   Genitourinary:  Negative for dysuria, frequency and urgency.   Musculoskeletal:  Negative for back pain.   Skin:  Negative for rash.   Neurological:  Negative for dizziness, syncope and light-headedness.   Psychiatric/Behavioral:  Negative for behavioral problems and sleep disturbance.         Objective   Vital Signs:   /70   Pulse 101   Temp 98.1 °F (36.7 °C) (Oral)   Ht 157.5 cm (62\")   Wt 76.2 kg (168 lb)   SpO2 96%   BMI 30.73 kg/m²        Physical Exam  Vitals and nursing note reviewed.   Constitutional:       General: She is not in acute distress.     Appearance: She is well-developed. She is not toxic-appearing or diaphoretic.   HENT:      Head: Normocephalic and atraumatic. Hair is normal.      Right Ear: External ear normal. No drainage, swelling or tenderness.      Left Ear: External ear normal. No drainage, swelling or tenderness.      Nose: Congestion present. No mucosal edema or rhinorrhea.      Right Turbinates: Not enlarged or swollen.      Left Turbinates: Not enlarged or swollen.      Mouth/Throat:      Mouth: Mucous membranes are moist. No oral lesions.      Pharynx: Uvula midline. Posterior oropharyngeal erythema present. No pharyngeal swelling, oropharyngeal exudate or uvula swelling.      Tonsils: No tonsillar exudate or tonsillar abscesses.   Eyes:      General: No scleral icterus.        Right eye: No discharge.         Left eye: No discharge.      Conjunctiva/sclera: Conjunctivae normal.      Pupils: Pupils are equal, round, and reactive to light.   Cardiovascular:      Rate and Rhythm: Normal rate and regular rhythm.      Heart sounds: Normal heart sounds. No murmur heard.     No gallop.   Pulmonary:      Effort: No respiratory distress.      Breath sounds: Normal breath sounds. No stridor or decreased air movement. No decreased breath sounds, wheezing, rhonchi or " rales.   Chest:      Chest wall: No tenderness.   Musculoskeletal:      Cervical back: Normal range of motion and neck supple.   Lymphadenopathy:      Cervical: No cervical adenopathy.   Skin:     General: Skin is warm and dry.      Findings: No rash.   Neurological:      Mental Status: She is alert and oriented to person, place, and time.      Motor: No abnormal muscle tone.   Psychiatric:         Mood and Affect: Mood normal.          The following data was reviewed by: DALE De Jesus on 04/01/2024:  POCT SARS-CoV-2 Antigen BAIRON (04/01/2024 14:36)  POCT Influenza A/B (04/01/2024 14:36)             Assessment and Plan      Assessment & Plan  Upper respiratory tract infection, unspecified type    Fever, unspecified fever cause    Productive cough      Orders Placed This Encounter   Procedures    POCT Influenza A/B    POCT SARS-CoV-2 Antigen BAIRON     New Medications Ordered This Visit   Medications    levoFLOXacin (Levaquin) 500 MG tablet     Sig: Take 1 tablet by mouth Daily.     Dispense:  7 tablet     Refill:  0    benzonatate (Tessalon Perles) 100 MG capsule     Sig: Take 1 capsule by mouth 3 (Three) Times a Day As Needed for Cough.     Dispense:  30 capsule     Refill:  1              Follow Up     Return if symptoms worsen or fail to improve.    Patient was given instructions and counseling regarding her condition or for health maintenance advice. Please see specific information pulled into the AVS if appropriate.     -Take medication as prescribed.  -Covid and Influenza testing are both negative today.  -Monitor for fever and take Tylenol as needed.  Drink plenty of fluids and get plenty of rest.  -Use cool-mist humidifier as needed.  -Seek immediate medical attention for fever unrelieved by Tylenol, chest pain, shortness of breath, decreased oxygen saturations, sharp pain with breathing, or any other worsening signs or symptoms.  -Remain in quarantine until Covid test is resulted, and continue quarantine  for 10 days from symptom onset if Covid result is positive.  -Return to the office is symptoms worsen or do not improve.

## 2024-04-02 ENCOUNTER — TELEPHONE (OUTPATIENT)
Dept: GASTROENTEROLOGY | Facility: CLINIC | Age: 69
End: 2024-04-02
Payer: COMMERCIAL

## 2024-04-03 ENCOUNTER — TELEPHONE (OUTPATIENT)
Dept: ONCOLOGY | Facility: CLINIC | Age: 69
End: 2024-04-03
Payer: COMMERCIAL

## 2024-04-03 NOTE — TELEPHONE ENCOUNTER
Kayla contacted me and reports that she has a sinus infection, was prescribed antibiotics by her PCP.  She recalled when she saw Natalya last that she wanted her to let us know if she was sick again. Discussed with Natalya, since she is being treated, she recommends continuing this course of antibiotics and if she does not see improvement over the next few days to let us know, as she may need a CBC check.  She voiced understanding.

## 2024-04-05 LAB
CLAM IGE QN: <0.1 KU/L
CODFISH IGE QN: <0.1 KU/L
CONV CLASS DESCRIPTION: ABNORMAL
CORN IGE QN: <0.1 KU/L
COW MILK IGE QN: 0.37 KU/L
EGG WHITE IGE QN: <0.1 KU/L
PEANUT IGE QN: <0.1 KU/L
SCALLOP IGE QN: <0.1 KU/L
SESAME SEED IGE QN: <0.1 KU/L
SHRIMP IGE QN: <0.1 KU/L
SOYBEAN IGE QN: <0.1 KU/L
WALNUT IGE QN: <0.1 KU/L
WHEAT IGE QN: <0.1 KU/L

## 2024-04-09 ENCOUNTER — INFUSION (OUTPATIENT)
Dept: ONCOLOGY | Facility: HOSPITAL | Age: 69
End: 2024-04-09
Payer: COMMERCIAL

## 2024-04-09 ENCOUNTER — TELEPHONE (OUTPATIENT)
Dept: ONCOLOGY | Facility: CLINIC | Age: 69
End: 2024-04-09
Payer: COMMERCIAL

## 2024-04-09 ENCOUNTER — OFFICE VISIT (OUTPATIENT)
Dept: ONCOLOGY | Facility: CLINIC | Age: 69
End: 2024-04-09
Payer: COMMERCIAL

## 2024-04-09 VITALS
OXYGEN SATURATION: 98 % | BODY MASS INDEX: 30.47 KG/M2 | SYSTOLIC BLOOD PRESSURE: 135 MMHG | TEMPERATURE: 97.5 F | HEART RATE: 101 BPM | DIASTOLIC BLOOD PRESSURE: 82 MMHG | HEIGHT: 62 IN | WEIGHT: 165.6 LBS | RESPIRATION RATE: 18 BRPM

## 2024-04-09 DIAGNOSIS — Z45.2 ENCOUNTER FOR FITTING AND ADJUSTMENT OF VASCULAR CATHETER: Primary | ICD-10-CM

## 2024-04-09 DIAGNOSIS — D70.9 NEUTROPENIA, UNSPECIFIED TYPE: Primary | ICD-10-CM

## 2024-04-09 DIAGNOSIS — J98.9 RESPIRATORY ILLNESS: ICD-10-CM

## 2024-04-09 DIAGNOSIS — C18.2 MALIGNANT NEOPLASM OF ASCENDING COLON: ICD-10-CM

## 2024-04-09 LAB
ALBUMIN SERPL-MCNC: 4.2 G/DL (ref 3.5–5.2)
ALBUMIN/GLOB SERPL: 1.4 G/DL
ALP SERPL-CCNC: 101 U/L (ref 39–117)
ALT SERPL W P-5'-P-CCNC: 14 U/L (ref 1–33)
ANION GAP SERPL CALCULATED.3IONS-SCNC: 8.4 MMOL/L (ref 5–15)
AST SERPL-CCNC: 16 U/L (ref 1–32)
BASOPHILS # BLD AUTO: 0.02 10*3/MM3 (ref 0–0.2)
BASOPHILS NFR BLD AUTO: 0.6 % (ref 0–1.5)
BILIRUB SERPL-MCNC: 0.2 MG/DL (ref 0–1.2)
BUN SERPL-MCNC: 16 MG/DL (ref 8–23)
BUN/CREAT SERPL: 21.3 (ref 7–25)
CALCIUM SPEC-SCNC: 9.5 MG/DL (ref 8.6–10.5)
CHLORIDE SERPL-SCNC: 103 MMOL/L (ref 98–107)
CO2 SERPL-SCNC: 26.6 MMOL/L (ref 22–29)
CREAT SERPL-MCNC: 0.75 MG/DL (ref 0.57–1)
DEPRECATED RDW RBC AUTO: 39.6 FL (ref 37–54)
EGFRCR SERPLBLD CKD-EPI 2021: 87.4 ML/MIN/1.73
EOSINOPHIL # BLD AUTO: 0.06 10*3/MM3 (ref 0–0.4)
EOSINOPHIL NFR BLD AUTO: 1.7 % (ref 0.3–6.2)
ERYTHROCYTE [DISTWIDTH] IN BLOOD BY AUTOMATED COUNT: 12 % (ref 12.3–15.4)
GLOBULIN UR ELPH-MCNC: 2.9 GM/DL
GLUCOSE SERPL-MCNC: 90 MG/DL (ref 65–99)
HCT VFR BLD AUTO: 38.9 % (ref 34–46.6)
HGB BLD-MCNC: 13.1 G/DL (ref 12–15.9)
IMM GRANULOCYTES # BLD AUTO: 0 10*3/MM3 (ref 0–0.05)
IMM GRANULOCYTES NFR BLD AUTO: 0 % (ref 0–0.5)
LYMPHOCYTES # BLD AUTO: 1.76 10*3/MM3 (ref 0.7–3.1)
LYMPHOCYTES NFR BLD AUTO: 49 % (ref 19.6–45.3)
MCH RBC QN AUTO: 30.5 PG (ref 26.6–33)
MCHC RBC AUTO-ENTMCNC: 33.7 G/DL (ref 31.5–35.7)
MCV RBC AUTO: 90.5 FL (ref 79–97)
MONOCYTES # BLD AUTO: 0.28 10*3/MM3 (ref 0.1–0.9)
MONOCYTES NFR BLD AUTO: 7.8 % (ref 5–12)
NEUTROPHILS NFR BLD AUTO: 1.47 10*3/MM3 (ref 1.7–7)
NEUTROPHILS NFR BLD AUTO: 40.9 % (ref 42.7–76)
NRBC BLD AUTO-RTO: 0 /100 WBC (ref 0–0.2)
PLATELET # BLD AUTO: 267 10*3/MM3 (ref 140–450)
PMV BLD AUTO: 9.3 FL (ref 6–12)
POTASSIUM SERPL-SCNC: 4.1 MMOL/L (ref 3.5–5.2)
PROT SERPL-MCNC: 7.1 G/DL (ref 6–8.5)
RBC # BLD AUTO: 4.3 10*6/MM3 (ref 3.77–5.28)
SODIUM SERPL-SCNC: 138 MMOL/L (ref 136–145)
WBC NRBC COR # BLD AUTO: 3.59 10*3/MM3 (ref 3.4–10.8)

## 2024-04-09 PROCEDURE — 99213 OFFICE O/P EST LOW 20 MIN: CPT | Performed by: NURSE PRACTITIONER

## 2024-04-09 PROCEDURE — 1160F RVW MEDS BY RX/DR IN RCRD: CPT | Performed by: NURSE PRACTITIONER

## 2024-04-09 PROCEDURE — 1159F MED LIST DOCD IN RCRD: CPT | Performed by: NURSE PRACTITIONER

## 2024-04-09 PROCEDURE — 25010000002 HEPARIN LOCK FLUSH PER 10 UNITS: Performed by: INTERNAL MEDICINE

## 2024-04-09 PROCEDURE — 1126F AMNT PAIN NOTED NONE PRSNT: CPT | Performed by: NURSE PRACTITIONER

## 2024-04-09 PROCEDURE — 3079F DIAST BP 80-89 MM HG: CPT | Performed by: NURSE PRACTITIONER

## 2024-04-09 PROCEDURE — 85025 COMPLETE CBC W/AUTO DIFF WBC: CPT | Performed by: INTERNAL MEDICINE

## 2024-04-09 PROCEDURE — 36591 DRAW BLOOD OFF VENOUS DEVICE: CPT

## 2024-04-09 PROCEDURE — 3075F SYST BP GE 130 - 139MM HG: CPT | Performed by: NURSE PRACTITIONER

## 2024-04-09 PROCEDURE — 80053 COMPREHEN METABOLIC PANEL: CPT | Performed by: INTERNAL MEDICINE

## 2024-04-09 RX ORDER — HEPARIN SODIUM (PORCINE) LOCK FLUSH IV SOLN 100 UNIT/ML 100 UNIT/ML
500 SOLUTION INTRAVENOUS AS NEEDED
OUTPATIENT
Start: 2024-04-09

## 2024-04-09 RX ORDER — HEPARIN SODIUM (PORCINE) LOCK FLUSH IV SOLN 100 UNIT/ML 100 UNIT/ML
500 SOLUTION INTRAVENOUS AS NEEDED
Status: DISCONTINUED | OUTPATIENT
Start: 2024-04-09 | End: 2024-04-09 | Stop reason: HOSPADM

## 2024-04-09 RX ORDER — SODIUM CHLORIDE 0.9 % (FLUSH) 0.9 %
10 SYRINGE (ML) INJECTION AS NEEDED
Status: DISCONTINUED | OUTPATIENT
Start: 2024-04-09 | End: 2024-04-09 | Stop reason: HOSPADM

## 2024-04-09 RX ORDER — AZITHROMYCIN 500 MG/1
TABLET, FILM COATED ORAL
Qty: 3 TABLET | Refills: 0 | Status: SHIPPED | OUTPATIENT
Start: 2024-04-09

## 2024-04-09 RX ORDER — SODIUM CHLORIDE 0.9 % (FLUSH) 0.9 %
10 SYRINGE (ML) INJECTION AS NEEDED
OUTPATIENT
Start: 2024-04-09

## 2024-04-09 RX ADMIN — Medication 10 ML: at 13:01

## 2024-04-09 RX ADMIN — Medication 500 UNITS: at 13:01

## 2024-04-09 NOTE — PROGRESS NOTES
Subjective   Kayla Adkins is a 68 y.o. female.  Referred by Dr. Rivas for evaluation of leukopenia    History of Present Illness   Ms. Adkins is a 66-year-old postmenopausal  who has been referred by Dr. Rivas for evaluation of leukopenia, neutropenia.  Patient reports that she has had longstanding leukopenia and her white blood cell count normally ranges in the threes.  On her most recent visit with Dr. Rivas white blood cell count was noted to be 2560 on 3/2/2021 and 2660 on 4/5/2021.  This prompted a referral to hematology.  Patient reports that she had a extensive work-up including a bone marrow biopsy for the same condition about 17 years ago.  She thinks that this was performed at [facility].  According to patient no clear etiology was determined after the work-up.  She was recommended to continue with periodic labs.  No treatment was required.    She was seen in December and had continued on oral iron.  She was tolerating that fairly well.  However she had worsening of colitis for about 2 months prior to that visit.  She was scheduled for colonoscopy in January 2022.    She had a routine surveillance colonoscopy performed by Dr. Whitlock on 2/22/2022.  On this there was an infiltrative nonobstructing medium-sized mass in the proximal ascending colon 1 to 2 cm distal to the cecum.  This was partially circumferential involving 1 foot of the lumen.  Biopsies were taken.  There is also mildly congested mucosa in the entire colon.  Random biopsies were taken.  She also had an EGD at the same time which reported normal esophagus, stomach and duodenum.  Pathology evaluation from the biopsy reported superficial fragments of at least intramucosal carcinoma.  Random colon biopsies reported increased lamina propria, chronic inflammation with surface epithelial alteration and increased intraepithelial lymphocytes consistent with lymphocytic colitis.  Biopsies from the stomach reported chronic  inactive gastritis.  Negative for intestinal metaplasia and H. pylori.  Biopsies from the duodenum was negative.    She was seen by Dr. Wellington who performed a laparoscopic right hemicolectomy on 3/2/2022.  Pathology showed a 3.8 cm mass at the cecum invading through the muscularis propria into the pericolorectal adipose tissue.  No lymphovascular or perineural invasion.  All margins were clear.  The radial margin was 3 cm.  3 out of 26 lymph nodes were positive for metastatic disease.  The largest metastatic focus was 0.7 cm with extranodal extension.  Pathological stage PT3N1B.  The tumor was moderately differentiated, grade 2 adenocarcinoma.    No loss of nuclear expression of MMR proteins.  Low probability of microsatellite instability.  MSI status-microsatellite stable.  This is by PCR.    Normal CEA and 0.77 on the day prior to surgery.    She was seen by Dr. Burton as inpatient and recommended adjuvant chemotherapy with FOLFOX.    Patient initiating cycle 1 FOLFOX 4/18/2022.  Neulasta support given due to baseline neutropenia.    9/27/2020 to cycle 12 FOLFOX (oxaliplatin omitted due to neuropathy).    12/12/2023-CT of the chest abdomen and pelvis-no evidence of metastatic disease.        Interval history:  Kayla returns today for triage visit.  At her last visit she was noted to have worsening neutropenia with an ANC of 850.  She was advised to call and notify us if she started to notice more frequent infections.  About 10 days ago she started to have head congestion, sore throat, cough, fever/chills.  She saw her PCP who tested her for flu a/B, COVID, both negative.  She was started on Levaquin daily, took her last dose of Levaquin yesterday.  She does feel that her symptoms are improving, however today does note that her cough seems more junky.  She has not had any further fever or chills.  She is taking Tessalon Perles and Sudafed currently.  She is worried, she is about to go out of town for trip to [place]  and does not want her symptoms to worsen while she is on her trip.    The following portions of the patient's history were reviewed and updated as appropriate: allergies, current medications, past family history, past medical history, past social history and problem list.    Past Medical History:   Diagnosis Date    Anemia     Anxiety     Bladder infection     Chemotherapy induced nausea and vomiting 04/25/2022    Colitis     Colon cancer     Colonic mass 02/24/2022    Drug therapy     Elevated LFTs 12/05/2022    GERD (gastroesophageal reflux disease)     Headache     Hyperlipidemia     Hypertension     Knee injury, initial encounter-left 07/01/2019    Neutropenia 01/03/2017    Prediabetes 03/04/2019      Past Surgical History:   Procedure Laterality Date    COLON RESECTION Right 03/02/2022    Procedure: LAPAROSCOPIC RIGHT HEMICOLECTOMY WITH DAVINCI ROBOT;  Surgeon: Malcolm Wellington MD;  Location: [facility] OR;  Service: Robotics - DaVinci;  Laterality: Right;    COLONOSCOPY      COLONOSCOPY N/A 3/9/2023    Procedure: COLONOSCOPY TO ANASTAMOSIS WITH COLD BIOPSIES;  Surgeon: Irvin Clayton MD;  Location: [facility] ENDOSCOPY;  Service: Gastroenterology;  Laterality: N/A;  PRE- HISTORY OF COLON CANCER  POST- NORMAL    COLONOSCOPY W/ POLYPECTOMY N/A 02/22/2022    Procedure: COLONOSCOPY INTO CECUM WITH COLD BIOPSIES;  Surgeon: Irvin Clayton MD;  Location: [facility] ENDOSCOPY;  Service: Gastroenterology;  Laterality: N/A;  PRE: DIARRHEA, ANEMIA  POST: ASCENDING COLON MASS    ENDOSCOPY N/A 02/22/2022    Procedure: ESOPHAGOGASTRODUODENOSCOPY WITH BIOPSY;  Surgeon: Irvin Clayton MD;  Location: [facility] ENDOSCOPY;  Service: Gastroenterology;  Laterality: N/A;  PRE: ANEMIA  POST: NORMAL EGD    TONSILLECTOMY      VENOUS ACCESS DEVICE (PORT) INSERTION N/A 04/11/2022    Procedure: INSERTION VENOUS ACCESS DEVICE with subcutaneous port;  Surgeon: Malcolm Wellington MD;  Location: [facility] OR [facility];   "Service: General;  Laterality: N/A;        Family History   Problem Relation Age of Onset    Hypertension Mother     Heart disease Father     Heart attack Father 47        first MI age 47; second MI 57    Hypertension Sister     No Known Problems Brother     Maljohann Hyperthermia Neg Hx       Social History     Socioeconomic History    Marital status:     Number of children: 2   Tobacco Use    Smoking status: Never    Smokeless tobacco: Never   Vaping Use    Vaping status: Never Used   Substance and Sexual Activity    Alcohol use: Not Currently     Comment: Very rarely    Drug use: Never    Sexual activity: Yes     Partners: Male      OB History          2    Para   2    Term   2            AB        Living             SAB        IAB        Ectopic        Molar        Multiple        Live Births   2                 Allergies   Allergen Reactions    Penicillins Rash      Review of systems as mentioned HPI otherwise negative    Objective   Blood pressure 135/82, pulse 101, temperature 97.5 °F (36.4 °C), temperature source Temporal, resp. rate 18, height 157 cm (61.81\"), weight 75.1 kg (165 lb 9.6 oz), SpO2 98%.     Physical Exam  Vitals reviewed.   Constitutional:       General: She is not in acute distress.     Appearance: She is well-developed.   HENT:      Nose: Nose normal.      Mouth/Throat:      Mouth: Mucous membranes are moist.      Pharynx: Oropharynx is clear.   Eyes:      Conjunctiva/sclera: Conjunctivae normal.      Pupils: Pupils are equal, round, and reactive to light.   Cardiovascular:      Rate and Rhythm: Normal rate.   Pulmonary:      Effort: Pulmonary effort is normal. No respiratory distress.   Abdominal:      General: Bowel sounds are normal.   Skin:     General: Skin is warm and dry.      Findings: No rash.   Neurological:      General: No focal deficit present.      Mental Status: She is alert and oriented to person, place, and time. Mental status is at baseline.      Motor: No " weakness.   Psychiatric:         Mood and Affect: Mood normal.         Behavior: Behavior normal.         Thought Content: Thought content normal.         Judgment: Judgment normal.       I have reexamined the patient and the results are consistent with the previously documented exam. DALE Orozco      DIAGNOSTIC DATA:  Results from last 7 days   Lab Units 04/09/24  1306   WBC 10*3/mm3 3.59   NEUTROS ABS 10*3/mm3 1.47*   HEMOGLOBIN g/dL 13.1   HEMATOCRIT % 38.9   PLATELETS 10*3/mm3 267         Results from last 7 days   Lab Units 04/09/24  1306   SODIUM mmol/L 138   POTASSIUM mmol/L 4.1   CHLORIDE mmol/L 103   CO2 mmol/L 26.6   BUN mg/dL 16   CREATININE mg/dL 0.75   CALCIUM mg/dL 9.5   ALBUMIN g/dL 4.2   BILIRUBIN mg/dL 0.2   ALK PHOS U/L 101   ALT (SGPT) U/L 14   AST (SGOT) U/L 16   GLUCOSE mg/dL 90                 No radiology results for the last 30 days.     CT of the chest abdomen and pelvis 12/12/2023-images independently reviewed and interpreted by Dr. Alvarez in detail summarized in the HPI.    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  No lymphovascular or perineural invasion.  All the margins are negative.  Tumor comes to within 3 cm of the radial margin.  3 out of 26 lymph nodes positive for metastatic carcinoma with the largest focus measuring 0.7 cm without extranodal extension.  No tumor deposits identified  PT3N1B  MSI low probability on immunohistochemistry and stable on PCR  No family history of colon cancer  Explained to her that this is stage IIIb colon cancer however since his T3N1 I would consider this low risk stage III.  There is no other high risk features noted on pathology  either.  Given that the tumor is stage III there is definite benefit from adjuvant chemotherapy.  We discussed the 2 available regimens including XELOX and FOLFOX.  Explained to her that based on the IDEA data 3 months of XELOX is noninferior to 6 months of adjuvant chemotherapy however with FOLFOX the noninferiority has not been established.  Explained to her that 6 months of chemotherapy improves disease-free survival by additional 2 to 3% over 3 months.  Discussed the adverse effects of XELOX as well as FOLFOX.  After discussing the pros and cons we have decided to proceed with FOLFOX for total of 6 months but if she is unable to tolerate due to neurotoxicity then we could potentially stop after 3 months of treatment.  Port placed on 4/11/2022  Cycle 1 FOLFOX on 4/18/2022  Cycle 11 FOLFOX 9/13/2022 with a 20% dose reduction of oxaliplatin.  Now experiencing neuropathy, we will discontinue oxaliplatin and just to 5-FU for cycle 12  Seen 10/14/2022 in follow-up and scan review.  CT scan of the chest, abdomen, and pelvis done 10/12/2022 showing a few tiny new lung nodules indeterminant, measuring about 3 to 4 mm in maximum size.  Patient also has enlarging spleen previously 11.3 cm, now measuring 13.6 cm.    12/14/2022-CT of the chest abdomen pelvis shows stable pulmonary nodules.  Screening colonoscopy 3/9/2023-negative.  6/1/2023-CT of the chest abdomen and pelvis-no evidence of metastatic disease.  She will continue with every 3 monthly tumor marker and lab surveillance along with every 6 months scans.  9/7/2023: Patient continues to remain free of any recurrent symptoms.  Tumor markers today is normal at 1.64.  She is already scheduled for CT scans and 3 months for continued surveillance.  12/12/2023-CT scans without any evidence of metastatic disease, CEA normal  3/14/2024: Feeling well without any symptoms of recurrence.  CEA normal at 1.52.  Continue surveillance    *Leukopenia  Predominantly  neutropenia  Mild increase in monocyte count  This is chronic and unchanged  No increased infections  Had a previous work-up including a bone marrow biopsy with no clear etiology  Differential includes nutritional, chronic idiopathic, autoimmune.  She is currently not on any medications which could account for the leukopenia.  No splenomegaly on exam  5/13/2021-flow cytometry negative  LDH normal at 174  Reticulocyte count 1.07%  Ferritin low at 5.7  Iron saturation low at 3%  Folic acid normal at 11.8, B12 415  Rheumatoid factor negative  AVIVA negative  ESR normal at 29  Serum copper normal at 134  12/12/2023-CBC reviewed and WBC 3.13, hemoglobin 13.3 and platelets 213,000, absolute neutrophil count 1.47  3/14/2024-WBC 2.77, ANC 0.85.  She does report having viral respiratory illness about 1 month ago.  Will have her return in 6 weeks for CBC with RN review to monitor.  Reviewed with Dr. Alvarez who is in agreement.  4/9/2024 WBC 3.59, ANC 1.47.    *Anemia  Microcytic  Likely secondary to iron deficiency  4/22/2021 iron saturation 3%, TIBC today 508, ferritin 5.70.  The patient was initiated on oral iron every other day.  8/26/2021, hemoglobin today 10.1.  The patient has been tolerating oral iron every other day.  We will increase oral iron to daily and recheck iron labs in 4 months.  12/16/2021, patient is taking oral iron every day and tolerating this well.  Hemoglobin today 10.0.  Iron saturation 45, TIBC 413, ferritin 10.20.  3/4/2022 ferritin 27.5, iron saturation 5%, TIBC 375, iron studies consistent with iron deficiency  3/24/2022 hemoglobin 10.2  Received 3 doses of Venofer.  9/15/2022-hemoglobin 9.7, low due to chemotherapy  As of 10/14/2022 hemoglobin improving now up to 11.3.  6/1/2023 Labs reviewed and hemoglobin normal at 13.4.  Iron studies reviewed and iron saturation 30%, TIBC 325, ferritin normal at 73  Patient is currently taking iron tablets twice a week.  She can discontinue this and continue  with healthy diet including iron rich food.  9/7/2023: Hemoglobin is normal at 12.6.  Patient is no longer taking oral iron and focusing on healthy food options itching iron  Hemoglobin 13.1.    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  She has been on gabapentin 300 mg nightly at bedtime however her symptoms have not improved.  Recommend that she start gabapentin continue with B complex only.  Neuropathy is likely permanent at this point.  No changes    *Elevated alkaline phosphatase  Alkaline phosphatase has been elevated into the 200s and 300s in the past.  Labs from 6/1/2023 with improved alkaline phosphatase 143.  This improvement is likely secondary to weight loss and improvement in fatty liver changes.    *Utility of circulating tumor DNA  Explained the role of circulating tumor DNA and assessing the risk of relapse since you have been treated with adjuvant chemotherapy.  Explained to her that currently we do not have any evidence that detecting CT DNA early would improve outcomes.  Currently not recommended by any of the guidelines.  However if patient wishes to proceed with obtaining CT DNA we would be able to use [employer] to do so.  After discussing the pros and cons patient and her  decided not to proceed with CT DNA testing today.  Continue surveillance with CT scans and colonoscopy.  12/12/2023-CEA is normal  3/14/2024-CEA normal 1.52    PLAN:   CT scan in 5 weeks.  MD follow-up in 6 weeks to review CT scans.    DALE Orozco  04/09/24

## 2024-04-09 NOTE — TELEPHONE ENCOUNTER
Follow up call to Kayla, Advised her that Dr Alvarez did think she ought to be seen and have labs drawn.  Natalya has openings this afternoon, will bring her in for CBC, CMP.  Recent viral panel was negative. Scheduling notified.

## 2024-05-06 ENCOUNTER — OFFICE VISIT (OUTPATIENT)
Dept: FAMILY MEDICINE CLINIC | Facility: CLINIC | Age: 69
End: 2024-05-06
Payer: COMMERCIAL

## 2024-05-06 VITALS
SYSTOLIC BLOOD PRESSURE: 124 MMHG | WEIGHT: 168 LBS | OXYGEN SATURATION: 99 % | BODY MASS INDEX: 30.91 KG/M2 | DIASTOLIC BLOOD PRESSURE: 82 MMHG | HEIGHT: 62 IN | HEART RATE: 115 BPM

## 2024-05-06 DIAGNOSIS — F41.9 ANXIETY: ICD-10-CM

## 2024-05-06 DIAGNOSIS — K21.9 GASTROESOPHAGEAL REFLUX DISEASE WITHOUT ESOPHAGITIS: ICD-10-CM

## 2024-05-06 DIAGNOSIS — E78.49 OTHER HYPERLIPIDEMIA: Primary | ICD-10-CM

## 2024-05-06 DIAGNOSIS — I10 ESSENTIAL HYPERTENSION: ICD-10-CM

## 2024-05-06 DIAGNOSIS — M81.0 AGE-RELATED OSTEOPOROSIS WITHOUT CURRENT PATHOLOGICAL FRACTURE: ICD-10-CM

## 2024-05-06 PROCEDURE — 3074F SYST BP LT 130 MM HG: CPT | Performed by: FAMILY MEDICINE

## 2024-05-06 PROCEDURE — 99214 OFFICE O/P EST MOD 30 MIN: CPT | Performed by: FAMILY MEDICINE

## 2024-05-06 PROCEDURE — G2211 COMPLEX E/M VISIT ADD ON: HCPCS | Performed by: FAMILY MEDICINE

## 2024-05-06 PROCEDURE — 3079F DIAST BP 80-89 MM HG: CPT | Performed by: FAMILY MEDICINE

## 2024-05-06 RX ORDER — RALOXIFENE HYDROCHLORIDE 60 MG/1
60 TABLET, FILM COATED ORAL DAILY
Qty: 90 TABLET | Refills: 2 | Status: SHIPPED | OUTPATIENT
Start: 2024-05-06 | End: 2025-01-31

## 2024-05-06 RX ORDER — DULOXETIN HYDROCHLORIDE 60 MG/1
60 CAPSULE, DELAYED RELEASE ORAL NIGHTLY
Qty: 90 CAPSULE | Refills: 2 | Status: SHIPPED | OUTPATIENT
Start: 2024-05-06 | End: 2025-01-31

## 2024-05-06 RX ORDER — GABAPENTIN 300 MG/1
CAPSULE ORAL
Qty: 90 CAPSULE | Refills: 0 | Status: CANCELLED | OUTPATIENT
Start: 2024-05-06

## 2024-05-06 RX ORDER — LANOLIN ALCOHOL/MO/W.PET/CERES
50 CREAM (GRAM) TOPICAL DAILY
COMMUNITY

## 2024-05-06 RX ORDER — OMEPRAZOLE 20 MG/1
20 CAPSULE, DELAYED RELEASE ORAL NIGHTLY
Qty: 90 CAPSULE | Refills: 2 | Status: SHIPPED | OUTPATIENT
Start: 2024-05-06 | End: 2025-01-31

## 2024-05-06 RX ORDER — ATORVASTATIN CALCIUM 10 MG/1
10 TABLET, FILM COATED ORAL NIGHTLY
Qty: 90 TABLET | Refills: 2 | Status: SHIPPED | OUTPATIENT
Start: 2024-05-06 | End: 2025-01-31

## 2024-05-06 RX ORDER — METOPROLOL SUCCINATE 50 MG/1
50 TABLET, EXTENDED RELEASE ORAL NIGHTLY
Qty: 90 TABLET | Refills: 2 | Status: SHIPPED | OUTPATIENT
Start: 2024-05-06 | End: 2025-01-31

## 2024-06-06 ENCOUNTER — INFUSION (OUTPATIENT)
Dept: ONCOLOGY | Facility: HOSPITAL | Age: 69
End: 2024-06-06
Payer: COMMERCIAL

## 2024-06-06 ENCOUNTER — HOSPITAL ENCOUNTER (OUTPATIENT)
Dept: CT IMAGING | Facility: HOSPITAL | Age: 69
Discharge: HOME | End: 2024-06-06
Payer: COMMERCIAL

## 2024-06-06 DIAGNOSIS — Z45.2 ENCOUNTER FOR FITTING AND ADJUSTMENT OF VASCULAR CATHETER: Primary | ICD-10-CM

## 2024-06-06 DIAGNOSIS — C18.2 MALIGNANT NEOPLASM OF ASCENDING COLON: ICD-10-CM

## 2024-06-06 LAB
ALBUMIN SERPL-MCNC: 4.2 G/DL (ref 3.5–5.2)
ALBUMIN/GLOB SERPL: 1.8 G/DL
ALP SERPL-CCNC: 90 U/L (ref 39–117)
ALT SERPL W P-5'-P-CCNC: 18 U/L (ref 1–33)
ANION GAP SERPL CALCULATED.3IONS-SCNC: 9.2 MMOL/L (ref 5–15)
AST SERPL-CCNC: 31 U/L (ref 1–32)
BASOPHILS # BLD AUTO: 0.01 10*3/MM3 (ref 0–0.2)
BASOPHILS NFR BLD AUTO: 0.4 % (ref 0–1.5)
BILIRUB SERPL-MCNC: 0.3 MG/DL (ref 0–1.2)
BUN SERPL-MCNC: 13 MG/DL (ref 8–23)
BUN/CREAT SERPL: 18.3 (ref 7–25)
CALCIUM SPEC-SCNC: 9.4 MG/DL (ref 8.6–10.5)
CEA SERPL-MCNC: 1.27 NG/ML
CHLORIDE SERPL-SCNC: 104 MMOL/L (ref 98–107)
CO2 SERPL-SCNC: 25.8 MMOL/L (ref 22–29)
CREAT SERPL-MCNC: 0.71 MG/DL (ref 0.57–1)
DEPRECATED RDW RBC AUTO: 39.8 FL (ref 37–54)
EGFRCR SERPLBLD CKD-EPI 2021: 93.3 ML/MIN/1.73
EOSINOPHIL # BLD AUTO: 0.05 10*3/MM3 (ref 0–0.4)
EOSINOPHIL NFR BLD AUTO: 1.8 % (ref 0.3–6.2)
ERYTHROCYTE [DISTWIDTH] IN BLOOD BY AUTOMATED COUNT: 12.1 % (ref 12.3–15.4)
GLOBULIN UR ELPH-MCNC: 2.4 GM/DL
GLUCOSE SERPL-MCNC: 99 MG/DL (ref 65–99)
HCT VFR BLD AUTO: 39.7 % (ref 34–46.6)
HGB BLD-MCNC: 13.4 G/DL (ref 12–15.9)
IMM GRANULOCYTES # BLD AUTO: 0 10*3/MM3 (ref 0–0.05)
IMM GRANULOCYTES NFR BLD AUTO: 0 % (ref 0–0.5)
LYMPHOCYTES # BLD AUTO: 1.15 10*3/MM3 (ref 0.7–3.1)
LYMPHOCYTES NFR BLD AUTO: 42.3 % (ref 19.6–45.3)
MCH RBC QN AUTO: 30.5 PG (ref 26.6–33)
MCHC RBC AUTO-ENTMCNC: 33.8 G/DL (ref 31.5–35.7)
MCV RBC AUTO: 90.2 FL (ref 79–97)
MONOCYTES # BLD AUTO: 0.32 10*3/MM3 (ref 0.1–0.9)
MONOCYTES NFR BLD AUTO: 11.8 % (ref 5–12)
NEUTROPHILS NFR BLD AUTO: 1.19 10*3/MM3 (ref 1.7–7)
NEUTROPHILS NFR BLD AUTO: 43.7 % (ref 42.7–76)
NRBC BLD AUTO-RTO: 0 /100 WBC (ref 0–0.2)
PLATELET # BLD AUTO: 244 10*3/MM3 (ref 140–450)
PMV BLD AUTO: 9.3 FL (ref 6–12)
POTASSIUM SERPL-SCNC: 4.1 MMOL/L (ref 3.5–5.2)
PROT SERPL-MCNC: 6.6 G/DL (ref 6–8.5)
RBC # BLD AUTO: 4.4 10*6/MM3 (ref 3.77–5.28)
SODIUM SERPL-SCNC: 139 MMOL/L (ref 136–145)
WBC NRBC COR # BLD AUTO: 2.72 10*3/MM3 (ref 3.4–10.8)

## 2024-06-06 PROCEDURE — 82378 CARCINOEMBRYONIC ANTIGEN: CPT

## 2024-06-06 PROCEDURE — 74177 CT ABD & PELVIS W/CONTRAST: CPT

## 2024-06-06 PROCEDURE — 80053 COMPREHEN METABOLIC PANEL: CPT

## 2024-06-06 PROCEDURE — 85025 COMPLETE CBC W/AUTO DIFF WBC: CPT | Performed by: INTERNAL MEDICINE

## 2024-06-06 PROCEDURE — 0 DIATRIZOATE MEGLUMINE & SODIUM PER 1 ML: Performed by: INTERNAL MEDICINE

## 2024-06-06 PROCEDURE — 71260 CT THORAX DX C+: CPT

## 2024-06-06 PROCEDURE — 25510000001 IOPAMIDOL 61 % SOLUTION: Performed by: INTERNAL MEDICINE

## 2024-06-06 RX ORDER — SODIUM CHLORIDE 0.9 % (FLUSH) 0.9 %
10 SYRINGE (ML) INJECTION AS NEEDED
Status: DISCONTINUED | OUTPATIENT
Start: 2024-06-06 | End: 2024-06-06 | Stop reason: HOSPADM

## 2024-06-06 RX ORDER — SODIUM CHLORIDE 0.9 % (FLUSH) 0.9 %
10 SYRINGE (ML) INJECTION AS NEEDED
OUTPATIENT
Start: 2024-06-06

## 2024-06-06 RX ORDER — HEPARIN SODIUM (PORCINE) LOCK FLUSH IV SOLN 100 UNIT/ML 100 UNIT/ML
500 SOLUTION INTRAVENOUS AS NEEDED
OUTPATIENT
Start: 2024-06-06

## 2024-06-06 RX ADMIN — IOPAMIDOL 90 ML: 612 INJECTION, SOLUTION INTRAVENOUS at 11:19

## 2024-06-06 RX ADMIN — Medication 10 ML: at 10:36

## 2024-06-06 RX ADMIN — DIATRIZOATE MEGLUMINE AND DIATRIZOATE SODIUM 30 ML: 660; 100 LIQUID ORAL; RECTAL at 10:18

## 2024-06-10 DIAGNOSIS — C18.2 MALIGNANT NEOPLASM OF ASCENDING COLON: ICD-10-CM

## 2024-06-11 RX ORDER — GABAPENTIN 300 MG/1
CAPSULE ORAL
Qty: 90 CAPSULE | Refills: 0 | OUTPATIENT
Start: 2024-06-11

## 2024-06-12 DIAGNOSIS — C18.2 MALIGNANT NEOPLASM OF ASCENDING COLON: ICD-10-CM

## 2024-06-13 ENCOUNTER — OFFICE VISIT (OUTPATIENT)
Dept: ONCOLOGY | Facility: CLINIC | Age: 69
End: 2024-06-13
Payer: COMMERCIAL

## 2024-06-13 VITALS
TEMPERATURE: 98.5 F | HEART RATE: 98 BPM | DIASTOLIC BLOOD PRESSURE: 78 MMHG | HEIGHT: 62 IN | SYSTOLIC BLOOD PRESSURE: 127 MMHG | WEIGHT: 168 LBS | OXYGEN SATURATION: 96 % | BODY MASS INDEX: 30.91 KG/M2

## 2024-06-13 DIAGNOSIS — C18.2 MALIGNANT NEOPLASM OF ASCENDING COLON: ICD-10-CM

## 2024-06-13 RX ORDER — GABAPENTIN 300 MG/1
CAPSULE ORAL
Qty: 90 CAPSULE | Refills: 0 | OUTPATIENT
Start: 2024-06-13

## 2024-06-13 RX ORDER — GABAPENTIN 300 MG/1
CAPSULE ORAL
Qty: 90 CAPSULE | Refills: 3 | Status: SHIPPED | OUTPATIENT
Start: 2024-06-13

## 2024-06-13 NOTE — PROGRESS NOTES
Subjective   Kayla Adkins is a 68 y.o. female.  Referred by Dr. Rivas for evaluation of leukopenia    History of Present Illness   Ms. Adkins is a 66-year-old postmenopausal  who has been referred by Dr. Rivas for evaluation of leukopenia, neutropenia.  Patient reports that she has had longstanding leukopenia and her white blood cell count normally ranges in the threes.  On her most recent visit with Dr. Rivas white blood cell count was noted to be 2560 on 3/2/2021 and 2660 on 4/5/2021.  This prompted a referral to hematology.  Patient reports that she had a extensive work-up including a bone marrow biopsy for the same condition about 17 years ago.  She thinks that this was performed at [facility].  According to patient no clear etiology was determined after the work-up.  She was recommended to continue with periodic labs.  No treatment was required.    She was seen in December and had continued on oral iron.  She was tolerating that fairly well.  However she had worsening of colitis for about 2 months prior to that visit.  She was scheduled for colonoscopy in January 2022.    She had a routine surveillance colonoscopy performed by Dr. Whitlock on 2/22/2022.  On this there was an infiltrative nonobstructing medium-sized mass in the proximal ascending colon 1 to 2 cm distal to the cecum.  This was partially circumferential involving 1 foot of the lumen.  Biopsies were taken.  There is also mildly congested mucosa in the entire colon.  Random biopsies were taken.  She also had an EGD at the same time which reported normal esophagus, stomach and duodenum.  Pathology evaluation from the biopsy reported superficial fragments of at least intramucosal carcinoma.  Random colon biopsies reported increased lamina propria, chronic inflammation with surface epithelial alteration and increased intraepithelial lymphocytes consistent with lymphocytic colitis.  Biopsies from the stomach reported chronic  inactive gastritis.  Negative for intestinal metaplasia and H. pylori.  Biopsies from the duodenum was negative.    She was seen by Dr. Wellington who performed a laparoscopic right hemicolectomy on 3/2/2022.  Pathology showed a 3.8 cm mass at the cecum invading through the muscularis propria into the pericolorectal adipose tissue.  No lymphovascular or perineural invasion.  All margins were clear.  The radial margin was 3 cm.  3 out of 26 lymph nodes were positive for metastatic disease.  The largest metastatic focus was 0.7 cm with extranodal extension.  Pathological stage PT3N1B.  The tumor was moderately differentiated, grade 2 adenocarcinoma.    No loss of nuclear expression of MMR proteins.  Low probability of microsatellite instability.  MSI status-microsatellite stable.  This is by PCR.    Normal CEA and 0.77 on the day prior to surgery.    She was seen by Dr. Burton as inpatient and recommended adjuvant chemotherapy with FOLFOX.    Patient initiating cycle 1 FOLFOX 4/18/2022.  Neulasta support given due to baseline neutropenia.    9/27/2020 to cycle 12 FOLFOX (oxaliplatin omitted due to neuropathy).    12/12/2023-CT of the chest abdomen and pelvis-no evidence of metastatic disease.    6/11/2024-CT of the chest abdomen and pelvis with 4 mm right middle lobe pulmonary nodule which is new but other than that no concerns for metastatic disease.      Interval history:  Returns today for follow-up.  She denies any new concerns at this time.  They recently sold their home and doing inspection it was noted that there was radon and patient's  is concerned about the risk of malignancies with radon.  She denies any recent respiratory infections.  Presents with restaging scans.  Denies any changes in bowel movements.  Last colonoscopy was in March 2023 which was negative.  She is due for the next colonoscopy in March 2026.      The following portions of the patient's history were reviewed and updated as appropriate:  allergies, current medications, past family history, past medical history, past social history and problem list.    Past Medical History:   Diagnosis Date    Anemia     Anxiety     Bladder infection     Chemotherapy induced nausea and vomiting 04/25/2022    Colitis     Colon cancer     Colonic mass 02/24/2022    Drug therapy     Elevated LFTs 12/05/2022    GERD (gastroesophageal reflux disease)     Headache     Hyperlipidemia     Hypertension     Knee injury, initial encounter-left 07/01/2019    Neutropenia 01/03/2017    Prediabetes 03/04/2019      Past Surgical History:   Procedure Laterality Date    COLON RESECTION Right 03/02/2022    Procedure: LAPAROSCOPIC RIGHT HEMICOLECTOMY WITH DAVINCI ROBOT;  Surgeon: Malcolm Wellington MD;  Location: [facility] MAIN OR;  Service: Robotics - DaVinci;  Laterality: Right;    COLONOSCOPY      COLONOSCOPY N/A 3/9/2023    Procedure: COLONOSCOPY TO ANASTAMOSIS WITH COLD BIOPSIES;  Surgeon: Irvin Clayton MD;  Location: [facility] ENDOSCOPY;  Service: Gastroenterology;  Laterality: N/A;  PRE- HISTORY OF COLON CANCER  POST- NORMAL    COLONOSCOPY W/ POLYPECTOMY N/A 02/22/2022    Procedure: COLONOSCOPY INTO CECUM WITH COLD BIOPSIES;  Surgeon: Irvin Clayton MD;  Location: [facility] ENDOSCOPY;  Service: Gastroenterology;  Laterality: N/A;  PRE: DIARRHEA, ANEMIA  POST: ASCENDING COLON MASS    ENDOSCOPY N/A 02/22/2022    Procedure: ESOPHAGOGASTRODUODENOSCOPY WITH BIOPSY;  Surgeon: Irvin Clayton MD;  Location: [facility] ENDOSCOPY;  Service: Gastroenterology;  Laterality: N/A;  PRE: ANEMIA  POST: NORMAL EGD    TONSILLECTOMY      VENOUS ACCESS DEVICE (PORT) INSERTION N/A 04/11/2022    Procedure: INSERTION VENOUS ACCESS DEVICE with subcutaneous port;  Surgeon: Malcolm Wellington MD;  [place] [facility] [place] [facility] [place]  Service: General;  Laterality: N/A;        Family History   Problem Relation Age of Onset    Hypertension Mother     Heart disease Father     Heart attack  "Father 47        first MI age 47; second MI 57    Hypertension Sister     No Known Problems Brother     Gerhard Hyperthermia Neg Hx       Social History     Socioeconomic History    Marital status:     Number of children: 2   Tobacco Use    Smoking status: Never    Smokeless tobacco: Never   Vaping Use    Vaping status: Never Used   Substance and Sexual Activity    Alcohol use: Not Currently     Comment: Very rarely    Drug use: Never    Sexual activity: Yes     Partners: Male      OB History          2    Para   2    Term   2            AB        Living             SAB        IAB        Ectopic        Molar        Multiple        Live Births   2                 Allergies   Allergen Reactions    Penicillins Rash      Review of systems as mentioned HPI otherwise negative    Objective   Blood pressure 127/78, pulse 98, temperature 98.5 °F (36.9 °C), temperature source Oral, height 157 cm (61.81\"), weight 76.2 kg (168 lb), SpO2 96%.     Physical Exam  Vitals reviewed.   Constitutional:       General: She is not in acute distress.     Appearance: She is well-developed.   HENT:      Nose: Nose normal.      Mouth/Throat:      Mouth: Mucous membranes are moist.      Pharynx: Oropharynx is clear.   Eyes:      Conjunctiva/sclera: Conjunctivae normal.      Pupils: Pupils are equal, round, and reactive to light.   Cardiovascular:      Rate and Rhythm: Normal rate.   Pulmonary:      Effort: Pulmonary effort is normal. No respiratory distress.   Abdominal:      General: Bowel sounds are normal.   Skin:     General: Skin is warm and dry.      Findings: No rash.   Neurological:      General: No focal deficit present.      Mental Status: She is alert and oriented to person, place, and time. Mental status is at baseline.      Motor: No weakness.   Psychiatric:         Mood and Affect: Mood normal.         Behavior: Behavior normal.         Thought Content: Thought content normal.         Judgment: Judgment normal. "       I have reexamined the patient and the results are consistent with the previously documented exam. Mago Alvarez MD      DIAGNOSTIC DATA:                              CT Chest With Contrast Diagnostic    Result Date: 6/11/2024  Impression: 1.  A few subsix 4 mm nodule within the right middle lobe not fully seen on 12/12/2023, indeterminate. Follow-up chest CT in 3 months is recommended to ensure stability exclude metastatic disease. 2.  No CT findings of metastatic disease within the abdomen or pelvis. 3.  Other findings as above.    This report was finalized on 6/11/2024 10:41 AM by Dr. Senthil Bond M.D on Workstation: [id number]      CT Abdomen Pelvis With Contrast    Result Date: 6/11/2024  Impression: 1.  A few subsix 4 mm nodule within the right middle lobe not fully seen on 12/12/2023, indeterminate. Follow-up chest CT in 3 months is recommended to ensure stability exclude metastatic disease. 2.  No CT findings of metastatic disease within the abdomen or pelvis. 3.  Other findings as above.    This report was finalized on 6/11/2024 10:41 AM by Dr. Senthil Bond M.D on Workstation: [employer]        CT of the chest abdomen pelvis, images independently reviewed and interpreted by me in detail summarized in the HPI.    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  No lymphovascular or perineural invasion.  All the margins are negative.  Tumor comes to within 3 cm of the radial margin.  3 out of 26 lymph nodes positive for metastatic carcinoma with the largest focus measuring 0.7 cm  without extranodal extension.  No tumor deposits identified  PT3N1B  MSI low probability on immunohistochemistry and stable on PCR  No family history of colon cancer  Explained to her that this is stage IIIb colon cancer however since his T3N1 I would consider this low risk stage III.  There is no other high risk features noted on pathology either.  Given that the tumor is stage III there is definite benefit from adjuvant chemotherapy.  We discussed the 2 available regimens including XELOX and FOLFOX.  Explained to her that based on the IDEA data 3 months of XELOX is noninferior to 6 months of adjuvant chemotherapy however with FOLFOX the noninferiority has not been established.  Explained to her that 6 months of chemotherapy improves disease-free survival by additional 2 to 3% over 3 months.  Discussed the adverse effects of XELOX as well as FOLFOX.  After discussing the pros and cons we have decided to proceed with FOLFOX for total of 6 months but if she is unable to tolerate due to neurotoxicity then we could potentially stop after 3 months of treatment.  Port placed on 4/11/2022  Cycle 1 FOLFOX on 4/18/2022  Cycle 11 FOLFOX 9/13/2022 with a 20% dose reduction of oxaliplatin.  Now experiencing neuropathy, we will discontinue oxaliplatin and just to 5-FU for cycle 12  Seen 10/14/2022 in follow-up and scan review.  CT scan of the chest, abdomen, and pelvis done 10/12/2022 showing a few tiny new lung nodules indeterminant, measuring about 3 to 4 mm in maximum size.  Patient also has enlarging spleen previously 11.3 cm, now measuring 13.6 cm.    12/14/2022-CT of the chest abdomen pelvis shows stable pulmonary nodules.  Screening colonoscopy 3/9/2023-negative.  6/1/2023-CT of the chest abdomen and pelvis-no evidence of metastatic disease.  She will continue with every 3 monthly tumor marker and lab surveillance along with every 6 months scans.  9/7/2023: Patient continues to remain free of any recurrent symptoms.   Tumor markers today is normal at 1.64.  She is already scheduled for CT scans and 3 months for continued surveillance.  12/12/2023-CT scans without any evidence of metastatic disease, CEA normal  6/30/2024-CT scans from 6/11/2024 reviewed and without any evidence of malignancy.  There is a small right middle lobe pulmonary nodule which will be followed up with a CT chest in 3 months.  Continue surveillance.    *Leukopenia  Predominantly neutropenia  Mild increase in monocyte count  This is chronic and unchanged  No increased infections  Had a previous work-up including a bone marrow biopsy with no clear etiology  Differential includes nutritional, chronic idiopathic, autoimmune.  She is currently not on any medications which could account for the leukopenia.  No splenomegaly on exam  5/13/2021-flow cytometry negative  LDH normal at 174  Reticulocyte count 1.07%  Ferritin low at 5.7  Iron saturation low at 3%  Folic acid normal at 11.8, B12 415  Rheumatoid factor negative  AVIVA negative  ESR normal at 29  Serum copper normal at 134  12/12/2023-CBC reviewed and WBC 3.13, hemoglobin 13.3 and platelets 213,000, absolute neutrophil count 1.47  3/14/2024-WBC 2.77, ANC 0.85.  She does report having viral respiratory illness about 1 month ago.  Will have her return in 6 weeks for CBC with RN review to monitor.  Reviewed with Dr. Alvarez who is in agreement.  6/6/2024-CBC reviewed and WBC 2.72, hemoglobin 13.4 and platelets 244    *Anemia  Microcytic  Likely secondary to iron deficiency  4/22/2021 iron saturation 3%, TIBC today 508, ferritin 5.70.  The patient was initiated on oral iron every other day.  8/26/2021, hemoglobin today 10.1.  The patient has been tolerating oral iron every other day.  We will increase oral iron to daily and recheck iron labs in 4 months.  12/16/2021, patient is taking oral iron every day and tolerating this well.  Hemoglobin today 10.0.  Iron saturation 45, TIBC 413, ferritin 10.20.  3/4/2022  ferritin 27.5, iron saturation 5%, TIBC 375, iron studies consistent with iron deficiency  3/24/2022 hemoglobin 10.2  Received 3 doses of Venofer.  9/15/2022-hemoglobin 9.7, low due to chemotherapy  As of 10/14/2022 hemoglobin improving now up to 11.3.  6/1/2023 Labs reviewed and hemoglobin normal at 13.4.  Iron studies reviewed and iron saturation 30%, TIBC 325, ferritin normal at 73  Patient is currently taking iron tablets twice a week.  She can discontinue this and continue with healthy diet including iron rich food.  9/7/2023: Hemoglobin is normal at 12.6.  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  She has been on gabapentin 300 mg nightly at bedtime however her symptoms have not improved.  Recommend that she start gabapentin continue with B complex only.  Neuropathy is likely permanent at this point.  Unchanged, gabapentin filled today    *Elevated alkaline phosphatase  Alkaline phosphatase has been elevated into the 200s and 300s in the past.  Labs from 6/1/2023 with improved alkaline phosphatase 143.  This improvement is likely secondary to weight loss and improvement in fatty liver changes.    *Utility of circulating tumor DNA  Explained the role of circulating tumor DNA and assessing the risk of relapse since you have been treated with adjuvant chemotherapy.  Explained to her that currently we do not have any evidence that detecting CT DNA early would improve outcomes.  Currently not recommended by any of the guidelines.  However if patient wishes to proceed with obtaining CT DNA we would be able to use [employer] to do so.  After discussing the pros and cons patient and her   decided not to proceed with CT DNA testing today.  Continue surveillance with CT scans and colonoscopy.  12/12/2023-CEA is normal  3/14/2024-CEA normal 1.52  6/6/2024-CEA normal    PLAN:   CT chest in 3 months  MD follow-up in 3 months.    Mago Alvarez MD  06/13/24

## 2024-07-22 ENCOUNTER — OFFICE VISIT (OUTPATIENT)
Dept: FAMILY MEDICINE CLINIC | Facility: CLINIC | Age: 69
End: 2024-07-22
Payer: COMMERCIAL

## 2024-07-22 VITALS
HEART RATE: 112 BPM | SYSTOLIC BLOOD PRESSURE: 146 MMHG | HEIGHT: 62 IN | OXYGEN SATURATION: 98 % | DIASTOLIC BLOOD PRESSURE: 74 MMHG | BODY MASS INDEX: 30.91 KG/M2 | WEIGHT: 168 LBS

## 2024-07-22 DIAGNOSIS — N30.01 ACUTE CYSTITIS WITH HEMATURIA: Primary | ICD-10-CM

## 2024-07-22 DIAGNOSIS — R30.0 BURNING WITH URINATION: ICD-10-CM

## 2024-07-22 LAB
BILIRUB BLD-MCNC: NEGATIVE MG/DL
CLARITY, POC: ABNORMAL
COLOR UR: ABNORMAL
EXPIRATION DATE: ABNORMAL
GLUCOSE UR STRIP-MCNC: NEGATIVE MG/DL
KETONES UR QL: NEGATIVE
LEUKOCYTE EST, POC: ABNORMAL
Lab: ABNORMAL
NITRITE UR-MCNC: POSITIVE MG/ML
PH UR: 5.5 [PH] (ref 5–8)
PROT UR STRIP-MCNC: ABNORMAL MG/DL
RBC # UR STRIP: ABNORMAL /UL
SP GR UR: 1.01 (ref 1–1.03)
UROBILINOGEN UR QL: NORMAL

## 2024-07-22 PROCEDURE — 99213 OFFICE O/P EST LOW 20 MIN: CPT | Performed by: FAMILY MEDICINE

## 2024-07-22 PROCEDURE — 3077F SYST BP >= 140 MM HG: CPT | Performed by: FAMILY MEDICINE

## 2024-07-22 PROCEDURE — 81003 URINALYSIS AUTO W/O SCOPE: CPT | Performed by: FAMILY MEDICINE

## 2024-07-22 PROCEDURE — 1126F AMNT PAIN NOTED NONE PRSNT: CPT | Performed by: FAMILY MEDICINE

## 2024-07-22 PROCEDURE — 3078F DIAST BP <80 MM HG: CPT | Performed by: FAMILY MEDICINE

## 2024-07-22 RX ORDER — SULFAMETHOXAZOLE AND TRIMETHOPRIM 800; 160 MG/1; MG/1
1 TABLET ORAL 2 TIMES DAILY
Qty: 6 TABLET | Refills: 0 | Status: SHIPPED | OUTPATIENT
Start: 2024-07-22 | End: 2024-07-25

## 2024-07-22 NOTE — PROGRESS NOTES
"Chief Complaint  Urinary Tract Infection (Has been taking Azo, has felt bad for 5 days)    Subjective        Urinary Tract Infection           The patient presents for evaluation of UTI.    The patient began experiencing symptoms approximately 5 days ago, indicative of a urinary tract infection (UTI). Despite attempts to alleviate the symptoms with AZO, the symptoms persisted. Her last dose of AZO was taken at approximately 7:30 this morning. Her last UTI episode occurred a few years ago. She denies experiencing any back pain. She reports experiencing dysuria and increased frequency of urination, but denies any discharge.           Vital Signs:   Vitals:    07/22/24 1438   BP: 146/74   Pulse: 112   SpO2: 98%   Weight: 76.2 kg (168 lb)   Height: 157 cm (61.8\")            6/13/2024     1:15 PM   PHQ-2/PHQ-9 Depression Screening   Little Interest or Pleasure in Doing Things 0-->not at all   Feeling Down, Depressed or Hopeless 0-->not at all   PHQ-9: Brief Depression Severity Measure Score 0                 Physical Exam  Vitals reviewed.   Abdominal:      Tenderness: There is no abdominal tenderness. There is no right CVA tenderness or left CVA tenderness.   Neurological:      Mental Status: She is alert.                 The following data was reviewed by: Emmett Rivas MD on 07/22/2024:  Urine Culture - Urine, Urine, Clean Catch (07/22/2024 15:13)     Results  Laboratory Studies  Urine test showed evidence of infection, nitrites, and a trace of blood.                Assessment and Plan     Orders Placed This Encounter   Procedures    Urine Culture - Urine, Urine, Clean Catch    POCT urinalysis dipstick, automated     New Medications Ordered This Visit   Medications    sulfamethoxazole-trimethoprim (BACTRIM DS,SEPTRA DS) 800-160 MG per tablet     Sig: Take 1 tablet by mouth 2 (Two) Times a Day for 3 days.     Dispense:  6 tablet     Refill:  0        1. Acute cystitis.  The patient is advised to persist with an " increased fluid intake, consuming 6 to 8 glasses of water daily, and an occasional intake of cranberry juice. A urine culture has been sent off for further analysis. A 3-day course of sulfamethoxazole with trimethoprim, to be taken twice daily, has been prescribed.    Follow-up  Follow-up visits will be scheduled as necessary.       Patient was given instructions and counseling regarding her condition or for health maintenance advice. Please see specific information pulled into the AVS if appropriate.     Patient or patient representative verbalized consent for the use of Ambient Listening during the visit with  Emmett Rivas MD for chart documentation. 7/22/2024  15:14 EDT

## 2024-07-25 LAB
BACTERIA UR CULT: ABNORMAL
BACTERIA UR CULT: ABNORMAL
OTHER ANTIBIOTIC SUSC ISLT: ABNORMAL

## 2024-08-29 ENCOUNTER — INFUSION (OUTPATIENT)
Dept: ONCOLOGY | Facility: HOSPITAL | Age: 69
End: 2024-08-29
Payer: COMMERCIAL

## 2024-08-29 ENCOUNTER — HOSPITAL ENCOUNTER (OUTPATIENT)
Dept: CT IMAGING | Facility: HOSPITAL | Age: 69
Discharge: HOME | End: 2024-08-29
Admitting: INTERNAL MEDICINE
Payer: COMMERCIAL

## 2024-08-29 DIAGNOSIS — C18.2 MALIGNANT NEOPLASM OF ASCENDING COLON: ICD-10-CM

## 2024-08-29 LAB
ALBUMIN SERPL-MCNC: 3.9 G/DL (ref 3.5–5.2)
ALBUMIN/GLOB SERPL: 1.5 G/DL
ALP SERPL-CCNC: 86 U/L (ref 39–117)
ALT SERPL W P-5'-P-CCNC: 21 U/L (ref 1–33)
ANION GAP SERPL CALCULATED.3IONS-SCNC: 8.4 MMOL/L (ref 5–15)
AST SERPL-CCNC: 20 U/L (ref 1–32)
BASOPHILS # BLD AUTO: 0.01 10*3/MM3 (ref 0–0.2)
BASOPHILS NFR BLD AUTO: 0.4 % (ref 0–1.5)
BILIRUB SERPL-MCNC: 0.3 MG/DL (ref 0–1.2)
BUN SERPL-MCNC: 13 MG/DL (ref 8–23)
BUN/CREAT SERPL: 16 (ref 7–25)
CALCIUM SPEC-SCNC: 9 MG/DL (ref 8.6–10.5)
CEA SERPL-MCNC: 1.28 NG/ML
CHLORIDE SERPL-SCNC: 106 MMOL/L (ref 98–107)
CO2 SERPL-SCNC: 25.6 MMOL/L (ref 22–29)
CREAT SERPL-MCNC: 0.81 MG/DL (ref 0.57–1)
DEPRECATED RDW RBC AUTO: 41.1 FL (ref 37–54)
EGFRCR SERPLBLD CKD-EPI 2021: 79.7 ML/MIN/1.73
EOSINOPHIL # BLD AUTO: 0.09 10*3/MM3 (ref 0–0.4)
EOSINOPHIL NFR BLD AUTO: 3.2 % (ref 0.3–6.2)
ERYTHROCYTE [DISTWIDTH] IN BLOOD BY AUTOMATED COUNT: 12.2 % (ref 12.3–15.4)
GLOBULIN UR ELPH-MCNC: 2.6 GM/DL
GLUCOSE SERPL-MCNC: 109 MG/DL (ref 65–99)
HCT VFR BLD AUTO: 38.9 % (ref 34–46.6)
HGB BLD-MCNC: 12.8 G/DL (ref 12–15.9)
IMM GRANULOCYTES # BLD AUTO: 0.01 10*3/MM3 (ref 0–0.05)
IMM GRANULOCYTES NFR BLD AUTO: 0.4 % (ref 0–0.5)
LYMPHOCYTES # BLD AUTO: 1.23 10*3/MM3 (ref 0.7–3.1)
LYMPHOCYTES NFR BLD AUTO: 43.9 % (ref 19.6–45.3)
MCH RBC QN AUTO: 30 PG (ref 26.6–33)
MCHC RBC AUTO-ENTMCNC: 32.9 G/DL (ref 31.5–35.7)
MCV RBC AUTO: 91.3 FL (ref 79–97)
MONOCYTES # BLD AUTO: 0.32 10*3/MM3 (ref 0.1–0.9)
MONOCYTES NFR BLD AUTO: 11.4 % (ref 5–12)
NEUTROPHILS NFR BLD AUTO: 1.14 10*3/MM3 (ref 1.7–7)
NEUTROPHILS NFR BLD AUTO: 40.7 % (ref 42.7–76)
NRBC BLD AUTO-RTO: 0 /100 WBC (ref 0–0.2)
PLATELET # BLD AUTO: 260 10*3/MM3 (ref 140–450)
PMV BLD AUTO: 9.5 FL (ref 6–12)
POTASSIUM SERPL-SCNC: 4.3 MMOL/L (ref 3.5–5.2)
PROT SERPL-MCNC: 6.5 G/DL (ref 6–8.5)
RBC # BLD AUTO: 4.26 10*6/MM3 (ref 3.77–5.28)
SODIUM SERPL-SCNC: 140 MMOL/L (ref 136–145)
WBC NRBC COR # BLD AUTO: 2.8 10*3/MM3 (ref 3.4–10.8)

## 2024-08-29 PROCEDURE — 80053 COMPREHEN METABOLIC PANEL: CPT | Performed by: INTERNAL MEDICINE

## 2024-08-29 PROCEDURE — 71260 CT THORAX DX C+: CPT

## 2024-08-29 PROCEDURE — 25510000001 IOPAMIDOL 61 % SOLUTION: Performed by: INTERNAL MEDICINE

## 2024-08-29 PROCEDURE — 85025 COMPLETE CBC W/AUTO DIFF WBC: CPT | Performed by: INTERNAL MEDICINE

## 2024-08-29 PROCEDURE — 82378 CARCINOEMBRYONIC ANTIGEN: CPT | Performed by: INTERNAL MEDICINE

## 2024-08-29 RX ORDER — IOPAMIDOL 612 MG/ML
100 INJECTION, SOLUTION INTRAVASCULAR
Status: COMPLETED | OUTPATIENT
Start: 2024-08-29 | End: 2024-08-29

## 2024-08-29 RX ADMIN — IOPAMIDOL 75 ML: 612 INJECTION, SOLUTION INTRAVENOUS at 09:27

## 2024-09-03 ENCOUNTER — OFFICE VISIT (OUTPATIENT)
Dept: ONCOLOGY | Facility: CLINIC | Age: 69
End: 2024-09-03
Payer: COMMERCIAL

## 2024-09-03 VITALS
SYSTOLIC BLOOD PRESSURE: 146 MMHG | TEMPERATURE: 98.9 F | WEIGHT: 171.9 LBS | RESPIRATION RATE: 16 BRPM | HEIGHT: 62 IN | HEART RATE: 106 BPM | DIASTOLIC BLOOD PRESSURE: 84 MMHG | BODY MASS INDEX: 31.63 KG/M2 | OXYGEN SATURATION: 97 %

## 2024-09-03 DIAGNOSIS — C18.2 MALIGNANT NEOPLASM OF ASCENDING COLON: Primary | ICD-10-CM

## 2024-09-03 PROCEDURE — 3077F SYST BP >= 140 MM HG: CPT | Performed by: INTERNAL MEDICINE

## 2024-09-03 PROCEDURE — G2211 COMPLEX E/M VISIT ADD ON: HCPCS | Performed by: INTERNAL MEDICINE

## 2024-09-03 PROCEDURE — 1126F AMNT PAIN NOTED NONE PRSNT: CPT | Performed by: INTERNAL MEDICINE

## 2024-09-03 PROCEDURE — 99214 OFFICE O/P EST MOD 30 MIN: CPT | Performed by: INTERNAL MEDICINE

## 2024-09-03 PROCEDURE — 3079F DIAST BP 80-89 MM HG: CPT | Performed by: INTERNAL MEDICINE

## 2024-09-03 PROCEDURE — 1159F MED LIST DOCD IN RCRD: CPT | Performed by: INTERNAL MEDICINE

## 2024-09-03 PROCEDURE — 1160F RVW MEDS BY RX/DR IN RCRD: CPT | Performed by: INTERNAL MEDICINE

## 2024-09-03 NOTE — LETTER
September 4, 2024    Emmett Rivas MD  [address] [address]  [facility] [address]    Patient: Kayla Adkins   YOB: 1955   Date of Visit: 9/3/2024       Dear Dr. Rob MD:  Thank you so much for helping me in the care of of Mrs. Adkins.  I was reading your recent correspondence and noted that recent CT examination showed a 1.1 cm thyroid nodule.  Do we need to intervene on this at this time?  Let me know if you would wish me to follow-up on this new finding.      Sincerely,      Mago Alvarez MD        CC: No Recipients

## 2024-09-03 NOTE — PROGRESS NOTES
Subjective   Kayla Adkins is a 68 y.o. female.  Referred by Dr. Rivas for evaluation of leukopenia    History of Present Illness   Ms. Adkins is a 66-year-old postmenopausal  who has been referred by Dr. Rivas for evaluation of leukopenia, neutropenia.  Patient reports that she has had longstanding leukopenia and her white blood cell count normally ranges in the threes.  On her most recent visit with Dr. Rivas white blood cell count was noted to be 2560 on 3/2/2021 and 2660 on 4/5/2021.  This prompted a referral to hematology.  Patient reports that she had a extensive work-up including a bone marrow biopsy for the same condition about 17 years ago.  She thinks that this was performed at [facility].  According to patient no clear etiology was determined after the work-up.  She was recommended to continue with periodic labs.  No treatment was required.    She was seen in December and had continued on oral iron.  She was tolerating that fairly well.  However she had worsening of colitis for about 2 months prior to that visit.  She was scheduled for colonoscopy in January 2022.    She had a routine surveillance colonoscopy performed by Dr. Whitlock on 2/22/2022.  On this there was an infiltrative nonobstructing medium-sized mass in the proximal ascending colon 1 to 2 cm distal to the cecum.  This was partially circumferential involving 1 foot of the lumen.  Biopsies were taken.  There is also mildly congested mucosa in the entire colon.  Random biopsies were taken.  She also had an EGD at the same time which reported normal esophagus, stomach and duodenum.  Pathology evaluation from the biopsy reported superficial fragments of at least intramucosal carcinoma.  Random colon biopsies reported increased lamina propria, chronic inflammation with surface epithelial alteration and increased intraepithelial lymphocytes consistent with lymphocytic colitis.  Biopsies from the stomach reported chronic  inactive gastritis.  Negative for intestinal metaplasia and H. pylori.  Biopsies from the duodenum was negative.    She was seen by Dr. Wellington who performed a laparoscopic right hemicolectomy on 3/2/2022.  Pathology showed a 3.8 cm mass at the cecum invading through the muscularis propria into the pericolorectal adipose tissue.  No lymphovascular or perineural invasion.  All margins were clear.  The radial margin was 3 cm.  3 out of 26 lymph nodes were positive for metastatic disease.  The largest metastatic focus was 0.7 cm with extranodal extension.  Pathological stage PT3N1B.  The tumor was moderately differentiated, grade 2 adenocarcinoma.    No loss of nuclear expression of MMR proteins.  Low probability of microsatellite instability.  MSI status-microsatellite stable.  This is by PCR.    Normal CEA and 0.77 on the day prior to surgery.    She was seen by Dr. Burton as inpatient and recommended adjuvant chemotherapy with FOLFOX.    Patient initiating cycle 1 FOLFOX 4/18/2022.  Neulasta support given due to baseline neutropenia.    9/27/2020 to cycle 12 FOLFOX (oxaliplatin omitted due to neuropathy).    12/12/2023-CT of the chest abdomen and pelvis-no evidence of metastatic disease.    6/11/2024-CT of the chest abdomen and pelvis with 4 mm right middle lobe pulmonary nodule which is new but other than that no concerns for metastatic disease.      Interval history:  Returns today for follow-up.  She denies any new concerns at this time.  They recently sold their home and doing inspection it was noted that there was radon and patient's  is concerned about the risk of malignancies with radon.  She denies any recent respiratory infections.  Presents with restaging scans.  Denies any changes in bowel movements.  Last colonoscopy was in March 2023 which was negative.  She is due for the next colonoscopy in March 2026.      The following portions of the patient's history were reviewed and updated as appropriate:  allergies, current medications, past family history, past medical history, past social history and problem list.    Past Medical History:   Diagnosis Date    Anemia     Anxiety     Bladder infection     Chemotherapy induced nausea and vomiting 04/25/2022    Colitis     Colon cancer     Colonic mass 02/24/2022    Drug therapy     Elevated LFTs 12/05/2022    GERD (gastroesophageal reflux disease)     Headache     Hyperlipidemia     Hypertension     Knee injury, initial encounter-left 07/01/2019    Neutropenia 01/03/2017    Prediabetes 03/04/2019      Past Surgical History:   Procedure Laterality Date    COLON RESECTION Right 03/02/2022    Procedure: LAPAROSCOPIC RIGHT HEMICOLECTOMY WITH DAVINCI ROBOT;  Surgeon: Malcolm Wellington MD;  Location: [facility] MAIN OR;  Service: Robotics - DaVinci;  Laterality: Right;    COLONOSCOPY      COLONOSCOPY N/A 3/9/2023    Procedure: COLONOSCOPY TO ANASTAMOSIS WITH COLD BIOPSIES;  Surgeon: Irvin Clayton MD;  Location: [facility] ENDOSCOPY;  Service: Gastroenterology;  Laterality: N/A;  PRE- HISTORY OF COLON CANCER  POST- NORMAL    COLONOSCOPY W/ POLYPECTOMY N/A 02/22/2022    Procedure: COLONOSCOPY INTO CECUM WITH COLD BIOPSIES;  Surgeon: Irvin Clayton MD;  Location: [facility] ENDOSCOPY;  Service: Gastroenterology;  Laterality: N/A;  PRE: DIARRHEA, ANEMIA  POST: ASCENDING COLON MASS    ENDOSCOPY N/A 02/22/2022    Procedure: ESOPHAGOGASTRODUODENOSCOPY WITH BIOPSY;  Surgeon: Irvin Clayton MD;  Location: [facility] ENDOSCOPY;  Service: Gastroenterology;  Laterality: N/A;  PRE: ANEMIA  POST: NORMAL EGD    TONSILLECTOMY      VENOUS ACCESS DEVICE (PORT) INSERTION N/A 04/11/2022    Procedure: INSERTION VENOUS ACCESS DEVICE with subcutaneous port;  Surgeon: Malcolm Wellington MD;  Location: [facility] OR [facility];  Service: General;  Laterality: N/A;        Family History   Problem Relation Age of Onset    Hypertension Mother     Heart disease Father     Heart attack  "Father 47        first MI age 47; second MI 57    Hypertension Sister     No Known Problems Brother     Gerhard Hyperthermia Neg Hx       Social History     Socioeconomic History    Marital status:     Number of children: 2   Tobacco Use    Smoking status: Never    Smokeless tobacco: Never   Vaping Use    Vaping status: Never Used   Substance and Sexual Activity    Alcohol use: Not Currently     Comment: Very rarely    Drug use: Never    Sexual activity: Yes     Partners: Male      OB History          2    Para   2    Term   2            AB        Living             SAB        IAB        Ectopic        Molar        Multiple        Live Births   2                 Allergies   Allergen Reactions    Penicillins Rash      Review of systems as mentioned HPI otherwise negative    Objective   Blood pressure 146/84, pulse 106, temperature 98.9 °F (37.2 °C), temperature source Oral, resp. rate 16, height 157 cm (61.8\"), weight 78 kg (171 lb 14.4 oz), SpO2 97%.     Physical Exam  Vitals reviewed.   Constitutional:       General: She is not in acute distress.     Appearance: She is well-developed.   HENT:      Nose: Nose normal.      Mouth/Throat:      Mouth: Mucous membranes are moist.      Pharynx: Oropharynx is clear.   Eyes:      Conjunctiva/sclera: Conjunctivae normal.      Pupils: Pupils are equal, round, and reactive to light.   Cardiovascular:      Rate and Rhythm: Normal rate.   Pulmonary:      Effort: Pulmonary effort is normal. No respiratory distress.   Abdominal:      General: Bowel sounds are normal.   Skin:     General: Skin is warm and dry.      Findings: No rash.   Neurological:      General: No focal deficit present.      Mental Status: She is alert and oriented to person, place, and time. Mental status is at baseline.      Motor: No weakness.   Psychiatric:         Mood and Affect: Mood normal.         Behavior: Behavior normal.         Thought Content: Thought content normal.         " Judgment: Judgment normal.       I have reexamined the patient and the results are consistent with the previously documented exam. Mago Alvarez MD      DIAGNOSTIC DATA:  Results from last 7 days   Lab Units 08/29/24  0903   WBC 10*3/mm3 2.80*   NEUTROS ABS 10*3/mm3 1.14*   HEMOGLOBIN g/dL 12.8   HEMATOCRIT % 38.9   PLATELETS 10*3/mm3 260           Results from last 7 days   Lab Units 08/29/24  0903   SODIUM mmol/L 140   POTASSIUM mmol/L 4.3   CHLORIDE mmol/L 106   CO2 mmol/L 25.6   BUN mg/dL 13   CREATININE mg/dL 0.81   CALCIUM mg/dL 9.0   ALBUMIN g/dL 3.9   BILIRUBIN mg/dL 0.3   ALK PHOS U/L 86   ALT (SGPT) U/L 21   AST (SGOT) U/L 20   GLUCOSE mg/dL 109*                   CT Chest With Contrast Diagnostic    Result Date: 8/29/2024   A few scattered small nodules are similar dating back to 2022. A few tiny nodules in the right middle lobe are favored to reflect areas of mucous plugging, some of which have improved, others are new. Recommend attention on follow-up.  This report was finalized on 8/29/2024 1:27 PM by Dr. Mirian Ac M.D on Workstation: [id number]        CT of the chest abdomen pelvis, images independently reviewed and interpreted by me in detail summarized in the HPI.    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  No lymphovascular or perineural invasion.  All the margins are negative.  Tumor comes to within 3 cm of the radial margin.  3 out of 26 lymph nodes positive for metastatic carcinoma with the largest focus measuring 0.7 cm without extranodal extension.  No tumor  deposits identified  PT3N1B  MSI low probability on immunohistochemistry and stable on PCR  No family history of colon cancer  Explained to her that this is stage IIIb colon cancer however since his T3N1 I would consider this low risk stage III.  There is no other high risk features noted on pathology either.  Given that the tumor is stage III there is definite benefit from adjuvant chemotherapy.  We discussed the 2 available regimens including XELOX and FOLFOX.  Explained to her that based on the IDEA data 3 months of XELOX is noninferior to 6 months of adjuvant chemotherapy however with FOLFOX the noninferiority has not been established.  Explained to her that 6 months of chemotherapy improves disease-free survival by additional 2 to 3% over 3 months.  Discussed the adverse effects of XELOX as well as FOLFOX.  After discussing the pros and cons we have decided to proceed with FOLFOX for total of 6 months but if she is unable to tolerate due to neurotoxicity then we could potentially stop after 3 months of treatment.  Port placed on 4/11/2022  Cycle 1 FOLFOX on 4/18/2022  Cycle 11 FOLFOX 9/13/2022 with a 20% dose reduction of oxaliplatin.  Now experiencing neuropathy, we will discontinue oxaliplatin and just to 5-FU for cycle 12  Seen 10/14/2022 in follow-up and scan review.  CT scan of the chest, abdomen, and pelvis done 10/12/2022 showing a few tiny new lung nodules indeterminant, measuring about 3 to 4 mm in maximum size.  Patient also has enlarging spleen previously 11.3 cm, now measuring 13.6 cm.    12/14/2022-CT of the chest abdomen pelvis shows stable pulmonary nodules.  Screening colonoscopy 3/9/2023-negative.  6/1/2023-CT of the chest abdomen and pelvis-no evidence of metastatic disease.  She will continue with every 3 monthly tumor marker and lab surveillance along with every 6 months scans.  9/7/2023: Patient continues to remain free of any recurrent symptoms.  Tumor markers today is normal at 1.64.  She  is already scheduled for CT scans and 3 months for continued surveillance.  12/12/2023-CT scans without any evidence of metastatic disease, CEA normal  6/30/2024-CT scans from 6/11/2024 reviewed and without any evidence of malignancy.  There is a small right middle lobe pulmonary nodule which will be followed up with a CT chest in 3 months.  8/29/2024-CT of the chest with 1.1 cm hypodense left thyroid nodule.  No pathologically enlarged thoracic lymph nodes.  Hepatic steatosis noted.  Small nodule in the right lower lobe and small nodule in the anterior lingula stable compared to 2/25/2022.  Small nodular opacities abutting the pleural surface in the left upper lobe are stable.  Tiny nodular opacities in the right middle lobe are favored to reflect areas of focal mucous plugging.  Some of these have improved and others are new.  8/29/2024-CBC and CMP reviewed and leukopenia/neutropenia stable.  Otherwise no laboratory abnormalities.  CEA normal  Currently no evidence of recurrent disease.  Continue surveillance.  We discussed going to annual scans however patient felt comfortable proceeding with scans every 6 months and hence will repeat CT of the chest abdomen pelvis in 6 months    *Leukopenia  Predominantly neutropenia  Mild increase in monocyte count  This is chronic and unchanged  No increased infections  Had a previous work-up including a bone marrow biopsy with no clear etiology  Differential includes nutritional, chronic idiopathic, autoimmune.  She is currently not on any medications which could account for the leukopenia.  No splenomegaly on exam  5/13/2021-flow cytometry negative  LDH normal at 174  Reticulocyte count 1.07%  Ferritin low at 5.7  Iron saturation low at 3%  Folic acid normal at 11.8, B12 415  Rheumatoid factor negative  AVIVA negative  ESR normal at 29  Serum copper normal at 134  12/12/2023-CBC reviewed and WBC 3.13, hemoglobin 13.3 and platelets 213,000, absolute neutrophil count  1.47  3/14/2024-WBC 2.77, ANC 0.85.  She does report having viral respiratory illness about 1 month ago.  Will have her return in 6 weeks for CBC with RN review to monitor.  Reviewed with Dr. Alvarez who is in agreement.  6/6/2024-CBC reviewed and WBC 2.72, hemoglobin 13.4 and platelets 244  8/29/2024-WBC count 2.8 and absolute neutrophil count 1.24.    *Anemia  Microcytic  Likely secondary to iron deficiency  4/22/2021 iron saturation 3%, TIBC today 508, ferritin 5.70.  The patient was initiated on oral iron every other day.  8/26/2021, hemoglobin today 10.1.  The patient has been tolerating oral iron every other day.  We will increase oral iron to daily and recheck iron labs in 4 months.  12/16/2021, patient is taking oral iron every day and tolerating this well.  Hemoglobin today 10.0.  Iron saturation 45, TIBC 413, ferritin 10.20.  3/4/2022 ferritin 27.5, iron saturation 5%, TIBC 375, iron studies consistent with iron deficiency  3/24/2022 hemoglobin 10.2  Received 3 doses of Venofer.  9/15/2022-hemoglobin 9.7, low due to chemotherapy  As of 10/14/2022 hemoglobin improving now up to 11.3.  6/1/2023 Labs reviewed and hemoglobin normal at 13.4.  Iron studies reviewed and iron saturation 30%, TIBC 325, ferritin normal at 73  Patient is currently taking iron tablets twice a week.  She can discontinue this and continue with healthy diet including iron rich food.  9/7/2023: Hemoglobin is normal at 12.6.  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  She has been on gabapentin 300 mg nightly at bedtime however her symptoms have not  improved.  Recommend that she start gabapentin continue with B complex only.  Neuropathy is likely permanent at this point.  Unchanged, continue gabapentin    *Elevated alkaline phosphatase  Alkaline phosphatase has been elevated into the 200s and 300s in the past.  Labs from 6/1/2023 with improved alkaline phosphatase 143.  This improvement is likely secondary to weight loss and improvement in fatty liver changes.    *Utility of circulating tumor DNA  Explained the role of circulating tumor DNA and assessing the risk of relapse since you have been treated with adjuvant chemotherapy.  Explained to her that currently we do not have any evidence that detecting CT DNA early would improve outcomes.  Currently not recommended by any of the guidelines.  However if patient wishes to proceed with obtaining CT DNA we would be able to use [employer] to do so.  After discussing the pros and cons patient and her  decided not to proceed with CT DNA testing   Continue surveillance with CT scans and colonoscopy.  12/12/2023-CEA is normal  3/14/2024-CEA normal 1.52  6/6/2024-CEA normal  8/29/2024-CEA normal    PLAN:   CT of the chest abdomen and pelvis in 6 months  MD follow-up in 6 months  Labs prior to MD follow-up  APRN in 3 months  Referral to general surgery for port removal    Mago Alvarez MD  09/03/24

## 2024-09-04 PROBLEM — E04.1 THYROID NODULE: Status: ACTIVE | Noted: 2024-09-04

## 2024-09-20 ENCOUNTER — OFFICE VISIT (OUTPATIENT)
Dept: SURGERY | Facility: CLINIC | Age: 69
End: 2024-09-20
Payer: COMMERCIAL

## 2024-09-20 VITALS
WEIGHT: 172 LBS | DIASTOLIC BLOOD PRESSURE: 86 MMHG | BODY MASS INDEX: 31.65 KG/M2 | SYSTOLIC BLOOD PRESSURE: 146 MMHG | HEIGHT: 62 IN

## 2024-09-20 DIAGNOSIS — Z95.828 PORT-A-CATH IN PLACE: Primary | ICD-10-CM

## 2024-09-25 DIAGNOSIS — E04.1 THYROID NODULE: Primary | ICD-10-CM

## 2024-10-02 ENCOUNTER — APPOINTMENT (OUTPATIENT)
Dept: WOMENS IMAGING | Facility: HOSPITAL | Age: 69
End: 2024-10-02
Payer: COMMERCIAL

## 2024-10-02 PROCEDURE — 77063 BREAST TOMOSYNTHESIS BI: CPT | Performed by: RADIOLOGY

## 2024-10-02 PROCEDURE — 77067 SCR MAMMO BI INCL CAD: CPT | Performed by: RADIOLOGY

## 2024-10-11 ENCOUNTER — DOCUMENTATION (OUTPATIENT)
Dept: FAMILY MEDICINE CLINIC | Facility: CLINIC | Age: 69
End: 2024-10-11
Payer: COMMERCIAL

## 2024-10-11 DIAGNOSIS — E04.1 THYROID NODULE: Primary | ICD-10-CM

## 2024-11-06 ENCOUNTER — OFFICE VISIT (OUTPATIENT)
Dept: FAMILY MEDICINE CLINIC | Facility: CLINIC | Age: 69
End: 2024-11-06
Payer: COMMERCIAL

## 2024-11-06 VITALS
HEART RATE: 98 BPM | HEIGHT: 62 IN | SYSTOLIC BLOOD PRESSURE: 128 MMHG | OXYGEN SATURATION: 99 % | DIASTOLIC BLOOD PRESSURE: 78 MMHG | BODY MASS INDEX: 31.47 KG/M2 | WEIGHT: 171 LBS

## 2024-11-06 DIAGNOSIS — M81.0 AGE-RELATED OSTEOPOROSIS WITHOUT CURRENT PATHOLOGICAL FRACTURE: ICD-10-CM

## 2024-11-06 DIAGNOSIS — F41.9 ANXIETY: ICD-10-CM

## 2024-11-06 DIAGNOSIS — K21.9 GASTROESOPHAGEAL REFLUX DISEASE WITHOUT ESOPHAGITIS: ICD-10-CM

## 2024-11-06 DIAGNOSIS — E78.49 OTHER HYPERLIPIDEMIA: ICD-10-CM

## 2024-11-06 DIAGNOSIS — I10 ESSENTIAL HYPERTENSION: Primary | ICD-10-CM

## 2024-11-06 DIAGNOSIS — D70.8 OTHER NEUTROPENIA: ICD-10-CM

## 2024-11-06 PROCEDURE — 1126F AMNT PAIN NOTED NONE PRSNT: CPT | Performed by: FAMILY MEDICINE

## 2024-11-06 PROCEDURE — G2211 COMPLEX E/M VISIT ADD ON: HCPCS | Performed by: FAMILY MEDICINE

## 2024-11-06 PROCEDURE — 3074F SYST BP LT 130 MM HG: CPT | Performed by: FAMILY MEDICINE

## 2024-11-06 PROCEDURE — 99214 OFFICE O/P EST MOD 30 MIN: CPT | Performed by: FAMILY MEDICINE

## 2024-11-06 PROCEDURE — 3078F DIAST BP <80 MM HG: CPT | Performed by: FAMILY MEDICINE

## 2024-11-06 RX ORDER — ATORVASTATIN CALCIUM 10 MG/1
10 TABLET, FILM COATED ORAL NIGHTLY
Qty: 90 TABLET | Refills: 2 | Status: SHIPPED | OUTPATIENT
Start: 2024-11-06 | End: 2025-08-03

## 2024-11-06 RX ORDER — DULOXETIN HYDROCHLORIDE 60 MG/1
60 CAPSULE, DELAYED RELEASE ORAL NIGHTLY
Qty: 90 CAPSULE | Refills: 2 | Status: SHIPPED | OUTPATIENT
Start: 2024-11-06 | End: 2025-08-03

## 2024-11-06 RX ORDER — METOPROLOL SUCCINATE 50 MG/1
50 TABLET, EXTENDED RELEASE ORAL NIGHTLY
Qty: 90 TABLET | Refills: 2 | Status: SHIPPED | OUTPATIENT
Start: 2024-11-06 | End: 2025-08-03

## 2024-11-06 RX ORDER — RALOXIFENE HYDROCHLORIDE 60 MG/1
60 TABLET, FILM COATED ORAL DAILY
Qty: 90 TABLET | Refills: 2 | Status: SHIPPED | OUTPATIENT
Start: 2024-11-06 | End: 2025-08-03

## 2024-11-06 NOTE — ASSESSMENT & PLAN NOTE
The current medical regimen is effective;  continue present plan and medications.    Orders:    atorvastatin (LIPITOR) 10 MG tablet; Take 1 tablet by mouth Every Night for 270 days.    Lipid Panel With / Chol / HDL Ratio; Future    CK; Future

## 2024-11-06 NOTE — ASSESSMENT & PLAN NOTE
The current medical regimen is effective;  continue present plan and medications.    Orders:    metoprolol succinate XL (TOPROL-XL) 50 MG 24 hr tablet; Take 1 tablet by mouth Every Night for 270 days.    TSH; Future    CBC & Differential; Future    Comprehensive Metabolic Panel; Future

## 2024-11-06 NOTE — ASSESSMENT & PLAN NOTE
The current medical regimen is effective;  continue present plan and medications.    Orders:    raloxifene (EVISTA) 60 MG tablet; Take 1 tablet by mouth Daily for 270 days.

## 2024-11-06 NOTE — ASSESSMENT & PLAN NOTE
The current medical regimen is effective;  continue present plan and medications.    Orders:    omeprazole (priLOSEC) 20 MG capsule; Take 1 capsule by mouth Every Night for 270 days.

## 2024-11-06 NOTE — ASSESSMENT & PLAN NOTE
The current medical regimen is effective;  continue present plan and medications.    Orders:    DULoxetine (CYMBALTA) 60 MG capsule; Take 1 capsule by mouth Every Night for 270 days.

## 2024-11-06 NOTE — PROGRESS NOTES
"Chief Complaint  Follow-up (6 month), Hyperlipidemia, Osteoporosis, Heartburn, Hypertension, and Anxiety    Subjective        Follow-up  Conditions present:  Anxiety, Hyperlipidemia and GERD    Hyperlipidemia    Osteoporosis    Heartburn    Hypertension  Associated symptoms include anxiety.   Anxiety          The patient presents for a follow-up visit.    She has discontinued the use of gabapentin due to weight gain, which was prescribed for tingling sensations in her hands and feet resulting from chemotherapy. She reports no noticeable difference since stopping the medication. Her current regimen includes duloxetine, vitamin B6, and omeprazole. She has also stopped taking iron supplements and aspirin. For headaches, she occasionally takes Excedrin.           Vital Signs:   Vitals:    11/06/24 1326   BP: 128/78   Pulse: 98   SpO2: 99%   Weight: 77.6 kg (171 lb)   Height: 157 cm (61.8\")            6/13/2024     1:15 PM   PHQ-2/PHQ-9 Depression Screening   Retired Little Interest or Pleasure in Doing Things 0-->not at all   Retired Feeling Down, Depressed or Hopeless 0-->not at all   Retired PHQ-9: Brief Depression Severity Measure Score 0                 Physical Exam  Vitals reviewed.   Constitutional:       General: She is not in acute distress.     Appearance: She is obese.   Eyes:      General: Lids are normal.      Conjunctiva/sclera: Conjunctivae normal.   Neck:      Vascular: No carotid bruit.      Trachea: No tracheal deviation.   Cardiovascular:      Rate and Rhythm: Normal rate and regular rhythm.      Heart sounds: Normal heart sounds. No murmur heard.  Pulmonary:      Effort: Pulmonary effort is normal.      Breath sounds: Normal breath sounds.   Skin:     General: Skin is warm and dry.   Neurological:      Mental Status: She is alert. She is not disoriented.   Psychiatric:         Speech: Speech normal.         Behavior: Behavior normal. Behavior is cooperative.          Vital Signs  Blood pressure is " 128/78.       The following data was reviewed by: Emmett Rivas MD on 11/06/2024:      Results  Laboratory Studies  Sodium 141, potassium 4.9, chloride 104, calcium 9.6. BUN 14, creatinine 0.82. TSH 0.978. Total cholesterol 170, HDL 58, LDL 85, triglycerides 156. White blood cell count 2.7. Hemoglobin 13.7.                Assessment and Plan     Assessment & Plan  Essential hypertension    The current medical regimen is effective;  continue present plan and medications.    Orders:    metoprolol succinate XL (TOPROL-XL) 50 MG 24 hr tablet; Take 1 tablet by mouth Every Night for 270 days.    TSH; Future    CBC & Differential; Future    Comprehensive Metabolic Panel; Future    Other hyperlipidemia     The current medical regimen is effective;  continue present plan and medications.    Orders:    atorvastatin (LIPITOR) 10 MG tablet; Take 1 tablet by mouth Every Night for 270 days.    Lipid Panel With / Chol / HDL Ratio; Future    CK; Future    Anxiety  The current medical regimen is effective;  continue present plan and medications.    Orders:    DULoxetine (CYMBALTA) 60 MG capsule; Take 1 capsule by mouth Every Night for 270 days.    Gastroesophageal reflux disease without esophagitis  The current medical regimen is effective;  continue present plan and medications.    Orders:    omeprazole (priLOSEC) 20 MG capsule; Take 1 capsule by mouth Every Night for 270 days.    Age-related osteoporosis without current pathological fracture  The current medical regimen is effective;  continue present plan and medications.    Orders:    raloxifene (EVISTA) 60 MG tablet; Take 1 tablet by mouth Daily for 270 days.    Other neutropenia  Continue to monitor neutropenia with serial labs twice a year           Return in about 6 months (around 5/6/2025) for Medicare Wellness & regular visit, [id number] min.     Patient was given instructions and counseling regarding her condition or for health maintenance advice. Please see specific  information pulled into the AVS if appropriate.     Patient or patient representative verbalized consent for the use of Ambient Listening during the visit with  Emmett Rivas MD for chart documentation. 11/6/2024  13:49 EST

## 2024-11-14 ENCOUNTER — TELEMEDICINE (OUTPATIENT)
Dept: FAMILY MEDICINE CLINIC | Facility: CLINIC | Age: 69
End: 2024-11-14
Payer: COMMERCIAL

## 2024-11-14 VITALS — BODY MASS INDEX: 31.48 KG/M2 | HEIGHT: 62 IN

## 2024-11-14 DIAGNOSIS — J06.9 ACUTE URI: Primary | ICD-10-CM

## 2024-11-14 PROCEDURE — 99213 OFFICE O/P EST LOW 20 MIN: CPT | Performed by: FAMILY MEDICINE

## 2024-11-14 PROCEDURE — 1126F AMNT PAIN NOTED NONE PRSNT: CPT | Performed by: FAMILY MEDICINE

## 2024-11-14 RX ORDER — DEXTROMETHORPHAN POLISTIREX 30 MG/5ML
30 SUSPENSION ORAL EVERY 12 HOURS PRN
COMMUNITY
Start: 2024-11-14

## 2024-11-14 RX ORDER — ACETAMINOPHEN 500 MG
1000 TABLET ORAL 3 TIMES DAILY PRN
COMMUNITY
Start: 2024-11-14

## 2024-11-14 RX ORDER — ECHINACEA PURPUREA EXTRACT 125 MG
2 TABLET ORAL EVERY 4 HOURS PRN
COMMUNITY
Start: 2024-11-14

## 2024-11-14 RX ORDER — DOXYCYCLINE HYCLATE 100 MG
100 TABLET ORAL 2 TIMES DAILY
Qty: 20 TABLET | Refills: 0 | Status: SHIPPED | OUTPATIENT
Start: 2024-11-14 | End: 2024-11-24

## 2024-11-14 RX ORDER — FEXOFENADINE HCL 180 MG/1
180 TABLET ORAL DAILY PRN
COMMUNITY
Start: 2024-11-14

## 2024-11-14 NOTE — PROGRESS NOTES
"Mode of Visit: Video  Location of patient: -HOME-  Location of provider: [facility]  You have chosen to receive care through a telehealth visit.  The patient has signed the video visit consent form.  The visit included audio and video interaction. No technical issues occurred during this visit.  Subjective    Chief Complaint   Patient presents with    Cough    Nasal Congestion        Cough          The patient presents for evaluation of congestion, headache, watery eyes, and runny nose.    She began experiencing symptoms of congestion, headache, watery eyes, and a runny nose last Saturday. She also reports having sweats but denies any fever or joint pain. A COVID-19 test was conducted on Tuesday, which returned a negative result. She has a mild cough with minimal sputum production, which is not green in color. She is currently taking Tylenol to manage her aches and pains. Her sore throat has improved today.    IMMUNIZATIONS  She is up to date with all of her immunizations.     Review of Systems   Respiratory:  Positive for cough.                 Objective    PE  Vitals:    11/14/24 0908   Height: 157 cm (61.8\")     Physical Exam   Constitutional: She appears well-developed. No distress.   Patient has congested voice and looks mildly ill.   Eyes: Conjunctivae are normal. No scleral icterus.   Neck: Neck normal appearance.  Pulmonary/Chest: Effort normal.  No respiratory distress. She no audible wheeze...  Neurological: She is alert.   Skin: Skin is dry.   Psychiatric: She has a normal mood and affect.        Results  Laboratory Studies  Covid test came back negative.     Assessment/Plan     Acute URI  Patient has history of low white blood cell count.  We will treat with doxycycline 100 mg twice daily x 10 days.  Regular symptomatic care as outlined below.  Follow-up on as-needed basis.  Orders:    doxycycline (VIBRAMYICN) 100 MG tablet; Take 1 tablet by mouth 2 (Two) Times a Day for 10 days. No dairy products " within 2 hours of a dose    acetaminophen (TYLENOL) 500 MG tablet; Take 2 tablets by mouth 3 (Three) Times a Day As Needed for Mild Pain (aches, pain or fever). Maximum daily dose not to exceed 3000 mg/day    benzocaine-menthol (CEPACOL) 15-3.6 MG lozenge lozenge; Dissolve 1 each in the mouth Every 4 (Four) Hours As Needed (sore throat).    dextromethorphan polistirex ER (Delsym) 30 MG/5ML Suspension Extended Release oral suspension; Take 5 mL by mouth Every 12 (Twelve) Hours As Needed (cough). To suppress cough    sodium chloride 0.65 % nasal spray; Administer 2 sprays into the nostril(s) as directed by provider Every 4 (Four) Hours As Needed for Congestion (nasal stuffiness).    fexofenadine (ALLEGRA) 180 MG tablet; Take 1 tablet by mouth Daily As Needed (sneezing, runny nose, itchy eyes, post nasal drip).       Patient or patient representative verbalized consent for the use of Ambient Listening during the visit with  Emmett Rivas MD for chart documentation. 11/14/2024  09:23 EST

## 2024-12-16 NOTE — PROGRESS NOTES
Subjective   Kayla Adkins is a 69 y.o. female.  Referred by Dr. Rivas for evaluation of leukopenia    History of Present Illness   Ms. Adkins is a 66-year-old postmenopausal  who has been referred by Dr. Rivas for evaluation of leukopenia, neutropenia.  Patient reports that she has had longstanding leukopenia and her white blood cell count normally ranges in the threes.  On her most recent visit with Dr. Rivas white blood cell count was noted to be 2560 on 3/2/2021 and 2660 on 4/5/2021.  This prompted a referral to hematology.  Patient reports that she had a extensive work-up including a bone marrow biopsy for the same condition about 17 years ago.  She thinks that this was performed at [facility].  According to patient no clear etiology was determined after the work-up.  She was recommended to continue with periodic labs.  No treatment was required.    She was seen in December and had continued on oral iron.  She was tolerating that fairly well.  However she had worsening of colitis for about 2 months prior to that visit.  She was scheduled for colonoscopy in January 2022.    She had a routine surveillance colonoscopy performed by Dr. Whitlock on 2/22/2022.  On this there was an infiltrative nonobstructing medium-sized mass in the proximal ascending colon 1 to 2 cm distal to the cecum.  This was partially circumferential involving 1 foot of the lumen.  Biopsies were taken.  There is also mildly congested mucosa in the entire colon.  Random biopsies were taken.  She also had an EGD at the same time which reported normal esophagus, stomach and duodenum.  Pathology evaluation from the biopsy reported superficial fragments of at least intramucosal carcinoma.  Random colon biopsies reported increased lamina propria, chronic inflammation with surface epithelial alteration and increased intraepithelial lymphocytes consistent with lymphocytic colitis.  Biopsies from the stomach reported chronic  inactive gastritis.  Negative for intestinal metaplasia and H. pylori.  Biopsies from the duodenum was negative.    She was seen by Dr. Wellington who performed a laparoscopic right hemicolectomy on 3/2/2022.  Pathology showed a 3.8 cm mass at the cecum invading through the muscularis propria into the pericolorectal adipose tissue.  No lymphovascular or perineural invasion.  All margins were clear.  The radial margin was 3 cm.  3 out of 26 lymph nodes were positive for metastatic disease.  The largest metastatic focus was 0.7 cm with extranodal extension.  Pathological stage PT3N1B.  The tumor was moderately differentiated, grade 2 adenocarcinoma.    No loss of nuclear expression of MMR proteins.  Low probability of microsatellite instability.  MSI status-microsatellite stable.  This is by PCR.    Normal CEA and 0.77 on the day prior to surgery.    She was seen by Dr. Burton as inpatient and recommended adjuvant chemotherapy with FOLFOX.    Patient initiating cycle 1 FOLFOX 4/18/2022.  Neulasta support given due to baseline neutropenia.    9/27/2020 to cycle 12 FOLFOX (oxaliplatin omitted due to neuropathy).    12/12/2023-CT of the chest abdomen and pelvis-no evidence of metastatic disease.    6/11/2024-CT of the chest abdomen and pelvis with 4 mm right middle lobe pulmonary nodule which is new but other than that no concerns for metastatic disease.      Interval history:  Returns today for follow-up.  She has been doing very well.  Appetite adequate.  Denies any bowel changes.  Denies any recent infections.  Last colonoscopy March 2023, this was negative.  Anticipating next colonoscopy March 2026.  She is very excited today, her daughter is currently in labor with her second child which is a boy.  She plans to leave her appointment today to go be with her daughter.    The following portions of the patient's history were reviewed and updated as appropriate: allergies, current medications, past family history, past medical  history, past social history and problem list.    Past Medical History:   Diagnosis Date    Anemia     Anxiety     Bladder infection     Chemotherapy induced nausea and vomiting 04/25/2022    Colitis     Colon cancer     Colonic mass 02/24/2022    Drug therapy     Elevated LFTs 12/05/2022    GERD (gastroesophageal reflux disease)     Headache     Hyperlipidemia     Hypertension     Knee injury, initial encounter-left 07/01/2019    Neutropenia 01/03/2017    Prediabetes 03/04/2019      Past Surgical History:   Procedure Laterality Date    COLON RESECTION Right 03/02/2022    Procedure: LAPAROSCOPIC RIGHT HEMICOLECTOMY WITH DAVINCI ROBOT;  Surgeon: Malcolm Wellington MD;  Location: [facility] MAIN OR;  Service: Robotics - DaVinci;  Laterality: Right;    COLONOSCOPY      COLONOSCOPY N/A 3/9/2023    Procedure: COLONOSCOPY TO ANASTAMOSIS WITH COLD BIOPSIES;  Surgeon: Irvin Clayton MD;  Location: [facility] ENDOSCOPY;  Service: Gastroenterology;  Laterality: N/A;  PRE- HISTORY OF COLON CANCER  POST- NORMAL    COLONOSCOPY W/ POLYPECTOMY N/A 02/22/2022    Procedure: COLONOSCOPY INTO CECUM WITH COLD BIOPSIES;  Surgeon: Irvin Clayton MD;  Location: [facility] ENDOSCOPY;  Service: Gastroenterology;  Laterality: N/A;  PRE: DIARRHEA, ANEMIA  POST: ASCENDING COLON MASS    ENDOSCOPY N/A 02/22/2022    Procedure: ESOPHAGOGASTRODUODENOSCOPY WITH BIOPSY;  Surgeon: Irvin Clayton MD;  Location: [facility] ENDOSCOPY;  Service: Gastroenterology;  Laterality: N/A;  PRE: ANEMIA  POST: NORMAL EGD    TONSILLECTOMY      VENOUS ACCESS DEVICE (PORT) INSERTION N/A 04/11/2022    Procedure: INSERTION VENOUS ACCESS DEVICE with subcutaneous port;  Surgeon: Malcolm Wellington MD;  Location: [facility] OR [facility];  Service: General;  Laterality: N/A;        Family History   Problem Relation Age of Onset    Hypertension Mother     Cancer Mother     Heart disease Father     Heart attack Father 47        first MI age 47; second MI 57     "Hypertension Sister     No Known Problems Brother     Gerhard Hyperthermia Neg Hx       Social History     Socioeconomic History    Marital status:     Number of children: 2   Tobacco Use    Smoking status: Never    Smokeless tobacco: Never   Vaping Use    Vaping status: Never Used   Substance and Sexual Activity    Alcohol use: Not Currently     Comment: Very rarely    Drug use: Never    Sexual activity: Yes     Partners: Male      OB History          2    Para   2    Term   2            AB        Living             SAB        IAB        Ectopic        Molar        Multiple        Live Births   2                 Allergies   Allergen Reactions    Penicillins Rash     Beta lactam allergy details  Antibiotic reaction: rash  Age at reaction: adult  Dose to reaction time: unknown  Reason for antibiotic: unknown  Epinephrine required for reaction?: unknown  Tolerated antibiotics: unknown         Review of systems as mentioned HPI otherwise negative    Objective   Blood pressure 144/83, pulse 112, temperature 98.3 °F (36.8 °C), temperature source Oral, resp. rate 20, height 157 cm (61.81\"), weight 78.3 kg (172 lb 9.6 oz), SpO2 96%.     Physical Exam  Vitals reviewed.   Constitutional:       General: She is not in acute distress.     Appearance: She is well-developed.   HENT:      Nose: Nose normal.      Mouth/Throat:      Mouth: Mucous membranes are moist.      Pharynx: Oropharynx is clear.   Eyes:      Conjunctiva/sclera: Conjunctivae normal.      Pupils: Pupils are equal, round, and reactive to light.   Cardiovascular:      Rate and Rhythm: Normal rate.   Pulmonary:      Effort: Pulmonary effort is normal. No respiratory distress.   Abdominal:      General: Bowel sounds are normal.   Skin:     General: Skin is warm and dry.      Findings: No rash.   Neurological:      General: No focal deficit present.      Mental Status: She is alert and oriented to person, place, and time. Mental status is at " baseline.      Motor: No weakness.   Psychiatric:         Mood and Affect: Mood normal.         Behavior: Behavior normal.         Thought Content: Thought content normal.         Judgment: Judgment normal.       I have reexamined the patient and the results are consistent with the previously documented exam. DALE Orozco      DIAGNOSTIC DATA:  Results from last 7 days   Lab Units 12/19/24  1347   WBC 10*3/mm3 3.71   NEUTROS ABS 10*3/mm3 1.58*   HEMOGLOBIN g/dL 12.6   HEMATOCRIT % 38.7   PLATELETS 10*3/mm3 260     Results from last 7 days   Lab Units 12/19/24  1347   SODIUM mmol/L 140   POTASSIUM mmol/L 3.5   CHLORIDE mmol/L 104   CO2 mmol/L 25.7   BUN mg/dL 13   CREATININE mg/dL 0.77   CALCIUM mg/dL 9.2   ALBUMIN g/dL 4.1   BILIRUBIN mg/dL 0.2   ALK PHOS U/L 89   ALT (SGPT) U/L 23   AST (SGOT) U/L 22   GLUCOSE mg/dL 173*         No radiology results for the last 30 days.     CT of the chest abdomen pelvis, images independently reviewed and interpreted by Dr. Alvarez in detail summarized in the HPI.    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  No lymphovascular or perineural invasion.  All the margins are negative.  Tumor comes to within 3 cm of the radial margin.  3 out of 26 lymph nodes positive for metastatic carcinoma with the largest focus measuring 0.7 cm without extranodal extension.  No tumor deposits identified  PT3N1B  MSI low probability on immunohistochemistry and stable on PCR  No family history of colon cancer  Explained to her that this is stage IIIb colon cancer  however since his T3N1 I would consider this low risk stage III.  There is no other high risk features noted on pathology either.  Given that the tumor is stage III there is definite benefit from adjuvant chemotherapy.  We discussed the 2 available regimens including XELOX and FOLFOX.  Explained to her that based on the IDEA data 3 months of XELOX is noninferior to 6 months of adjuvant chemotherapy however with FOLFOX the noninferiority has not been established.  Explained to her that 6 months of chemotherapy improves disease-free survival by additional 2 to 3% over 3 months.  Discussed the adverse effects of XELOX as well as FOLFOX.  After discussing the pros and cons we have decided to proceed with FOLFOX for total of 6 months but if she is unable to tolerate due to neurotoxicity then we could potentially stop after 3 months of treatment.  Port placed on 4/11/2022  Cycle 1 FOLFOX on 4/18/2022  Cycle 11 FOLFOX 9/13/2022 with a 20% dose reduction of oxaliplatin.  Now experiencing neuropathy, we will discontinue oxaliplatin and just to 5-FU for cycle 12  Seen 10/14/2022 in follow-up and scan review.  CT scan of the chest, abdomen, and pelvis done 10/12/2022 showing a few tiny new lung nodules indeterminant, measuring about 3 to 4 mm in maximum size.  Patient also has enlarging spleen previously 11.3 cm, now measuring 13.6 cm.    12/14/2022-CT of the chest abdomen pelvis shows stable pulmonary nodules.  Screening colonoscopy 3/9/2023-negative.  6/1/2023-CT of the chest abdomen and pelvis-no evidence of metastatic disease.  She will continue with every 3 monthly tumor marker and lab surveillance along with every 6 months scans.  9/7/2023: Patient continues to remain free of any recurrent symptoms.  Tumor markers today is normal at 1.64.  She is already scheduled for CT scans and 3 months for continued surveillance.  12/12/2023-CT scans without any evidence of metastatic disease, CEA normal  6/30/2024-CT scans from  6/11/2024 reviewed and without any evidence of malignancy.  There is a small right middle lobe pulmonary nodule which will be followed up with a CT chest in 3 months.  8/29/2024-CT of the chest with 1.1 cm hypodense left thyroid nodule.  No pathologically enlarged thoracic lymph nodes.  Hepatic steatosis noted.  Small nodule in the right lower lobe and small nodule in the anterior lingula stable compared to 2/25/2022.  Small nodular opacities abutting the pleural surface in the left upper lobe are stable.  Tiny nodular opacities in the right middle lobe are favored to reflect areas of focal mucous plugging.  Some of these have improved and others are new.  8/29/2024-CBC and CMP reviewed and leukopenia/neutropenia stable.  Otherwise no laboratory abnormalities.  CEA normal  Currently no evidence of recurrent disease.  Continue surveillance.  We discussed going to annual scans however patient felt comfortable proceeding with scans every 6 months and hence will repeat CT of the chest abdomen pelvis in 6 months  Port removed September 2024.  12/19/2024: doing well without any no concerns. No evidence of disease.     *Leukopenia  Predominantly neutropenia  Mild increase in monocyte count  This is chronic and unchanged  No increased infections  Had a previous work-up including a bone marrow biopsy with no clear etiology  Differential includes nutritional, chronic idiopathic, autoimmune.  She is currently not on any medications which could account for the leukopenia.  No splenomegaly on exam  5/13/2021-flow cytometry negative  LDH normal at 174  Reticulocyte count 1.07%  Ferritin low at 5.7  Iron saturation low at 3%  Folic acid normal at 11.8, B12 415  Rheumatoid factor negative  AVIVA negative  ESR normal at 29  Serum copper normal at 134  12/12/2023-CBC reviewed and WBC 3.13, hemoglobin 13.3 and platelets 213,000, absolute neutrophil count 1.47  3/14/2024-WBC 2.77, ANC 0.85.  She does report having viral respiratory  illness about 1 month ago.  Will have her return in 6 weeks for CBC with RN review to monitor.  Reviewed with Dr. Alvarez who is in agreement.  6/6/2024-CBC reviewed and WBC 2.72, hemoglobin 13.4 and platelets 244  8/29/2024-WBC count 2.8 and absolute neutrophil count 1.24.  12/19/2024: WBC 3.71, ANC 1.58    *Anemia  Microcytic  Likely secondary to iron deficiency  4/22/2021 iron saturation 3%, TIBC today 508, ferritin 5.70.  The patient was initiated on oral iron every other day.  8/26/2021, hemoglobin today 10.1.  The patient has been tolerating oral iron every other day.  We will increase oral iron to daily and recheck iron labs in 4 months.  12/16/2021, patient is taking oral iron every day and tolerating this well.  Hemoglobin today 10.0.  Iron saturation 45, TIBC 413, ferritin 10.20.  3/4/2022 ferritin 27.5, iron saturation 5%, TIBC 375, iron studies consistent with iron deficiency  3/24/2022 hemoglobin 10.2  Received 3 doses of Venofer.  9/15/2022-hemoglobin 9.7, low due to chemotherapy  As of 10/14/2022 hemoglobin improving now up to 11.3.  6/1/2023 Labs reviewed and hemoglobin normal at 13.4.  Iron studies reviewed and iron saturation 30%, TIBC 325, ferritin normal at 73  Patient is currently taking iron tablets twice a week.  She can discontinue this and continue with healthy diet including iron rich food.  9/7/2023: Hemoglobin is normal at 12.6.  Patient is no longer taking oral iron and focusing on healthy food options itching iron  Hemoglobin normal on 8/29/2020 12/19/2024: Hemoglobin 12.6.    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  She has been on gabapentin 300 mg nightly at bedtime however her symptoms have not improved.  Recommend that  she start gabapentin continue with B complex only.  Neuropathy is likely permanent at this point.  Unchanged, continue gabapentin    *Elevated alkaline phosphatase  Alkaline phosphatase has been elevated into the 200s and 300s in the past.  Labs from 6/1/2023 with improved alkaline phosphatase 143.  This improvement is likely secondary to weight loss and improvement in fatty liver changes.    *Utility of circulating tumor DNA  Explained the role of circulating tumor DNA and assessing the risk of relapse since you have been treated with adjuvant chemotherapy.  Explained to her that currently we do not have any evidence that detecting CT DNA early would improve outcomes.  Currently not recommended by any of the guidelines.  However if patient wishes to proceed with obtaining CT DNA we would be able to use [employer] to do so.  After discussing the pros and cons patient and her  decided not to proceed with CT DNA testing   Continue surveillance with CT scans and colonoscopy.  12/12/2023-CEA is normal  3/14/2024-CEA normal 1.52  6/6/2024-CEA normal  8/29/2024-CEA normal  12/19/2024-CEA 1.16    *1.1 cm hypodense left thyroid nodule seen on CT 8/29/2024.  Patient was evaluated by ENT and this area was deemed to be a cyst, no additional follow-up recommended.    PLAN:    3 months MD with scans 1 week prior.  Planning CT every 6 months.    DALE Orozco  12/19/24

## 2024-12-19 ENCOUNTER — OFFICE VISIT (OUTPATIENT)
Dept: ONCOLOGY | Facility: CLINIC | Age: 69
End: 2024-12-19
Payer: COMMERCIAL

## 2024-12-19 ENCOUNTER — LAB (OUTPATIENT)
Dept: OTHER | Facility: HOSPITAL | Age: 69
End: 2024-12-19
Payer: COMMERCIAL

## 2024-12-19 VITALS
DIASTOLIC BLOOD PRESSURE: 83 MMHG | WEIGHT: 172.6 LBS | SYSTOLIC BLOOD PRESSURE: 144 MMHG | TEMPERATURE: 98.3 F | BODY MASS INDEX: 31.76 KG/M2 | HEIGHT: 62 IN | RESPIRATION RATE: 20 BRPM | HEART RATE: 112 BPM | OXYGEN SATURATION: 96 %

## 2024-12-19 DIAGNOSIS — C18.2 MALIGNANT NEOPLASM OF ASCENDING COLON: ICD-10-CM

## 2024-12-19 DIAGNOSIS — C18.2 MALIGNANT NEOPLASM OF ASCENDING COLON: Primary | ICD-10-CM

## 2024-12-19 LAB
ALBUMIN SERPL-MCNC: 4.1 G/DL (ref 3.5–5.2)
ALBUMIN/GLOB SERPL: 1.5 G/DL
ALP SERPL-CCNC: 89 U/L (ref 39–117)
ALT SERPL W P-5'-P-CCNC: 23 U/L (ref 1–33)
ANION GAP SERPL CALCULATED.3IONS-SCNC: 10.3 MMOL/L (ref 5–15)
AST SERPL-CCNC: 22 U/L (ref 1–32)
BASOPHILS # BLD AUTO: 0.01 10*3/MM3 (ref 0–0.2)
BASOPHILS NFR BLD AUTO: 0.3 % (ref 0–1.5)
BILIRUB SERPL-MCNC: 0.2 MG/DL (ref 0–1.2)
BUN SERPL-MCNC: 13 MG/DL (ref 8–23)
BUN/CREAT SERPL: 16.9 (ref 7–25)
CALCIUM SPEC-SCNC: 9.2 MG/DL (ref 8.6–10.5)
CEA SERPL-MCNC: 1.16 NG/ML
CHLORIDE SERPL-SCNC: 104 MMOL/L (ref 98–107)
CO2 SERPL-SCNC: 25.7 MMOL/L (ref 22–29)
CREAT SERPL-MCNC: 0.77 MG/DL (ref 0.57–1)
DEPRECATED RDW RBC AUTO: 42.1 FL (ref 37–54)
EGFRCR SERPLBLD CKD-EPI 2021: 84.1 ML/MIN/1.73
EOSINOPHIL # BLD AUTO: 0.05 10*3/MM3 (ref 0–0.4)
EOSINOPHIL NFR BLD AUTO: 1.3 % (ref 0.3–6.2)
ERYTHROCYTE [DISTWIDTH] IN BLOOD BY AUTOMATED COUNT: 12.9 % (ref 12.3–15.4)
GLOBULIN UR ELPH-MCNC: 2.8 GM/DL
GLUCOSE SERPL-MCNC: 173 MG/DL (ref 65–99)
HCT VFR BLD AUTO: 38.7 % (ref 34–46.6)
HGB BLD-MCNC: 12.6 G/DL (ref 12–15.9)
IMM GRANULOCYTES # BLD AUTO: 0 10*3/MM3 (ref 0–0.05)
IMM GRANULOCYTES NFR BLD AUTO: 0 % (ref 0–0.5)
LYMPHOCYTES # BLD AUTO: 1.77 10*3/MM3 (ref 0.7–3.1)
LYMPHOCYTES NFR BLD AUTO: 47.7 % (ref 19.6–45.3)
MCH RBC QN AUTO: 28.9 PG (ref 26.6–33)
MCHC RBC AUTO-ENTMCNC: 32.6 G/DL (ref 31.5–35.7)
MCV RBC AUTO: 88.8 FL (ref 79–97)
MONOCYTES # BLD AUTO: 0.3 10*3/MM3 (ref 0.1–0.9)
MONOCYTES NFR BLD AUTO: 8.1 % (ref 5–12)
NEUTROPHILS NFR BLD AUTO: 1.58 10*3/MM3 (ref 1.7–7)
NEUTROPHILS NFR BLD AUTO: 42.6 % (ref 42.7–76)
NRBC BLD AUTO-RTO: 0 /100 WBC (ref 0–0.2)
PLATELET # BLD AUTO: 260 10*3/MM3 (ref 140–450)
PMV BLD AUTO: 9.5 FL (ref 6–12)
POTASSIUM SERPL-SCNC: 3.5 MMOL/L (ref 3.5–5.2)
PROT SERPL-MCNC: 6.9 G/DL (ref 6–8.5)
RBC # BLD AUTO: 4.36 10*6/MM3 (ref 3.77–5.28)
SODIUM SERPL-SCNC: 140 MMOL/L (ref 136–145)
WBC NRBC COR # BLD AUTO: 3.71 10*3/MM3 (ref 3.4–10.8)

## 2024-12-19 PROCEDURE — 80053 COMPREHEN METABOLIC PANEL: CPT | Performed by: INTERNAL MEDICINE

## 2024-12-19 PROCEDURE — 82378 CARCINOEMBRYONIC ANTIGEN: CPT | Performed by: INTERNAL MEDICINE

## 2024-12-19 PROCEDURE — 36415 COLL VENOUS BLD VENIPUNCTURE: CPT

## 2024-12-19 PROCEDURE — 85025 COMPLETE CBC W/AUTO DIFF WBC: CPT | Performed by: INTERNAL MEDICINE

## 2024-12-30 ENCOUNTER — TELEMEDICINE (OUTPATIENT)
Dept: FAMILY MEDICINE CLINIC | Facility: CLINIC | Age: 69
End: 2024-12-30
Payer: COMMERCIAL

## 2024-12-30 DIAGNOSIS — J04.0 LARYNGITIS: Primary | ICD-10-CM

## 2024-12-30 DIAGNOSIS — J40 BRONCHITIS: ICD-10-CM

## 2024-12-30 PROCEDURE — 99213 OFFICE O/P EST LOW 20 MIN: CPT | Performed by: NURSE PRACTITIONER

## 2024-12-30 PROCEDURE — 1126F AMNT PAIN NOTED NONE PRSNT: CPT | Performed by: NURSE PRACTITIONER

## 2024-12-30 PROCEDURE — 1159F MED LIST DOCD IN RCRD: CPT | Performed by: NURSE PRACTITIONER

## 2024-12-30 PROCEDURE — 1160F RVW MEDS BY RX/DR IN RCRD: CPT | Performed by: NURSE PRACTITIONER

## 2024-12-30 RX ORDER — BENZONATATE 100 MG/1
100 CAPSULE ORAL 3 TIMES DAILY PRN
Qty: 30 CAPSULE | Refills: 0 | Status: SHIPPED | OUTPATIENT
Start: 2024-12-30

## 2024-12-30 RX ORDER — AZITHROMYCIN 250 MG/1
TABLET, FILM COATED ORAL
Qty: 6 TABLET | Refills: 0 | Status: SHIPPED | OUTPATIENT
Start: 2024-12-30

## 2024-12-30 RX ORDER — PREDNISONE 20 MG/1
20 TABLET ORAL DAILY
Qty: 5 TABLET | Refills: 0 | Status: SHIPPED | OUTPATIENT
Start: 2024-12-30

## 2024-12-30 NOTE — PROGRESS NOTES
Subjective   Kayla Adkins is a 69 y.o. female.   You have chosen to receive care through a telehealth visit.  Do you consent to use a video/audio connection for your medical care today? Yes  Patient at home during time of encounter, provider in office.  History of Present Illness   Kayla Adkins 69 y.o. female who presents for evaluation of upper respiratory congestion, cough. Symptoms include congestion, post nasal drip, productive cough, and hoarseness.  Onset of symptoms was 7 days ago, unchanged since that time. Patient denies shortness of breath, wheezing, fever.   Evaluation to date: none Treatment to date:  benzonatate.     The following portions of the patient's history were reviewed and updated as appropriate: allergies, current medications, past family history, past medical history, past social history, past surgical history, and problem list.    Review of Systems   Constitutional:  Positive for fatigue. Negative for chills and fever.   HENT:  Positive for congestion, postnasal drip and voice change.    Respiratory:  Positive for cough.        Objective   Physical Exam  Vitals and nursing note reviewed.   Constitutional:       Appearance: She is well-developed.   Pulmonary:      Effort: Pulmonary effort is normal.   Neurological:      Mental Status: She is alert and oriented to person, place, and time.   Psychiatric:         Mood and Affect: Mood normal.         Behavior: Behavior normal.         Thought Content: Thought content normal.         Judgment: Judgment normal.         Assessment & Plan   Diagnoses and all orders for this visit:    1. Laryngitis (Primary)  -     predniSONE (DELTASONE) 20 MG tablet; Take 1 tablet by mouth Daily.  Dispense: 5 tablet; Refill: 0  -     benzonatate (Tessalon Perles) 100 MG capsule; Take 1 capsule by mouth 3 (Three) Times a Day As Needed for Cough.  Dispense: 30 capsule; Refill: 0  -     azithromycin (Zithromax Z-William) 250 MG tablet; Take 2 tablets by mouth on  day 1, then 1 tablet daily on days 2-5  Dispense: 6 tablet; Refill: 0    2. Bronchitis  -     predniSONE (DELTASONE) 20 MG tablet; Take 1 tablet by mouth Daily.  Dispense: 5 tablet; Refill: 0  -     benzonatate (Tessalon Perles) 100 MG capsule; Take 1 capsule by mouth 3 (Three) Times a Day As Needed for Cough.  Dispense: 30 capsule; Refill: 0  -     azithromycin (Zithromax Z-William) 250 MG tablet; Take 2 tablets by mouth on day 1, then 1 tablet daily on days 2-5  Dispense: 6 tablet; Refill: 0               Total time spent on encounter was 6 minutes.

## 2025-03-06 ENCOUNTER — LAB (OUTPATIENT)
Dept: LAB | Facility: HOSPITAL | Age: 70
End: 2025-03-06
Payer: COMMERCIAL

## 2025-03-06 ENCOUNTER — HOSPITAL ENCOUNTER (OUTPATIENT)
Dept: CT IMAGING | Facility: HOSPITAL | Age: 70
Discharge: HOME | End: 2025-03-06
Payer: COMMERCIAL

## 2025-03-06 DIAGNOSIS — C18.2 MALIGNANT NEOPLASM OF ASCENDING COLON: ICD-10-CM

## 2025-03-06 LAB
ALBUMIN SERPL-MCNC: 4.3 G/DL (ref 3.5–5.2)
ALBUMIN/GLOB SERPL: 1.5 G/DL
ALP SERPL-CCNC: 102 U/L (ref 39–117)
ALT SERPL W P-5'-P-CCNC: 34 U/L (ref 1–33)
ANION GAP SERPL CALCULATED.3IONS-SCNC: 7.2 MMOL/L (ref 5–15)
AST SERPL-CCNC: 35 U/L (ref 1–32)
BASOPHILS # BLD AUTO: 0.01 10*3/MM3 (ref 0–0.2)
BASOPHILS NFR BLD AUTO: 0.4 % (ref 0–1.5)
BILIRUB SERPL-MCNC: 0.3 MG/DL (ref 0–1.2)
BUN SERPL-MCNC: 14 MG/DL (ref 8–23)
BUN/CREAT SERPL: 20.3 (ref 7–25)
CALCIUM SPEC-SCNC: 9.1 MG/DL (ref 8.6–10.5)
CEA SERPL-MCNC: 1.18 NG/ML
CHLORIDE SERPL-SCNC: 104 MMOL/L (ref 98–107)
CO2 SERPL-SCNC: 28.8 MMOL/L (ref 22–29)
CREAT SERPL-MCNC: 0.69 MG/DL (ref 0.57–1)
DEPRECATED RDW RBC AUTO: 40.7 FL (ref 37–54)
EGFRCR SERPLBLD CKD-EPI 2021: 94.7 ML/MIN/1.73
EOSINOPHIL # BLD AUTO: 0.06 10*3/MM3 (ref 0–0.4)
EOSINOPHIL NFR BLD AUTO: 2.2 % (ref 0.3–6.2)
ERYTHROCYTE [DISTWIDTH] IN BLOOD BY AUTOMATED COUNT: 12.6 % (ref 12.3–15.4)
GLOBULIN UR ELPH-MCNC: 2.8 GM/DL
GLUCOSE SERPL-MCNC: 117 MG/DL (ref 65–99)
HCT VFR BLD AUTO: 40.9 % (ref 34–46.6)
HGB BLD-MCNC: 13.5 G/DL (ref 12–15.9)
IMM GRANULOCYTES # BLD AUTO: 0 10*3/MM3 (ref 0–0.05)
IMM GRANULOCYTES NFR BLD AUTO: 0 % (ref 0–0.5)
LYMPHOCYTES # BLD AUTO: 1.09 10*3/MM3 (ref 0.7–3.1)
LYMPHOCYTES NFR BLD AUTO: 39.6 % (ref 19.6–45.3)
MCH RBC QN AUTO: 29.2 PG (ref 26.6–33)
MCHC RBC AUTO-ENTMCNC: 33 G/DL (ref 31.5–35.7)
MCV RBC AUTO: 88.5 FL (ref 79–97)
MONOCYTES # BLD AUTO: 0.31 10*3/MM3 (ref 0.1–0.9)
MONOCYTES NFR BLD AUTO: 11.3 % (ref 5–12)
NEUTROPHILS NFR BLD AUTO: 1.28 10*3/MM3 (ref 1.7–7)
NEUTROPHILS NFR BLD AUTO: 46.5 % (ref 42.7–76)
NRBC BLD AUTO-RTO: 0 /100 WBC (ref 0–0.2)
PLATELET # BLD AUTO: 254 10*3/MM3 (ref 140–450)
PMV BLD AUTO: 9.3 FL (ref 6–12)
POTASSIUM SERPL-SCNC: 4.2 MMOL/L (ref 3.5–5.2)
PROT SERPL-MCNC: 7.1 G/DL (ref 6–8.5)
RBC # BLD AUTO: 4.62 10*6/MM3 (ref 3.77–5.28)
SODIUM SERPL-SCNC: 140 MMOL/L (ref 136–145)
WBC NRBC COR # BLD AUTO: 2.75 10*3/MM3 (ref 3.4–10.8)

## 2025-03-06 PROCEDURE — 71260 CT THORAX DX C+: CPT

## 2025-03-06 PROCEDURE — 80053 COMPREHEN METABOLIC PANEL: CPT | Performed by: INTERNAL MEDICINE

## 2025-03-06 PROCEDURE — 25510000001 IOPAMIDOL 61 % SOLUTION: Performed by: INTERNAL MEDICINE

## 2025-03-06 PROCEDURE — 82378 CARCINOEMBRYONIC ANTIGEN: CPT | Performed by: INTERNAL MEDICINE

## 2025-03-06 PROCEDURE — 85025 COMPLETE CBC W/AUTO DIFF WBC: CPT

## 2025-03-06 PROCEDURE — 25510000002 DIATRIZOATE MEGLUMINE & SODIUM PER 1 ML: Performed by: INTERNAL MEDICINE

## 2025-03-06 PROCEDURE — 74177 CT ABD & PELVIS W/CONTRAST: CPT

## 2025-03-06 PROCEDURE — 36415 COLL VENOUS BLD VENIPUNCTURE: CPT

## 2025-03-06 RX ORDER — IOPAMIDOL 612 MG/ML
100 INJECTION, SOLUTION INTRAVASCULAR
Status: COMPLETED | OUTPATIENT
Start: 2025-03-06 | End: 2025-03-06

## 2025-03-06 RX ORDER — DIATRIZOATE MEGLUMINE AND DIATRIZOATE SODIUM 660; 100 MG/ML; MG/ML
30 SOLUTION ORAL; RECTAL
Status: COMPLETED | OUTPATIENT
Start: 2025-03-06 | End: 2025-03-06

## 2025-03-06 RX ADMIN — IOPAMIDOL 85 ML: 612 INJECTION, SOLUTION INTRAVENOUS at 09:31

## 2025-03-06 RX ADMIN — DIATRIZOATE MEGLUMINE AND DIATRIZOATE SODIUM 30 ML: 660; 100 LIQUID ORAL; RECTAL at 08:31

## 2025-03-11 ENCOUNTER — OFFICE VISIT (OUTPATIENT)
Dept: ONCOLOGY | Facility: CLINIC | Age: 70
End: 2025-03-11
Payer: COMMERCIAL

## 2025-03-11 VITALS
HEIGHT: 62 IN | HEART RATE: 103 BPM | TEMPERATURE: 98.2 F | OXYGEN SATURATION: 97 % | WEIGHT: 172 LBS | BODY MASS INDEX: 31.65 KG/M2 | SYSTOLIC BLOOD PRESSURE: 149 MMHG | DIASTOLIC BLOOD PRESSURE: 77 MMHG

## 2025-03-11 DIAGNOSIS — D70.9 NEUTROPENIA, UNSPECIFIED TYPE: ICD-10-CM

## 2025-03-11 DIAGNOSIS — C18.2 MALIGNANT NEOPLASM OF ASCENDING COLON: Primary | ICD-10-CM

## 2025-03-11 NOTE — PROGRESS NOTES
Subjective   Kayla Adkins is a 69 y.o. female.  Referred by Dr. Rivas for evaluation of leukopenia    History of Present Illness   Ms. Adkins is a 66-year-old postmenopausal  who has been referred by Dr. Rivas for evaluation of leukopenia, neutropenia.  Patient reports that she has had longstanding leukopenia and her white blood cell count normally ranges in the threes.  On her most recent visit with Dr. Rivas white blood cell count was noted to be 2560 on 3/2/2021 and 2660 on 4/5/2021.  This prompted a referral to hematology.  Patient reports that she had a extensive work-up including a bone marrow biopsy for the same condition about 17 years ago.  She thinks that this was performed at [facility].  According to patient no clear etiology was determined after the work-up.  She was recommended to continue with periodic labs.  No treatment was required.    She was seen in December and had continued on oral iron.  She was tolerating that fairly well.  However she had worsening of colitis for about 2 months prior to that visit.  She was scheduled for colonoscopy in January 2022.    She had a routine surveillance colonoscopy performed by Dr. Whitlock on 2/22/2022.  On this there was an infiltrative nonobstructing medium-sized mass in the proximal ascending colon 1 to 2 cm distal to the cecum.  This was partially circumferential involving 1 foot of the lumen.  Biopsies were taken.  There is also mildly congested mucosa in the entire colon.  Random biopsies were taken.  She also had an EGD at the same time which reported normal esophagus, stomach and duodenum.  Pathology evaluation from the biopsy reported superficial fragments of at least intramucosal carcinoma.  Random colon biopsies reported increased lamina propria, chronic inflammation with surface epithelial alteration and increased intraepithelial lymphocytes consistent with lymphocytic colitis.  Biopsies from the stomach reported chronic  inactive gastritis.  Negative for intestinal metaplasia and H. pylori.  Biopsies from the duodenum was negative.    She was seen by Dr. Wellington who performed a laparoscopic right hemicolectomy on 3/2/2022.  Pathology showed a 3.8 cm mass at the cecum invading through the muscularis propria into the pericolorectal adipose tissue.  No lymphovascular or perineural invasion.  All margins were clear.  The radial margin was 3 cm.  3 out of 26 lymph nodes were positive for metastatic disease.  The largest metastatic focus was 0.7 cm with extranodal extension.  Pathological stage PT3N1B.  The tumor was moderately differentiated, grade 2 adenocarcinoma.    No loss of nuclear expression of MMR proteins.  Low probability of microsatellite instability.  MSI status-microsatellite stable.  This is by PCR.    Normal CEA and 0.77 on the day prior to surgery.    She was seen by Dr. Burton as inpatient and recommended adjuvant chemotherapy with FOLFOX.    Patient initiating cycle 1 FOLFOX 4/18/2022.  Neulasta support given due to baseline neutropenia.    9/27/2020 to cycle 12 FOLFOX (oxaliplatin omitted due to neuropathy).    12/12/2023-CT of the chest abdomen and pelvis-no evidence of metastatic disease.    6/11/2024-CT of the chest abdomen and pelvis with 4 mm right middle lobe pulmonary nodule which is new but other than that no concerns for metastatic disease.    3/6/2025-CT of the chest abdomen and pelvis-right lower lobe pulmonary nodule measuring 2 mm unchanged, lateral basilar right lower lobe pulmonary nodule measuring pulmonary meters unchanged.  No evidence of metastatic disease in the abdomen or pelvis.  No evidence of osseous metastatic disease.      Interval history:  Kayla returns today for follow-up.  She denies any new complaints at this time.  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  She does not feel like the gabapentin has helped much and has discontinued the gabapentin.  She is  using B6.  No other new complaints at this time  She is up-to-date on her colonoscopies with the most recent one being in March 2023 which was benign  She is due for her next colonoscopy in March 2026.  3/6/2025 labs were performed and CBC with persistent neutropenia which has not changed significantly, hemoglobin and platelets are normal  CMP reviewed and LFTs mildly elevated.  CEA normal at 1.18    The following portions of the patient's history were reviewed and updated as appropriate: allergies, current medications, past family history, past medical history, past social history and problem list.    Past Medical History:   Diagnosis Date    Anemia     Anxiety     Bladder infection     Chemotherapy induced nausea and vomiting 04/25/2022    Colitis     Colon cancer     Colonic mass 02/24/2022    Drug therapy     Elevated LFTs 12/05/2022    GERD (gastroesophageal reflux disease)     Headache     Hyperlipidemia     Hypertension     Knee injury, initial encounter-left 07/01/2019    Neutropenia 01/03/2017    Prediabetes 03/04/2019      Past Surgical History:   Procedure Laterality Date    COLON RESECTION Right 03/02/2022    Procedure: LAPAROSCOPIC RIGHT HEMICOLECTOMY WITH DAVINCI ROBOT;  Surgeon: Malcolm Wellington MD;  Location: [facility] OR;  Service: Robotics - DaVinci;  Laterality: Right;    COLONOSCOPY      COLONOSCOPY N/A 3/9/2023    Procedure: COLONOSCOPY TO ANASTAMOSIS WITH COLD BIOPSIES;  Surgeon: Irvin Clayton MD;  Location: [facility] ENDOSCOPY;  Service: Gastroenterology;  Laterality: N/A;  PRE- HISTORY OF COLON CANCER  POST- NORMAL    COLONOSCOPY W/ POLYPECTOMY N/A 02/22/2022    Procedure: COLONOSCOPY INTO CECUM WITH COLD BIOPSIES;  Surgeon: Irvin Clayton MD;  Location: [facility] ENDOSCOPY;  Service: Gastroenterology;  Laterality: N/A;  PRE: DIARRHEA, ANEMIA  POST: ASCENDING COLON MASS    ENDOSCOPY N/A 02/22/2022    Procedure: ESOPHAGOGASTRODUODENOSCOPY WITH BIOPSY;  Surgeon: Forrest  "Irvin Navarrete MD;  Location: [facility] ENDOSCOPY;  Service: Gastroenterology;  Laterality: N/A;  PRE: ANEMIA  POST: NORMAL EGD    TONSILLECTOMY      VENOUS ACCESS DEVICE (PORT) INSERTION N/A 2022    Procedure: INSERTION VENOUS ACCESS DEVICE with subcutaneous port;  Surgeon: Malcolm Wellington MD;  [place] [facility] [place] [facility] [place]  Service: General;  Laterality: N/A;        Family History   Problem Relation Age of Onset    Hypertension Mother     Cancer Mother     Heart disease Father     Heart attack Father 47        first MI age 47; second MI 57    Hypertension Sister     No Known Problems Brother     Gerhard Hyperthermia Neg Hx       Social History     Socioeconomic History    Marital status:     Number of children: 2   Tobacco Use    Smoking status: Never    Smokeless tobacco: Never   Vaping Use    Vaping status: Never Used   Substance and Sexual Activity    Alcohol use: Not Currently     Comment: Very rarely    Drug use: Never    Sexual activity: Yes     Partners: Male      OB History          2    Para   2    Term   2            AB        Living             SAB        IAB        Ectopic        Molar        Multiple        Live Births   2                 Allergies   Allergen Reactions    Penicillins Rash     Beta lactam allergy details  Antibiotic reaction: rash  Age at reaction: adult  Dose to reaction time: unknown  Reason for antibiotic: unknown  Epinephrine required for reaction?: unknown  Tolerated antibiotics: unknown         Review of systems as mentioned HPI otherwise negative    Objective   Blood pressure 149/77, pulse 103, temperature 98.2 °F (36.8 °C), temperature source Infrared, height 157 cm (61.81\"), weight 78 kg (172 lb), SpO2 97%.     Physical Exam  Vitals reviewed.   Constitutional:       General: She is not in acute distress.     Appearance: She is well-developed.   HENT:      Nose: Nose normal.      Mouth/Throat:      Mouth: Mucous membranes are moist.      Pharynx: " Oropharynx is clear.   Eyes:      Conjunctiva/sclera: Conjunctivae normal.      Pupils: Pupils are equal, round, and reactive to light.   Cardiovascular:      Rate and Rhythm: Normal rate.   Pulmonary:      Effort: Pulmonary effort is normal. No respiratory distress.   Abdominal:      General: Bowel sounds are normal.   Skin:     General: Skin is warm and dry.      Findings: No rash.   Neurological:      General: No focal deficit present.      Mental Status: She is alert and oriented to person, place, and time. Mental status is at baseline.      Motor: No weakness.   Psychiatric:         Mood and Affect: Mood normal.         Behavior: Behavior normal.         Thought Content: Thought content normal.         Judgment: Judgment normal.       I have reexamined the patient and the results are consistent with the previously documented exam. Mago Alvarez MD      DIAGNOSTIC DATA:  Results from last 7 days   Lab Units 03/06/25  0840   WBC 10*3/mm3 2.75*   NEUTROS ABS 10*3/mm3 1.28*   HEMOGLOBIN g/dL 13.5   HEMATOCRIT % 40.9   PLATELETS 10*3/mm3 254     Results from last 7 days   Lab Units 03/06/25  0840   SODIUM mmol/L 140   POTASSIUM mmol/L 4.2   CHLORIDE mmol/L 104   CO2 mmol/L 28.8   BUN mg/dL 14   CREATININE mg/dL 0.69   CALCIUM mg/dL 9.1   ALBUMIN g/dL 4.3   BILIRUBIN mg/dL 0.3   ALK PHOS U/L 102   ALT (SGPT) U/L 34*   AST (SGOT) U/L 35*   GLUCOSE mg/dL 117*         CT Chest With Contrast Diagnostic  Result Date: 3/10/2025   1. No convincing metastatic disease in the chest, abdomen or pelvis. 2. Stable small pulmonary nodules. Attention on follow-up. 3. Possible hepatic steatosis. 4. Partial colectomy. No obstruction. 5. Colonic diverticulosis.  This report was finalized on 3/10/2025 12:06 PM by Dr. Tomás Joyner M.D on Workstation: [id number]      CT Abdomen Pelvis With Contrast  Result Date: 3/10/2025   1. No convincing metastatic disease in the chest, abdomen or pelvis. 2. Stable small pulmonary nodules.  Attention on follow-up. 3. Possible hepatic steatosis. 4. Partial colectomy. No obstruction. 5. Colonic diverticulosis.  This report was finalized on 3/10/2025 12:06 PM by Dr. Tomás Joyner M.D on Workstation: [id number]         CT of the chest abdomen pelvis, images independently reviewed and interpreted by Dr. Alvarez in detail summarized in the HPI.    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  No lymphovascular or perineural invasion.  All the margins are negative.  Tumor comes to within 3 cm of the radial margin.  3 out of 26 lymph nodes positive for metastatic carcinoma with the largest focus measuring 0.7 cm without extranodal extension.  No tumor deposits identified  PT3N1B  MSI low probability on immunohistochemistry and stable on PCR  No family history of colon cancer  Explained to her that this is stage IIIb colon cancer however since his T3N1 I would consider this low risk stage III.  There is no other high risk features noted on pathology either.  Given that the tumor is stage III there is definite benefit from adjuvant chemotherapy.  We discussed the 2 available regimens including XELOX and FOLFOX.  Explained to her that based on the IDEA data 3 months of XELOX is noninferior to 6 months of adjuvant chemotherapy however with FOLFOX the noninferiority has not been established.  Explained to her that 6 months of chemotherapy improves disease-free survival by additional 2 to 3% over 3 months.  Discussed the adverse effects of XELOX as well as FOLFOX.  After discussing the pros  and cons we have decided to proceed with FOLFOX for total of 6 months but if she is unable to tolerate due to neurotoxicity then we could potentially stop after 3 months of treatment.  Port placed on 4/11/2022  Cycle 1 FOLFOX on 4/18/2022  Cycle 11 FOLFOX 9/13/2022 with a 20% dose reduction of oxaliplatin.  Now experiencing neuropathy, we will discontinue oxaliplatin and just to 5-FU for cycle 12  Seen 10/14/2022 in follow-up and scan review.  CT scan of the chest, abdomen, and pelvis done 10/12/2022 showing a few tiny new lung nodules indeterminant, measuring about 3 to 4 mm in maximum size.  Patient also has enlarging spleen previously 11.3 cm, now measuring 13.6 cm.    12/14/2022-CT of the chest abdomen pelvis shows stable pulmonary nodules.  Screening colonoscopy 3/9/2023-negative.  6/1/2023-CT of the chest abdomen and pelvis-no evidence of metastatic disease.  She will continue with every 3 monthly tumor marker and lab surveillance along with every 6 months scans.  9/7/2023: Patient continues to remain free of any recurrent symptoms.  Tumor markers today is normal at 1.64.  She is already scheduled for CT scans and 3 months for continued surveillance.  12/12/2023-CT scans without any evidence of metastatic disease, CEA normal  6/30/2024-CT scans from 6/11/2024 reviewed and without any evidence of malignancy.  There is a small right middle lobe pulmonary nodule which will be followed up with a CT chest in 3 months.  8/29/2024-CT of the chest with 1.1 cm hypodense left thyroid nodule.  No pathologically enlarged thoracic lymph nodes.  Hepatic steatosis noted.  Small nodule in the right lower lobe and small nodule in the anterior lingula stable compared to 2/25/2022.  Small nodular opacities abutting the pleural surface in the left upper lobe are stable.  Tiny nodular opacities in the right middle lobe are favored to reflect areas of focal mucous plugging.  Some of these have improved and others are  new.  8/29/2024-CBC and CMP reviewed and leukopenia/neutropenia stable.  Otherwise no laboratory abnormalities.  CEA normal  Currently no evidence of recurrent disease.  Continue surveillance.  We discussed going to annual scans however patient felt comfortable proceeding with scans every 6 months and hence will repeat CT of the chest abdomen pelvis in 6 months  Port removed September 2024.  3/6/2025-CT of the chest abdomen and pelvis without any evidence of recurrent disease, pulmonary nodule stable, CBC CMP and CEA stable.  Repeat scans again in 6 months    *Leukopenia  Predominantly neutropenia  Mild increase in monocyte count  This is chronic and unchanged  No increased infections  Had a previous work-up including a bone marrow biopsy with no clear etiology  Differential includes nutritional, chronic idiopathic, autoimmune.  She is currently not on any medications which could account for the leukopenia.  No splenomegaly on exam  5/13/2021-flow cytometry negative  LDH normal at 174  Reticulocyte count 1.07%  Ferritin low at 5.7  Iron saturation low at 3%  Folic acid normal at 11.8, B12 415  Rheumatoid factor negative  AVIVA negative  ESR normal at 29  Serum copper normal at 134  12/12/2023-CBC reviewed and WBC 3.13, hemoglobin 13.3 and platelets 213,000, absolute neutrophil count 1.47  3/14/2024-WBC 2.77, ANC 0.85.  She does report having viral respiratory illness about 1 month ago.  Will have her return in 6 weeks for CBC with RN review to monitor.  Reviewed with Dr. Alvarez who is in agreement.  6/6/2024-CBC reviewed and WBC 2.72, hemoglobin 13.4 and platelets 244  8/29/2024-WBC count 2.8 and absolute neutrophil count 1.24.  3/6/2025-CBC reviewed and WBC 2.75, hemoglobin 13.5 and platelets 254,000, absolute neutrophil count 1.28    *Abnormal LFTs  3/6/2025-mild increase in LFTs.  Repeat labs at next visit in 6 months    *Anemia  Microcytic  Likely secondary to iron deficiency  4/22/2021 iron saturation 3%, TIBC  today 508, ferritin 5.70.  The patient was initiated on oral iron every other day.  8/26/2021, hemoglobin today 10.1.  The patient has been tolerating oral iron every other day.  We will increase oral iron to daily and recheck iron labs in 4 months.  12/16/2021, patient is taking oral iron every day and tolerating this well.  Hemoglobin today 10.0.  Iron saturation 45, TIBC 413, ferritin 10.20.  3/4/2022 ferritin 27.5, iron saturation 5%, TIBC 375, iron studies consistent with iron deficiency  3/24/2022 hemoglobin 10.2  Received 3 doses of Venofer.  9/15/2022-hemoglobin 9.7, low due to chemotherapy  As of 10/14/2022 hemoglobin improving now up to 11.3.  6/1/2023 Labs reviewed and hemoglobin normal at 13.4.  Iron studies reviewed and iron saturation 30%, TIBC 325, ferritin normal at 73  Patient is currently taking iron tablets twice a week.  She can discontinue this and continue with healthy diet including iron rich food.  9/7/2023: Hemoglobin is normal at 12.6.  Patient is no longer taking oral iron and focusing on healthy food options itching iron  Hemoglobin normal on 8/29/2024  3/6/2025-hemoglobin stable at 13.5, normal    *Sbkjpuccp-cy-lo-date on mammogram  Benign mammogram on 9/15/2023  Up-to-date on colonoscopies with the next one due in 2026    *Colitis  Lymphocytic colitis noted on the biopsy of the colon  Diarrhea has resolved.    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  She has been on gabapentin 300 mg nightly at bedtime however her symptoms have not improved.  Recommend that she start gabapentin continue with B complex only.  Persistent neuropathy  Patient has discontinued gabapentin and no difference in symptoms.  Continue with B6 only.    *Elevated alkaline phosphatase  Alkaline phosphatase has been elevated into the 200s and 300s in the past.  Labs from 6/1/2023 with improved alkaline phosphatase 143.  This improvement is likely secondary to weight  loss and improvement in fatty liver changes.    *Utility of circulating tumor DNA  Explained the role of circulating tumor DNA and assessing the risk of relapse since you have been treated with adjuvant chemotherapy.  Explained to her that currently we do not have any evidence that detecting CT DNA early would improve outcomes.  Currently not recommended by any of the guidelines.  However if patient wishes to proceed with obtaining CT DNA we would be able to use [employer] to do so.  After discussing the pros and cons patient and her  decided not to proceed with CT DNA testing   Continue surveillance with CT scans and colonoscopy.  12/12/2023-CEA is normal  3/14/2024-CEA normal 1.52  6/6/2024-CEA normal  8/29/2024-CEA normal  12/19/2024-CEA 1.16  3/11/2025-CEA 1.18    *1.1 cm hypodense left thyroid nodule seen on CT 8/29/2024.  Patient was evaluated by ENT and this area was deemed to be a cyst, no additional follow-up recommended.    PLAN:    6 months with MD, CT chest abdomen and pelvis and labs prior to MD follow-up    CT chest abdomen and pelvis images independently reviewed and interpreted by me in detail summarized above.      Mago Alvarez MD  03/11/25

## 2025-05-05 ENCOUNTER — OFFICE VISIT (OUTPATIENT)
Dept: FAMILY MEDICINE CLINIC | Facility: CLINIC | Age: 70
End: 2025-05-05
Payer: COMMERCIAL

## 2025-05-05 VITALS
DIASTOLIC BLOOD PRESSURE: 82 MMHG | OXYGEN SATURATION: 98 % | HEIGHT: 62 IN | SYSTOLIC BLOOD PRESSURE: 124 MMHG | WEIGHT: 173 LBS | BODY MASS INDEX: 31.83 KG/M2 | HEART RATE: 120 BPM

## 2025-05-05 DIAGNOSIS — Z00.00 MEDICARE ANNUAL WELLNESS VISIT, SUBSEQUENT: Primary | ICD-10-CM

## 2025-05-05 DIAGNOSIS — E78.49 OTHER HYPERLIPIDEMIA: ICD-10-CM

## 2025-05-05 DIAGNOSIS — F41.9 ANXIETY: ICD-10-CM

## 2025-05-05 DIAGNOSIS — D70.8 OTHER NEUTROPENIA: ICD-10-CM

## 2025-05-05 DIAGNOSIS — K21.9 GASTROESOPHAGEAL REFLUX DISEASE WITHOUT ESOPHAGITIS: ICD-10-CM

## 2025-05-05 DIAGNOSIS — R73.01 IFG (IMPAIRED FASTING GLUCOSE): ICD-10-CM

## 2025-05-05 DIAGNOSIS — G47.9 SLEEP DISTURBANCE: ICD-10-CM

## 2025-05-05 DIAGNOSIS — I10 ESSENTIAL HYPERTENSION: ICD-10-CM

## 2025-05-05 DIAGNOSIS — M81.0 AGE-RELATED OSTEOPOROSIS WITHOUT CURRENT PATHOLOGICAL FRACTURE: ICD-10-CM

## 2025-05-05 DIAGNOSIS — Z23 IMMUNIZATION DUE: ICD-10-CM

## 2025-05-05 DIAGNOSIS — Z00.00 ENCOUNTER FOR WELLNESS EXAMINATION IN ADULT: ICD-10-CM

## 2025-05-05 RX ORDER — DULOXETIN HYDROCHLORIDE 60 MG/1
60 CAPSULE, DELAYED RELEASE ORAL NIGHTLY
Qty: 90 CAPSULE | Refills: 2 | Status: SHIPPED | OUTPATIENT
Start: 2025-05-05 | End: 2026-01-30

## 2025-05-05 RX ORDER — OMEPRAZOLE 20 MG/1
20 CAPSULE, DELAYED RELEASE ORAL NIGHTLY
Qty: 90 CAPSULE | Refills: 2 | Status: SHIPPED | OUTPATIENT
Start: 2025-05-05 | End: 2026-01-30

## 2025-05-05 RX ORDER — METOPROLOL SUCCINATE 50 MG/1
50 TABLET, EXTENDED RELEASE ORAL NIGHTLY
Qty: 90 TABLET | Refills: 2 | Status: SHIPPED | OUTPATIENT
Start: 2025-05-05 | End: 2026-01-30

## 2025-05-05 RX ORDER — ATORVASTATIN CALCIUM 10 MG/1
10 TABLET, FILM COATED ORAL NIGHTLY
Qty: 90 TABLET | Refills: 2 | Status: SHIPPED | OUTPATIENT
Start: 2025-05-05 | End: 2026-01-30

## 2025-05-05 RX ORDER — RALOXIFENE HYDROCHLORIDE 60 MG/1
60 TABLET, FILM COATED ORAL DAILY
Qty: 90 TABLET | Refills: 2 | Status: SHIPPED | OUTPATIENT
Start: 2025-05-05 | End: 2026-01-30

## 2025-05-05 NOTE — PATIENT INSTRUCTIONS
Carbohydrate Counting for Diabetes Mellitus, Adult  Carbohydrate counting is a method of keeping track of how many carbohydrates you eat. Eating carbohydrates increases the amount of sugar (glucose) in the blood. Counting how many carbohydrates you eat improves how well you manage your blood glucose. This, in turn, helps you manage your diabetes.  Carbohydrates are measured in grams (g) per serving. It is important to know how many carbohydrates (in grams or by serving size) you can have in each meal. This is different for every person. A dietitian can help you make a meal plan and calculate how many carbohydrates you should have at each meal and snack.  What foods contain carbohydrates?  Carbohydrates are found in the following foods:  Grains, such as breads and cereals.  Dried beans and soy products.  Starchy vegetables, such as potatoes, peas, and corn.  Fruit and fruit juices.  Milk and yogurt.  Sweets and snack foods, such as cake, cookies, candy, chips, and soft drinks.  How do I count carbohydrates in foods?  There are two ways to count carbohydrates in food. You can read food labels or learn standard serving sizes of foods. You can use either of these methods or a combination of both.  Using the Nutrition Facts label  The Nutrition Facts list is included on the labels of almost all packaged foods and beverages in the [place]. It includes:  The serving size.  Information about nutrients in each serving, including the grams of carbohydrate per serving.  To use the Nutrition Facts, decide how many servings you will have. Then, multiply the number of servings by the number of carbohydrates per serving. The resulting number is the total grams of carbohydrates that you will be having.  Learning the standard serving sizes of foods  When you eat carbohydrate foods that are not packaged or do not include Nutrition Facts on the label, you need to measure the servings in order to count the grams of  carbohydrates.  Measure the foods that you will eat with a food scale or measuring cup, if needed.  Decide how many standard-size servings you will eat.  Multiply the number of servings by 15. For foods that contain carbohydrates, one serving equals 15 g of carbohydrates.  For example, if you eat 2 cups or 10 oz (300 g) of strawberries, you will have eaten 2 servings and 30 g of carbohydrates (2 servings x 15 g = 30 g).  For foods that have more than one food mixed, such as soups and casseroles, you must count the carbohydrates in each food that is included.  The following list contains standard serving sizes of common carbohydrate-rich foods. Each of these servings has about 15 g of carbohydrates:  1 slice of bread.  1 six-inch (15 cm) tortilla.  ? cup or 2 oz (53 g) cooked rice or pasta.  ½ cup or 3 oz (85 g) cooked or canned, drained and rinsed beans or lentils.  ½ cup or 3 oz (85 g) starchy vegetable, such as peas, corn, or squash.  ½ cup or 4 oz (120 g) hot cereal.  ½ cup or 3 oz (85 g) boiled or mashed potatoes, or ¼ or 3 oz (85 g) of a large baked potato.  ½ cup or 4 fl oz (118 mL) fruit juice.  1 cup or 8 fl oz (237 mL) milk.  1 small or 4 oz (106 g) apple.  ½ or 2 oz (63 g) of a medium banana.  1 cup or 5 oz (150 g) strawberries.  3 cups or 1 oz (28.3 g) popped popcorn.  What is an example of carbohydrate counting?  To calculate the grams of carbohydrates in this sample meal, follow the steps shown below.  Sample meal  3 oz (85 g) chicken breast.  ? cup or 4 oz (106 g) brown rice.  ½ cup or 3 oz (85 g) corn.  1 cup or 8 fl oz (237 mL) milk.  1 cup or 5 oz (150 g) strawberries with sugar-free whipped topping.  Carbohydrate calculation  Identify the foods that contain carbohydrates:  Rice.  Corn.  Milk.  Strawberries.  Calculate how many servings you have of each food:  2 servings rice.  1 serving corn.  1 serving milk.  1 serving strawberries.  Multiply each number of servings by 15  servings rice x 15  g = 30 g.  1 serving corn x 15 g = 15 g.  1 serving milk x 15 g = 15 g.  1 serving strawberries x 15 g = 15 g.  Add together all of the amounts to find the total grams of carbohydrates eaten:  30 g + 15 g + 15 g + 15 g = 75 g of carbohydrates total.  What are tips for following this plan?  Shopping  Develop a meal plan and then make a shopping list.  Buy fresh and frozen vegetables, fresh and frozen fruit, dairy, eggs, beans, lentils, and whole grains.  Look at food labels. Choose foods that have more fiber and less sugar.  Avoid processed foods and foods with added sugars.  Meal planning  Aim to have the same number of grams of carbohydrates at each meal and for each snack time.  Plan to have regular, balanced meals and snacks.  Where to find more information  American Diabetes Association: diabetes.org  Centers for Disease Control and Prevention: cdc.gov  Academy of Nutrition and Dietetics: eatright.org  Association of Diabetes Care & Education Specialists: diabeteseducator.org  Summary  Carbohydrate counting is a method of keeping track of how many carbohydrates you eat.  Eating carbohydrates increases the amount of sugar (glucose) in your blood.  Counting how many carbohydrates you eat improves how well you manage your blood glucose. This helps you manage your diabetes.  A dietitian can help you make a meal plan and calculate how many carbohydrates you should have at each meal and snack.  This information is not intended to replace advice given to you by your health care provider. Make sure you discuss any questions you have with your health care provider.  Document Revised: 07/20/2021 Document Reviewed: 07/21/2021  [employer] Patient Education © 2024 [employer]Exercising to Lose Weight  Getting regular exercise is important for everyone. It is especially important if you are overweight. Being overweight increases your risk of heart disease, stroke, diabetes, high blood pressure, and several types of cancer.  Exercising, and reducing the calories you consume, can help you lose weight and improve fitness and health.  Exercise can be moderate or vigorous intensity. To lose weight, most people need to do a certain amount of moderate or vigorous-intensity exercise each week.  How can exercise affect me?  You lose weight when you exercise enough to burn more calories than you eat. Exercise also reduces body fat and builds muscle. The more muscle you have, the more calories you burn. Exercise also:  Improves mood.  Reduces stress and tension.  Improves your overall fitness, flexibility, and endurance.  Increases bone strength.  Moderate-intensity exercise    Moderate-intensity exercise is any activity that gets you moving enough to burn at least three times more energy (calories) than if you were sitting.  Examples of moderate exercise include:  Walking a mile in 15 minutes.  Doing light yard work.  Biking at an easy pace.  Most people should get at least 150 minutes of moderate-intensity exercise a week to maintain their body weight.  Vigorous-intensity exercise  Vigorous-intensity exercise is any activity that gets you moving enough to burn at least six times more calories than if you were sitting. When you exercise at this intensity, you should be working hard enough that you are not able to carry on a conversation.  Examples of vigorous exercise include:  Running.  Playing a team sport, such as football, basketball, and soccer.  Jumping rope.  Most people should get at least 75 minutes a week of vigorous exercise to maintain their body weight.  What actions can I take to lose weight?  The amount of exercise you need to lose weight depends on:  Your age.  The type of exercise.  Any health conditions you have.  Your overall physical ability.  Talk to your health care provider about how much exercise you need and what types of activities are safe for you.  Nutrition    Make changes to your diet as told by your health care  provider or diet and nutrition specialist (dietitian). This may include:  Eating fewer calories.  Eating more protein.  Eating less unhealthy fats.  Eating a diet that includes fresh fruits and vegetables, whole grains, low-fat dairy products, and lean protein.  Avoiding foods with added fat, salt, and sugar.  Drink plenty of water while you exercise to prevent dehydration or heat stroke.  Activity  Choose an activity that you enjoy and set realistic goals. Your health care provider can help you make an exercise plan that works for you.  Exercise at a moderate or vigorous intensity most days of the week.  The intensity of exercise may vary from person to person. You can tell how intense a workout is for you by paying attention to your breathing and heartbeat. Most people will notice their breathing and heartbeat get faster with more intense exercise.  Do resistance training twice each week, such as:  Push-ups.  Sit-ups.  Lifting weights.  Using resistance bands.  Getting short amounts of exercise can be just as helpful as long, structured periods of exercise. If you have trouble finding time to exercise, try doing these things as part of your daily routine:  Get up, stretch, and walk around every 30 minutes throughout the day.  Go for a walk during your lunch break.  Park your car farther away from your destination.  If you take public transportation, get off one stop early and walk the rest of the way.  Make phone calls while standing up and walking around.  Take the stairs instead of elevators or escalators.  Wear comfortable clothes and shoes with good support.  Do not exercise so much that you hurt yourself, feel dizzy, or get very short of breath.  Where to find more information  [place] Department of Health and Human Services: www.hhs.gov  Centers for Disease Control and Prevention: www.cdc.gov  Contact a health care provider:  Before starting a new exercise program.  If you have questions or concerns about your  weight.  If you have a medical problem that keeps you from exercising.  Get help right away if:  You have any of the following while exercising:  Injury.  Dizziness.  Difficulty breathing or shortness of breath that does not go away when you stop exercising.  Chest pain.  Rapid heartbeat.  These symptoms may represent a serious problem that is an emergency. Do not wait to see if the symptoms will go away. Get medical help right away. Call your local emergency services (911 in the [place] Do not drive yourself to the hospital.  Summary  Getting regular exercise is especially important if you are overweight.  Being overweight increases your risk of heart disease, stroke, diabetes, high blood pressure, and several types of cancer.  Losing weight happens when you burn more calories than you eat.  Reducing the amount of calories you eat, and getting regular moderate or vigorous exercise each week, helps you lose weight.  This information is not intended to replace advice given to you by your health care provider. Make sure you discuss any questions you have with your health care provider.  Document Revised: 2022 Document Reviewed: 2022  [employer] Patient Education ©  [employer]  Medicare Wellness  Personal Prevention Plan of Service     Date of Office Visit:    Encounter Provider:  Emmett Rivas MD  Place of Service:  [facility]  Patient Name: Kayla Adkins  :  1955    As part of the Medicare Wellness portion of your visit today, we are providing you with this personalized preventive plan of services (PPPS). This plan is based upon recommendations of the [place] Preventive Services Task Force (USPSTF) and the Advisory Committee on Immunization Practices (ACIP).    This lists the preventive care services that should be considered, and provides dates of when you are due. Items listed as completed are up-to-date and do not require any further  intervention.    Health Maintenance   Topic Date Due    ANNUAL WELLNESS VISIT  06/07/2024    COVID-19 Vaccine (8 - 2024-25 season) 04/15/2025    INFLUENZA VACCINE  07/01/2025    MAMMOGRAM  09/15/2025    DXA SCAN  09/15/2025    LIPID PANEL  04/24/2026    TDAP/TD VACCINES (3 - Td or Tdap) 03/20/2033    HEPATITIS C SCREENING  Completed    Pneumococcal Vaccine 50+  Completed    ZOSTER VACCINE  Completed    COLORECTAL CANCER SCREENING  Discontinued       No orders of the defined types were placed in this encounter.      No follow-ups on file.           sedated/other (specify)

## 2025-05-05 NOTE — ASSESSMENT & PLAN NOTE
Impaired fasting glucose once again identified.  Low carbohydrate diet shared and after visit summary.  Recommend exercise with goal of weight loss.  Recheck labs 6 months  Orders:    Comprehensive Metabolic Panel; Future    Hemoglobin A1c; Future    Microalbumin / Creatinine Urine Ratio - Urine, Clean Catch; Future

## 2025-05-05 NOTE — ASSESSMENT & PLAN NOTE
Condition is stable. The current medical regimen is effective;  continue present plan and medications.  Orders:    DULoxetine (CYMBALTA) 60 MG capsule; Take 1 capsule by mouth Every Night for 270 days.

## 2025-05-05 NOTE — ASSESSMENT & PLAN NOTE
Condition is stable. The current medical regimen is effective;  continue present plan and medications.  Orders:    atorvastatin (LIPITOR) 10 MG tablet; Take 1 tablet by mouth Every Night for 270 days.    Lipid Panel; Future

## 2025-05-05 NOTE — ASSESSMENT & PLAN NOTE
GERD symptoms stable.  Pros and cons of medication once again reviewed.  Orders:    omeprazole (priLOSEC) 20 MG capsule; Take 1 capsule by mouth Every Night for 270 days.

## 2025-05-05 NOTE — ASSESSMENT & PLAN NOTE
Continue with raloxifene.  DEXA scan due in another year  Orders:    raloxifene (EVISTA) 60 MG tablet; Take 1 tablet by mouth Daily for 270 days.

## 2025-05-05 NOTE — ASSESSMENT & PLAN NOTE
Neutropenia once again identified.  Clinically asymptomatic.  Continue to monitor every 6 months.

## 2025-05-05 NOTE — PROGRESS NOTES
Subjective   The ABCs of the Annual Wellness Visit  Medicare Wellness Visit      Kayla Adkins is a 69 y.o. patient who presents for a Medicare Wellness Visit.    The following portions of the patient's history were reviewed and   updated as appropriate: allergies, current medications, past family history, past medical history, past social history, past surgical history, and problem list.    Compared to one year ago, the patient's physical   health is the same.  Compared to one year ago, the patient's mental   health is the same. Some forgetfulness    Recent Hospitalizations:  She was not admitted to the hospital during the last year.     Current Medical Providers:  Patient Care Team:  Emmett Rivas MD as PCP - General (Family Medicine)  Jody Erickson MD as Consulting Physician (Obstetrics and Gynecology)  Jovan Stinson MD as Consulting Physician (Gastroenterology)  Emmett Rivas MD as Referring Physician (Family Medicine)  Malcolm Wellington MD as Consulting Physician (General Surgery)  Mago Alvarez MD as Consulting Physician (Hematology and Oncology)    Outpatient Medications Prior to Visit   Medication Sig Dispense Refill    aspirin-acetaminophen-caffeine (EXCEDRIN MIGRAINE) 250-250-65 MG per tablet Take 1 tablet by mouth Every 6 (Six) Hours As Needed for Headache.      Simethicone 125 MG tablet Take 125 mg by mouth Every 6 (Six) Hours As Needed (gas). 60 tablet 0    vitamin B-6 (PYRIDOXINE) 50 MG tablet Take 1 tablet by mouth Daily.      atorvastatin (LIPITOR) 10 MG tablet Take 1 tablet by mouth Every Night for 270 days. 90 tablet 2    azithromycin (Zithromax Z-William) 250 MG tablet Take 2 tablets by mouth on day 1, then 1 tablet daily on days 2-5 (Patient not taking: Reported on 5/5/2025) 6 tablet 0    benzonatate (Tessalon Perles) 100 MG capsule Take 1 capsule by mouth 3 (Three) Times a Day As Needed for Cough. (Patient not taking: Reported on 5/5/2025) 30 capsule 0    DULoxetine (CYMBALTA)  "60 MG capsule Take 1 capsule by mouth Every Night for 270 days. 90 capsule 2    metoprolol succinate XL (TOPROL-XL) 50 MG 24 hr tablet Take 1 tablet by mouth Every Night for 270 days. 90 tablet 2    omeprazole (priLOSEC) 20 MG capsule Take 1 capsule by mouth Every Night for 270 days. 90 capsule 2    predniSONE (DELTASONE) 20 MG tablet Take 1 tablet by mouth Daily. (Patient not taking: Reported on 5/5/2025) 5 tablet 0    raloxifene (EVISTA) 60 MG tablet Take 1 tablet by mouth Daily for 270 days. 90 tablet 2     No facility-administered medications prior to visit.     No opioid medication identified on active medication list. I have reviewed chart for other potential  high risk medication/s and harmful drug interactions in the elderly.      Aspirin is not on active medication list.  Aspirin use is not indicated based on review of current medical condition/s. Risk of harm outweighs potential benefits.  .    Patient Active Problem List   Diagnosis    Essential hypertension    Other hyperlipidemia    Anxiety    Gastroesophageal reflux disease without esophagitis    Lymphocytic colitis of colon    Fear of flying    Chondromalacia of left knee    Tear of medial meniscus of left knee, current    Other neutropenia    Diarrhea    Iron deficiency anemia    Colonic mass    Malignant neoplasm of ascending colon    Iron malabsorption    Encounter for fitting and adjustment of vascular catheter    Chemotherapy induced nausea and vomiting    Elevated alkaline phosphatase level    Age-related osteoporosis without current pathological fracture    Thyroid nodule    Port-A-Cath in place    IFG (impaired fasting glucose)     Advance Care Planning Advance Directive is on file.  ACP discussion was held with the patient during this visit. Patient has an advance directive in EMR which is still valid.             Objective   Vitals:    05/05/25 1447   BP: 124/82   Pulse: 120   SpO2: 98%   Weight: 78.5 kg (173 lb)   Height: 157 cm (61.8\") " "  PainSc: 0-No pain       Estimated body mass index is 31.85 kg/m² as calculated from the following:    Height as of this encounter: 157 cm (61.8\").    Weight as of this encounter: 78.5 kg (173 lb).                Does the patient have evidence of cognitive impairment? No  Lab Results   Component Value Date    CHLPL 179 2025    TRIG 157 (H) 2025    HDL 59 2025    LDL 93 2025    VLDL 27 2025                                                                                                Health  Risk Assessment    Smoking Status:  Social History     Tobacco Use   Smoking Status Never   Smokeless Tobacco Never     Alcohol Consumption:  Social History     Substance and Sexual Activity   Alcohol Use Not Currently    Comment: Very rarely       Fall Risk Screen  STEADI Fall Risk Assessment was completed, and patient is at LOW risk for falls.Assessment completed on:2025    Depression Screening   Little interest or pleasure in doing things? Not at all   Feeling down, depressed, or hopeless? Not at all   PHQ-2 Total Score 0      Health Habits and Functional and Cognitive Screenin/5/2025     3:06 PM   Functional & Cognitive Status   Do you have difficulty preparing food and eating? No   Do you have difficulty bathing yourself, getting dressed or grooming yourself? No   Do you have difficulty using the toilet? No   Do you have difficulty moving around from place to place? No   Do you have trouble with steps or getting out of a bed or a chair? No   Current Diet Well Balanced Diet   Dental Exam Up to date   Eye Exam Up to date   Exercise (times per week) 0 times per week   Current Exercises Include No Regular Exercise   Do you need help using the phone?  No   Are you deaf or do you have serious difficulty hearing?  No   Do you need help to go to places out of walking distance? No   Do you need help shopping? No   Do you need help preparing meals?  No   Do you need help with housework?  No "   Do you need help with laundry? No   Do you need help taking your medications? No   Do you need help managing money? No   Do you ever drive or ride in a car without wearing a seat belt? No   Have you felt unusual stress, anger or loneliness in the last month? No   Who do you live with? Spouse   If you need help, do you have trouble finding someone available to you? No   Have you been bothered in the last four weeks by sexual problems? No   Do you have difficulty concentrating, remembering or making decisions? No           Age-appropriate Screening Schedule:  Refer to the list below for future screening recommendations based on patient's age, sex and/or medical conditions. Orders for these recommended tests are listed in the plan section. The patient has been provided with a written plan.    Health Maintenance List  Health Maintenance   Topic Date Due    ANNUAL WELLNESS VISIT  06/07/2024    COVID-19 Vaccine (8 - 2024-25 season) 04/15/2025    MAMMOGRAM  06/01/2025 (Originally 9/15/2024)    INFLUENZA VACCINE  07/01/2025    DXA SCAN  09/15/2025    LIPID PANEL  04/24/2026    TDAP/TD VACCINES (3 - Td or Tdap) 03/20/2033    HEPATITIS C SCREENING  Completed    Pneumococcal Vaccine 50+  Completed    ZOSTER VACCINE  Completed    COLORECTAL CANCER SCREENING  Discontinued                                                                                                                                                CMS Preventative Services Quick Reference  Risk Factors Identified During Encounter  Immunizations Discussed/Encouraged: COVID19  Inactivity/Sedentary: Patient was advised to exercise at least 150 minutes a week per CDC recommendations.    The above risks/problems have been discussed with the patient.  Pertinent information has been shared with the patient in the After Visit Summary.  An After Visit Summary and PPPS were made available to the patient.    Follow Up:   Next Medicare Wellness visit to be scheduled in 1  year.         Additional E&M Note during same encounter follows:  Patient has additional, significant, and separately identifiable condition(s)/problem(s) that require work above and beyond the Medicare Wellness Visit     Chief Complaint  Medicare Wellness-subsequent    Subjective    HPI  CHARAN is also being seen today for an annual adult preventative physical exam.        The patient presents for a Medicare wellness visit and medication refill.    General Health  She reports overall good health, with no recent surgeries. She has gained weight and admits to an unhealthy diet. No chronic pain, falls, or bladder control issues. Living will unchanged. Annual mammograms through OB/GYN, next scheduled for 06/2025. Received RSV vaccine.  - Onset: Weight gain noted recently.  - Character: Weight gain, unhealthy diet.    Memory Loss Concerns  Concerns about memory loss with omeprazole, despite effective reflux management. Attempted discontinuation led to reflux recurrence.    Medication Usage  Uses Excedrin Migraine sparingly; duloxetine effective.  - Character: Excedrin Migraine used sparingly; duloxetine effective.    Hypertension  On metoprolol for hypertension.  - Character: Hypertension managed with metoprolol.    Bone Health  Taking raloxifene, beneficial. DEXA scans and bone density tests current.  - Character: Raloxifene beneficial; DEXA scans and bone density tests current.    Supplement Usage  Takes B6 daily and simethicone as needed, both helpful.    Neutropenia  History of neutropenia for ~30 years.    Sleep Quality  No sleep apnea testing despite snoring reports. Oura ring indicates poor sleep quality.  - Character: Snoring reports; poor sleep quality indicated by Oura ring.    Cardiac Testing Interest  Interested in cardiac testing due to family history of heart disease, asymptomatic. Normal cardiac testing before chemotherapy 3 years ago. No recall of stress test. History of shortness of breath attributed to  "deconditioning.  - Onset: Interest in cardiac testing due to family history of heart disease.  - Character: Asymptomatic; normal cardiac testing before chemotherapy 3 years ago.  - Alleviating/Aggravating Factors: History of shortness of breath attributed to deconditioning.    FAMILY HISTORY  Sister has diabetes. Father had heart disease and early onset heart attack.    MEDICATIONS  Excedrin migraines, duloxetine, metoprolol, omeprazole, raloxifene, B6, simethicone    IMMUNIZATIONS  She has received the RSV vaccine.  Review of Systems   Respiratory:  Positive for shortness of breath (occaisional).    Cardiovascular:  Negative for chest pain.   Genitourinary:  Negative for difficulty urinating.   Psychiatric/Behavioral:  Negative for dysphoric mood.    All other systems reviewed and are negative.         Objective   Vital Signs:  /82   Pulse 120   Ht 157 cm (61.8\")   Wt 78.5 kg (173 lb)   SpO2 98%   BMI 31.85 kg/m²   Physical Exam  Vitals reviewed.   Constitutional:       General: She is not in acute distress.     Appearance: She is obese.   Eyes:      General: Lids are normal.      Conjunctiva/sclera: Conjunctivae normal.   Neck:      Vascular: No carotid bruit.      Trachea: No tracheal deviation.   Cardiovascular:      Rate and Rhythm: Normal rate and regular rhythm.      Heart sounds: Normal heart sounds. No murmur heard.  Pulmonary:      Effort: Pulmonary effort is normal.      Breath sounds: Normal breath sounds.   Skin:     General: Skin is warm and dry.   Neurological:      Mental Status: She is alert. She is not disoriented.   Psychiatric:         Speech: Speech normal.         Behavior: Behavior normal. Behavior is cooperative.          The following data was reviewed by: Emmett Rivas MD on 05/05/2025:        Results  - Laboratory Studies:    - Sodium: 139    - Potassium: 4.6    - Chloride: 103    - Calcium: 9.5    - BUN: 15    - Creatinine: 0.83    - Glucose: 114    - Proteins: normal    - " Liver function tests: normal    - TSH: 1.28    - Cholesterol: 179    - HDL: 59    - LDL: 93    - Triglycerides: 157    - WBC: 2.6    - Hemoglobin: 14    - Platelets: 320,000           Assessment and Plan Additional age appropriate preventative wellness advice topics were discussed during today's preventative wellness exam(some topics already addressed during AWV portion of the note above):    Physical Activity: Advised cardiovascular activity 150 minutes per week as tolerated. (example brisk walk for 30 minutes, 5 days a week).     Nutrition: Discussed nutrition plan with patient. Information shared in after visit summary. Goal is for a well balanced diet to enhance overall health.     Motor Vehicle Safety Discussion:  Wearing Seatbelt While in Motor Vehicle recommendation. Adhering to posted speed limit recommendation.       Medicare annual wellness visit, subsequent  Immunizations discussed.        Encounter for wellness examination in adult  Diet and exercise discussed. Additional preventative measures as discussed above       Other hyperlipidemia     Condition is stable. The current medical regimen is effective;  continue present plan and medications.  Orders:    atorvastatin (LIPITOR) 10 MG tablet; Take 1 tablet by mouth Every Night for 270 days.    Lipid Panel; Future    Anxiety  Condition is stable. The current medical regimen is effective;  continue present plan and medications.  Orders:    DULoxetine (CYMBALTA) 60 MG capsule; Take 1 capsule by mouth Every Night for 270 days.    Essential hypertension    Blood pressure well-controlled today 124/82.  Continue same medication  Orders:    metoprolol succinate XL (TOPROL-XL) 50 MG 24 hr tablet; Take 1 tablet by mouth Every Night for 270 days.    Comprehensive Metabolic Panel; Future    CBC & Differential; Future    TSH; Future    Gastroesophageal reflux disease without esophagitis  GERD symptoms stable.  Pros and cons of medication once again  reviewed.  Orders:    omeprazole (priLOSEC) 20 MG capsule; Take 1 capsule by mouth Every Night for 270 days.    Age-related osteoporosis without current pathological fracture  Continue with raloxifene.  DEXA scan due in another year  Orders:    raloxifene (EVISTA) 60 MG tablet; Take 1 tablet by mouth Daily for 270 days.    Immunization due  COVID-vaccine administered today.  Orders:    COVID-19 (Pfizer) 12yrs+ (COMIRNATY)    IFG (impaired fasting glucose)  Impaired fasting glucose once again identified.  Low carbohydrate diet shared and after visit summary.  Recommend exercise with goal of weight loss.  Recheck labs 6 months  Orders:    Comprehensive Metabolic Panel; Future    Hemoglobin A1c; Future    Microalbumin / Creatinine Urine Ratio - Urine, Clean Catch; Future    Sleep disturbance  New problem with daytime sleepiness.  Referral to sleep medicine.  Orders:    Ambulatory Referral to Sleep Medicine    Other neutropenia  Neutropenia once again identified.  Clinically asymptomatic.  Continue to monitor every 6 months.                 Follow Up   Return in about 6 months (around 11/5/2025).  Patient was given instructions and counseling regarding her condition or for health maintenance advice. Please see specific information pulled into the AVS if appropriate.  Patient or patient representative verbalized consent for the use of Ambient Listening during the visit with  Emmett Rivas MD for chart documentation. 5/5/2025  15:54 EDT

## 2025-05-05 NOTE — ASSESSMENT & PLAN NOTE
Blood pressure well-controlled today 124/82.  Continue same medication  Orders:    metoprolol succinate XL (TOPROL-XL) 50 MG 24 hr tablet; Take 1 tablet by mouth Every Night for 270 days.    Comprehensive Metabolic Panel; Future    CBC & Differential; Future    TSH; Future

## 2025-05-27 ENCOUNTER — OFFICE VISIT (OUTPATIENT)
Facility: HOSPITAL | Age: 70
End: 2025-05-27
Payer: COMMERCIAL

## 2025-05-27 VITALS
HEART RATE: 110 BPM | SYSTOLIC BLOOD PRESSURE: 140 MMHG | WEIGHT: 174.8 LBS | DIASTOLIC BLOOD PRESSURE: 83 MMHG | HEIGHT: 62 IN | OXYGEN SATURATION: 96 % | BODY MASS INDEX: 32.17 KG/M2

## 2025-05-27 DIAGNOSIS — R06.83 SNORING: ICD-10-CM

## 2025-05-27 DIAGNOSIS — E66.9 OBESITY (BMI 30-39.9): ICD-10-CM

## 2025-05-27 DIAGNOSIS — G47.8 NON-RESTORATIVE SLEEP: ICD-10-CM

## 2025-05-27 DIAGNOSIS — G47.10 HYPERSOMNIA: ICD-10-CM

## 2025-05-27 DIAGNOSIS — G47.30 SLEEP APNEA, UNSPECIFIED TYPE: Primary | ICD-10-CM

## 2025-05-27 PROCEDURE — 1160F RVW MEDS BY RX/DR IN RCRD: CPT | Performed by: FAMILY MEDICINE

## 2025-05-27 PROCEDURE — 3077F SYST BP >= 140 MM HG: CPT | Performed by: FAMILY MEDICINE

## 2025-05-27 PROCEDURE — 99214 OFFICE O/P EST MOD 30 MIN: CPT | Performed by: FAMILY MEDICINE

## 2025-05-27 PROCEDURE — 1159F MED LIST DOCD IN RCRD: CPT | Performed by: FAMILY MEDICINE

## 2025-05-27 PROCEDURE — 3079F DIAST BP 80-89 MM HG: CPT | Performed by: FAMILY MEDICINE

## 2025-05-27 PROCEDURE — G0463 HOSPITAL OUTPT CLINIC VISIT: HCPCS

## 2025-05-27 NOTE — PROGRESS NOTES
[facility]Consultation       Reason for Consultation: Sleep disturbance      Patient Care Team:  Emmett Rivas MD as PCP - General (Family Medicine)  Jody Erickson MD as Consulting Physician (Obstetrics and Gynecology)  Jovan Stinson MD as Consulting Physician (Gastroenterology)  Emmett Rivas MD as Referring Physician (Family Medicine)  Malcolm Wellington MD as Consulting Physician (General Surgery)  Mago Alvarez MD as Consulting Physician (Hematology and Oncology)  Tesha Bingham MD as Consulting Physician (Sleep Medicine)      History of present illness:  Thank you for asking me to see your patient.  The patient is a 69 y.o. female presents today with concern for sleep disorder.  No history of prior sleep study; tonsillectomy 1970.  Sleep latency is not long sleeps 4 to 5 hours 1 nap per day no rotating shifts.  Reports sometimes hypersomnia nonrestorative sleep weight gain 20 pounds over the past 5 years snoring morning headaches leg jerking at night urge sensations nocturia up to 1 time a night sometimes restless sleep.  BMI 32.2.    Medical Conditions (PMH):   Anxiety  Restless legs  Hypertension  Acid reflux  Headaches    Social history:  Do you drive a commercial vehicle:  No   Shift work:  No   Tobacco use:  No   Alcohol use: 0 per week  Caffeinated drinks: 2 per day  Occupation: Unanswered    Family History (parents and siblings) (pertaining to sleep medicine):  BEN    Allergies:  Penicillins       Current Outpatient Medications:     aspirin-acetaminophen-caffeine (EXCEDRIN MIGRAINE) 250-250-65 MG per tablet, Take 1 tablet by mouth Every 6 (Six) Hours As Needed for Headache., Disp: , Rfl:     atorvastatin (LIPITOR) 10 MG tablet, Take 1 tablet by mouth Every Night for 270 days., Disp: 90 tablet, Rfl: 2    DULoxetine (CYMBALTA) 60 MG capsule, Take 1 capsule by mouth Every Night for 270 days., Disp: 90 capsule, Rfl: 2    metoprolol succinate XL (TOPROL-XL) 50 MG 24 hr  "tablet, Take 1 tablet by mouth Every Night for 270 days., Disp: 90 tablet, Rfl: 2    omeprazole (priLOSEC) 20 MG capsule, Take 1 capsule by mouth Every Night for 270 days., Disp: 90 capsule, Rfl: 2    raloxifene (EVISTA) 60 MG tablet, Take 1 tablet by mouth Daily for 270 days., Disp: 90 tablet, Rfl: 2    Simethicone 125 MG tablet, Take 125 mg by mouth Every 6 (Six) Hours As Needed (gas)., Disp: 60 tablet, Rfl: 0    vitamin B-6 (PYRIDOXINE) 50 MG tablet, Take 1 tablet by mouth Daily., Disp: , Rfl:     Vital Signs:    Vitals:    05/27/25 1344   BP: 140/83   Pulse: 110   SpO2: 96%   Weight: 79.3 kg (174 lb 12.8 oz)   Height: 157 cm (61.81\")      Body mass index is 32.17 kg/m².  Neck Circumference: 15 inches      REVIEW OF SYSTEMS:  Pertinent positive symptoms are:  Snoring  [place] Sleepiness Scale of Total score: 12   Anxiety  Frequent heartburn      Physical exam:  Vitals:    05/27/25 1344   BP: 140/83   Pulse: 110   SpO2: 96%   Weight: 79.3 kg (174 lb 12.8 oz)   Height: 157 cm (61.81\")    Body mass index is 32.17 kg/m². Neck Circumference: 15 inches  HEENT: Head is atraumatic, normocephalic  Eyes: pupils are round equal and reacting to light and accommodation, conjunctiva normal  Throat: tongue normal  NECK:Neck Circumference: 15 inches  RESPIRATORY SYSTEM: Regular respirations  CARDIOVASULAR SYSTEM: Regular rate  EXTREMITES: No cyanosis, clubbing  NEUROLOGICAL SYSTEM: Oriented x 3, no gross motor defects, gait normal      Impression:  1. Sleep apnea, unspecified type    2. Hypersomnia    3. Non-restorative sleep    4. Snoring    5. Obesity (BMI 30-39.9)        Plan:    Office note(s) from care team reviewed. Office note(s) reviewed: 5/5/2025 PCP    Labs/ Test Results Reviewed:  TSH          10/30/2024    10:08 4/24/2025    09:04   TSH   TSH 0.978  1.280                  ASSESSMENT AND PLAN:   At risk for sleep apnea: patient's symptoms and physical examination are concerning for possible sleep apnea.   I discussed " the signs, symptoms, and pathophysiology of sleep apnea with this patient.  I also discussed the potential complications of untreated sleep apnea including but not limited to resistant hypertension, insulin resistance, pulmonary hypertension, atrial fibrillation, heart attack, stroke, nonrestorative sleep with hypersomnia which can increase risk for motor vehicle accidents, etc.   Different testing methods including home-based and lab based sleep studies were discussed with this patient.   Based on patient history and physical examination, will proceed with HST.  The order for the sleep study is placed in [facility].  The test will be scheduled after prior authorization has been obtained through patient's insurance.  Treatment and management will be discussed in more detail with this patient after the test is completed.  All questions were answered to patient's satisfaction.   Snoring: snoring is the sound created by turbulent airflow vibrating upper airway soft tissue.  I have also discussed factors affecting snoring including sleep deprivation, sleeping on the back and alcohol ingestion. To minimize snoring, patient is advised to have adequate sleep, sleep on their side, and avoid alcohol and sedative medications around bedtime.   Excessive daytime sleepiness:  [place] Sleepiness Scale of Total score: 12.  There are many causes of excessive daytime sleepiness.  Rule out sleep apnea as a contributing factor, as above.  Do not drive, operate heavy machinery, or do activities that require high concentration if feeling tired/drowsy.  Obesity: Body mass index is 32.17 kg/m².. Patients who are overweight or obese are at increased risk of sleep apnea/ sleep disordered breathing. Weight reduction and healthy lifestyle are encouraged in overweight/ obese patients as part of a comprehensive approach to sleep apnea treatment.    If positive HST return to clinic to discuss results and treatment options; oral mandibular device  versus CPAP    I have also discussed with the patient the following  Sleep hygiene: try to maintain a regular bed time and wake time, avoid watching TV/ using electronic devices in bed (including cell phones), limit caffeinated and alcoholic beverages before bed, try to maintain a cool and quiet sleep environment, avoid daytime naps  Adequate amount of sleep: most people need around 7 to 8 hours of sleep each night       Patient's questions were answered.      Thank you for allowing me to participate in your patient's care.    Tesha Bingham MD  Sleep Medicine  05/27/25  14:34 EDT

## 2025-06-24 ENCOUNTER — HOSPITAL ENCOUNTER (OUTPATIENT)
Dept: SLEEP MEDICINE | Facility: HOSPITAL | Age: 70
Discharge: HOME | End: 2025-06-24
Admitting: FAMILY MEDICINE
Payer: COMMERCIAL

## 2025-06-24 DIAGNOSIS — R06.83 SNORING: ICD-10-CM

## 2025-06-24 DIAGNOSIS — G47.8 NON-RESTORATIVE SLEEP: ICD-10-CM

## 2025-06-24 DIAGNOSIS — G47.30 SLEEP APNEA, UNSPECIFIED TYPE: ICD-10-CM

## 2025-06-24 DIAGNOSIS — E66.9 OBESITY (BMI 30-39.9): ICD-10-CM

## 2025-06-24 DIAGNOSIS — G47.10 HYPERSOMNIA: ICD-10-CM

## 2025-06-24 PROCEDURE — G0399 HOME SLEEP TEST/TYPE 3 PORTA: HCPCS

## 2025-07-07 ENCOUNTER — OFFICE VISIT (OUTPATIENT)
Facility: HOSPITAL | Age: 70
End: 2025-07-07
Payer: COMMERCIAL

## 2025-07-07 VITALS
SYSTOLIC BLOOD PRESSURE: 142 MMHG | HEART RATE: 102 BPM | HEIGHT: 62 IN | WEIGHT: 174.4 LBS | OXYGEN SATURATION: 96 % | BODY MASS INDEX: 32.09 KG/M2 | DIASTOLIC BLOOD PRESSURE: 80 MMHG

## 2025-07-07 DIAGNOSIS — G47.33 OSA (OBSTRUCTIVE SLEEP APNEA): Primary | ICD-10-CM

## 2025-07-07 DIAGNOSIS — G47.9 TROUBLE IN SLEEPING: ICD-10-CM

## 2025-07-07 DIAGNOSIS — E66.9 OBESITY (BMI 30-39.9): ICD-10-CM

## 2025-07-07 PROCEDURE — 1160F RVW MEDS BY RX/DR IN RCRD: CPT | Performed by: NURSE PRACTITIONER

## 2025-07-07 PROCEDURE — 1159F MED LIST DOCD IN RCRD: CPT | Performed by: NURSE PRACTITIONER

## 2025-07-07 PROCEDURE — 99214 OFFICE O/P EST MOD 30 MIN: CPT | Performed by: NURSE PRACTITIONER

## 2025-07-07 PROCEDURE — 3077F SYST BP >= 140 MM HG: CPT | Performed by: NURSE PRACTITIONER

## 2025-07-07 PROCEDURE — 3079F DIAST BP 80-89 MM HG: CPT | Performed by: NURSE PRACTITIONER

## 2025-07-07 NOTE — PROGRESS NOTES
[facility]  [address] [facility]   [address]  [place] [address]  Phone [phone]  Fax [phone]        SLEEP CLINIC FOLLOW-UP PROGRESS NOTE    Kayla Adkins  [id number]   1955  69 y.o.  female      PCP: Emmett Rivas MD    DATE OF VISIT: 7/7/2025          CHIEF COMPLAINT: Obstructive sleep apnea    HPI:  This is a 69 y.o. year old patient who presents to the clinic today for the management of obstructive sleep apnea.  Patient had a(n) home sleep study 6/2025 showing obstructive sleep apnea with AHI of 7/hr. it was recommended that she follow-up in the office to discuss treatment options including possible oral mandibular advancement device versus auto CPAP versus weight loss.    Patient presents today to discuss test results and treatment options.  Patient feels that she was more restless the night of the home sleep study with frequent nighttime awakenings and she reports aura ring reports she only slept 3 of the hours that device was on.  We discussed that home sleep study in general could underestimate severity of sleep apnea, and that poor sleep efficiency night of home sleep study could potentially underestimate the severity of sleep apnea further.  We reviewed pathophysiology of obstructive sleep apnea as well as risks of untreated or poorly treated sleep apnea including not limited to cardiopulmonary and cerebrovascular risks as well as potential negative impacts on mood/memory/concentration/sleep.    Patient continues to have frequent nighttime awakenings, follows with outside provider on anxiety, and follows with outside provider on hypertension.  Patient significant other is with her in the office today, per patient preference.  They believe her blood pressure has been a little bit higher recently, would recommend discussing further with primary care, did discuss that untreated sleep apnea could make blood pressure harder to control as well.  Given comorbidities and concern  "for underestimating severity of sleep apnea, did recommend proceeding with treatment for sleep apnea.  We discussed that healthy weight loss may help reduce severity of sleep apnea or may help resolve it, however approximately 30% of people with sleep apnea have weight considered within normal limits, would never want to assume sleep apnea resolves with weight loss.  We discussed treatment options including not limited to CPAP therapy which is still generally considered gold standard of treatment for sleep apnea, as well as possible oral mandibular advancement device (patient denies issues with jaw or TMJ issues, denies loose teeth, denies any significant dental issues), and we also discussed potential bongo Rx though not as much evidence available on this, also discussed possible surgical options as they inquired about this, though may not be sufficient in treating sleep apnea and can be more invasive and uncomfortable.  Patient is considering auto CPAP versus looking into oral mandibular advancement device.  They would like to go home and think about their options and will reach out and let us know whether they would like Pap order placed or referral to Dr. Joshua Edwards.          MEDICATIONS: reviewed     ALLERGIES:  Penicillins    SOCIAL HISTORY (habits pertaining to sleep medicine):  Tobacco use: No   Alcohol use: 0 per week  Caffeine use: 2     REVIEW OF SYSTEMS:   Pertinent positive symptoms are:  Kiln Sleepiness Scale :Total score: 14   Anxiety: Follows with outside provider        PHYSICAL EXAMINATION:  CONSTITUTIONAL:  Vitals:    07/07/25 1355   BP: 142/80   Pulse: 102   SpO2: 96%   Weight: 79.1 kg (174 lb 6.4 oz)   Height: 157 cm (61.81\")    Body mass index is 32.09 kg/m².   HEAD: atraumatic, normocephalic  RESP SYSTEM: not in respiratory distress, breathing unlabored  CARDIOVASULAR: normal rate, no edema noted   NEURO: Alert and oriented x 3, mood and affect appeared appropriate          ASSESSMENT " AND PLAN:  Obstructive Sleep Apnea: Mild on HST though concerned this could have been underestimated by poor sleep the night of the study.  Discussed treatment options in great detail.  Reviewed risks of untreated or poorly treated sleep apnea.  Patient deciding between trial of auto CPAP versus looking into coverage for oral mandibular advancement device.  She would like to think about this more at home and will reach out if she would like order for CPAP or referral to look into oral mandibular advancement device.  Also offered to see her back at any point if she has any additional questions or concerns.  We also discussed she is welcome to get a second opinion from another provider, however they declined at this time, they are pleased with the options discussed during visit today.  Obesity: Body mass index is 32.09 kg/m².. Patients who are overweight or obese are at increased risk of sleep apnea/ sleep disordered breathing. Weight reduction and healthy lifestyle are encouraged in overweight/ obese patients as part of a comprehensive approach to sleep apnea treatment.     Trouble staying asleep at night: We discussed that untreated sleep apnea may contribute to frequent nighttime awakenings.  Discussed healthy sleep habits and handout provided.  [facility].  Discussed possible referral to look into candidacy for CBT-I if symptoms worsen or fail to improve despite addressing the above.      Patient will follow-up after oromandibular advancement device adjusted, if optimal, or after being on CPAP 30 to 90 days, if apical, based on patient choice.  She will reach out when she will let us know what she has decided for treatment of sleep apnea where she has additional questions or concerns prior.  Patient's questions were answered.          Thank you for allowing me to participate in the care of this patient.     Shae Foster DNP, APRN  [facility]

## (undated) DEVICE — VISUALIZATION SYSTEM: Brand: CLEARIFY

## (undated) DEVICE — SENSR O2 OXIMAX FNGR A/ 18IN NONSTR

## (undated) DEVICE — VESSEL SEALER EXTEND: Brand: ENDOWRIST

## (undated) DEVICE — PATIENT RETURN ELECTRODE, SINGLE-USE, CONTACT QUALITY MONITORING, ADULT, WITH 9FT CORD, FOR PATIENTS WEIGING OVER 33LBS. (15KG): Brand: MEGADYNE

## (undated) DEVICE — ANTIBACTERIAL UNDYED BRAIDED (POLYGLACTIN 910), SYNTHETIC ABSORBABLE SUTURE: Brand: COATED VICRYL

## (undated) DEVICE — ENDOPATH XCEL BLADELESS TROCARS WITH STABILITY SLEEVES: Brand: ENDOPATH XCEL

## (undated) DEVICE — SUT SILK 2/0 SH 30IN K833H

## (undated) DEVICE — TUBING, SUCTION, 1/4" X 20', STRAIGHT: Brand: MEDLINE INDUSTRIES, INC.

## (undated) DEVICE — DRP C/ARM 41X74IN

## (undated) DEVICE — STAPLER 60: Brand: SUREFORM

## (undated) DEVICE — GLV SURG BIOGEL LTX PF 7 1/2

## (undated) DEVICE — APPL CHLORAPREP HI/LITE 26ML ORNG

## (undated) DEVICE — KT ORCA ORCAPOD DISP STRL

## (undated) DEVICE — TUBING, SUCTION, 1/4" X 10', STRAIGHT: Brand: MEDLINE

## (undated) DEVICE — VIOLET BRAIDED (POLYGLACTIN 910), SYNTHETIC ABSORBABLE SUTURE: Brand: COATED VICRYL

## (undated) DEVICE — CANNULA SEAL

## (undated) DEVICE — SINGLE-USE BIOPSY FORCEPS: Brand: RADIAL JAW 4

## (undated) DEVICE — DRSNG SURESITE WNDW 4X4.5

## (undated) DEVICE — BLADELESS OBTURATOR: Brand: WECK VISTA

## (undated) DEVICE — ELECTRD BLD EZ CLN MOD XLNG 2.75IN

## (undated) DEVICE — CANN O2 ETCO2 FITS ALL CONN CO2 SMPL A/ 7IN DISP LF

## (undated) DEVICE — MEDI-VAC YANKAUER SUCTION HANDLE W/BULBOUS TIP: Brand: CARDINAL HEALTH

## (undated) DEVICE — REDUCER: Brand: ENDOWRIST

## (undated) DEVICE — SUT MNCRYL 4/0 PS2 18 IN

## (undated) DEVICE — SOL NACL 0.9PCT 1000ML

## (undated) DEVICE — TROCAR SITE CLOSURE DEVICE: Brand: ENDO CLOSE

## (undated) DEVICE — TIP COVER ACCESSORY

## (undated) DEVICE — NDL HYPO PRECISIONGLIDE REG 25G 1 1/2

## (undated) DEVICE — BITEBLOCK OMNI BLOC

## (undated) DEVICE — SYR LL TP 10ML STRL

## (undated) DEVICE — BLADELESS OBTURATOR, LONG: Brand: WECK VISTA

## (undated) DEVICE — LN SMPL CO2 SHTRM SD STREAM W/M LUER

## (undated) DEVICE — ADAPT CLN BIOGUARD AIR/H2O DISP

## (undated) DEVICE — SUT VIC 0 TIES 18IN J912G

## (undated) DEVICE — SOL ANTISTICK CAUTRY ELECTROLUBE LF

## (undated) DEVICE — 3M™ STERI-DRAPE™ INSTRUMENT POUCH 1018L: Brand: STERI-DRAPE™

## (undated) DEVICE — LOU GENERAL ROBOT: Brand: MEDLINE INDUSTRIES, INC.

## (undated) DEVICE — ARM DRAPE

## (undated) DEVICE — PK PROC MINOR TOWER 40

## (undated) DEVICE — SEAL

## (undated) DEVICE — COLUMN DRAPE

## (undated) DEVICE — SUT MNCRYL PLS ANTIB UD 4/0 PS2 18IN

## (undated) DEVICE — SPNG GZ WOVN 4X4IN 12PLY 10/BX STRL

## (undated) DEVICE — LAPAROSCOPIC SMOKE ELIMINATION DEVICE: Brand: PNEUVIEW XE

## (undated) DEVICE — PENCL ES MEGADINE EZ/CLEAN BUTN W/HOLSTR 10FT

## (undated) DEVICE — MSK PROC CURAPLEX O2 2/ADAPT 7FT

## (undated) DEVICE — STPLR SKIN VISISTAT WD 35CT